# Patient Record
Sex: MALE | Race: WHITE | NOT HISPANIC OR LATINO | Employment: OTHER | URBAN - METROPOLITAN AREA
[De-identification: names, ages, dates, MRNs, and addresses within clinical notes are randomized per-mention and may not be internally consistent; named-entity substitution may affect disease eponyms.]

---

## 2017-04-28 DIAGNOSIS — R73.9 BLOOD GLUCOSE ELEVATED: Primary | ICD-10-CM

## 2017-04-28 DIAGNOSIS — E03.9 ACQUIRED HYPOTHYROIDISM: ICD-10-CM

## 2017-04-28 DIAGNOSIS — E78.5 HYPERLIPIDEMIA, UNSPECIFIED HYPERLIPIDEMIA TYPE: ICD-10-CM

## 2017-05-07 LAB
ALBUMIN SERPL-MCNC: 3.9 G/DL (ref 3.5–5.2)
ALBUMIN/GLOB SERPL: 1.1 G/DL
ALP SERPL-CCNC: 79 U/L (ref 39–117)
ALT SERPL-CCNC: 13 U/L (ref 1–41)
AST SERPL-CCNC: 15 U/L (ref 1–40)
BASOPHILS # BLD AUTO: 0.08 10*3/MM3 (ref 0–0.2)
BASOPHILS NFR BLD AUTO: 0.8 % (ref 0–1.5)
BILIRUB SERPL-MCNC: 0.3 MG/DL (ref 0.1–1.2)
BUN SERPL-MCNC: 25 MG/DL (ref 8–23)
BUN/CREAT SERPL: 16.4 (ref 7–25)
CALCIUM SERPL-MCNC: 9.3 MG/DL (ref 8.2–9.6)
CHLORIDE SERPL-SCNC: 102 MMOL/L (ref 98–107)
CHOLEST SERPL-MCNC: 133 MG/DL (ref 100–199)
CO2 SERPL-SCNC: 28.8 MMOL/L (ref 22–29)
CREAT SERPL-MCNC: 1.52 MG/DL (ref 0.76–1.27)
EOSINOPHIL # BLD AUTO: 0.18 10*3/MM3 (ref 0–0.7)
EOSINOPHIL NFR BLD AUTO: 1.8 % (ref 0.3–6.2)
ERYTHROCYTE [DISTWIDTH] IN BLOOD BY AUTOMATED COUNT: 13.5 % (ref 11.5–14.5)
GLOBULIN SER CALC-MCNC: 3.7 GM/DL
GLUCOSE SERPL-MCNC: 105 MG/DL (ref 65–99)
HBA1C MFR BLD: 5.6 % (ref 4.8–5.6)
HCT VFR BLD AUTO: 41.3 % (ref 40.4–52.2)
HDL SERPL-SCNC: 33.7 UMOL/L
HDLC SERPL-MCNC: 52 MG/DL
HGB BLD-MCNC: 13.5 G/DL (ref 13.7–17.6)
IMM GRANULOCYTES # BLD: 0.02 10*3/MM3 (ref 0–0.03)
IMM GRANULOCYTES NFR BLD: 0.2 % (ref 0–0.5)
LDL SERPL QN: 21.5 NM
LDL SERPL-SCNC: 634 NMOL/L
LDL SMALL SERPL-SCNC: 296 NMOL/L
LDLC SERPL CALC-MCNC: 63 MG/DL (ref 0–99)
LYMPHOCYTES # BLD AUTO: 2.79 10*3/MM3 (ref 0.9–4.8)
LYMPHOCYTES NFR BLD AUTO: 28.6 % (ref 19.6–45.3)
MCH RBC QN AUTO: 31.7 PG (ref 27–32.7)
MCHC RBC AUTO-ENTMCNC: 32.7 G/DL (ref 32.6–36.4)
MCV RBC AUTO: 96.9 FL (ref 79.8–96.2)
MONOCYTES # BLD AUTO: 1.09 10*3/MM3 (ref 0.2–1.2)
MONOCYTES NFR BLD AUTO: 11.2 % (ref 5–12)
NEUTROPHILS # BLD AUTO: 5.58 10*3/MM3 (ref 1.9–8.1)
NEUTROPHILS NFR BLD AUTO: 57.4 % (ref 42.7–76)
PLATELET # BLD AUTO: 183 10*3/MM3 (ref 140–500)
POTASSIUM SERPL-SCNC: 4.5 MMOL/L (ref 3.5–5.2)
PROT SERPL-MCNC: 7.6 G/DL (ref 6–8.5)
RBC # BLD AUTO: 4.26 10*6/MM3 (ref 4.6–6)
SODIUM SERPL-SCNC: 143 MMOL/L (ref 136–145)
TRIGL SERPL-MCNC: 88 MG/DL (ref 0–149)
TSH SERPL DL<=0.005 MIU/L-ACNC: 1.55 MIU/ML (ref 0.27–4.2)
WBC # BLD AUTO: 9.74 10*3/MM3 (ref 4.5–10.7)

## 2017-05-12 ENCOUNTER — OFFICE VISIT (OUTPATIENT)
Dept: FAMILY MEDICINE CLINIC | Facility: CLINIC | Age: 82
End: 2017-05-12

## 2017-05-12 VITALS
HEIGHT: 68 IN | WEIGHT: 152.8 LBS | TEMPERATURE: 97.8 F | DIASTOLIC BLOOD PRESSURE: 64 MMHG | OXYGEN SATURATION: 94 % | HEART RATE: 79 BPM | SYSTOLIC BLOOD PRESSURE: 120 MMHG | BODY MASS INDEX: 23.16 KG/M2

## 2017-05-12 DIAGNOSIS — I10 BENIGN ESSENTIAL HYPERTENSION: Primary | ICD-10-CM

## 2017-05-12 DIAGNOSIS — E78.5 HYPERLIPIDEMIA, UNSPECIFIED HYPERLIPIDEMIA TYPE: ICD-10-CM

## 2017-05-12 DIAGNOSIS — E03.9 ACQUIRED HYPOTHYROIDISM: ICD-10-CM

## 2017-05-12 DIAGNOSIS — N18.30 CHRONIC KIDNEY DISEASE, STAGE III (MODERATE) (HCC): ICD-10-CM

## 2017-05-12 PROCEDURE — 99214 OFFICE O/P EST MOD 30 MIN: CPT | Performed by: INTERNAL MEDICINE

## 2017-05-12 RX ORDER — SIMVASTATIN 5 MG
5 TABLET ORAL NIGHTLY
Qty: 90 TABLET | Refills: 3 | Status: SHIPPED | OUTPATIENT
Start: 2017-05-12 | End: 2018-12-13

## 2017-10-05 DIAGNOSIS — E03.9 ACQUIRED HYPOTHYROIDISM: ICD-10-CM

## 2017-10-05 DIAGNOSIS — E78.5 HYPERLIPIDEMIA, UNSPECIFIED HYPERLIPIDEMIA TYPE: ICD-10-CM

## 2017-10-11 LAB
ALBUMIN SERPL-MCNC: 4 G/DL (ref 3.5–5.2)
ALBUMIN/GLOB SERPL: 1.2 G/DL
ALP SERPL-CCNC: 72 U/L (ref 39–117)
ALT SERPL-CCNC: 13 U/L (ref 1–41)
AST SERPL-CCNC: 16 U/L (ref 1–40)
BASOPHILS # BLD AUTO: 0.08 10*3/MM3 (ref 0–0.2)
BASOPHILS NFR BLD AUTO: 1 % (ref 0–1.5)
BILIRUB SERPL-MCNC: 0.4 MG/DL (ref 0.1–1.2)
BUN SERPL-MCNC: 22 MG/DL (ref 8–23)
BUN/CREAT SERPL: 14.9 (ref 7–25)
CALCIUM SERPL-MCNC: 9 MG/DL (ref 8.2–9.6)
CHLORIDE SERPL-SCNC: 103 MMOL/L (ref 98–107)
CHOLEST SERPL-MCNC: 199 MG/DL (ref 100–199)
CO2 SERPL-SCNC: 27.8 MMOL/L (ref 22–29)
CREAT SERPL-MCNC: 1.48 MG/DL (ref 0.76–1.27)
EOSINOPHIL # BLD AUTO: 0.2 10*3/MM3 (ref 0–0.7)
EOSINOPHIL NFR BLD AUTO: 2.5 % (ref 0.3–6.2)
ERYTHROCYTE [DISTWIDTH] IN BLOOD BY AUTOMATED COUNT: 13.1 % (ref 11.5–14.5)
GLOBULIN SER CALC-MCNC: 3.3 GM/DL
GLUCOSE SERPL-MCNC: 90 MG/DL (ref 65–99)
HCT VFR BLD AUTO: 40 % (ref 40.4–52.2)
HDL SERPL-SCNC: 22.2 UMOL/L
HDLC SERPL-MCNC: 44 MG/DL
HGB BLD-MCNC: 13 G/DL (ref 13.7–17.6)
IMM GRANULOCYTES # BLD: 0 10*3/MM3 (ref 0–0.03)
IMM GRANULOCYTES NFR BLD: 0 % (ref 0–0.5)
LDL SERPL QN: 20.7 NM
LDL SERPL-SCNC: 1140 NMOL/L
LDL SMALL SERPL-SCNC: 637 NMOL/L
LDLC SERPL CALC-MCNC: 128 MG/DL (ref 0–99)
LYMPHOCYTES # BLD AUTO: 2.9 10*3/MM3 (ref 0.9–4.8)
LYMPHOCYTES NFR BLD AUTO: 35.8 % (ref 19.6–45.3)
MCH RBC QN AUTO: 31.9 PG (ref 27–32.7)
MCHC RBC AUTO-ENTMCNC: 32.5 G/DL (ref 32.6–36.4)
MCV RBC AUTO: 98.3 FL (ref 79.8–96.2)
MONOCYTES # BLD AUTO: 0.83 10*3/MM3 (ref 0.2–1.2)
MONOCYTES NFR BLD AUTO: 10.3 % (ref 5–12)
NEUTROPHILS # BLD AUTO: 4.08 10*3/MM3 (ref 1.9–8.1)
NEUTROPHILS NFR BLD AUTO: 50.4 % (ref 42.7–76)
PLATELET # BLD AUTO: 190 10*3/MM3 (ref 140–500)
POTASSIUM SERPL-SCNC: 3.5 MMOL/L (ref 3.5–5.2)
PROT SERPL-MCNC: 7.3 G/DL (ref 6–8.5)
RBC # BLD AUTO: 4.07 10*6/MM3 (ref 4.6–6)
SODIUM SERPL-SCNC: 143 MMOL/L (ref 136–145)
TRIGL SERPL-MCNC: 137 MG/DL (ref 0–149)
TSH SERPL DL<=0.005 MIU/L-ACNC: 2.49 MIU/ML (ref 0.27–4.2)
WBC # BLD AUTO: 8.09 10*3/MM3 (ref 4.5–10.7)

## 2017-10-17 ENCOUNTER — OFFICE VISIT (OUTPATIENT)
Dept: FAMILY MEDICINE CLINIC | Facility: CLINIC | Age: 82
End: 2017-10-17

## 2017-10-17 VITALS
WEIGHT: 154.2 LBS | HEART RATE: 79 BPM | DIASTOLIC BLOOD PRESSURE: 70 MMHG | BODY MASS INDEX: 23.37 KG/M2 | HEIGHT: 68 IN | OXYGEN SATURATION: 94 % | SYSTOLIC BLOOD PRESSURE: 124 MMHG | TEMPERATURE: 98 F

## 2017-10-17 DIAGNOSIS — N18.30 ANEMIA IN STAGE 3 CHRONIC KIDNEY DISEASE (HCC): ICD-10-CM

## 2017-10-17 DIAGNOSIS — Z23 NEED FOR IMMUNIZATION AGAINST INFLUENZA: Primary | ICD-10-CM

## 2017-10-17 DIAGNOSIS — N18.30 CHRONIC KIDNEY DISEASE, STAGE III (MODERATE) (HCC): ICD-10-CM

## 2017-10-17 DIAGNOSIS — I10 BENIGN ESSENTIAL HYPERTENSION: ICD-10-CM

## 2017-10-17 DIAGNOSIS — D63.1 ANEMIA IN STAGE 3 CHRONIC KIDNEY DISEASE (HCC): ICD-10-CM

## 2017-10-17 DIAGNOSIS — E78.5 HYPERLIPIDEMIA, UNSPECIFIED HYPERLIPIDEMIA TYPE: ICD-10-CM

## 2017-10-17 DIAGNOSIS — E03.9 ACQUIRED HYPOTHYROIDISM: ICD-10-CM

## 2017-10-17 PROBLEM — D64.9 ANEMIA: Status: ACTIVE | Noted: 2017-10-17

## 2017-10-17 PROCEDURE — 90662 IIV NO PRSV INCREASED AG IM: CPT | Performed by: INTERNAL MEDICINE

## 2017-10-17 PROCEDURE — 99214 OFFICE O/P EST MOD 30 MIN: CPT | Performed by: INTERNAL MEDICINE

## 2017-10-17 PROCEDURE — G0008 ADMIN INFLUENZA VIRUS VAC: HCPCS | Performed by: INTERNAL MEDICINE

## 2017-10-17 NOTE — PROGRESS NOTES
Subjective   Lavelle Soliman is a 89 y.o. male. Patient is here today for follow-up on his hyperlipidemia, hypertension, hypothyroidism and chronic kidney disease stage III.  Patient generally is feeling okay and is had no chest pain, shortness of breath, edema or myalgias.  He does complain of some intermittent symptoms of tinnitus and vertigo that seem to go together.  They're not terribly severe at this point needed is not taking needs to see any specialist or have any specific treatment.  He does continue to be followed [facility]    Chief Complaint   Patient presents with   • Hypothyroidism     HLD, HTN, BPH, CKD- FOLLOW UP LABS          Vitals:    10/17/17 0931   BP: 124/70   Pulse: 79   Temp: 98 °F (36.7 °C)   SpO2: 94%     The following portions of the patient's history were reviewed and updated as appropriate: allergies, current medications, past family history, past medical history, past social history, past surgical history and problem list.    History reviewed. No pertinent past medical history.   Allergies   Allergen Reactions   • Latex       Social History     Social History   • Marital status:      Spouse name: N/A   • Number of children: N/A   • Years of education: N/A     Occupational History   • Not on file.     Social History Main Topics   • Smoking status: Former Smoker   • Smokeless tobacco: Never Used   • Alcohol use Yes   • Drug use: Defer   • Sexual activity: Defer     Other Topics Concern   • Not on file     Social History Narrative        Current Outpatient Prescriptions:   •  hydrochlorothiazide (HYDRODIURIL) 12.5 MG tablet, Take by mouth., Disp: , Rfl:   •  levothyroxine (SYNTHROID, LEVOTHROID) 88 MCG tablet, Take 1 tablet by mouth daily., Disp: , Rfl:   •  simvastatin (ZOCOR) 5 MG tablet, Take 1 tablet by mouth Every Night., Disp: 90 tablet, Rfl: 3  •  terazosin (HYTRIN) 5 MG capsule, Take by mouth., Disp: , Rfl:      Objective     History of Present  Illness     Review of Systems   Constitutional: Negative.    HENT: Positive for tinnitus.    Eyes: Negative.    Respiratory: Negative.    Cardiovascular: Negative.    Gastrointestinal: Negative.    Endocrine: Negative.    Genitourinary: Negative.    Musculoskeletal: Negative.    Skin: Negative.    Neurological: Positive for dizziness.   Psychiatric/Behavioral: Negative.        Physical Exam   Constitutional: He is oriented to person, place, and time. He appears well-developed and well-nourished.   Pleasant, cooperative and in no distress with a blood pressure 130/80   HENT:   Head: Normocephalic and atraumatic.   Hearing aids   Eyes: Conjunctivae are normal. Pupils are equal, round, and reactive to light. No scleral icterus.   Neck: Normal range of motion. Neck supple.   Cardiovascular: Normal rate, regular rhythm and normal heart sounds.    Pulmonary/Chest: Effort normal and breath sounds normal. No respiratory distress. He has no wheezes. He has no rales.   Musculoskeletal: Normal range of motion. He exhibits no edema.   Neurological: He is alert and oriented to person, place, and time.   Skin: Skin is warm and dry.   Psychiatric: He has a normal mood and affect. His behavior is normal.   Nursing note and vitals reviewed.      ASSESSMENT  CBC is stable with just a minimally low RBC count hemoglobin and hematocrit.  CMP has an elevated creatinine that stable at 1.48 and otherwise is normal.  TSH was quite normal.  Her lipid panel is a bit higher than usual but probably okay with a total cholesterol of 199, HDL 44, LDL a bit higher at 128 with the particle number well in the moderate range at 1140.  #1-hypertension, excellent control  #2-hyperlipidemia, reasonable control on medicine  #3-chronic kidney disease stage III, stable  #4-hypothyroidism, stable  #5-mild anemia, related to his kidney disease and stable     Problem List Items Addressed This Visit        Cardiovascular and Mediastinum    Benign essential  hypertension    Hyperlipidemia       Endocrine    Hypothyroidism       Genitourinary    Chronic kidney disease, stage III (moderate)      Other Visit Diagnoses     Need for immunization against influenza    -  Primary    Relevant Orders    Flu Vaccine High Dose PF 65YR+          PLAN  The patient received a flu shot today.  He will continue current medicines and a plan on rechecking him in 6 months with a CBC, CMP, NMR lipid panel, TSH and free T4 and hemoglobin A1c    There are no Patient Instructions on file for this visit.  Return in about 6 months (around 4/17/2018) for with labs.

## 2018-04-18 DIAGNOSIS — E03.9 ACQUIRED HYPOTHYROIDISM: ICD-10-CM

## 2018-04-18 DIAGNOSIS — E78.5 HYPERLIPIDEMIA, UNSPECIFIED HYPERLIPIDEMIA TYPE: ICD-10-CM

## 2018-04-18 DIAGNOSIS — R73.9 BLOOD GLUCOSE ELEVATED: ICD-10-CM

## 2018-04-26 LAB
ALBUMIN SERPL-MCNC: 3.7 G/DL (ref 3.5–5.2)
ALBUMIN/GLOB SERPL: 1 G/DL
ALP SERPL-CCNC: 64 U/L (ref 39–117)
ALT SERPL-CCNC: 14 U/L (ref 1–41)
AST SERPL-CCNC: 15 U/L (ref 1–40)
BASOPHILS # BLD AUTO: 0.08 10*3/MM3 (ref 0–0.2)
BASOPHILS NFR BLD AUTO: 1 % (ref 0–1.5)
BILIRUB SERPL-MCNC: 0.4 MG/DL (ref 0.1–1.2)
BUN SERPL-MCNC: 20 MG/DL (ref 8–23)
BUN/CREAT SERPL: 12.8 (ref 7–25)
CALCIUM SERPL-MCNC: 9.2 MG/DL (ref 8.2–9.6)
CHLORIDE SERPL-SCNC: 101 MMOL/L (ref 98–107)
CHOLEST SERPL-MCNC: 212 MG/DL (ref 100–199)
CO2 SERPL-SCNC: 30.5 MMOL/L (ref 22–29)
CREAT SERPL-MCNC: 1.56 MG/DL (ref 0.76–1.27)
EOSINOPHIL # BLD AUTO: 0.14 10*3/MM3 (ref 0–0.7)
EOSINOPHIL NFR BLD AUTO: 1.7 % (ref 0.3–6.2)
ERYTHROCYTE [DISTWIDTH] IN BLOOD BY AUTOMATED COUNT: 13.2 % (ref 11.5–14.5)
GFR SERPLBLD CREATININE-BSD FMLA CKD-EPI: 42 ML/MIN/1.73
GFR SERPLBLD CREATININE-BSD FMLA CKD-EPI: 51 ML/MIN/1.73
GLOBULIN SER CALC-MCNC: 3.7 GM/DL
GLUCOSE SERPL-MCNC: 104 MG/DL (ref 65–99)
HBA1C MFR BLD: 5.9 % (ref 4.8–5.6)
HCT VFR BLD AUTO: 41.6 % (ref 40.4–52.2)
HDL SERPL-SCNC: 23.4 UMOL/L
HDLC SERPL-MCNC: 45 MG/DL
HGB BLD-MCNC: 13.8 G/DL (ref 13.7–17.6)
IMM GRANULOCYTES # BLD: 0.02 10*3/MM3 (ref 0–0.03)
IMM GRANULOCYTES NFR BLD: 0.2 % (ref 0–0.5)
LDL SERPL QN: 21.4 NM
LDL SERPL-SCNC: 1178 NMOL/L
LDL SMALL SERPL-SCNC: 407 NMOL/L
LDLC SERPL CALC-MCNC: 136 MG/DL (ref 0–99)
LYMPHOCYTES # BLD AUTO: 2.47 10*3/MM3 (ref 0.9–4.8)
LYMPHOCYTES NFR BLD AUTO: 29.8 % (ref 19.6–45.3)
MCH RBC QN AUTO: 32.2 PG (ref 27–32.7)
MCHC RBC AUTO-ENTMCNC: 33.2 G/DL (ref 32.6–36.4)
MCV RBC AUTO: 97 FL (ref 79.8–96.2)
MONOCYTES # BLD AUTO: 1 10*3/MM3 (ref 0.2–1.2)
MONOCYTES NFR BLD AUTO: 12.1 % (ref 5–12)
NEUTROPHILS # BLD AUTO: 4.59 10*3/MM3 (ref 1.9–8.1)
NEUTROPHILS NFR BLD AUTO: 55.4 % (ref 42.7–76)
PLATELET # BLD AUTO: 182 10*3/MM3 (ref 140–500)
POTASSIUM SERPL-SCNC: 3.7 MMOL/L (ref 3.5–5.2)
PROT SERPL-MCNC: 7.4 G/DL (ref 6–8.5)
RBC # BLD AUTO: 4.29 10*6/MM3 (ref 4.6–6)
SODIUM SERPL-SCNC: 142 MMOL/L (ref 136–145)
T4 FREE SERPL-MCNC: 1.67 NG/DL (ref 0.93–1.7)
TRIGL SERPL-MCNC: 155 MG/DL (ref 0–149)
TSH SERPL DL<=0.005 MIU/L-ACNC: 0.58 MIU/ML (ref 0.27–4.2)
WBC # BLD AUTO: 8.28 10*3/MM3 (ref 4.5–10.7)

## 2018-05-01 ENCOUNTER — OFFICE VISIT (OUTPATIENT)
Dept: FAMILY MEDICINE CLINIC | Facility: CLINIC | Age: 83
End: 2018-05-01

## 2018-05-01 VITALS
WEIGHT: 159.4 LBS | TEMPERATURE: 97.6 F | BODY MASS INDEX: 24.16 KG/M2 | HEIGHT: 68 IN | DIASTOLIC BLOOD PRESSURE: 74 MMHG | SYSTOLIC BLOOD PRESSURE: 122 MMHG | HEART RATE: 76 BPM | OXYGEN SATURATION: 95 %

## 2018-05-01 DIAGNOSIS — I10 BENIGN ESSENTIAL HYPERTENSION: ICD-10-CM

## 2018-05-01 DIAGNOSIS — E03.9 ACQUIRED HYPOTHYROIDISM: ICD-10-CM

## 2018-05-01 DIAGNOSIS — E78.5 HYPERLIPIDEMIA, UNSPECIFIED HYPERLIPIDEMIA TYPE: ICD-10-CM

## 2018-05-01 DIAGNOSIS — N18.30 ANEMIA IN STAGE 3 CHRONIC KIDNEY DISEASE (HCC): ICD-10-CM

## 2018-05-01 DIAGNOSIS — N18.30 CHRONIC KIDNEY DISEASE, STAGE III (MODERATE) (HCC): Primary | ICD-10-CM

## 2018-05-01 DIAGNOSIS — D63.1 ANEMIA IN STAGE 3 CHRONIC KIDNEY DISEASE (HCC): ICD-10-CM

## 2018-05-01 PROCEDURE — 99214 OFFICE O/P EST MOD 30 MIN: CPT | Performed by: INTERNAL MEDICINE

## 2018-05-01 NOTE — PROGRESS NOTES
Kristian Soliman is a 89 y.o. male. Patient is here today for follow-up on his hypertension, hyperlipidemia, hypothyroidism, chronic kidney stage III and mild anemia related to his kidney disease.  He's been feeling fine and is had no chest pain, shortness of breath, edema or myalgias.  He's had no medicine side effects.  Chief Complaint   Patient presents with   • Hypothyroidism     HLD, HTN- FOLLOW UP LABS          Vitals:    05/01/18 0927   BP: 122/74   Pulse: 76   Temp: 97.6 °F (36.4 °C)   SpO2: 95%     The following portions of the patient's history were reviewed and updated as appropriate: allergies, current medications, past family history, past medical history, past social history, past surgical history and problem list.    History reviewed. No pertinent past medical history.   Allergies   Allergen Reactions   • Latex       Social History     Social History   • Marital status:      Spouse name: N/A   • Number of children: N/A   • Years of education: N/A     Occupational History   • Not on file.     Social History Main Topics   • Smoking status: Former Smoker   • Smokeless tobacco: Never Used   • Alcohol use Yes   • Drug use: Unknown   • Sexual activity: Defer     Other Topics Concern   • Not on file     Social History Narrative   • No narrative on file        Current Outpatient Prescriptions:   •  hydrochlorothiazide (HYDRODIURIL) 12.5 MG tablet, Take by mouth., Disp: , Rfl:   •  levothyroxine (SYNTHROID, LEVOTHROID) 88 MCG tablet, Take 1 tablet by mouth daily., Disp: , Rfl:   •  simvastatin (ZOCOR) 5 MG tablet, Take 1 tablet by mouth Every Night., Disp: 90 tablet, Rfl: 3  •  terazosin (HYTRIN) 5 MG capsule, Take by mouth., Disp: , Rfl:      Objective     History of Present Illness     Review of Systems   Constitutional: Negative.    HENT: Negative.    Eyes: Negative.    Respiratory: Negative.    Cardiovascular: Negative.    Gastrointestinal: Negative.    Endocrine: Negative.     Genitourinary: Negative.    Musculoskeletal: Negative.    Skin: Negative.    Neurological: Negative.    Psychiatric/Behavioral: Negative.        Physical Exam   Constitutional: He is oriented to person, place, and time. He appears well-developed and well-nourished.   Pleasant, cooperative no distress   HENT:   Head: Normocephalic and atraumatic.   Eyes: Conjunctivae are normal. Pupils are equal, round, and reactive to light. No scleral icterus.   Neck: Normal range of motion. Neck supple. No thyromegaly present.   Cardiovascular: Normal rate, regular rhythm and normal heart sounds.    Pulmonary/Chest: Effort normal and breath sounds normal. No respiratory distress. He has no wheezes. He has no rales.   Musculoskeletal: Normal range of motion. He exhibits no edema.   Neurological: He is alert and oriented to person, place, and time.   Skin: Skin is warm and dry.   Psychiatric: He has a normal mood and affect. His behavior is normal.   Nursing note and vitals reviewed.      ASSESSMENT  CBC had a minimally low RBC count but otherwise was normal.  CMP had a sugar of 104 and a creatinine 1.56, both stable and otherwise was normal.  Hemoglobin A1c is a bit high but acceptable at 5.9.  TSH and free T4 were both normal.  His NMR lipid panels acceptable with a total cholesterol 212, HDL of 45,  but the particle number stable in the moderate range.  #1-hypertension, adequately controlled on medication  #2-hyperlipidemia, adequately controlled for age on medication  #3-hypothyroidism, euthyroid on replacement  #4-chronic kidney disease stage III, stable and asymptomatic  #5-borderline anemia, related to kidney disease and stable     Problem List Items Addressed This Visit        Cardiovascular and Mediastinum    Benign essential hypertension    Hyperlipidemia       Endocrine    Hypothyroidism       Genitourinary    Chronic kidney disease, stage III (moderate) - Primary       Hematopoietic and Hemostatic    Anemia       Other Visit Diagnoses    None.         PLAN  The patient will continue current medicines as now and a plan on rechecking him in 4 months with a CBC, CMP, NMR lipid panel and hemoglobin A1c and TSH    There are no Patient Instructions on file for this visit.  Return in about 4 months (around 9/1/2018) for with labs.

## 2018-11-01 DIAGNOSIS — R73.9 HYPERGLYCEMIA: ICD-10-CM

## 2018-11-01 DIAGNOSIS — Z79.899 LONG TERM USE OF DRUG: Primary | ICD-10-CM

## 2018-11-01 DIAGNOSIS — E03.9 HYPOTHYROIDISM, UNSPECIFIED TYPE: ICD-10-CM

## 2018-11-01 DIAGNOSIS — Z13.1 SCREENING FOR DIABETES MELLITUS: ICD-10-CM

## 2018-11-01 DIAGNOSIS — E78.5 HYPERLIPIDEMIA, UNSPECIFIED HYPERLIPIDEMIA TYPE: ICD-10-CM

## 2018-11-08 LAB
ALBUMIN SERPL-MCNC: 3.6 G/DL (ref 3.5–5.2)
ALBUMIN/GLOB SERPL: 0.9 G/DL
ALP SERPL-CCNC: 83 U/L (ref 39–117)
ALT SERPL-CCNC: 14 U/L (ref 1–41)
AST SERPL-CCNC: 12 U/L (ref 1–40)
BASOPHILS # BLD AUTO: 0.08 10*3/MM3 (ref 0–0.2)
BASOPHILS NFR BLD AUTO: 0.8 % (ref 0–1.5)
BILIRUB SERPL-MCNC: 0.3 MG/DL (ref 0.1–1.2)
BUN SERPL-MCNC: 27 MG/DL (ref 8–23)
BUN/CREAT SERPL: 17.8 (ref 7–25)
CALCIUM SERPL-MCNC: 8.9 MG/DL (ref 8.2–9.6)
CHLORIDE SERPL-SCNC: 103 MMOL/L (ref 98–107)
CHOLEST SERPL-MCNC: 191 MG/DL (ref 100–199)
CO2 SERPL-SCNC: 28.5 MMOL/L (ref 22–29)
CREAT SERPL-MCNC: 1.52 MG/DL (ref 0.76–1.27)
EOSINOPHIL # BLD AUTO: 0.2 10*3/MM3 (ref 0–0.7)
EOSINOPHIL NFR BLD AUTO: 2.1 % (ref 0.3–6.2)
ERYTHROCYTE [DISTWIDTH] IN BLOOD BY AUTOMATED COUNT: 13.1 % (ref 11.5–14.5)
GLOBULIN SER CALC-MCNC: 4 GM/DL
GLUCOSE SERPL-MCNC: 104 MG/DL (ref 65–99)
HBA1C MFR BLD: 5.4 % (ref 4.8–5.6)
HCT VFR BLD AUTO: 40.8 % (ref 40.4–52.2)
HDL SERPL-SCNC: 25.1 UMOL/L
HDLC SERPL-MCNC: 44 MG/DL
HGB BLD-MCNC: 12.9 G/DL (ref 13.7–17.6)
IMM GRANULOCYTES # BLD: 0.02 10*3/MM3 (ref 0–0.03)
IMM GRANULOCYTES NFR BLD: 0.2 % (ref 0–0.5)
LDL SERPL QN: 21.3 NM
LDL SERPL-SCNC: 1122 NMOL/L
LDL SMALL SERPL-SCNC: 410 NMOL/L
LDLC SERPL CALC-MCNC: 123 MG/DL (ref 0–99)
LYMPHOCYTES # BLD AUTO: 2.86 10*3/MM3 (ref 0.9–4.8)
LYMPHOCYTES NFR BLD AUTO: 29.3 % (ref 19.6–45.3)
MCH RBC QN AUTO: 31.1 PG (ref 27–32.7)
MCHC RBC AUTO-ENTMCNC: 31.6 G/DL (ref 32.6–36.4)
MCV RBC AUTO: 98.3 FL (ref 79.8–96.2)
MONOCYTES # BLD AUTO: 1.01 10*3/MM3 (ref 0.2–1.2)
MONOCYTES NFR BLD AUTO: 10.4 % (ref 5–12)
NEUTROPHILS # BLD AUTO: 5.58 10*3/MM3 (ref 1.9–8.1)
NEUTROPHILS NFR BLD AUTO: 57.2 % (ref 42.7–76)
PLATELET # BLD AUTO: 229 10*3/MM3 (ref 140–500)
POTASSIUM SERPL-SCNC: 3.3 MMOL/L (ref 3.5–5.2)
PROT SERPL-MCNC: 7.6 G/DL (ref 6–8.5)
RBC # BLD AUTO: 4.15 10*6/MM3 (ref 4.6–6)
SODIUM SERPL-SCNC: 141 MMOL/L (ref 136–145)
TRIGL SERPL-MCNC: 118 MG/DL (ref 0–149)
TSH SERPL DL<=0.005 MIU/L-ACNC: 1.16 MIU/ML (ref 0.27–4.2)
WBC # BLD AUTO: 9.75 10*3/MM3 (ref 4.5–10.7)

## 2018-11-13 ENCOUNTER — OFFICE VISIT (OUTPATIENT)
Dept: FAMILY MEDICINE CLINIC | Facility: CLINIC | Age: 83
End: 2018-11-13

## 2018-11-13 VITALS
BODY MASS INDEX: 23.95 KG/M2 | HEIGHT: 68 IN | WEIGHT: 158 LBS | OXYGEN SATURATION: 96 % | RESPIRATION RATE: 16 BRPM | SYSTOLIC BLOOD PRESSURE: 128 MMHG | DIASTOLIC BLOOD PRESSURE: 74 MMHG | HEART RATE: 78 BPM

## 2018-11-13 DIAGNOSIS — R73.9 HYPERGLYCEMIA: ICD-10-CM

## 2018-11-13 DIAGNOSIS — I10 BENIGN ESSENTIAL HYPERTENSION: ICD-10-CM

## 2018-11-13 DIAGNOSIS — N18.30 ANEMIA DUE TO STAGE 3 CHRONIC KIDNEY DISEASE (HCC): ICD-10-CM

## 2018-11-13 DIAGNOSIS — N18.30 CHRONIC KIDNEY DISEASE, STAGE III (MODERATE) (HCC): ICD-10-CM

## 2018-11-13 DIAGNOSIS — E03.9 ACQUIRED HYPOTHYROIDISM: ICD-10-CM

## 2018-11-13 DIAGNOSIS — D63.1 ANEMIA DUE TO STAGE 3 CHRONIC KIDNEY DISEASE (HCC): ICD-10-CM

## 2018-11-13 DIAGNOSIS — E78.5 HYPERLIPIDEMIA, UNSPECIFIED HYPERLIPIDEMIA TYPE: Primary | ICD-10-CM

## 2018-11-13 PROCEDURE — 99214 OFFICE O/P EST MOD 30 MIN: CPT | Performed by: INTERNAL MEDICINE

## 2018-11-13 NOTE — PROGRESS NOTES
Kristian Soliman is a 89 y.o. male. Patient is here today for follow-up on his hypertension, hyperlipidemia, hypothyroidism, chronic kidney disease stage III and mild anemia.  He is generally stable and has no new acute complaints other than some mild constipation.  He's had no chest pain, shortness of breath, edema or myalgias  Chief Complaint   Patient presents with   • Hyperglycemia   • Hyperlipidemia   • Hypothyroidism          Vitals:    11/13/18 0941   BP: 128/74   Pulse: 78   Resp: 16   SpO2: 96%     The following portions of the patient's history were reviewed and updated as appropriate: allergies, current medications, past family history, past medical history, past social history, past surgical history and problem list.    History reviewed. No pertinent past medical history.   Allergies   Allergen Reactions   • Latex       Social History     Socioeconomic History   • Marital status:      Spouse name: Not on file   • Number of children: Not on file   • Years of education: Not on file   • Highest education level: Not on file   Social Needs   • Financial resource strain: Not on file   • Food insecurity - worry: Not on file   • Food insecurity - inability: Not on file   • Transportation needs - medical: Not on file   • Transportation needs - non-medical: Not on file   Occupational History   • Not on file   Tobacco Use   • Smoking status: Former Smoker   • Smokeless tobacco: Never Used   Substance and Sexual Activity   • Alcohol use: Yes   • Drug use: Defer   • Sexual activity: Defer   Other Topics Concern   • Not on file   Social History Narrative   • Not on file        Current Outpatient Medications:   •  hydrochlorothiazide (HYDRODIURIL) 12.5 MG tablet, Take by mouth., Disp: , Rfl:   •  levothyroxine (SYNTHROID, LEVOTHROID) 88 MCG tablet, Take 1 tablet by mouth daily., Disp: , Rfl:   •  simvastatin (ZOCOR) 5 MG tablet, Take 1 tablet by mouth Every Night., Disp: 90 tablet, Rfl: 3  •   terazosin (HYTRIN) 5 MG capsule, Take by mouth., Disp: , Rfl:      Objective     History of Present Illness     Review of Systems   Constitutional: Negative.    HENT: Negative.    Eyes: Negative.    Respiratory: Negative.    Cardiovascular: Negative.    Gastrointestinal: Negative.    Genitourinary: Negative.    Musculoskeletal: Negative.    Skin: Negative.    Neurological: Negative.    Psychiatric/Behavioral: Negative.        Physical Exam   Constitutional: He is oriented to person, place, and time. He appears well-developed and well-nourished.   Pleasant, cooperative no distress blood pressure 120/80   HENT:   Head: Normocephalic and atraumatic.   Eyes: Conjunctivae are normal. Pupils are equal, round, and reactive to light. No scleral icterus.   Neck: Normal range of motion. Neck supple.   Cardiovascular: Normal rate, regular rhythm and normal heart sounds.   Pulmonary/Chest: Effort normal and breath sounds normal. No stridor. No respiratory distress. He has no wheezes.   Musculoskeletal: Normal range of motion. He exhibits no edema.   Neurological: He is alert and oriented to person, place, and time.   Skin: Skin is warm and dry.   Psychiatric: He has a normal mood and affect. His behavior is normal.   Nursing note and vitals reviewed.      ASSESSMENT  CBC is stable with a minimally low RBC count and hemoglobin but normal hematocrit.  CMP had an elevated sugar of 104 and a creatinine of 1.5 to that are stable and was otherwise normal.  Hemoglobin A1c was normal at 5.4.  TSH is quite normal.  NMR lipid panel is stable with total cholesterol 191, HDL 44,  with the particle number in the moderate range.  #1-hypertension, controlled on medication  #2-hyperlipidemia, controlled on medication  #3-hypothyroidism, euthyroid on replacement  #4-hyperglycemia, mild and stable with normal hemoglobin A1c  #5-chronic kidney disease stage III, stable     Problem List Items Addressed This Visit        Cardiovascular and  Mediastinum    Benign essential hypertension    Hyperlipidemia - Primary       Endocrine    Hypothyroidism       Genitourinary    Chronic kidney disease, stage III (moderate) (CMS/HCC)       Hematopoietic and Hemostatic    Anemia          PLAN  I recommended the shingles vaccinations to the patient.  He will continue current medicines as now and can try some MiraLAX for the constipation.  I plan on rechecking him in 6 months with a CBC, CMP, lipid panel, hemoglobin A1c, TSH and free T4    There are no Patient Instructions on file for this visit.  Return in about 6 months (around 5/13/2019) for with labs.

## 2018-12-03 ENCOUNTER — APPOINTMENT (OUTPATIENT)
Dept: GENERAL RADIOLOGY | Facility: HOSPITAL | Age: 83
End: 2018-12-03

## 2018-12-03 ENCOUNTER — HOSPITAL ENCOUNTER (INPATIENT)
Facility: HOSPITAL | Age: 83
LOS: 5 days | Discharge: HOME HEALTH | End: 2018-12-08
Attending: EMERGENCY MEDICINE | Admitting: HOSPITALIST

## 2018-12-03 ENCOUNTER — APPOINTMENT (OUTPATIENT)
Dept: CT IMAGING | Facility: HOSPITAL | Age: 83
End: 2018-12-03

## 2018-12-03 ENCOUNTER — LAB (OUTPATIENT)
Dept: LAB | Facility: HOSPITAL | Age: 83
End: 2018-12-03

## 2018-12-03 ENCOUNTER — TRANSCRIBE ORDERS (OUTPATIENT)
Dept: ADMINISTRATIVE | Facility: HOSPITAL | Age: 83
End: 2018-12-03

## 2018-12-03 DIAGNOSIS — N13.8 BENIGN PROSTATIC HYPERPLASIA WITH URINARY OBSTRUCTION: ICD-10-CM

## 2018-12-03 DIAGNOSIS — N39.0 URINARY TRACT INFECTION WITHOUT HEMATURIA, SITE UNSPECIFIED: Primary | ICD-10-CM

## 2018-12-03 DIAGNOSIS — A41.9 SEPSIS, DUE TO UNSPECIFIED ORGANISM: ICD-10-CM

## 2018-12-03 DIAGNOSIS — N30.00 ACUTE CYSTITIS WITHOUT HEMATURIA: Primary | ICD-10-CM

## 2018-12-03 DIAGNOSIS — N39.0 URINARY TRACT INFECTION WITHOUT HEMATURIA, SITE UNSPECIFIED: ICD-10-CM

## 2018-12-03 DIAGNOSIS — R33.9 URINARY RETENTION: ICD-10-CM

## 2018-12-03 DIAGNOSIS — N40.1 BENIGN PROSTATIC HYPERPLASIA WITH URINARY OBSTRUCTION: ICD-10-CM

## 2018-12-03 DIAGNOSIS — D72.828 OTHER ELEVATED WHITE BLOOD CELL (WBC) COUNT: ICD-10-CM

## 2018-12-03 DIAGNOSIS — I48.91 NEW ONSET A-FIB (HCC): ICD-10-CM

## 2018-12-03 PROBLEM — N18.30 CKD (CHRONIC KIDNEY DISEASE), STAGE III: Status: ACTIVE | Noted: 2018-12-03

## 2018-12-03 PROBLEM — A49.9 UTI (URINARY TRACT INFECTION), BACTERIAL: Status: ACTIVE | Noted: 2018-12-03

## 2018-12-03 PROBLEM — E87.6 HYPOKALEMIA: Status: ACTIVE | Noted: 2018-12-03

## 2018-12-03 PROBLEM — I10 HTN (HYPERTENSION): Status: ACTIVE | Noted: 2018-12-03

## 2018-12-03 PROBLEM — N17.9 AKI (ACUTE KIDNEY INJURY) (HCC): Status: ACTIVE | Noted: 2018-12-03

## 2018-12-03 LAB
ALBUMIN SERPL-MCNC: 3.4 G/DL (ref 3.5–5.2)
ALBUMIN SERPL-MCNC: 3.4 G/DL (ref 3.5–5.2)
ALBUMIN/GLOB SERPL: 0.8 G/DL
ALBUMIN/GLOB SERPL: 0.8 G/DL
ALP SERPL-CCNC: 85 U/L (ref 39–117)
ALP SERPL-CCNC: 89 U/L (ref 39–117)
ALT SERPL W P-5'-P-CCNC: 13 U/L (ref 1–41)
ALT SERPL W P-5'-P-CCNC: 14 U/L (ref 1–41)
ANION GAP SERPL CALCULATED.3IONS-SCNC: 11.5 MMOL/L
ANION GAP SERPL CALCULATED.3IONS-SCNC: 14.6 MMOL/L
AST SERPL-CCNC: 13 U/L (ref 1–40)
AST SERPL-CCNC: 14 U/L (ref 1–40)
BACTERIA UR QL AUTO: ABNORMAL /HPF
BASOPHILS # BLD AUTO: 0.03 10*3/MM3 (ref 0–0.2)
BASOPHILS NFR BLD AUTO: 0.1 % (ref 0–1.5)
BILIRUB SERPL-MCNC: 0.6 MG/DL (ref 0.1–1.2)
BILIRUB SERPL-MCNC: 0.8 MG/DL (ref 0.1–1.2)
BILIRUB UR QL STRIP: NEGATIVE
BUN BLD-MCNC: 33 MG/DL (ref 8–23)
BUN BLD-MCNC: 35 MG/DL (ref 8–23)
BUN/CREAT SERPL: 15.3 (ref 7–25)
BUN/CREAT SERPL: 17 (ref 7–25)
CALCIUM SPEC-SCNC: 8.7 MG/DL (ref 8.2–9.6)
CALCIUM SPEC-SCNC: 8.7 MG/DL (ref 8.2–9.6)
CHLORIDE SERPL-SCNC: 100 MMOL/L (ref 98–107)
CHLORIDE SERPL-SCNC: 97 MMOL/L (ref 98–107)
CLARITY UR: ABNORMAL
CO2 SERPL-SCNC: 24.4 MMOL/L (ref 22–29)
CO2 SERPL-SCNC: 24.5 MMOL/L (ref 22–29)
COLOR UR: YELLOW
CREAT BLD-MCNC: 2.06 MG/DL (ref 0.76–1.27)
CREAT BLD-MCNC: 2.16 MG/DL (ref 0.76–1.27)
D-LACTATE SERPL-SCNC: 1.8 MMOL/L (ref 0.5–2)
DEPRECATED RDW RBC AUTO: 44.3 FL (ref 37–54)
DEPRECATED RDW RBC AUTO: 45.9 FL (ref 37–54)
EOSINOPHIL # BLD AUTO: 0 10*3/MM3 (ref 0–0.7)
EOSINOPHIL NFR BLD AUTO: 0 % (ref 0.3–6.2)
ERYTHROCYTE [DISTWIDTH] IN BLOOD BY AUTOMATED COUNT: 12.9 % (ref 11.5–14.5)
ERYTHROCYTE [DISTWIDTH] IN BLOOD BY AUTOMATED COUNT: 13.1 % (ref 11.5–14.5)
GFR SERPL CREATININE-BSD FRML MDRD: 29 ML/MIN/1.73
GFR SERPL CREATININE-BSD FRML MDRD: 30 ML/MIN/1.73
GLOBULIN UR ELPH-MCNC: 4.2 GM/DL
GLOBULIN UR ELPH-MCNC: 4.4 GM/DL
GLUCOSE BLD-MCNC: 119 MG/DL (ref 65–99)
GLUCOSE BLD-MCNC: 122 MG/DL (ref 65–99)
GLUCOSE UR STRIP-MCNC: NEGATIVE MG/DL
HCT VFR BLD AUTO: 36.5 % (ref 40.4–52.2)
HCT VFR BLD AUTO: 38.3 % (ref 40.4–52.2)
HGB BLD-MCNC: 12.3 G/DL (ref 13.7–17.6)
HGB BLD-MCNC: 12.4 G/DL (ref 13.7–17.6)
HGB UR QL STRIP.AUTO: ABNORMAL
HYALINE CASTS UR QL AUTO: ABNORMAL /LPF
IMM GRANULOCYTES # BLD: 0.12 10*3/MM3 (ref 0–0.03)
IMM GRANULOCYTES NFR BLD: 0.5 % (ref 0–0.5)
KETONES UR QL STRIP: ABNORMAL
LEUKOCYTE ESTERASE UR QL STRIP.AUTO: ABNORMAL
LYMPHOCYTES # BLD AUTO: 1.18 10*3/MM3 (ref 0.9–4.8)
LYMPHOCYTES NFR BLD AUTO: 5 % (ref 19.6–45.3)
MCH RBC QN AUTO: 31.3 PG (ref 27–32.7)
MCH RBC QN AUTO: 31.6 PG (ref 27–32.7)
MCHC RBC AUTO-ENTMCNC: 32.4 G/DL (ref 32.6–36.4)
MCHC RBC AUTO-ENTMCNC: 33.7 G/DL (ref 32.6–36.4)
MCV RBC AUTO: 93.8 FL (ref 79.8–96.2)
MCV RBC AUTO: 96.7 FL (ref 79.8–96.2)
MONOCYTES # BLD AUTO: 2.52 10*3/MM3 (ref 0.2–1.2)
MONOCYTES NFR BLD AUTO: 10.7 % (ref 5–12)
NEUTROPHILS # BLD AUTO: 19.7 10*3/MM3 (ref 1.9–8.1)
NEUTROPHILS NFR BLD AUTO: 83.7 % (ref 42.7–76)
NITRITE UR QL STRIP: NEGATIVE
PH UR STRIP.AUTO: 5.5 [PH] (ref 5–8)
PLATELET # BLD AUTO: 158 10*3/MM3 (ref 140–500)
PLATELET # BLD AUTO: 166 10*3/MM3 (ref 140–500)
PMV BLD AUTO: 10.7 FL (ref 6–12)
PMV BLD AUTO: 11 FL (ref 6–12)
POTASSIUM BLD-SCNC: 3.2 MMOL/L (ref 3.5–5.2)
POTASSIUM BLD-SCNC: 3.7 MMOL/L (ref 3.5–5.2)
PROCALCITONIN SERPL-MCNC: 2.28 NG/ML (ref 0.1–0.25)
PROT SERPL-MCNC: 7.6 G/DL (ref 6–8.5)
PROT SERPL-MCNC: 7.8 G/DL (ref 6–8.5)
PROT UR QL STRIP: ABNORMAL
RBC # BLD AUTO: 3.89 10*6/MM3 (ref 4.6–6)
RBC # BLD AUTO: 3.96 10*6/MM3 (ref 4.6–6)
RBC # UR: ABNORMAL /HPF
REF LAB TEST METHOD: ABNORMAL
SODIUM BLD-SCNC: 136 MMOL/L (ref 136–145)
SODIUM BLD-SCNC: 136 MMOL/L (ref 136–145)
SP GR UR STRIP: 1.01 (ref 1–1.03)
SQUAMOUS #/AREA URNS HPF: ABNORMAL /HPF
TROPONIN T SERPL-MCNC: <0.01 NG/ML (ref 0–0.03)
UROBILINOGEN UR QL STRIP: ABNORMAL
WBC NRBC COR # BLD: 23.55 10*3/MM3 (ref 4.5–10.7)
WBC NRBC COR # BLD: 25.93 10*3/MM3 (ref 4.5–10.7)
WBC UR QL AUTO: ABNORMAL /HPF

## 2018-12-03 PROCEDURE — 72110 X-RAY EXAM L-2 SPINE 4/>VWS: CPT | Performed by: GENERAL PRACTICE

## 2018-12-03 PROCEDURE — 74176 CT ABD & PELVIS W/O CONTRAST: CPT

## 2018-12-03 PROCEDURE — 85025 COMPLETE CBC W/AUTO DIFF WBC: CPT | Performed by: EMERGENCY MEDICINE

## 2018-12-03 PROCEDURE — 25010000002 HEPARIN (PORCINE) PER 1000 UNITS: Performed by: HOSPITALIST

## 2018-12-03 PROCEDURE — 93005 ELECTROCARDIOGRAM TRACING: CPT | Performed by: HOSPITALIST

## 2018-12-03 PROCEDURE — 25010000003 CEFTRIAXONE PER 250 MG: Performed by: EMERGENCY MEDICINE

## 2018-12-03 PROCEDURE — 83605 ASSAY OF LACTIC ACID: CPT | Performed by: EMERGENCY MEDICINE

## 2018-12-03 PROCEDURE — 84145 PROCALCITONIN (PCT): CPT | Performed by: EMERGENCY MEDICINE

## 2018-12-03 PROCEDURE — 87040 BLOOD CULTURE FOR BACTERIA: CPT | Performed by: EMERGENCY MEDICINE

## 2018-12-03 PROCEDURE — 36415 COLL VENOUS BLD VENIPUNCTURE: CPT

## 2018-12-03 PROCEDURE — 99284 EMERGENCY DEPT VISIT MOD MDM: CPT

## 2018-12-03 PROCEDURE — 80053 COMPREHEN METABOLIC PANEL: CPT | Performed by: EMERGENCY MEDICINE

## 2018-12-03 PROCEDURE — 81001 URINALYSIS AUTO W/SCOPE: CPT | Performed by: EMERGENCY MEDICINE

## 2018-12-03 PROCEDURE — 80053 COMPREHEN METABOLIC PANEL: CPT

## 2018-12-03 PROCEDURE — 93010 ELECTROCARDIOGRAM REPORT: CPT | Performed by: INTERNAL MEDICINE

## 2018-12-03 PROCEDURE — 25010000002 CEFTRIAXONE PER 250 MG: Performed by: HOSPITALIST

## 2018-12-03 PROCEDURE — 84484 ASSAY OF TROPONIN QUANT: CPT | Performed by: HOSPITALIST

## 2018-12-03 PROCEDURE — 85027 COMPLETE CBC AUTOMATED: CPT

## 2018-12-03 RX ORDER — ONDANSETRON 4 MG/1
4 TABLET, FILM COATED ORAL EVERY 6 HOURS PRN
Status: DISCONTINUED | OUTPATIENT
Start: 2018-12-03 | End: 2018-12-08 | Stop reason: HOSPADM

## 2018-12-03 RX ORDER — SODIUM CHLORIDE 0.9 % (FLUSH) 0.9 %
3-10 SYRINGE (ML) INJECTION AS NEEDED
Status: DISCONTINUED | OUTPATIENT
Start: 2018-12-03 | End: 2018-12-08 | Stop reason: HOSPADM

## 2018-12-03 RX ORDER — TERAZOSIN 5 MG/1
5 CAPSULE ORAL NIGHTLY
Status: DISCONTINUED | OUTPATIENT
Start: 2018-12-03 | End: 2018-12-08 | Stop reason: HOSPADM

## 2018-12-03 RX ORDER — ONDANSETRON 4 MG/1
4 TABLET, ORALLY DISINTEGRATING ORAL EVERY 6 HOURS PRN
Status: DISCONTINUED | OUTPATIENT
Start: 2018-12-03 | End: 2018-12-08 | Stop reason: HOSPADM

## 2018-12-03 RX ORDER — POTASSIUM CHLORIDE 1.5 G/1.77G
40 POWDER, FOR SOLUTION ORAL ONCE
Status: COMPLETED | OUTPATIENT
Start: 2018-12-03 | End: 2018-12-03

## 2018-12-03 RX ORDER — ONDANSETRON 2 MG/ML
4 INJECTION INTRAMUSCULAR; INTRAVENOUS EVERY 6 HOURS PRN
Status: DISCONTINUED | OUTPATIENT
Start: 2018-12-03 | End: 2018-12-08 | Stop reason: HOSPADM

## 2018-12-03 RX ORDER — NITROGLYCERIN 0.4 MG/1
0.4 TABLET SUBLINGUAL
Status: DISCONTINUED | OUTPATIENT
Start: 2018-12-03 | End: 2018-12-08 | Stop reason: HOSPADM

## 2018-12-03 RX ORDER — HEPARIN SODIUM 5000 [USP'U]/ML
5000 INJECTION, SOLUTION INTRAVENOUS; SUBCUTANEOUS EVERY 12 HOURS SCHEDULED
Status: DISCONTINUED | OUTPATIENT
Start: 2018-12-03 | End: 2018-12-08 | Stop reason: HOSPADM

## 2018-12-03 RX ORDER — DILTIAZEM HYDROCHLORIDE 5 MG/ML
0.25 INJECTION INTRAVENOUS ONCE
Status: DISCONTINUED | OUTPATIENT
Start: 2018-12-03 | End: 2018-12-04

## 2018-12-03 RX ORDER — SODIUM CHLORIDE 9 MG/ML
125 INJECTION, SOLUTION INTRAVENOUS CONTINUOUS
Status: DISCONTINUED | OUTPATIENT
Start: 2018-12-03 | End: 2018-12-05

## 2018-12-03 RX ORDER — LEVOTHYROXINE SODIUM 88 UG/1
88 TABLET ORAL DAILY
Status: DISCONTINUED | OUTPATIENT
Start: 2018-12-03 | End: 2018-12-08 | Stop reason: HOSPADM

## 2018-12-03 RX ORDER — ACETAMINOPHEN 325 MG/1
650 TABLET ORAL EVERY 4 HOURS PRN
Status: DISCONTINUED | OUTPATIENT
Start: 2018-12-03 | End: 2018-12-08 | Stop reason: HOSPADM

## 2018-12-03 RX ORDER — SODIUM CHLORIDE 0.9 % (FLUSH) 0.9 %
3 SYRINGE (ML) INJECTION EVERY 12 HOURS SCHEDULED
Status: DISCONTINUED | OUTPATIENT
Start: 2018-12-03 | End: 2018-12-08 | Stop reason: HOSPADM

## 2018-12-03 RX ORDER — CEFTRIAXONE SODIUM 2 G/50ML
2 INJECTION, SOLUTION INTRAVENOUS ONCE
Status: COMPLETED | OUTPATIENT
Start: 2018-12-03 | End: 2018-12-03

## 2018-12-03 RX ORDER — CEFTRIAXONE SODIUM 1 G/50ML
1 INJECTION, SOLUTION INTRAVENOUS EVERY 24 HOURS
Status: DISCONTINUED | OUTPATIENT
Start: 2018-12-03 | End: 2018-12-07

## 2018-12-03 RX ORDER — SODIUM CHLORIDE 0.9 % (FLUSH) 0.9 %
10 SYRINGE (ML) INJECTION AS NEEDED
Status: DISCONTINUED | OUTPATIENT
Start: 2018-12-03 | End: 2018-12-08 | Stop reason: HOSPADM

## 2018-12-03 RX ORDER — ATORVASTATIN CALCIUM 10 MG/1
10 TABLET, FILM COATED ORAL DAILY
Status: DISCONTINUED | OUTPATIENT
Start: 2018-12-03 | End: 2018-12-08 | Stop reason: HOSPADM

## 2018-12-03 RX ADMIN — LEVOTHYROXINE SODIUM 88 MCG: 88 TABLET ORAL at 20:08

## 2018-12-03 RX ADMIN — SODIUM CHLORIDE 125 ML/HR: 9 INJECTION, SOLUTION INTRAVENOUS at 12:41

## 2018-12-03 RX ADMIN — SODIUM CHLORIDE 1000 ML: 9 INJECTION, SOLUTION INTRAVENOUS at 11:26

## 2018-12-03 RX ADMIN — CEFTRIAXONE SODIUM 2 G: 2 INJECTION, SOLUTION INTRAVENOUS at 12:41

## 2018-12-03 RX ADMIN — METOPROLOL TARTRATE 25 MG: 25 TABLET ORAL at 17:34

## 2018-12-03 RX ADMIN — TERAZOSIN HYDROCHLORIDE 5 MG: 5 CAPSULE ORAL at 20:08

## 2018-12-03 RX ADMIN — SODIUM CHLORIDE 125 ML/HR: 9 INJECTION, SOLUTION INTRAVENOUS at 18:33

## 2018-12-03 RX ADMIN — SODIUM CHLORIDE, PRESERVATIVE FREE 3 ML: 5 INJECTION INTRAVENOUS at 22:05

## 2018-12-03 RX ADMIN — ATORVASTATIN CALCIUM 10 MG: 10 TABLET, FILM COATED ORAL at 20:08

## 2018-12-03 RX ADMIN — HEPARIN SODIUM 5000 UNITS: 5000 INJECTION INTRAVENOUS; SUBCUTANEOUS at 20:08

## 2018-12-03 RX ADMIN — CEFTRIAXONE SODIUM 1 G: 1 INJECTION, SOLUTION INTRAVENOUS at 22:06

## 2018-12-03 RX ADMIN — POTASSIUM CHLORIDE 40 MEQ: 1.5 POWDER, FOR SOLUTION ORAL at 22:05

## 2018-12-03 NOTE — ED PROVIDER NOTES
" EMERGENCY DEPARTMENT ENCOUNTER    Room Number:  16/16  PCP: Rafita Colunga MD  Historian: Patient, Family      HPI  Chief Complaint: Flank Pain  Context: Lavelle Soliman is a 89 y.o. male who presents to the ED c/o intermittent episodes of left flank pain radiating to the lower back onset about 5-6 days ago. No known direct trauma sustained to the back recently. Pt reports that his flank pain has progressively worsened since initial onset. Pt reports that he has also had dysuria, difficulty urinating, and intermittent confusion. Pt denies documented fever, headache, neck pain/stiffness, hematuria, abdominal pain, N/V/D, chest pain, dyspnea, new cough, sore throat, bowel/bladder incontinence, motor/sensory deficits, and saddle anesthesia. Pt reports that he was seen for this at the [facility] earlier today and had a UA ordered, which suggested that pt had a UTI; consequently, pt was referred to the ER for further evaluation. There are no other complaints at this time.      Pain Location: Left flank  Radiation: Lower back  Character: \"aching\"  Duration: Onset about 5-6 days ago  Severity: Moderate  Progression: Worsening  Aggravating Factors: Nothing  Alleviating Factors: Nothing          MEDICAL RECORD REVIEW    Pt had an L-Spine X-Ray performed at the [facility] earlier today that showed: Old L2 volume loss similar to 2009. There is intervertebral  disc space narrowing at L1-2 and a 3-4 and L5-S1. No acute compression  deformity. Lower lumbar facet hypertrophy. No spondylolysis nor  spondylolisthesis.     There is multilevel disc degeneration lower thoracic spine with minimal  wedging of T12 and volume loss T11, age indeterminate.    Pt's UA dipstick results from [facility] earlier today (urine cultures were ordered):  Ref Range & Units 08:57    Color Yellow, Straw, Dark Yellow, Yuliana Dark Yellow    Clarity, UA Clear Cloudy Abnormal     Glucose, UA Negative, 1000 mg/dL (3+) mg/dL " Negative    Bilirubin Negative Negative    Ketones, UA Negative Trace Abnormal     Specific Gravity  1.005 - 1.030 1.025    Blood, UA Negative 2+ Abnormal     pH, Urine 5.0 - 8.0 5.5    Protein, POC Negative mg/dL 1+ Abnormal     Urobilinogen, UA Normal Normal    Nitrite, UA Negative Negative    Leukocytes Negative Small (1+) Abnormal                     PAST MEDICAL HISTORY  Active Ambulatory Problems     Diagnosis Date Noted   • Benign essential hypertension 02/11/2016   • Benign prostatic hyperplasia with urinary obstruction 02/11/2016   • Chronic kidney disease, stage III (moderate) (CMS/[facility]) 02/11/2016   • Hyperlipidemia 02/11/2016   • Hypothyroidism 02/11/2016   • Muscle cramps 06/30/2016   • Anemia 10/17/2017   • Hyperglycemia 11/13/2018     Resolved Ambulatory Problems     Diagnosis Date Noted   • No Resolved Ambulatory Problems     Past Medical History:   Diagnosis Date   • Disease of thyroid gland          PAST SURGICAL HISTORY  Past Surgical History:   Procedure Laterality Date   • HERNIA REPAIR           FAMILY HISTORY  Family History   Problem Relation Age of Onset   • No Known Problems Mother    • No Known Problems Father          SOCIAL HISTORY  Social History     Socioeconomic History   • Marital status:      Spouse name: Not on file   • Number of children: Not on file   • Years of education: Not on file   • Highest education level: Not on file   Social Needs   • Financial resource strain: Not on file   • Food insecurity - worry: Not on file   • Food insecurity - inability: Not on file   • Transportation needs - medical: Not on file   • Transportation needs - non-medical: Not on file   Occupational History   • Not on file   Tobacco Use   • Smoking status: Former Smoker   • Smokeless tobacco: Never Used   Substance and Sexual Activity   • Alcohol use: Yes     Comment: occ   • Drug use: Defer   • Sexual activity: Defer   Other Topics Concern   • Not on file   Social History Narrative   • Not on  file         ALLERGIES  Latex        REVIEW OF SYSTEMS  Review of Systems   Constitutional: Negative for chills and fever.   HENT: Negative for congestion, rhinorrhea and sore throat.    Eyes: Negative for pain.   Respiratory: Negative for cough and shortness of breath.    Cardiovascular: Negative for chest pain, palpitations and leg swelling.   Gastrointestinal: Negative for abdominal pain, diarrhea, nausea and vomiting.   Genitourinary: Positive for difficulty urinating, dysuria and flank pain (left-sided). Negative for frequency and hematuria.   Musculoskeletal: Positive for back pain. Negative for myalgias, neck pain and neck stiffness.   Skin: Negative for rash.   Neurological: Negative for dizziness, speech difficulty, weakness, light-headedness, numbness and headaches.   Psychiatric/Behavioral: Positive for confusion.   All other systems reviewed and are negative.           PHYSICAL EXAM  ED Triage Vitals   Temp Heart Rate Resp BP SpO2   12/03/18 1047 12/03/18 1047 12/03/18 1047 12/03/18 1102 12/03/18 1047   99.9 °F (37.7 °C) 97 16 121/63 94 %      Temp src Heart Rate Source Patient Position BP Location FiO2 (%)   12/03/18 1047 -- 12/03/18 1102 -- --   Tympanic  Sitting           Physical Exam   Constitutional: He is oriented to person, place, and time. No distress.   HENT:   Head: Normocephalic.   Mouth/Throat: Mucous membranes are normal.   Eyes: EOM are normal. Pupils are equal, round, and reactive to light.   Neck: Normal range of motion. Neck supple.   Cardiovascular: Normal rate, regular rhythm and normal heart sounds.   Pulmonary/Chest: Effort normal and breath sounds normal. No respiratory distress. He has no decreased breath sounds. He has no wheezes. He has no rhonchi. He has no rales.   Abdominal: Soft. There is no tenderness. There is CVA tenderness (left-sided). There is no rebound and no guarding.   Musculoskeletal: Normal range of motion. He exhibits tenderness (midline lumbar tenderness;  tenderness of the left lower back).   Neurological: He is alert and oriented to person, place, and time. He has normal sensation.   Skin: Skin is warm and dry.   Psychiatric: Mood and affect normal.   Nursing note and vitals reviewed.          LAB RESULTS  Recent Results (from the past 24 hour(s))   POC Urinalysis Dipstick, Multipro (Automated dipstick)    Collection Time: 12/03/18  8:57 AM   Result Value Ref Range    Color Dark Yellow Yellow, Straw, Dark Yellow, Yuliana    Clarity, UA Cloudy (A) Clear    Glucose, UA Negative Negative, 1000 mg/dL (3+) mg/dL    Bilirubin Negative Negative    Ketones, UA Trace (A) Negative    Specific Gravity  1.025 1.005 - 1.030    Blood, UA 2+ (A) Negative    pH, Urine 5.5 5.0 - 8.0    Protein, POC 1+ (A) Negative mg/dL    Urobilinogen, UA Normal Normal    Nitrite, UA Negative Negative    Leukocytes Small (1+) (A) Negative   CBC (No Diff)    Collection Time: 12/03/18  9:31 AM   Result Value Ref Range    WBC 25.93 (H) 4.50 - 10.70 10*3/mm3    RBC 3.89 (L) 4.60 - 6.00 10*6/mm3    Hemoglobin 12.3 (L) 13.7 - 17.6 g/dL    Hematocrit 36.5 (L) 40.4 - 52.2 %    MCV 93.8 79.8 - 96.2 fL    MCH 31.6 27.0 - 32.7 pg    MCHC 33.7 32.6 - 36.4 g/dL    RDW 12.9 11.5 - 14.5 %    RDW-SD 44.3 37.0 - 54.0 fl    MPV 10.7 6.0 - 12.0 fL    Platelets 158 140 - 500 10*3/mm3   Comprehensive Metabolic Panel    Collection Time: 12/03/18  9:31 AM   Result Value Ref Range    Glucose 122 (H) 65 - 99 mg/dL    BUN 33 (H) 8 - 23 mg/dL    Creatinine 2.16 (H) 0.76 - 1.27 mg/dL    Sodium 136 136 - 145 mmol/L    Potassium 3.7 3.5 - 5.2 mmol/L    Chloride 100 98 - 107 mmol/L    CO2 24.5 22.0 - 29.0 mmol/L    Calcium 8.7 8.2 - 9.6 mg/dL    Total Protein 7.8 6.0 - 8.5 g/dL    Albumin 3.40 (L) 3.50 - 5.20 g/dL    ALT (SGPT) 13 1 - 41 U/L    AST (SGOT) 13 1 - 40 U/L    Alkaline Phosphatase 85 39 - 117 U/L    Total Bilirubin 0.8 0.1 - 1.2 mg/dL    eGFR Non African Amer 29 (L) >60 mL/min/1.73    Globulin 4.4 gm/dL    A/G Ratio  0.8 g/dL    BUN/Creatinine Ratio 15.3 7.0 - 25.0    Anion Gap 11.5 mmol/L   Comprehensive Metabolic Panel    Collection Time: 12/03/18 11:24 AM   Result Value Ref Range    Glucose 119 (H) 65 - 99 mg/dL    BUN 35 (H) 8 - 23 mg/dL    Creatinine 2.06 (H) 0.76 - 1.27 mg/dL    Sodium 136 136 - 145 mmol/L    Potassium 3.2 (L) 3.5 - 5.2 mmol/L    Chloride 97 (L) 98 - 107 mmol/L    CO2 24.4 22.0 - 29.0 mmol/L    Calcium 8.7 8.2 - 9.6 mg/dL    Total Protein 7.6 6.0 - 8.5 g/dL    Albumin 3.40 (L) 3.50 - 5.20 g/dL    ALT (SGPT) 14 1 - 41 U/L    AST (SGOT) 14 1 - 40 U/L    Alkaline Phosphatase 89 39 - 117 U/L    Total Bilirubin 0.6 0.1 - 1.2 mg/dL    eGFR Non African Amer 30 (L) >60 mL/min/1.73    Globulin 4.2 gm/dL    A/G Ratio 0.8 g/dL    BUN/Creatinine Ratio 17.0 7.0 - 25.0    Anion Gap 14.6 mmol/L   Lactic Acid, Plasma    Collection Time: 12/03/18 11:24 AM   Result Value Ref Range    Lactate 1.8 0.5 - 2.0 mmol/L   Procalcitonin    Collection Time: 12/03/18 11:24 AM   Result Value Ref Range    Procalcitonin 2.28 (C) 0.10 - 0.25 ng/mL   CBC Auto Differential    Collection Time: 12/03/18 11:24 AM   Result Value Ref Range    WBC 23.55 (H) 4.50 - 10.70 10*3/mm3    RBC 3.96 (L) 4.60 - 6.00 10*6/mm3    Hemoglobin 12.4 (L) 13.7 - 17.6 g/dL    Hematocrit 38.3 (L) 40.4 - 52.2 %    MCV 96.7 (H) 79.8 - 96.2 fL    MCH 31.3 27.0 - 32.7 pg    MCHC 32.4 (L) 32.6 - 36.4 g/dL    RDW 13.1 11.5 - 14.5 %    RDW-SD 45.9 37.0 - 54.0 fl    MPV 11.0 6.0 - 12.0 fL    Platelets 166 140 - 500 10*3/mm3    Neutrophil % 83.7 (H) 42.7 - 76.0 %    Lymphocyte % 5.0 (L) 19.6 - 45.3 %    Monocyte % 10.7 5.0 - 12.0 %    Eosinophil % 0.0 (L) 0.3 - 6.2 %    Basophil % 0.1 0.0 - 1.5 %    Immature Grans % 0.5 0.0 - 0.5 %    Neutrophils, Absolute 19.70 (H) 1.90 - 8.10 10*3/mm3    Lymphocytes, Absolute 1.18 0.90 - 4.80 10*3/mm3    Monocytes, Absolute 2.52 (H) 0.20 - 1.20 10*3/mm3    Eosinophils, Absolute 0.00 0.00 - 0.70 10*3/mm3    Basophils, Absolute 0.03 0.00 -  0.20 10*3/mm3    Immature Grans, Absolute 0.12 (H) 0.00 - 0.03 10*3/mm3   Urinalysis With Microscopic If Indicated (No Culture) - Urine, Clean Catch    Collection Time: 12/03/18 12:27 PM   Result Value Ref Range    Color, UA Yellow Yellow, Straw    Appearance, UA Turbid (A) Clear    pH, UA 5.5 5.0 - 8.0    Specific Gravity, UA 1.014 1.005 - 1.030    Glucose, UA Negative Negative    Ketones, UA Trace (A) Negative    Bilirubin, UA Negative Negative    Blood, UA Moderate (2+) (A) Negative    Protein, UA 30 mg/dL (1+) (A) Negative    Leuk Esterase, UA Large (3+) (A) Negative    Nitrite, UA Negative Negative    Urobilinogen, UA 0.2 E.U./dL 0.2 - 1.0 E.U./dL   Urinalysis, Microscopic Only - Urine, Clean Catch    Collection Time: 12/03/18 12:27 PM   Result Value Ref Range    RBC, UA 3-5 (A) None Seen, 0-2 /HPF    WBC, UA Too Numerous to Count (A) None Seen, 0-2 /HPF    Bacteria, UA 4+ (A) None Seen /HPF    Squamous Epithelial Cells, UA 0-2 None Seen, 0-2 /HPF    Hyaline Casts, UA 3-6 None Seen /LPF    Methodology Automated Microscopy        Ordered the above labs and reviewed the results.        RADIOLOGY  CT Abdomen Pelvis Without Contrast   Preliminary Result   1. Enlarged prostate gland that indents the bladder base. The urinary   bladder demonstrates perivesicular fat stranding most concerning for   cystitis. There is no hydronephrosis.   2. Colonic diverticulosis.   3. Infrarenal abdominal aortic aneurysm.   4. Additional findings as above.    FINDINGS: Bilateral adrenal glands are normal. Both kidneys are normal  in size and attenuation. No renal calculi or hydronephrosis is imaged.  There is a large predominantly exophytic cyst arising from the mid to  lower pole of the left kidney measuring 5.3 x 5.2 cm. There is also a  partly exophytic cortical cyst in the midpole of the right kidney  measuring 2 x 1.6 cm. The urinary bladder is partially distended. The  prostate gland is enlarged and indents the bladder base.  Mild  perivesicular fat stranding is present and is suggestive of cystitis.     The liver demonstrates normal attenuation. Multiple low-density lesions  are seen within the liver that are favored to represent simple cysts.  Cholelithiasis is present. The spleen is normal. The pancreas is normal  without ductal dilatation. The small and large bowel loops demonstrate  normal caliber. Colonic diverticulosis is present. The appendix is  normal. No pathologic retroperitoneal lymphadenopathy. There is a distal  abdominal aortic aneurysm measuring up to 4 cm. Bilateral lung bases  demonstrate underaeration of the lower lobes with patchy increased  ground glass density favored to represent atelectasis. Multiple healed  pelvic fractures are identified.       These findings were discussed with Dr. Gamino by telephone.       Radiation dose reduction techniques were utilized, including automated   exposure control and exposure modulation based on body size.                         Ordered the above noted radiological studies. Reviewed by me in PACS.  Spoke with Dr. Bueno (radiologist) regarding CT Abd scan results.          PROCEDURES  Procedures        MEDICATIONS GIVEN IN ER  Medications   sodium chloride 0.9 % flush 10 mL (not administered)   sodium chloride 0.9 % infusion (125 mL/hr Intravenous New Bag 12/3/18 1241)   sodium chloride 0.9 % bolus 1,000 mL (0 mL Intravenous Stopped 12/3/18 1451)   cefTRIAXone (ROCEPHIN) IVPB 2 g (0 g Intravenous Stopped 12/3/18 1452)             PROGRESS AND CONSULTS       11:06 AM:  Procalcitonin, lactic acid, blood cultures, UA, blood work, and CT Abd ordered for further evaluation. IV fluids ordered to hydrate pt.     12:24 PM:  Pt's UA suggests UTI - Rocephin ordered.     12:30 PM:  Rechecked pt. Informed pt and family that pt's UA is (+) for acute UTI and pt's WBC is 23.55. CT Abd results are c/w cystitis. Need for pt's admission to the hospital for further evaluation and  management/treatment. Pt and family agree with plan. Decision time to admit: Now.     12:33 PM:  Placed call to A for admission.     12:44 PM:  Discussed case with Dr. Wilkinson, hospitalist. He will admit pt to a med/surg bed (Dr. Wilkinson states that pt is stable for a med/surg bed) for further evaluation and management/treatment.           MEDICAL DECISION MAKING      MDM  Number of Diagnoses or Management Options  Acute cystitis without hematuria:   Other elevated white blood cell (WBC) count:      Amount and/or Complexity of Data Reviewed  Clinical lab tests: ordered and reviewed (UA results reviewed.)  Tests in the radiology section of CPT®: ordered and reviewed (CT Abd:  1. Enlarged prostate gland that indents the bladder base. The urinary  bladder demonstrates perivesicular fat stranding most concerning for  cystitis. There is no hydronephrosis.  2. Colonic diverticulosis.  3. Infrarenal abdominal aortic aneurysm.  4. Additional findings as above.)  Decide to obtain previous medical records or to obtain history from someone other than the patient: yes  Discuss the patient with other providers: yes (Discussed the case with Dr. Wilkinson, hospitalist.  )    Patient Progress  Patient progress: stable             DIAGNOSIS  Final diagnoses:   Acute cystitis without hematuria   Other elevated white blood cell (WBC) count             DISPOSITION  Pt admitted to med/surg.    ADMISSION    Discussed treatment plan and reason for admission with pt/family and admitting physician.  Pt/family voiced understanding of the plan for admission for further testing/treatment as needed.         Latest Documented Vital Signs:  As of 2:56 PM  BP- 110/61 HR- 92 Temp- 97.8 °F (36.6 °C) (Oral) O2 sat- 95%        --  Documentation assistance provided by aide Amaya for Dr. Stevenson MD.  Information recorded by the aide was done at my direction and has been verified and validated by me.         Vandana Amaya  12/03/18  1456       Tony Gamino MD  12/03/18 3862

## 2018-12-03 NOTE — H&P
HISTORY AND PHYSICAL   [facility]        Patient Identification:  Name: Lavelle Soliman  Age: 89 y.o.  Sex: male  :  1927  MRN: [id number]                     Primary Care Physician: Rafita Colunga MD    Chief Complaint:  Pain left lower back.    History of Present Illness:   Pleasant 89-year-old gentleman who presented initially to an urgent care center with left low back pain.  He was noted have a significant urinary tract infection during the workup there as well as significant leukocytosis and was referred to the emergency room for further evaluation and treatment.  He notes the back pain is been there for 5 or 6 days.  Waxes and wanes.  He is not aware of any positional component to it.  There is not aware of any fever sweats or chills.  On questioning he has noted some dysuria the last week or so.  He also has noted urinary incontinence and dribbling.  He states he does feel that his bladder is not emptying fully and indeed had a high postvoid residual here on the floor.  Workup in the emergency room confirmed a significant urinary tract infection with a significant leukocytosis with left shift.  CT scan did not note any obstructive changes or pyelonephritis.  Although the patient is reportedly afebrile vital signs stable at the urgent care center and stable vital signs are noted in the emergency room, on presentation to the floor he was tachycardic with an irregular rhythm.  EKG has already been performed confirming atrial fibrillation with rapid ventricular response.  Patient is asymptomatic.  He has no knowledge of previous cardiac dysrhythmias or any cardiac history.  He denies any palpitations, chest pain, shortness of air, lightheaded spells.  He has been treated for hypothyroidism and a TSH done just 1 month ago was within normal limits.      Past Medical History:  Past Medical History:   Diagnosis Date   • Disease of thyroid gland      Past Surgical History:  Past Surgical  History:   Procedure Laterality Date   • HERNIA REPAIR        Home Meds:  Medications Prior to Admission   Medication Sig Dispense Refill Last Dose   • hydrochlorothiazide (HYDRODIURIL) 12.5 MG tablet Take by mouth.   Taking   • levothyroxine (SYNTHROID, LEVOTHROID) 88 MCG tablet Take 1 tablet by mouth daily.   Taking   • simvastatin (ZOCOR) 5 MG tablet Take 1 tablet by mouth Every Night. 90 tablet 3 Taking   • terazosin (HYTRIN) 5 MG capsule Take by mouth.   Taking       Allergies:  Allergies   Allergen Reactions   • Latex      Immunizations:  Immunization History   Administered Date(s) Administered   • Flu Mist 10/15/2015   • Flu Vaccine High Dose PF 65YR+ 10/15/2015, 10/20/2016, 10/17/2017, 10/16/2018   • Hepatitis A 2018, 2018   • Pneumococcal Conjugate (PCV7) 2014   • Pneumococcal Conjugate 13-Valent (PCV13) 2013   • Tdap 2013   • Zostavax 2014     Social History:   Social History     Social History Narrative   • Not on file     Social History     Tobacco Use   • Smoking status: Former Smoker   • Smokeless tobacco: Never Used   Substance Use Topics   • Alcohol use: Yes     Comment: occ     Family History:  Family History   Problem Relation Age of Onset   • No Known Problems Mother    • No Known Problems Father         Review of Systems  Review of Systems   Constitutional: Negative.    HENT: Negative.    Eyes: Negative.    Respiratory: Negative.    Cardiovascular: Negative.    Gastrointestinal: Negative.    Endocrine: Negative.    Genitourinary:        As per history of present illness.  Otherwise negative.   Musculoskeletal:        As per history of present illness.  Otherwise negative.   Skin: Negative.    Allergic/Immunologic: Negative.    Neurological: Negative.    Hematological: Negative.    Psychiatric/Behavioral: Negative.        Objective:  tMax 24 hrs: Temp (24hrs), Av.8 °F (37.1 °C), Min:97.5 °F (36.4 °C), Max:99.9 °F (37.7 °C)    Vitals Ranges:   Temp:  [97.5 °F  (36.4 °C)-99.9 °F (37.7 °C)] 99.9 °F (37.7 °C)  Heart Rate:  [] 114  Resp:  [16-22] 22  BP: (109-121)/(61-76) 120/76      Exam:  Physical Exam   Constitutional: He is oriented to person, place, and time. He appears well-developed. No distress.   Mildly thin.   HENT:   Head: Normocephalic and atraumatic.   Right Ear: External ear normal.   Left Ear: External ear normal.   Nose: Nose normal.   Mouth/Throat: Oropharynx is clear and moist.   Eyes: Conjunctivae and EOM are normal. Right eye exhibits no discharge. Left eye exhibits no discharge. No scleral icterus.   Neck: Neck supple. No tracheal deviation present. No thyromegaly present.   Cardiovascular: Exam reveals no gallop and no friction rub.   Murmur (/6 systolic precordial) heard.  Rapid and irregular.   Pulmonary/Chest: Effort normal and breath sounds normal. No respiratory distress. He exhibits no tenderness.   Abdominal: Soft. Bowel sounds are normal. He exhibits no distension and no mass. There is no tenderness. There is no rebound and no guarding.   Musculoskeletal: He exhibits edema (Trace pretibial bilaterally).   Neurological: He is alert and oriented to person, place, and time.   Skin: Skin is warm and dry. He is not diaphoretic.   Psychiatric: He has a normal mood and affect. His behavior is normal. Judgment and thought content normal.       Data Review:  All labs and radiology reviewed.    Assessment:    Sepsis (CMS/[facility])    Atrial fibrillation (CMS/[facility])    MARTA (acute kidney injury) (CMS/[facility])    Urinary retention    UTI (urinary tract infection), bacterial    CKD (chronic kidney disease), stage III (CMS/[facility])    Hypokalemia    HTN (hypertension)      Plan:  Patient was [facility] to [facility] unit.  We will be transferring him to a telemetry unit and go ahead and get a Cardizem drip started on him and get cardiology involved.  He has been cultured and started on Rocephin emergency room and we will continue to this pending culture results.  A Foreign  catheter is been inserted noting his urinary retention in association with acute kidney injury.  We'll continue IV hydration also.  For full details please see orders.    Rock Wilkinson MD  12/3/2018  4:26 PM    Addendum:  NSR on arrival to [facility].  Will start PO beta blockers.     EMR Dragon/Transcription disclaimer:   Much of this encounter note is an electronic transcription/translation of spoken language to printed text. The electronic translation of spoken language may permit erroneous, or at times, nonsensical words or phrases to be inadvertently transcribed; Although I have reviewed the note for such errors, some may still exist.

## 2018-12-03 NOTE — PLAN OF CARE
Problem: Patient Care Overview  Goal: Plan of Care Review  Outcome: Ongoing (interventions implemented as appropriate)   12/03/18 3212   Coping/Psychosocial   Plan of Care Reviewed With patient;family   Plan of Care Review   Progress no change   OTHER   Outcome Summary admit to unit for further evaluation and treatment     Goal: Individualization and Mutuality  Outcome: Ongoing (interventions implemented as appropriate)    Goal: Discharge Needs Assessment  Outcome: Ongoing (interventions implemented as appropriate)    Goal: Interprofessional Rounds/Family Conf  Outcome: Ongoing (interventions implemented as appropriate)

## 2018-12-03 NOTE — NURSING NOTE
Received pt to floor from ER, HR rapid & irregular. MD notified stat EKG obtained. Transferring to 4S room 417 report called to Yris STALLINGS

## 2018-12-03 NOTE — ED NOTES
2ND SET OF BLOOD CULTURE DRAWN FROM RIGHT AC, SITE PREPPED WITH CHLORHEXIDINE 21g BUTTERFLY     Vonnie Heredia  12/03/18 5907

## 2018-12-03 NOTE — ED NOTES
"Nursing report ED to floor  Lavelle Soliman  89 y.o.  male    HPI (triage note):   Chief Complaint   Patient presents with   • Back Pain     left sided back pain. Seen [place] [facility] [place]. Dx with UTI and told to go to ER.        Admitting doctor:   Rock Wilkinson MD    Admitting diagnosis:   The primary encounter diagnosis was Acute cystitis without hematuria. A diagnosis of Other elevated white blood cell (WBC) count was also pertinent to this visit.    Code status:   Current Code Status     This patient does not have a recorded code status. Please follow your organizational policy for patients in this situation.          Allergies:   Latex    Weight:       12/03/18  1107   Weight: 72.6 kg (160 lb)       Most recent vitals:   Vitals:    12/03/18 1102 12/03/18 1107 12/03/18 1228 12/03/18 1455   BP: 121/63  121/63 110/61   BP Location:    Right arm   Patient Position: Sitting   Sitting   Pulse: 84   92   Resp: 20   16   Temp:    97.8 °F (36.6 °C)   TempSrc:    Oral   SpO2: 94%   95%   Weight:  72.6 kg (160 lb)     Height:  170.2 cm (67\")         Active LDAs/IV Access:   Lines, Drains & Airways    Active LDAs     Name:   Placement date:   Placement time:   Site:   Days:    Peripheral IV 12/03/18 1125 Left Antecubital   12/03/18    1125    Antecubital   less than 1                Labs (abnormal labs have a star):   Labs Reviewed   COMPREHENSIVE METABOLIC PANEL - Abnormal; Notable for the following components:       Result Value    Glucose 119 (*)     BUN 35 (*)     Creatinine 2.06 (*)     Potassium 3.2 (*)     Chloride 97 (*)     Albumin 3.40 (*)     eGFR Non  Amer 30 (*)     All other components within normal limits    Narrative:     The MDRD GFR formula is only valid for adults with stable renal function between ages 18 and 70.   URINALYSIS W/ MICROSCOPIC IF INDICATED (NO CULTURE) - Abnormal; Notable for the following components:    Appearance, UA Turbid (*)     Ketones, UA Trace (*)     Blood, UA " "Moderate (2+) (*)     Protein, UA 30 mg/dL (1+) (*)     Leuk Esterase, UA Large (3+) (*)     All other components within normal limits   PROCALCITONIN - Abnormal; Notable for the following components:    Procalcitonin 2.28 (*)     All other components within normal limits    Narrative:     As a Marker for Sepsis (Non-Neonates):   1. <0.5 ng/mL represents a low risk of severe sepsis and/or septic shock.  1. >2 ng/mL represents a high risk of severe sepsis and/or septic shock.    As a Marker for Lower Respiratory Tract Infections that require antibiotic therapy:  PCT on Admission     Antibiotic Therapy             6-12 Hrs later  > 0.5                Strongly Recommended            >0.25 - <0.5         Recommended  0.1 - 0.25           Discouraged                   Remeasure/reassess PCT  <0.1                 Strongly Discouraged          Remeasure/reassess PCT      As 28 day mortality risk marker: \"Change in Procalcitonin Result\" (> 80 % or <=80 %) if Day 0 (or Day 1) and Day 4 values are available. Refer to http://www.[employer]-pct-calculator.com/   Change in PCT <=80 %   A decrease of PCT levels below or equal to 80 % defines a positive change in PCT test result representing a higher risk for 28-day all-cause mortality of patients diagnosed with severe sepsis or septic shock.  Change in PCT > 80 %   A decrease of PCT levels of more than 80 % defines a negative change in PCT result representing a lower risk for 28-day all-cause mortality of patients diagnosed with severe sepsis or septic shock.               CBC WITH AUTO DIFFERENTIAL - Abnormal; Notable for the following components:    WBC 23.55 (*)     RBC 3.96 (*)     Hemoglobin 12.4 (*)     Hematocrit 38.3 (*)     MCV 96.7 (*)     MCHC 32.4 (*)     Neutrophil % 83.7 (*)     Lymphocyte % 5.0 (*)     Eosinophil % 0.0 (*)     Neutrophils, Absolute 19.70 (*)     Monocytes, Absolute 2.52 (*)     Immature Grans, Absolute 0.12 (*)     All other components within normal " limits   URINALYSIS, MICROSCOPIC ONLY - Abnormal; Notable for the following components:    RBC, UA 3-5 (*)     WBC, UA Too Numerous to Count (*)     Bacteria, UA 4+ (*)     All other components within normal limits   LACTIC ACID, PLASMA - Normal   BLOOD CULTURE   BLOOD CULTURE   CBC AND DIFFERENTIAL    Narrative:     The following orders were created for panel order CBC & Differential.  Procedure                               Abnormality         Status                     ---------                               -----------         ------                     Scan Slide[[id number]]                                                                  CBC Auto Differential[[id number]]        Abnormal            Final result                 Please view results for these tests on the individual orders.       EKG:   No orders to display       Meds given in ED:   Medications   sodium chloride 0.9 % flush 10 mL (not administered)   sodium chloride 0.9 % infusion (125 mL/hr Intravenous New Bag 12/3/18 1241)   sodium chloride 0.9 % bolus 1,000 mL (0 mL Intravenous Stopped 12/3/18 1451)   cefTRIAXone (ROCEPHIN) IVPB 2 g (0 g Intravenous Stopped 12/3/18 1452)       Imaging results:  Ct Abdomen Pelvis Without Contrast    Result Date: 12/3/2018  1. Enlarged prostate gland that indents the bladder base. The urinary bladder demonstrates perivesicular fat stranding most concerning for cystitis. There is no hydronephrosis. 2. Colonic diverticulosis. 3. Infrarenal abdominal aortic aneurysm. 4. Additional findings as above.  These findings were discussed with Dr. Gamino by telephone.  Radiation dose reduction techniques were utilized, including automated exposure control and exposure modulation based on body size.          Ambulatory status:   - up with assist    Social issues:   Social History     Socioeconomic History   • Marital status:      Spouse name: Not on file   • Number of children: Not on file   • Years of education: Not  on file   • Highest education level: Not on file   Social Needs   • Financial resource strain: Not on file   • Food insecurity - worry: Not on file   • Food insecurity - inability: Not on file   • Transportation needs - medical: Not on file   • Transportation needs - non-medical: Not on file   Occupational History   • Not on file   Tobacco Use   • Smoking status: Former Smoker   • Smokeless tobacco: Never Used   Substance and Sexual Activity   • Alcohol use: Yes     Comment: occ   • Drug use: Defer   • Sexual activity: Defer   Other Topics Concern   • Not on file   Social History Narrative   • Not on file          More Boyer, RN  12/03/18 8356

## 2018-12-04 ENCOUNTER — APPOINTMENT (OUTPATIENT)
Dept: CARDIOLOGY | Facility: HOSPITAL | Age: 83
End: 2018-12-04
Attending: HOSPITALIST

## 2018-12-04 PROBLEM — N40.1 BPH WITH OBSTRUCTION/LOWER URINARY TRACT SYMPTOMS: Chronic | Status: ACTIVE | Noted: 2018-12-04

## 2018-12-04 PROBLEM — N13.8 BPH WITH OBSTRUCTION/LOWER URINARY TRACT SYMPTOMS: Chronic | Status: ACTIVE | Noted: 2018-12-04

## 2018-12-04 PROBLEM — I48.91 NEW ONSET A-FIB: Status: ACTIVE | Noted: 2018-12-04

## 2018-12-04 LAB
ANION GAP SERPL CALCULATED.3IONS-SCNC: 11 MMOL/L
ASCENDING AORTA: 3.1 CM
BASOPHILS # BLD AUTO: 0.02 10*3/MM3 (ref 0–0.2)
BASOPHILS NFR BLD AUTO: 0.1 % (ref 0–1.5)
BH CV ECHO MEAS - ACS: 2.2 CM
BH CV ECHO MEAS - AO MEAN PG (FULL): 2 MMHG
BH CV ECHO MEAS - AO MEAN PG: 4 MMHG
BH CV ECHO MEAS - AO ROOT AREA (BSA CORRECTED): 1.6
BH CV ECHO MEAS - AO ROOT AREA: 7.1 CM^2
BH CV ECHO MEAS - AO ROOT DIAM: 3 CM
BH CV ECHO MEAS - AO V2 MAX: 132 CM/SEC
BH CV ECHO MEAS - AO V2 MEAN: 84.3 CM/SEC
BH CV ECHO MEAS - AO V2 VTI: 27.2 CM
BH CV ECHO MEAS - AVA(I,A): 2.1 CM^2
BH CV ECHO MEAS - AVA(I,D): 2.1 CM^2
BH CV ECHO MEAS - BSA(HAYCOCK): 1.8 M^2
BH CV ECHO MEAS - BSA: 1.8 M^2
BH CV ECHO MEAS - BZI_BMI: 24.4 KILOGRAMS/M^2
BH CV ECHO MEAS - BZI_METRIC_HEIGHT: 170.2 CM
BH CV ECHO MEAS - BZI_METRIC_WEIGHT: 70.8 KG
BH CV ECHO MEAS - EDV(MOD-SP2): 69 ML
BH CV ECHO MEAS - EDV(MOD-SP4): 60 ML
BH CV ECHO MEAS - EDV(TEICH): 173.2 ML
BH CV ECHO MEAS - EF(CUBED): 61.3 %
BH CV ECHO MEAS - EF(MOD-BP): 53 %
BH CV ECHO MEAS - EF(MOD-SP2): 50.7 %
BH CV ECHO MEAS - EF(MOD-SP4): 65 %
BH CV ECHO MEAS - EF(TEICH): 52 %
BH CV ECHO MEAS - ESV(MOD-SP2): 34 ML
BH CV ECHO MEAS - ESV(MOD-SP4): 21 ML
BH CV ECHO MEAS - ESV(TEICH): 83.1 ML
BH CV ECHO MEAS - FS: 27.1 %
BH CV ECHO MEAS - IVS/LVPW: 1
BH CV ECHO MEAS - IVSD: 0.7 CM
BH CV ECHO MEAS - LAT PEAK E' VEL: 10 CM/SEC
BH CV ECHO MEAS - LV DIASTOLIC VOL/BSA (35-75): 33 ML/M^2
BH CV ECHO MEAS - LV MASS(C)D: 153.4 GRAMS
BH CV ECHO MEAS - LV MASS(C)DI: 84.3 GRAMS/M^2
BH CV ECHO MEAS - LV MEAN PG: 2 MMHG
BH CV ECHO MEAS - LV SYSTOLIC VOL/BSA (12-30): 11.5 ML/M^2
BH CV ECHO MEAS - LV V1 MAX: 87 CM/SEC
BH CV ECHO MEAS - LV V1 MEAN: 64.6 CM/SEC
BH CV ECHO MEAS - LV V1 VTI: 18.1 CM
BH CV ECHO MEAS - LVIDD: 5.9 CM
BH CV ECHO MEAS - LVIDS: 4.3 CM
BH CV ECHO MEAS - LVLD AP2: 6.5 CM
BH CV ECHO MEAS - LVLD AP4: 5.7 CM
BH CV ECHO MEAS - LVLS AP2: 5.8 CM
BH CV ECHO MEAS - LVLS AP4: 5.1 CM
BH CV ECHO MEAS - LVOT AREA (M): 3.1 CM^2
BH CV ECHO MEAS - LVOT AREA: 3.1 CM^2
BH CV ECHO MEAS - LVOT DIAM: 2 CM
BH CV ECHO MEAS - LVPWD: 0.7 CM
BH CV ECHO MEAS - MED PEAK E' VEL: 14 CM/SEC
BH CV ECHO MEAS - MR MAX PG: 9.1 MMHG
BH CV ECHO MEAS - MR MAX VEL: 151 CM/SEC
BH CV ECHO MEAS - MV A DUR: 0.14 SEC
BH CV ECHO MEAS - MV A MAX VEL: 63.2 CM/SEC
BH CV ECHO MEAS - MV DEC SLOPE: 495 CM/SEC^2
BH CV ECHO MEAS - MV DEC TIME: 0.16 SEC
BH CV ECHO MEAS - MV E MAX VEL: 85.4 CM/SEC
BH CV ECHO MEAS - MV E/A: 1.4
BH CV ECHO MEAS - MV MEAN PG: 2 MMHG
BH CV ECHO MEAS - MV P1/2T MAX VEL: 84.9 CM/SEC
BH CV ECHO MEAS - MV P1/2T: 50.2 MSEC
BH CV ECHO MEAS - MV V2 MEAN: 60.9 CM/SEC
BH CV ECHO MEAS - MV V2 VTI: 21.8 CM
BH CV ECHO MEAS - MVA P1/2T LCG: 2.6 CM^2
BH CV ECHO MEAS - MVA(P1/2T): 4.4 CM^2
BH CV ECHO MEAS - MVA(VTI): 2.6 CM^2
BH CV ECHO MEAS - PA MAX PG (FULL): 1.8 MMHG
BH CV ECHO MEAS - PA MAX PG: 3.2 MMHG
BH CV ECHO MEAS - PA V2 MAX: 89.6 CM/SEC
BH CV ECHO MEAS - PULM A REVS DUR: 0.1 SEC
BH CV ECHO MEAS - PULM A REVS VEL: 29 CM/SEC
BH CV ECHO MEAS - PULM DIAS VEL: 26 CM/SEC
BH CV ECHO MEAS - PULM S/D: 1
BH CV ECHO MEAS - PULM SYS VEL: 26.4 CM/SEC
BH CV ECHO MEAS - PVA(V,A): 2.8 CM^2
BH CV ECHO MEAS - PVA(V,D): 2.8 CM^2
BH CV ECHO MEAS - QP/QS: 0.92
BH CV ECHO MEAS - RAP SYSTOLE: 8 MMHG
BH CV ECHO MEAS - RV MAX PG: 1.4 MMHG
BH CV ECHO MEAS - RV MEAN PG: 1 MMHG
BH CV ECHO MEAS - RV V1 MAX: 60.2 CM/SEC
BH CV ECHO MEAS - RV V1 MEAN: 39.2 CM/SEC
BH CV ECHO MEAS - RV V1 VTI: 12.6 CM
BH CV ECHO MEAS - RVOT AREA: 4.2 CM^2
BH CV ECHO MEAS - RVOT DIAM: 2.3 CM
BH CV ECHO MEAS - RVSP: 33 MMHG
BH CV ECHO MEAS - SI(AO): 105.7 ML/M^2
BH CV ECHO MEAS - SI(CUBED): 69.2 ML/M^2
BH CV ECHO MEAS - SI(LVOT): 31.3 ML/M^2
BH CV ECHO MEAS - SI(MOD-SP2): 19.2 ML/M^2
BH CV ECHO MEAS - SI(MOD-SP4): 21.4 ML/M^2
BH CV ECHO MEAS - SI(TEICH): 49.5 ML/M^2
BH CV ECHO MEAS - SUP REN AO DIAM: 1.9 CM
BH CV ECHO MEAS - SV(AO): 192.3 ML
BH CV ECHO MEAS - SV(CUBED): 125.9 ML
BH CV ECHO MEAS - SV(LVOT): 56.9 ML
BH CV ECHO MEAS - SV(MOD-SP2): 35 ML
BH CV ECHO MEAS - SV(MOD-SP4): 39 ML
BH CV ECHO MEAS - SV(RVOT): 52.3 ML
BH CV ECHO MEAS - SV(TEICH): 90.1 ML
BH CV ECHO MEAS - TAPSE (>1.6): 2.2 CM2
BH CV ECHO MEAS - TR MAX VEL: 253 CM/SEC
BH CV ECHO MEASUREMENTS AVERAGE E/E' RATIO: 7.12
BH CV XLRA - RV BASE: 3.1 CM
BH CV XLRA - TDI S': 13 CM/SEC
BUN BLD-MCNC: 30 MG/DL (ref 8–23)
BUN/CREAT SERPL: 18.9 (ref 7–25)
CALCIUM SPEC-SCNC: 7.8 MG/DL (ref 8.2–9.6)
CHLORIDE SERPL-SCNC: 104 MMOL/L (ref 98–107)
CO2 SERPL-SCNC: 22 MMOL/L (ref 22–29)
CREAT BLD-MCNC: 1.59 MG/DL (ref 0.76–1.27)
DEPRECATED RDW RBC AUTO: 47.8 FL (ref 37–54)
EOSINOPHIL # BLD AUTO: 0 10*3/MM3 (ref 0–0.7)
EOSINOPHIL NFR BLD AUTO: 0 % (ref 0.3–6.2)
ERYTHROCYTE [DISTWIDTH] IN BLOOD BY AUTOMATED COUNT: 13.3 % (ref 11.5–14.5)
GFR SERPL CREATININE-BSD FRML MDRD: 41 ML/MIN/1.73
GLUCOSE BLD-MCNC: 122 MG/DL (ref 65–99)
HCT VFR BLD AUTO: 33.2 % (ref 40.4–52.2)
HGB BLD-MCNC: 10.5 G/DL (ref 13.7–17.6)
IMM GRANULOCYTES # BLD: 0.06 10*3/MM3 (ref 0–0.03)
IMM GRANULOCYTES NFR BLD: 0.3 % (ref 0–0.5)
LEFT ATRIUM VOLUME INDEX: 30 ML/M2
LV EF 2D ECHO EST: 53 %
LYMPHOCYTES # BLD AUTO: 1.43 10*3/MM3 (ref 0.9–4.8)
LYMPHOCYTES NFR BLD AUTO: 7.9 % (ref 19.6–45.3)
MAGNESIUM SERPL-MCNC: 2 MG/DL (ref 1.7–2.3)
MAXIMAL PREDICTED HEART RATE: 129 BPM
MCH RBC QN AUTO: 30.9 PG (ref 27–32.7)
MCHC RBC AUTO-ENTMCNC: 31.6 G/DL (ref 32.6–36.4)
MCV RBC AUTO: 97.6 FL (ref 79.8–96.2)
MONOCYTES # BLD AUTO: 1.6 10*3/MM3 (ref 0.2–1.2)
MONOCYTES NFR BLD AUTO: 8.8 % (ref 5–12)
NEUTROPHILS # BLD AUTO: 15.09 10*3/MM3 (ref 1.9–8.1)
NEUTROPHILS NFR BLD AUTO: 82.9 % (ref 42.7–76)
PLATELET # BLD AUTO: 146 10*3/MM3 (ref 140–500)
PMV BLD AUTO: 11.5 FL (ref 6–12)
POTASSIUM BLD-SCNC: 2.9 MMOL/L (ref 3.5–5.2)
POTASSIUM BLD-SCNC: 4 MMOL/L (ref 3.5–5.2)
PROCALCITONIN SERPL-MCNC: 1.72 NG/ML (ref 0.1–0.25)
RBC # BLD AUTO: 3.4 10*6/MM3 (ref 4.6–6)
SINUS: 3.1 CM
SODIUM BLD-SCNC: 137 MMOL/L (ref 136–145)
STJ: 3.1 CM
STRESS TARGET HR: 110 BPM
TROPONIN T SERPL-MCNC: <0.01 NG/ML (ref 0–0.03)
TSH SERPL DL<=0.05 MIU/L-ACNC: 0.32 MIU/ML (ref 0.27–4.2)
WBC NRBC COR # BLD: 18.2 10*3/MM3 (ref 4.5–10.7)

## 2018-12-04 PROCEDURE — 99221 1ST HOSP IP/OBS SF/LOW 40: CPT | Performed by: INTERNAL MEDICINE

## 2018-12-04 PROCEDURE — 93306 TTE W/DOPPLER COMPLETE: CPT

## 2018-12-04 PROCEDURE — 85025 COMPLETE CBC W/AUTO DIFF WBC: CPT | Performed by: HOSPITALIST

## 2018-12-04 PROCEDURE — 93005 ELECTROCARDIOGRAM TRACING: CPT | Performed by: INTERNAL MEDICINE

## 2018-12-04 PROCEDURE — 84484 ASSAY OF TROPONIN QUANT: CPT | Performed by: HOSPITALIST

## 2018-12-04 PROCEDURE — 84145 PROCALCITONIN (PCT): CPT | Performed by: HOSPITALIST

## 2018-12-04 PROCEDURE — 83735 ASSAY OF MAGNESIUM: CPT | Performed by: HOSPITALIST

## 2018-12-04 PROCEDURE — 84443 ASSAY THYROID STIM HORMONE: CPT | Performed by: HOSPITALIST

## 2018-12-04 PROCEDURE — 25010000002 HEPARIN (PORCINE) PER 1000 UNITS: Performed by: HOSPITALIST

## 2018-12-04 PROCEDURE — 84132 ASSAY OF SERUM POTASSIUM: CPT | Performed by: HOSPITALIST

## 2018-12-04 PROCEDURE — 80048 BASIC METABOLIC PNL TOTAL CA: CPT | Performed by: HOSPITALIST

## 2018-12-04 PROCEDURE — 25010000002 DIGOXIN PER 500 MCG: Performed by: INTERNAL MEDICINE

## 2018-12-04 PROCEDURE — 93306 TTE W/DOPPLER COMPLETE: CPT | Performed by: INTERNAL MEDICINE

## 2018-12-04 PROCEDURE — 93010 ELECTROCARDIOGRAM REPORT: CPT | Performed by: INTERNAL MEDICINE

## 2018-12-04 PROCEDURE — 25010000002 CEFTRIAXONE PER 250 MG: Performed by: HOSPITALIST

## 2018-12-04 RX ORDER — DIGOXIN 0.25 MG/ML
250 INJECTION INTRAMUSCULAR; INTRAVENOUS ONCE
Status: COMPLETED | OUTPATIENT
Start: 2018-12-04 | End: 2018-12-04

## 2018-12-04 RX ORDER — FINASTERIDE 5 MG/1
5 TABLET, FILM COATED ORAL DAILY
Status: DISCONTINUED | OUTPATIENT
Start: 2018-12-04 | End: 2018-12-08 | Stop reason: HOSPADM

## 2018-12-04 RX ORDER — POTASSIUM CHLORIDE 750 MG/1
40 CAPSULE, EXTENDED RELEASE ORAL AS NEEDED
Status: DISCONTINUED | OUTPATIENT
Start: 2018-12-04 | End: 2018-12-08 | Stop reason: HOSPADM

## 2018-12-04 RX ORDER — TAMSULOSIN HYDROCHLORIDE 0.4 MG/1
0.4 CAPSULE ORAL DAILY
Status: DISCONTINUED | OUTPATIENT
Start: 2018-12-04 | End: 2018-12-08 | Stop reason: HOSPADM

## 2018-12-04 RX ORDER — CALCIUM CARBONATE 200(500)MG
2 TABLET,CHEWABLE ORAL ONCE
Status: COMPLETED | OUTPATIENT
Start: 2018-12-05 | End: 2018-12-05

## 2018-12-04 RX ADMIN — TERAZOSIN HYDROCHLORIDE 5 MG: 5 CAPSULE ORAL at 21:09

## 2018-12-04 RX ADMIN — POTASSIUM CHLORIDE 40 MEQ: 750 CAPSULE, EXTENDED RELEASE ORAL at 10:51

## 2018-12-04 RX ADMIN — POTASSIUM CHLORIDE 40 MEQ: 750 CAPSULE, EXTENDED RELEASE ORAL at 19:31

## 2018-12-04 RX ADMIN — ATORVASTATIN CALCIUM 10 MG: 10 TABLET, FILM COATED ORAL at 08:12

## 2018-12-04 RX ADMIN — SODIUM CHLORIDE 125 ML/HR: 9 INJECTION, SOLUTION INTRAVENOUS at 22:04

## 2018-12-04 RX ADMIN — METOPROLOL TARTRATE 12.5 MG: 25 TABLET ORAL at 08:12

## 2018-12-04 RX ADMIN — CEFTRIAXONE SODIUM 1 G: 1 INJECTION, SOLUTION INTRAVENOUS at 16:57

## 2018-12-04 RX ADMIN — HEPARIN SODIUM 5000 UNITS: 5000 INJECTION INTRAVENOUS; SUBCUTANEOUS at 08:12

## 2018-12-04 RX ADMIN — TAMSULOSIN HYDROCHLORIDE 0.4 MG: 0.4 CAPSULE ORAL at 08:12

## 2018-12-04 RX ADMIN — LEVOTHYROXINE SODIUM 88 MCG: 88 TABLET ORAL at 08:12

## 2018-12-04 RX ADMIN — POTASSIUM CHLORIDE 40 MEQ: 750 CAPSULE, EXTENDED RELEASE ORAL at 12:31

## 2018-12-04 RX ADMIN — HEPARIN SODIUM 5000 UNITS: 5000 INJECTION INTRAVENOUS; SUBCUTANEOUS at 21:09

## 2018-12-04 RX ADMIN — FINASTERIDE 5 MG: 5 TABLET, FILM COATED ORAL at 14:56

## 2018-12-04 RX ADMIN — DIGOXIN 250 MCG: 0.25 INJECTION INTRAMUSCULAR; INTRAVENOUS at 14:56

## 2018-12-04 RX ADMIN — SODIUM CHLORIDE, PRESERVATIVE FREE 3 ML: 5 INJECTION INTRAVENOUS at 21:59

## 2018-12-04 NOTE — NURSING NOTE
Patient converted to atrial fibrillation around 1425.  He is un-symptomatic anywhere from 110-130.  Dr. Enrrique dalton nurse notified.

## 2018-12-04 NOTE — CONSULTS
FIRST UROLOGY CONSULT      Patient Identification:  NAME:  Lavelle Soliman  Age:  89 y.o.   Sex:  male   :  1927   MRN:  [id number]       Chief complaint: Left sided pain    History of present illness:      89 yo male not seen in our office since .  One week h/o left flank pain, radiates to the lower back, mod severity.  Progressively worsening, then assoc with dysuria, MS changes, weak stream.  No GH, no fever, no vomiting.    In ER:  -UA turbid, large navarro ase, TNTC WBCs, 3-5 RBCs  -Creat 2.2 - down to 1.6  -WBC 26 K - down to 18 K  -Blood and urine cultures negative to dtae    -CT - marked BPH, no hydro, no masses, sergio-vesical stranding and inflammation, AAA    Carrasquillo placed for poor voiding, PVR around 400 cc by report  On Rocephin and IV fluids  On hytrin 5 mg daily    Reports fairly minimal BPH symptoms at baseline  Feels much better with Carrasquillo in however, indicating he did have pain from AUR and UTI perhaps    Asked to see    Past medical history:  Past Medical History:   Diagnosis Date   • Disease of thyroid gland        Past surgical history:  Past Surgical History:   Procedure Laterality Date   • HERNIA REPAIR         Allergies:  Latex    Home medications:  Medications Prior to Admission   Medication Sig Dispense Refill Last Dose   • hydrochlorothiazide (HYDRODIURIL) 12.5 MG tablet Take by mouth.   Taking   • levothyroxine (SYNTHROID, LEVOTHROID) 88 MCG tablet Take 1 tablet by mouth daily.   Taking   • simvastatin (ZOCOR) 5 MG tablet Take 1 tablet by mouth Every Night. 90 tablet 3 Taking   • terazosin (HYTRIN) 5 MG capsule Take by mouth.   Taking        Hospital medications:    atorvastatin 10 mg Oral Daily   ceftriaxone 1 g Intravenous Q24H   diltiaZEM 0.25 mg/kg Intravenous Once   heparin (porcine) 5,000 Units Subcutaneous Q12H   levothyroxine 88 mcg Oral Daily   metoprolol tartrate 12.5 mg Oral Q12H   sodium chloride 3 mL Intravenous Q12H   terazosin 5 mg Oral Nightly       diltiaZEM  5-15 mg/hr    sodium chloride 125 mL/hr Last Rate: 125 mL/hr (18 1833)     •  acetaminophen  •  diltiaZEM **FOLLOWED BY** diltiaZEM **FOLLOWED BY** diltiazem  •  nitroglycerin  •  ondansetron **OR** ondansetron ODT **OR** ondansetron  •  [COMPLETED] Insert peripheral IV **AND** sodium chloride  •  sodium chloride    Family history:  Family History   Problem Relation Age of Onset   • No Known Problems Mother    • No Known Problems Father        Social history:  Social History     Tobacco Use   • Smoking status: Former Smoker   • Smokeless tobacco: Never Used   Substance Use Topics   • Alcohol use: Yes     Comment: occ   • Drug use: Defer       Review of systems:    Negative 12-system ROS except for the following:  No chest pain, cough, fever, SOB, o/w neg 10 point ROS      Objective:  TMax 24 hours:   Temp (24hrs), Av.6 °F (37 °C), Min:97.5 °F (36.4 °C), Max:99.9 °F (37.7 °C)      Vitals Ranges:   Temp:  [97.5 °F (36.4 °C)-99.9 °F (37.7 °C)] 98.4 °F (36.9 °C)  Heart Rate:  [] 83  Resp:  [16-22] 16  BP: ()/(50-88) 102/59    Intake/Output Last 3 shifts:  I/O last 3 completed shifts:  In: 1050 [IV Piggyback:1050]  Out: 400 [Urine:400]     Physical Exam:       General Appearance:    Alert, cooperative, NAD   Ears:    [place]   Throat:   No oral lesions, oral mucosa moist   Neck:   Supple, no LAD, trachea midline   Back:     Mild left CVA tenderness   Lungs:     Respirations unlabored, no audible wheezing    Heart:    Strong peripheral pulses, no tachycardia   Abdomen:     Soft, NDNT, no masses, bladder non distended   :    Carrasquillo in place, clear yellow urine   Extremities:   No edema, no deformity   Neuro/Psych:   Orientation intact, mood/affect pleasant, no focal findings       Results review:   I reviewed the patient's new clinical results.    Data review:  Lab Results (last 24 hours)     Procedure Component Value Units Date/Time    Procalcitonin [[id number]]  (Abnormal) Collected:  18 0402     "Specimen:  Blood Updated:  12/04/18 0527     Procalcitonin 1.72 ng/mL     Narrative:       As a Marker for Sepsis (Non-Neonates):   1. <0.5 ng/mL represents a low risk of severe sepsis and/or septic shock.  1. >2 ng/mL represents a high risk of severe sepsis and/or septic shock.    As a Marker for Lower Respiratory Tract Infections that require antibiotic therapy:  PCT on Admission     Antibiotic Therapy             6-12 Hrs later  > 0.5                Strongly Recommended            >0.25 - <0.5         Recommended  0.1 - 0.25           Discouraged                   Remeasure/reassess PCT  <0.1                 Strongly Discouraged          Remeasure/reassess PCT      As 28 day mortality risk marker: \"Change in Procalcitonin Result\" (> 80 % or <=80 %) if Day 0 (or Day 1) and Day 4 values are available. Refer to http://www.[employer]ang[employer]ipct-calculator.[employer]/   Change in PCT <=80 %   A decrease of PCT levels below or equal to 80 % defines a positive change in PCT test result representing a higher risk for 28-day all-cause mortality of patients diagnosed with severe sepsis or septic shock.  Change in PCT > 80 %   A decrease of PCT levels of more than 80 % defines a negative change in PCT result representing a lower risk for 28-day all-cause mortality of patients diagnosed with severe sepsis or septic shock.                Basic Metabolic Panel [[id number]]  (Abnormal) Collected:  12/04/18 0402    Specimen:  Blood Updated:  12/04/18 0516     Glucose 122 mg/dL      BUN 30 mg/dL      Creatinine 1.59 mg/dL      Sodium 137 mmol/L      Potassium 2.9 mmol/L      Chloride 104 mmol/L      CO2 22.0 mmol/L      Calcium 7.8 mg/dL      eGFR Non African Amer 41 mL/min/1.73      BUN/Creatinine Ratio 18.9     Anion Gap 11.0 mmol/L     Narrative:       The MDRD GFR formula is only valid for adults with stable renal function between ages 18 and 70.    Troponin [[id number]]  (Normal) Collected:  12/04/18 0402    Specimen:  Blood Updated:  12/04/18 " 0514     Troponin T <0.010 ng/mL     Narrative:       Troponin T Reference Ranges:  Less than 0.03 ng/mL:    Negative for AMI  0.03 to 0.09 ng/mL:      Indeterminant for AMI  Greater than 0.09 ng/mL: Positive for AMI    TSH [[id number]]  (Normal) Collected:  12/04/18 0402    Specimen:  Blood Updated:  12/04/18 0514     TSH 0.324 mIU/mL     CBC & Differential [[id number]] Collected:  12/04/18 0402    Specimen:  Blood Updated:  12/04/18 0438    Narrative:       The following orders were created for panel order CBC & Differential.  Procedure                               Abnormality         Status                     ---------                               -----------         ------                     CBC Auto Differential[[id number]]        Abnormal            Final result                 Please view results for these tests on the individual orders.    CBC Auto Differential [[id number]]  (Abnormal) Collected:  12/04/18 0402    Specimen:  Blood Updated:  12/04/18 0438     WBC 18.20 10*3/mm3      RBC 3.40 10*6/mm3      Hemoglobin 10.5 g/dL      Hematocrit 33.2 %      MCV 97.6 fL      MCH 30.9 pg      MCHC 31.6 g/dL      RDW 13.3 %      RDW-SD 47.8 fl      MPV 11.5 fL      Platelets 146 10*3/mm3      Neutrophil % 82.9 %      Lymphocyte % 7.9 %      Monocyte % 8.8 %      Eosinophil % 0.0 %      Basophil % 0.1 %      Immature Grans % 0.3 %      Neutrophils, Absolute 15.09 10*3/mm3      Lymphocytes, Absolute 1.43 10*3/mm3      Monocytes, Absolute 1.60 10*3/mm3      Eosinophils, Absolute 0.00 10*3/mm3      Basophils, Absolute 0.02 10*3/mm3      Immature Grans, Absolute 0.06 10*3/mm3     Blood Culture - Blood, Arm, Left [[id number]] Collected:  12/03/18 1124    Specimen:  Blood from Arm, Left Updated:  12/03/18 2345     Blood Culture No growth at less than 24 hours    Blood Culture - Blood, Arm, Left [[id number]] Collected:  12/03/18 1124    Specimen:  Blood from Arm, Left Updated:  12/03/18 2345     Blood Culture No growth at  less than 24 hours    Troponin [[id number]]  (Normal) Collected:  12/03/18 1124    Specimen:  Blood Updated:  12/03/18 1712     Troponin T <0.010 ng/mL     Narrative:       Troponin T Reference Ranges:  Less than 0.03 ng/mL:    Negative for AMI  0.03 to 0.09 ng/mL:      Indeterminant for AMI  Greater than 0.09 ng/mL: Positive for AMI    Urinalysis With Microscopic If Indicated (No Culture) - Urine, Clean Catch [[id number]]  (Abnormal) Collected:  12/03/18 1227    Specimen:  Urine, Clean Catch Updated:  12/03/18 1241     Color, UA Yellow     Appearance, UA Turbid     pH, UA 5.5     Specific Gravity, UA 1.014     Glucose, UA Negative     Ketones, UA Trace     Bilirubin, UA Negative     Blood, UA Moderate (2+)     Protein, UA 30 mg/dL (1+)     Leuk Esterase, UA Large (3+)     Nitrite, UA Negative     Urobilinogen, UA 0.2 E.U./dL    Urinalysis, Microscopic Only - Urine, Clean Catch [[id number]]  (Abnormal) Collected:  12/03/18 1227    Specimen:  Urine, Clean Catch Updated:  12/03/18 1241     RBC, UA 3-5 /HPF      WBC, UA Too Numerous to Count /HPF      Bacteria, UA 4+ /HPF      Squamous Epithelial Cells, UA 0-2 /HPF      Hyaline Casts, UA 3-6 /LPF      Methodology Automated Microscopy    Procalcitonin [[id number]]  (Abnormal) Collected:  12/03/18 1124    Specimen:  Blood Updated:  12/03/18 1212     Procalcitonin 2.28 ng/mL     Narrative:       As a Marker for Sepsis (Non-Neonates):   1. <0.5 ng/mL represents a low risk of severe sepsis and/or septic shock.  1. >2 ng/mL represents a high risk of severe sepsis and/or septic shock.    As a Marker for Lower Respiratory Tract Infections that require antibiotic therapy:  PCT on Admission     Antibiotic Therapy             6-12 Hrs later  > 0.5                Strongly Recommended            >0.25 - <0.5         Recommended  0.1 - 0.25           Discouraged                   Remeasure/reassess PCT  <0.1                 Strongly Discouraged          Remeasure/reassess PCT      As 28  "day mortality risk marker: \"Change in Procalcitonin Result\" (> 80 % or <=80 %) if Day 0 (or Day 1) and Day 4 values are available. Refer to http://www.[facility] – [facility] City[facility]irqpct-calculator.com/   Change in PCT <=80 %   A decrease of PCT levels below or equal to 80 % defines a positive change in PCT test result representing a higher risk for 28-day all-cause mortality of patients diagnosed with severe sepsis or septic shock.  Change in PCT > 80 %   A decrease of PCT levels of more than 80 % defines a negative change in PCT result representing a lower risk for 28-day all-cause mortality of patients diagnosed with severe sepsis or septic shock.                Comprehensive Metabolic Panel [[id number]]  (Abnormal) Collected:  12/03/18 1124    Specimen:  Blood Updated:  12/03/18 1200     Glucose 119 mg/dL      BUN 35 mg/dL      Creatinine 2.06 mg/dL      Sodium 136 mmol/L      Potassium 3.2 mmol/L      Chloride 97 mmol/L      CO2 24.4 mmol/L      Calcium 8.7 mg/dL      Total Protein 7.6 g/dL      Albumin 3.40 g/dL      ALT (SGPT) 14 U/L      AST (SGOT) 14 U/L      Alkaline Phosphatase 89 U/L      Total Bilirubin 0.6 mg/dL      eGFR Non African Amer 30 mL/min/1.73      Globulin 4.2 gm/dL      A/G Ratio 0.8 g/dL      BUN/Creatinine Ratio 17.0     Anion Gap 14.6 mmol/L     Narrative:       The MDRD GFR formula is only valid for adults with stable renal function between ages 18 and 70.    Lactic Acid, Plasma [[id number]]  (Normal) Collected:  12/03/18 1124    Specimen:  Blood Updated:  12/03/18 1154     Lactate 1.8 mmol/L     CBC & Differential [[id number]] Collected:  12/03/18 1124    Specimen:  Blood Updated:  12/03/18 1145    Narrative:       The following orders were created for panel order CBC & Differential.  Procedure                               Abnormality         Status                     ---------                               -----------         ------                     Scan Slide[[id number]]                                      "                             CBC Auto Differential[[id number]]        Abnormal            Final result                 Please view results for these tests on the individual orders.    CBC Auto Differential [[id number]]  (Abnormal) Collected:  12/03/18 1124    Specimen:  Blood from Arm, Left Updated:  12/03/18 1145     WBC 23.55 10*3/mm3      RBC 3.96 10*6/mm3      Hemoglobin 12.4 g/dL      Hematocrit 38.3 %      MCV 96.7 fL      MCH 31.3 pg      MCHC 32.4 g/dL      RDW 13.1 %      RDW-SD 45.9 fl      MPV 11.0 fL      Platelets 166 10*3/mm3      Neutrophil % 83.7 %      Lymphocyte % 5.0 %      Monocyte % 10.7 %      Eosinophil % 0.0 %      Basophil % 0.1 %      Immature Grans % 0.5 %      Neutrophils, Absolute 19.70 10*3/mm3      Lymphocytes, Absolute 1.18 10*3/mm3      Monocytes, Absolute 2.52 10*3/mm3      Eosinophils, Absolute 0.00 10*3/mm3      Basophils, Absolute 0.03 10*3/mm3      Immature Grans, Absolute 0.12 10*3/mm3            Imaging:  Imaging Results (last 24 hours)     Procedure Component Value Units Date/Time    CT Abdomen Pelvis Without Contrast [[id number]] Collected:  12/03/18 1206     Updated:  12/03/18 1206    Narrative:       CT ABDOMEN AND PELVIS WITHOUT CONTRAST     HISTORY: Left flank pain and urinary tract infection.     TECHNIQUE: Axial CT images of the abdomen and pelvis were obtained  without administration of intravenous contrast. The patient was not  given oral contrast. Coronal and sagittal reformats were obtained.     COMPARISON: None.     FINDINGS: Bilateral adrenal glands are normal. Both kidneys are normal  in size and attenuation. No renal calculi or hydronephrosis is imaged.  There is a large predominantly exophytic cyst arising from the mid to  lower pole of the left kidney measuring 5.3 x 5.2 cm. There is also a  partly exophytic cortical cyst in the midpole of the right kidney  measuring 2 x 1.6 cm. The urinary bladder is partially distended. The  prostate gland is enlarged and  indents the bladder base. Mild  perivesicular fat stranding is present and is suggestive of cystitis.     The liver demonstrates normal attenuation. Multiple low-density lesions  are seen within the liver that are favored to represent simple cysts.  Cholelithiasis is present. The spleen is normal. The pancreas is normal  without ductal dilatation. The small and large bowel loops demonstrate  normal caliber. Colonic diverticulosis is present. The appendix is  normal. No pathologic retroperitoneal lymphadenopathy. There is a distal  abdominal aortic aneurysm measuring up to 4 cm. Bilateral lung bases  demonstrate underaeration of the lower lobes with patchy increased  ground glass density favored to represent atelectasis. Multiple healed  pelvic fractures are identified.       Impression:       1. Enlarged prostate gland that indents the bladder base. The urinary  bladder demonstrates perivesicular fat stranding most concerning for  cystitis. There is no hydronephrosis.  2. Colonic diverticulosis.  3. Infrarenal abdominal aortic aneurysm.  4. Additional findings as above.     These findings were discussed with Dr. Gamino by telephone.     Radiation dose reduction techniques were utilized, including automated  exposure control and exposure modulation based on body size.                      Assessment:       Sepsis (CMS/HCC)    MARTA (acute kidney injury) (CMS/HCC)    Urinary retention    UTI (urinary tract infection), bacterial    CKD (chronic kidney disease), stage III (CMS/HCC)    Hypokalemia    HTN (hypertension)    Atrial fibrillation (CMS/HCC)    Acute cystitis vs prostatitis  BPH with AUR    Plan:     Await cultures  Cont Rocephin  Keep Carrasquillo - will need formal eval with cysto, poss UDS  Start flomax and finasteride today  Will follow    Johnathon Martin MD  12/04/18  5:29 AM

## 2018-12-04 NOTE — PROGRESS NOTES
"   LOS: 1 day   Patient Care Team:  Rafita Colunga MD as PCP - General  Rafita Colunga MD as PCP - Claims Attributed    Chief Complaint: tired and weak    Subjective     Feeling better today. Still quite tired/weak. Tolerating PO. Feels better now that cristobal in place--no abd pain.        Subjective:  Symptoms:  Improved.  He reports malaise and weakness.  No shortness of breath, cough, chest pain, headache, chest pressure, anorexia, diarrhea or anxiety.    Diet:  Adequate intake.  No nausea or vomiting.    Activity level: Impaired due to weakness.    Pain:  He reports no pain.        History taken from: patient chart RN    Objective     Vital Signs  Temp:  [97.8 °F (36.6 °C)-99.9 °F (37.7 °C)] 98.3 °F (36.8 °C)  Heart Rate:  [] 81  Resp:  [16-22] 18  BP: ()/(50-88) 99/51    Objective:  General Appearance:  Comfortable and in no acute distress.    Vital signs: (most recent): Blood pressure 99/51, pulse 81, temperature 98.3 °F (36.8 °C), temperature source Oral, resp. rate 18, height 170.2 cm (67\"), weight 71.1 kg (156 lb 12.8 oz), SpO2 95 %.  Vital signs are normal.  No fever.  (BPs a little low).    Output: Producing urine (clear urine in cristobal bag) and producing stool.    Lungs:  Normal effort and normal respiratory rate.  Breath sounds clear to auscultation.    Heart: Normal rate.  Regular rhythm.    Abdomen: Abdomen is soft.  Bowel sounds are normal.   There is no abdominal tenderness.     Extremities: There is no dependent edema.    Pulses: Distal pulses are intact.    Neurological: Patient is alert and oriented to person, place and time.    Pupils:  Pupils are equal, round, and reactive to light.    Skin:  Warm and dry.  (Skin turgor poor  BCR ~3 sec)            Results Review:     I reviewed the patient's new clinical results.  I reviewed the patient's new imaging results and agree with the interpretation.  I reviewed the patient's other test results and agree with the interpretation  I " personally viewed and interpreted the patient's EKG/Telemetry data  Discussed with patient and RN    Medication Review: reviewed    Assessment/Plan       Sepsis (CMS/[facility])    Hypothyroidism    MARTA (acute kidney injury) (CMS/[facility])    Urinary retention    UTI (urinary tract infection), bacterial    CKD (chronic kidney disease), stage III (CMS/[facility])    Hypokalemia    HTN (hypertension)    BPH with obstruction/lower urinary tract symptoms    New onset a-fib (CMS/[facility])          Plan:   (Continue IV Rocephin, blood and urine cultures pending, PCT down  TFTs okay  Cr falling with IVFs, no hydro, CT imaging suggests severe UTI only  [place] in place, may need cysto, Flomax and Proscar started  Appreciate  attention to pt  In NSR since arrival on floor  PAF due to sepsis  Echo ordered  No plans for rate control therapy or AC unless afib returns  Appreciate Card attention to pt  K+ protocol ordered  ).       Mitesh Mooney MD  12/04/18  1:39 PM    Time: 20min

## 2018-12-04 NOTE — PLAN OF CARE
Problem: Patient Care Overview  Goal: Plan of Care Review  Outcome: Ongoing (interventions implemented as appropriate)   12/03/18 1759 12/04/18 0000 12/04/18 0518   Coping/Psychosocial   Plan of Care Reviewed With --  patient --    Plan of Care Review   Progress no change --  --    OTHER   Outcome Summary --  --  VSS, patient remains in NSR, cristobal catheter care done per protocol, pt is resting well with no complaints, continue IVF and IV abx. Will continue to closely monitor.      Goal: Individualization and Mutuality  Outcome: Ongoing (interventions implemented as appropriate)    Goal: Discharge Needs Assessment  Outcome: Ongoing (interventions implemented as appropriate)    Goal: Interprofessional Rounds/Family Conf  Outcome: Ongoing (interventions implemented as appropriate)      Problem: Skin Injury Risk (Adult)  Goal: Identify Related Risk Factors and Signs and Symptoms  Outcome: Ongoing (interventions implemented as appropriate)    Goal: Skin Health and Integrity  Outcome: Ongoing (interventions implemented as appropriate)      Problem: Cardiac Output Decreased (Adult)  Goal: Identify Related Risk Factors and Signs and Symptoms  Outcome: Ongoing (interventions implemented as appropriate)    Goal: Effective Tissue Perfusion  Outcome: Ongoing (interventions implemented as appropriate)

## 2018-12-04 NOTE — CONSULTS
Cardiology Hospital Consult    Patient Name: Lavelle Soliman  Age/Sex: 89 y.o. male  : 1927  MRN: [id number]    Date of Admission: 12/3/2018  Date of Encounter Visit: 18  Encounter Provider: Licha Osuna MD  Referring Provider: Rock Wilkinson MD  Place of Service: [facility] CARD[facility]  Patient Care Team:  Rafita Colunga MD as PCP - General  Rafita Colunga MD as PCP - Claims Attributed    Subjective:     Consulted for: Atrial fibrillation      History of Present Illness: 89-year-old male patient with a history of hypertension, BPH, chronic kidney disease stage III, hyperlipidemia, and hypothyroidism who is admitted with urinary tract infection with sepsis and atrial fibrillation.      The patient presented to the emergency room on 12/3/2018 with complaints of intermittent left flank pain radiating down to his lower back that started about 5-6 days prior.  He also reported generalized weakness and chills.  He initially went to urgent care and a urinalysis was performed showing evidence of a UTI.  He was transferred to the ER for further evaluation.  He also reports worsening difficulty urinating and intermittent confusion.  His work up revealed evidence of urinary incontinence, significant leukocytosis with a left shift and elevated procalcitonin level.  On arrival to the floor he was noted to be in atrial fibrillation with rapid ventricular rate.  He then converted back to sinus rhythm shortly after.     He denies any chest pain, dyspnea, dizziness or lightheadedness or palpitations with atrial fibrillation.  He denies any prior cardiac history.  He is feeling better this morning.       Past Medical History:  Past Medical History:   Diagnosis Date   • Disease of thyroid gland        Past Surgical History:   Procedure Laterality Date   • HERNIA REPAIR         Home Medications:   Medications Prior to Admission   Medication Sig Dispense Refill Last Dose    • hydrochlorothiazide (HYDRODIURIL) 12.5 MG tablet Take by mouth.   Taking   • levothyroxine (SYNTHROID, LEVOTHROID) 88 MCG tablet Take 1 tablet by mouth daily.   Taking   • simvastatin (ZOCOR) 5 MG tablet Take 1 tablet by mouth Every Night. 90 tablet 3 Taking   • terazosin (HYTRIN) 5 MG capsule Take by mouth.   Taking       Allergies:  Allergies   Allergen Reactions   • Latex        Past Social History:  Social History     Socioeconomic History   • Marital status:      Spouse name: Not on file   • Number of children: Not on file   • Years of education: Not on file   • Highest education level: Not on file   Tobacco Use   • Smoking status: Former Smoker   • Smokeless tobacco: Never Used   Substance and Sexual Activity   • Alcohol use: Yes     Comment: occ   • Drug use: Defer   • Sexual activity: Defer       Past Family History:  Family History   Problem Relation Age of Onset   • No Known Problems Mother    • No Known Problems Father        Review of Systems:   All systems reviewed. Pertinent positives identified in HPI. All other systems are negative.    Objective:   Temp:  [97.5 °F (36.4 °C)-99.9 °F (37.7 °C)] 99.3 °F (37.4 °C)  Heart Rate:  [] 81  Resp:  [16-22] 18  BP: ()/(50-88) 99/53     Intake/Output Summary (Last 24 hours) at 12/4/2018 0814  Last data filed at 12/4/2018 0657  Gross per 24 hour   Intake 1050 ml   Output 1200 ml   Net -150 ml     Body mass index is 24.56 kg/m².      12/03/18  1107 12/03/18  1529   Weight: 72.6 kg (160 lb) 71.1 kg (156 lb 12.8 oz)     Weight change:     Physical Exam:   General Appearance:    Alert, cooperative, in no acute distress   Head:    Normocephalic, without obvious abnormality, atraumatic   Eyes:            Lids and lashes normal, conjunctivae and sclerae normal, no   icterus, no pallor, corneas clear, PERRLA   Ears:    Ears appear intact with no abnormalities noted   Neck:   No adenopathy, supple, trachea midline, no thyromegaly, no   carotid bruit,  no JVD   Lungs:     Clear to auscultation,respirations regular, even and unlabored    Heart:    Regular rhythm and normal rate, normal S1 and S2, no murmur, no gallop, no rub, no click   Chest Wall:    No abnormalities observed   Abdomen:     Normal bowel sounds, no masses, no organomegaly, soft        non-tender, non-distended, no guarding, no rebound  tenderness   Extremities:   Moves all extremities well, no edema, no cyanosis, no redness   Pulses:   Pulses palpable and equal bilaterally. Normal radial, carotid, femoral, dorsalis pedis and posterior tibial pulses bilaterally. Normal abdominal aorta   Skin:  Psychiatric:   No bleeding, bruising or rash    Alert and oriented x 3, normal mood and affect         Lab Review:   Results from last 7 days   Lab Units  12/04/18   0402  12/03/18   1124  12/03/18   0931   SODIUM mmol/L  137  136  136   POTASSIUM mmol/L  2.9*  3.2*  3.7   CHLORIDE mmol/L  104  97*  100   CO2 mmol/L  22.0  24.4  24.5   BUN mg/dL  30*  35*  33*   CREATININE mg/dL  1.59*  2.06*  2.16*   GLUCOSE mg/dL  122*  119*  122*   CALCIUM mg/dL  7.8*  8.7  8.7   AST (SGOT) U/L   --   14  13   ALT (SGPT) U/L   --   14  13     Results from last 7 days   Lab Units  12/04/18   0402  12/03/18   1124   TROPONIN T ng/mL  <0.010  <0.010     Results from last 7 days   Lab Units  12/04/18   0402  12/03/18   1124  12/03/18   0931   WBC 10*3/mm3  18.20*  23.55*  25.93*   HEMOGLOBIN g/dL  10.5*  12.4*  12.3*   HEMATOCRIT %  33.2*  38.3*  36.5*   PLATELETS 10*3/mm3  146  166  158                   Invalid input(s): LDLCALC      Results from last 7 days   Lab Units  12/04/18   0402   TSH mIU/mL  0.324       Echo EF Estimated  No results found for: ECHOEFEST    EKG:     Imaging:  Imaging Results (most recent)     Procedure Component Value Units Date/Time    CT Abdomen Pelvis Without Contrast [[id number]] Collected:  12/03/18 1206     Updated:  12/03/18 9825    Narrative:       CT ABDOMEN AND PELVIS WITHOUT CONTRAST      HISTORY: Left flank pain and urinary tract infection.     TECHNIQUE: Axial CT images of the abdomen and pelvis were obtained  without administration of intravenous contrast. The patient was not  given oral contrast. Coronal and sagittal reformats were obtained.     COMPARISON: None.     FINDINGS: Bilateral adrenal glands are normal. Both kidneys are normal  in size and attenuation. No renal calculi or hydronephrosis is imaged.  There is a large predominantly exophytic cyst arising from the mid to  lower pole of the left kidney measuring 5.3 x 5.2 cm. There is also a  partly exophytic cortical cyst in the midpole of the right kidney  measuring 2 x 1.6 cm. The urinary bladder is partially distended. The  prostate gland is enlarged and indents the bladder base. Mild  perivesicular fat stranding is present and is suggestive of cystitis.     The liver demonstrates normal attenuation. Multiple low-density lesions  are seen within the liver that are favored to represent simple cysts.  Cholelithiasis is present. The spleen is normal. The pancreas is normal  without ductal dilatation. The small and large bowel loops demonstrate  normal caliber. Colonic diverticulosis is present. The appendix is  normal. No pathologic retroperitoneal lymphadenopathy. There is a distal  abdominal aortic aneurysm measuring up to 4 cm. Bilateral lung bases  demonstrate underaeration of the lower lobes with patchy increased  ground glass density favored to represent atelectasis. Multiple healed  pelvic fractures are identified.       Impression:       1. Enlarged prostate gland that indents the bladder base. The urinary  bladder demonstrates perivesicular fat stranding most concerning for  cystitis. There is no hydronephrosis.  2. Colonic diverticulosis.  3. Infrarenal abdominal aortic aneurysm.  4. Additional findings as above.     These findings were discussed with Dr. Gamino by telephone.     Radiation dose reduction techniques were  utilized, including automated  exposure control and exposure modulation based on body size.                       12/3/2018              I personally viewed and interpreted the patient's EKG    Assessment/Plan:     1. Paroxsymal atrial fibrillation.  Likely related to sepsis.  Currently in sinus rhythm.   2. Urinary tract infection  3. Sepsis. Related to #2.  Elevated procalcitonin.  On IVF's in addition to antibiotics.   4. MARAT/CKD  5. History of hypertension.  Currently hypotensive.   6. BPH  7. Urinary retention    -  Will check echocardiogram  -  Monitor for atrial fibrillation recurrence.  No plans for beta blockers unless afib recurs and if BP will tolerate.  No plans for anticoagulation unless afib recurs    Thank you for allowing me to participate in the care of Lavelle Soliman. Feel free to contact me directly with any further questions or concerns.    Licha Osuna MD  [facility]  12/04/18  8:14 AM    EMR Dragon/Transcription disclaimer:   Much of this encounter note is an electronic transcription/translation of spoken language to printed text. The electronic translation of spoken language may permit erroneous, or at times, nonsensical words or phrases to be inadvertently transcribed; Although I have reviewed the note for such errors, some may still exist.

## 2018-12-05 LAB
ANION GAP SERPL CALCULATED.3IONS-SCNC: 9.2 MMOL/L
BASOPHILS # BLD AUTO: 0.03 10*3/MM3 (ref 0–0.2)
BASOPHILS NFR BLD AUTO: 0.2 % (ref 0–1.5)
BUN BLD-MCNC: 20 MG/DL (ref 8–23)
BUN/CREAT SERPL: 16.4 (ref 7–25)
CALCIUM SPEC-SCNC: 7.5 MG/DL (ref 8.2–9.6)
CHLORIDE SERPL-SCNC: 111 MMOL/L (ref 98–107)
CO2 SERPL-SCNC: 18.8 MMOL/L (ref 22–29)
CREAT BLD-MCNC: 1.22 MG/DL (ref 0.76–1.27)
DEPRECATED RDW RBC AUTO: 47.8 FL (ref 37–54)
EOSINOPHIL # BLD AUTO: 0.17 10*3/MM3 (ref 0–0.7)
EOSINOPHIL NFR BLD AUTO: 1.2 % (ref 0.3–6.2)
ERYTHROCYTE [DISTWIDTH] IN BLOOD BY AUTOMATED COUNT: 13.3 % (ref 11.5–14.5)
GFR SERPL CREATININE-BSD FRML MDRD: 56 ML/MIN/1.73
GLUCOSE BLD-MCNC: 103 MG/DL (ref 65–99)
HCT VFR BLD AUTO: 31.9 % (ref 40.4–52.2)
HGB BLD-MCNC: 10 G/DL (ref 13.7–17.6)
IMM GRANULOCYTES # BLD: 0.07 10*3/MM3 (ref 0–0.03)
IMM GRANULOCYTES NFR BLD: 0.5 % (ref 0–0.5)
LYMPHOCYTES # BLD AUTO: 1.29 10*3/MM3 (ref 0.9–4.8)
LYMPHOCYTES NFR BLD AUTO: 9 % (ref 19.6–45.3)
MCH RBC QN AUTO: 30.8 PG (ref 27–32.7)
MCHC RBC AUTO-ENTMCNC: 31.3 G/DL (ref 32.6–36.4)
MCV RBC AUTO: 98.2 FL (ref 79.8–96.2)
MONOCYTES # BLD AUTO: 1.96 10*3/MM3 (ref 0.2–1.2)
MONOCYTES NFR BLD AUTO: 13.7 % (ref 5–12)
NEUTROPHILS # BLD AUTO: 10.78 10*3/MM3 (ref 1.9–8.1)
NEUTROPHILS NFR BLD AUTO: 75.4 % (ref 42.7–76)
PLATELET # BLD AUTO: 172 10*3/MM3 (ref 140–500)
PMV BLD AUTO: 11.5 FL (ref 6–12)
POTASSIUM BLD-SCNC: 3.7 MMOL/L (ref 3.5–5.2)
RBC # BLD AUTO: 3.25 10*6/MM3 (ref 4.6–6)
SODIUM BLD-SCNC: 139 MMOL/L (ref 136–145)
WBC NRBC COR # BLD: 14.3 10*3/MM3 (ref 4.5–10.7)

## 2018-12-05 PROCEDURE — 85025 COMPLETE CBC W/AUTO DIFF WBC: CPT | Performed by: HOSPITALIST

## 2018-12-05 PROCEDURE — 97110 THERAPEUTIC EXERCISES: CPT

## 2018-12-05 PROCEDURE — 25010000002 HEPARIN (PORCINE) PER 1000 UNITS: Performed by: HOSPITALIST

## 2018-12-05 PROCEDURE — 99232 SBSQ HOSP IP/OBS MODERATE 35: CPT | Performed by: INTERNAL MEDICINE

## 2018-12-05 PROCEDURE — 25010000002 DIGOXIN PER 500 MCG: Performed by: NURSE PRACTITIONER

## 2018-12-05 PROCEDURE — 25010000002 CEFTRIAXONE PER 250 MG: Performed by: HOSPITALIST

## 2018-12-05 PROCEDURE — 97162 PT EVAL MOD COMPLEX 30 MIN: CPT

## 2018-12-05 PROCEDURE — 80048 BASIC METABOLIC PNL TOTAL CA: CPT | Performed by: HOSPITALIST

## 2018-12-05 RX ORDER — FAMOTIDINE 20 MG/1
20 TABLET, FILM COATED ORAL DAILY
Status: DISCONTINUED | OUTPATIENT
Start: 2018-12-05 | End: 2018-12-08 | Stop reason: HOSPADM

## 2018-12-05 RX ORDER — CALCIUM CARBONATE 200(500)MG
2 TABLET,CHEWABLE ORAL 3 TIMES DAILY PRN
Status: DISCONTINUED | OUTPATIENT
Start: 2018-12-05 | End: 2018-12-08 | Stop reason: HOSPADM

## 2018-12-05 RX ORDER — DIGOXIN 0.25 MG/ML
250 INJECTION INTRAMUSCULAR; INTRAVENOUS ONCE
Status: COMPLETED | OUTPATIENT
Start: 2018-12-05 | End: 2018-12-05

## 2018-12-05 RX ADMIN — FINASTERIDE 5 MG: 5 TABLET, FILM COATED ORAL at 08:21

## 2018-12-05 RX ADMIN — CEFTRIAXONE SODIUM 1 G: 1 INJECTION, SOLUTION INTRAVENOUS at 16:51

## 2018-12-05 RX ADMIN — FAMOTIDINE 20 MG: 20 TABLET, FILM COATED ORAL at 16:51

## 2018-12-05 RX ADMIN — HEPARIN SODIUM 5000 UNITS: 5000 INJECTION INTRAVENOUS; SUBCUTANEOUS at 08:21

## 2018-12-05 RX ADMIN — DIGOXIN 250 MCG: 0.25 INJECTION INTRAMUSCULAR; INTRAVENOUS at 17:09

## 2018-12-05 RX ADMIN — SODIUM CHLORIDE, PRESERVATIVE FREE 3 ML: 5 INJECTION INTRAVENOUS at 08:21

## 2018-12-05 RX ADMIN — Medication 2 TABLET: at 01:09

## 2018-12-05 RX ADMIN — LEVOTHYROXINE SODIUM 88 MCG: 88 TABLET ORAL at 08:21

## 2018-12-05 RX ADMIN — TERAZOSIN HYDROCHLORIDE 5 MG: 5 CAPSULE ORAL at 19:57

## 2018-12-05 RX ADMIN — TAMSULOSIN HYDROCHLORIDE 0.4 MG: 0.4 CAPSULE ORAL at 08:21

## 2018-12-05 RX ADMIN — ATORVASTATIN CALCIUM 10 MG: 10 TABLET, FILM COATED ORAL at 08:21

## 2018-12-05 RX ADMIN — HEPARIN SODIUM 5000 UNITS: 5000 INJECTION INTRAVENOUS; SUBCUTANEOUS at 19:57

## 2018-12-05 NOTE — PLAN OF CARE
Problem: Patient Care Overview  Goal: Plan of Care Review  Outcome: Ongoing (interventions implemented as appropriate)   12/05/18 1549   Coping/Psychosocial   Plan of Care Reviewed With patient   Plan of Care Review   Progress improving   OTHER   Outcome Summary urinary catheter still in place, pt alert and oriented, tums/ pepcid added for acid reflux, no c/o at this time, will continue to monitor closely      Goal: Individualization and Mutuality  Outcome: Ongoing (interventions implemented as appropriate)    Goal: Discharge Needs Assessment  Outcome: Ongoing (interventions implemented as appropriate)    Goal: Interprofessional Rounds/Family Conf  Outcome: Ongoing (interventions implemented as appropriate)      Problem: Skin Injury Risk (Adult)  Goal: Identify Related Risk Factors and Signs and Symptoms  Outcome: Outcome(s) achieved Date Met: 12/05/18    Goal: Skin Health and Integrity  Outcome: Ongoing (interventions implemented as appropriate)      Problem: Cardiac Output Decreased (Adult)  Goal: Effective Tissue Perfusion  Outcome: Ongoing (interventions implemented as appropriate)      Problem: Fall Risk (Adult)  Goal: Absence of Fall  Outcome: Ongoing (interventions implemented as appropriate)

## 2018-12-05 NOTE — THERAPY DISCHARGE NOTE
Acute Care - Physical Therapy Initial Eval/Discharge  [facility]     Patient Name: Lavelle Soliman  : 1927  MRN: [id number]  Today's Date: 2018   Onset of Illness/Injury or Date of Surgery: 18            Admit Date: 12/3/2018    Visit Dx:    ICD-10-CM ICD-9-CM   1. Acute cystitis without hematuria N30.00 595.0   2. Other elevated white blood cell (WBC) count D72.828 288.69     Patient Active Problem List   Diagnosis   • Benign essential hypertension   • Benign prostatic hyperplasia with urinary obstruction   • Chronic kidney disease, stage III (moderate) (CMS/HCC)   • Hyperlipidemia   • Hypothyroidism   • Muscle cramps   • Anemia   • Hyperglycemia   • Sepsis (CMS/HCC)   • MARTA (acute kidney injury) (CMS/HCC)   • Urinary retention   • UTI (urinary tract infection), bacterial   • CKD (chronic kidney disease), stage III (CMS/HCC)   • Hypokalemia   • HTN (hypertension)   • Atrial fibrillation (CMS/[facility])   • BPH with obstruction/lower urinary tract symptoms   • New onset a-fib (CMS/HCC)     Past Medical History:   Diagnosis Date   • Disease of thyroid gland      Past Surgical History:   Procedure Laterality Date   • HERNIA REPAIR            PT ASSESSMENT (last 12 hours)      Physical Therapy Evaluation     Row Name 1844          PT Evaluation Time/Intention    Subjective Information  no complaints  -AR     Document Type  evaluation;discharge treatment  -AR     Mode of Treatment  physical therapy  -AR     Patient Effort  good  -AR     Symptoms Noted During/After Treatment  none  -AR     Row Name 1844          General Information    Patient Profile Reviewed?  yes  -AR     Onset of Illness/Injury or Date of Surgery  18  -AR     Prior Level of Function  independent: no falls  -AR     Equipment Currently Used at Home  cane, straight occasional use of cane  -AR     Pertinent History of Current Functional Problem  sepsis, UTI  -AR     Row Name 1829           Relationship/Environment    Lives With  child(angela), adult  -AR     Row Name 12/05/18 0944          Resource/Environmental Concerns    Current Living Arrangements  home/apartment/condo  -AR     Row Name 12/05/18 0944          Cognitive Assessment/Intervention- PT/OT    Orientation Status (Cognition)  oriented x 4  -AR     Follows Commands (Cognition)  WNL  -AR     Row Name 12/05/18 0944          Bed Mobility Assessment/Treatment    Bed Mobility Assessment/Treatment  supine-sit;sit-supine  -AR     Supine-Sit [place] (Bed Mobility)  conditional independence  -AR     Sit-Supine [place] (Bed Mobility)  independent  -AR     Assistive Device (Bed Mobility)  head of bed elevated  -AR     Row Name 12/05/18 0944          Transfer Assessment/Treatment    Transfer Assessment/Treatment  sit-stand transfer;stand-sit transfer  -AR     Sit-Stand [place] (Transfers)  stand by assist  -AR     Stand-Sit [place] (Transfers)  stand by assist  -AR     Row Name 12/05/18 0944          Sit-Stand Transfer    Assistive Device (Sit-Stand Transfers)  walker, front-wheeled  -AR     Row Name 12/05/18 0944          Stand-Sit Transfer    Assistive Device (Stand-Sit Transfers)  walker, front-wheeled  -AR     Row Name 12/05/18 0944          Gait/Stairs Assessment/Training    Gait/Stairs Assessment/Training  gait/ambulation independence  -AR     [place] Level (Gait)  supervision  -AR     Assistive Device (Gait)  walker, front-wheeled  -AR     Distance in Feet (Gait)  175  -AR     Pattern (Gait)  swing-through  -AR     Deviations/Abnormal Patterns (Gait)  festinating/shuffling;gait speed decreased  -AR     Bilateral Gait Deviations  forward flexed posture  -AR     Comment (Gait/Stairs)  had just walked w/ dtr, no AD, wanting to trial RW  -AR     Row Name 12/05/18 0944          General ROM    GENERAL ROM COMMENTS  B LE WFL  -AR     Row Name 12/05/18 0944          MMT (Manual Muscle Testing)    General MMT Comments  B LE +4/5   -AR     Row Name 12/05/18 0944          Pain Assessment    Additional Documentation  Pain Scale: Numbers Pre/Post-Treatment (Group)  -AR     Row Name 12/05/18 0944          Pain Scale: Numbers Pre/Post-Treatment    Pain Scale: Numbers, Pretreatment  0/10 - no pain  -AR     Pain Scale: Numbers, Post-Treatment  0/10 - no pain  -AR     Pain Intervention(s)  Repositioned  -AR     Row Name 12/05/18 0944          Physical Therapy Clinical Impression    Patient/Family Goals Statement (PT Clinical Impression)  DC home  -AR     Criteria for Skilled Interventions Met (PT Clinical Impression)  no  -AR     Row Name 12/05/18 0944          Vital Signs    O2 Delivery Pre Treatment  room air  -AR     Row Name 12/05/18 0944          Positioning and Restraints    Pre-Treatment Position  in bed  -AR     Post Treatment Position  bed  -AR     In Bed  supine;call light within reach;encouraged to call for assist;with family/caregiver  -AR     Row Name 12/05/18 0944          Living Environment    Home Accessibility  -- 1 small step to enter  -AR       User Key  (r) = Recorded By, (t) = Taken By, (c) = Cosigned By    Initials Name Provider Type    Holly Vizcaino, PT Physical Therapist              PT Recommendation and Plan  Anticipated Discharge Disposition (PT): home with assist(planning to use RW, pt owns)  Therapy Frequency (PT Clinical Impression): evaluation only  Outcome Summary/Treatment Plan (PT)  Anticipated Discharge Disposition (PT): home with assist(planning to use RW, pt owns)  Plan of Care Reviewed With: patient  Outcome Summary: Pt admitted 12/3 w/ MARTA on CKD, sepsis, acute cystits vs prostatitis.  Pt has been ambulating in [place] w/ dtr, no AD; requesting to trial RW.  Able to ambulate 175' w/ supervision using RW, no LOB or safety concerns.  DIscussed plan to use RW once home - pt owns. Educated on activity/walking recommendations and PT POC to DC PT - pt near baseline and up w/ family.  Pt and family agreeable.           Time Calculation:   PT Charges     Row Name 12/05/18 0950             Time Calculation    Start Time  0902  -AR      Stop Time  0921  -AR      Time Calculation (min)  19 min  -AR      PT Received On  12/05/18  -AR        User Key  (r) = Recorded By, (t) = Taken By, (c) = Cosigned By    Initials Name Provider Type    AR Holly Mccollum, PT Physical Therapist        Therapy Suggested Charges     Code   Minutes Charges    None           Therapy Charges for Today     Code Description Service Date Service Provider Modifiers Qty    20612950060 HC PT EVAL MOD COMPLEXITY 2 12/5/2018 Holly Mccollum, PT GP 1    25220401431 HC PT THER PROC EA 15 MIN 12/5/2018 Holly Mccollum, PT GP 1               PT Discharge Summary  Anticipated Discharge Disposition (PT): home with assist(planning to use RW, pt owns)    Holly Mccollum PT  12/5/2018

## 2018-12-05 NOTE — PROGRESS NOTES
"   LOS: 2 days   Patient Care Team:  Rafita Colunga MD as PCP - General  Rafita Colunga MD as PCP - Claims Attributed    Chief Complaint: heartburn    Subjective     Feeling okay. Would like something for \"indigestion\". Worse at night. Last night had episode of Afib with RVR (141) treated with IV Digoxin x 1. No symptoms with this. Has some dry cough today        Subjective:  Symptoms:  Improved.  He reports malaise, cough and weakness.  No shortness of breath, chest pain, headache, chest pressure, anorexia, diarrhea or anxiety.    Diet:  Adequate intake.  No nausea or vomiting.    Activity level: Impaired due to weakness.    Pain:  He reports no pain.        History taken from: patient chart family RN    Objective     Vital Signs  Temp:  [97.6 °F (36.4 °C)-98.9 °F (37.2 °C)] 97.6 °F (36.4 °C)  Heart Rate:  [] 84  Resp:  [18-20] 18  BP: ()/(50-87) 115/87    Objective:  General Appearance:  Comfortable and in no acute distress.    Vital signs: (most recent): Blood pressure 115/87, pulse 84, temperature 97.6 °F (36.4 °C), temperature source Oral, resp. rate 18, height 170.2 cm (67\"), weight 70.8 kg (156 lb), SpO2 94 %.  Vital signs are normal.  No fever.  (BPs a little low).    Output: Producing urine (clear urine in cristobal bag) and producing stool.    Lungs:  Normal effort and normal respiratory rate.  Breath sounds clear to auscultation.    Heart: Normal rate.  Regular rhythm.    Abdomen: Abdomen is soft.  Bowel sounds are normal.   There is no abdominal tenderness.     Extremities: There is no dependent edema.    Pulses: Distal pulses are intact.    Neurological: Patient is alert and oriented to person, place and time.    Pupils:  Pupils are equal, round, and reactive to light.    Skin:  Warm and dry.  (Skin turgor poor  BCR ~3 sec)            Results Review:     I reviewed the patient's new clinical results.  I reviewed the patient's new imaging results and agree with the interpretation.  I " reviewed the patient's other test results and agree with the interpretation  I personally viewed and interpreted the patient's EKG/Telemetry data  Discussed with patient, daughter, and RN    Medication Review: reviewed and adjusted    Assessment/Plan       Sepsis (CMS/[facility])    Hypothyroidism    MARTA (acute kidney injury) (CMS/[facility])    Urinary retention    UTI (urinary tract infection), bacterial    CKD (chronic kidney disease), stage III (CMS/[facility])    Hypokalemia    HTN (hypertension)    BPH with obstruction/lower urinary tract symptoms    New onset a-fib (CMS/[facility])          Plan:   (Continue IV Rocephin, blood and urine cultures pending, WBC falling  TFTs okay  Cr normalized with IVFs, no hydro, CT imaging suggests severe UTI only  [place] in place, may need cysto and UDS as outpt, Flomax and Proscar started  Appreciate  attention to pt  Episode of PAF with RVR last night, required IV Dig x 1  PAF due to sepsis  Echo reassuring  No plans for rate control therapy or AC at present per Card  Appreciate Card attention to pt  K+ protocol   Home with [facility] eventually (1-2 days)  ).       Mitesh Mooney MD  12/05/18  2:54 PM    Time: 25min

## 2018-12-05 NOTE — PROGRESS NOTES
Hospital Follow Up    Chief Complaint: Follow up paroxsymal atrial fibrillation    Interval History:  Episode of atrial fibrillation yesterday with rates in 100s-130s.  Back in sinus rhythm now. Feeling better today.     Objective:     Objective:  Temp:  [98 °F (36.7 °C)-98.9 °F (37.2 °C)] 98.1 °F (36.7 °C)  Heart Rate:  [] 83  Resp:  [18-20] 18  BP: ()/(50-71) 117/56     Intake/Output Summary (Last 24 hours) at 12/5/2018 0820  Last data filed at 12/5/2018 0719  Gross per 24 hour   Intake 3860.42 ml   Output 2200 ml   Net 1660.42 ml     Body mass index is 24.43 kg/m².      12/03/18  1107 12/03/18  1529 12/04/18  1639   Weight: 72.6 kg (160 lb) 71.1 kg (156 lb 12.8 oz) 70.8 kg (156 lb)     Weight change: -1.814 kg ()      Physical Exam:   General : Alert, cooperative, in no acute distress.  Neuro: alert,cooperative and oriented  Lungs: CTAB. Normal respiratory effort and rate.  CV:: Regular rate and rhythm, normal S1 and S2, no murmurs, gallops or rubs.  ABD: Soft, nontender, non-distended. positive bowel sounds  Extr: No edema or cyanosis, moves all extremities    Lab Review:   Results from last 7 days   Lab Units  12/05/18   0449  12/04/18   2254  12/04/18   0402  12/03/18   1124  12/03/18   0931   SODIUM mmol/L  139   --   137  136  136   POTASSIUM mmol/L  3.7  4.0  2.9*  3.2*  3.7   CHLORIDE mmol/L  111*   --   104  97*  100   CO2 mmol/L  18.8*   --   22.0  24.4  24.5   BUN mg/dL  20   --   30*  35*  33*   CREATININE mg/dL  1.22   --   1.59*  2.06*  2.16*   GLUCOSE mg/dL  103*   --   122*  119*  122*   CALCIUM mg/dL  7.5*   --   7.8*  8.7  8.7   AST (SGOT) U/L   --    --    --   14  13   ALT (SGPT) U/L   --    --    --   14  13     Results from last 7 days   Lab Units  12/04/18   0402  12/03/18   1124   TROPONIN T ng/mL  <0.010  <0.010     Results from last 7 days   Lab Units  12/05/18   0449  12/04/18   0402   WBC 10*3/mm3  14.30*  18.20*   HEMOGLOBIN g/dL  10.0*  10.5*   HEMATOCRIT %  31.9*   33.2*   PLATELETS 10*3/mm3  172  146         Results from last 7 days   Lab Units  12/04/18   0402   MAGNESIUM mg/dL  2.0           Invalid input(s): LDLCALC      Results from last 7 days   Lab Units  12/04/18   0402   TSH mIU/mL  0.324     I reviewed the patient's new clinical results.  I personally viewed and interpreted the patient's EKG  Current Medications:   Scheduled Meds:  atorvastatin 10 mg Oral Daily   ceftriaxone 1 g Intravenous Q24H   finasteride 5 mg Oral Daily   heparin (porcine) 5,000 Units Subcutaneous Q12H   levothyroxine 88 mcg Oral Daily   sodium chloride 3 mL Intravenous Q12H   tamsulosin 0.4 mg Oral Daily   terazosin 5 mg Oral Nightly     Continuous Infusions:  sodium chloride 125 mL/hr Last Rate: 125 mL/hr (12/05/18 0719)       Allergies:  Allergies   Allergen Reactions   • Latex        Assessment/Plan:     1. Paroxsymal atrial fibrillation.  Recurrent episode yesterday.  Treated with a dose of IV digoxin.  Likely related to sepsis.  Currently back in sinus rhythm.  Echo with normal LV function and no significant valvular disease.  2. Urinary tract infection  3. Sepsis. Related to #2.  Elevated procalcitonin.  On IVF's in addition to antibiotics.   4. MARTA/CKD  5. History of hypertension.  Currently hypotensive.   6. BPH  7. Urinary retention    -  Continue to monitor for further atrial fibrillation    Licha Osuna MD  12/05/18  8:20 AM

## 2018-12-05 NOTE — PROGRESS NOTES
Continued Stay Note  [facility]     Patient Name: Lavelle Soliman  MRN: [id number]  Today's Date: 12/5/2018    Admit Date: 12/3/2018    Discharge Plan     Row Name 12/05/18 1407       Plan    Plan  Plans are home with [facility] and family.  Pt states he plans on using walker at home.     Patient/Family in Agreement with Plan  yes    Plan Comments  CCP spoke with pt and dtr, Little @ bedside.  Confirmed face sheet and pharmacy info with pt.  Pt states he lives with dtr, and had been using a cane for assistance.  Pt says he will use a walker  ( which he has at home) when he's dc'd.   Pt states plans are home. CCP discussed having HH follow up on dc.  They agree they would like  HH to follow. CCP will cont to follow and offer assist as needed. Adriana Hernandez RN        Discharge Codes    No documentation.             Adriana Hernandez RN

## 2018-12-05 NOTE — PLAN OF CARE
Problem: Patient Care Overview  Goal: Plan of Care Review   12/05/18 0947   Coping/Psychosocial   Plan of Care Reviewed With patient   OTHER   Outcome Summary Pt admitted 12/3 w/ MARTA on CKD, sepsis, acute cystits vs prostatitis. Pt has been ambulating in garsia w/ dtr, no AD; requesting to trial RW. Able to ambulate 175' w/ supervision using RW, no LOB or safety concerns. DIscussed plan to use RW once home - pt owns. Educated on activity/walking recommendations and PT POC to DC PT - pt near baseline and up w/ family. Pt and family agreeable.

## 2018-12-05 NOTE — PLAN OF CARE
Problem: Patient Care Overview  Goal: Plan of Care Review  Outcome: Ongoing (interventions implemented as appropriate)   12/05/18 0454   Coping/Psychosocial   Plan of Care Reviewed With patient   Plan of Care Review   Progress improving   OTHER   Outcome Summary vss. patient remains in NSR overnight. K+ replaced per protocol-recheck at 2300=4.0. one time order of tums ordered for c/o heartburn with relief. f/c care provided. continue IVF and abx as ordered. No falls. ctm closely      Goal: Individualization and Mutuality  Outcome: Ongoing (interventions implemented as appropriate)    Goal: Discharge Needs Assessment  Outcome: Ongoing (interventions implemented as appropriate)      Problem: Skin Injury Risk (Adult)  Goal: Skin Health and Integrity  Outcome: Ongoing (interventions implemented as appropriate)      Problem: Cardiac Output Decreased (Adult)  Goal: Identify Related Risk Factors and Signs and Symptoms  Outcome: Outcome(s) achieved Date Met: 12/05/18    Goal: Effective Tissue Perfusion  Outcome: Ongoing (interventions implemented as appropriate)      Problem: Fall Risk (Adult)  Goal: Identify Related Risk Factors and Signs and Symptoms  Outcome: Outcome(s) achieved Date Met: 12/05/18    Goal: Absence of Fall  Outcome: Ongoing (interventions implemented as appropriate)

## 2018-12-06 ENCOUNTER — APPOINTMENT (OUTPATIENT)
Dept: GENERAL RADIOLOGY | Facility: HOSPITAL | Age: 83
End: 2018-12-06

## 2018-12-06 LAB
ANION GAP SERPL CALCULATED.3IONS-SCNC: 9.1 MMOL/L
BASOPHILS # BLD AUTO: 0.05 10*3/MM3 (ref 0–0.2)
BASOPHILS NFR BLD AUTO: 0.4 % (ref 0–1.5)
BUN BLD-MCNC: 16 MG/DL (ref 8–23)
BUN/CREAT SERPL: 13.9 (ref 7–25)
CALCIUM SPEC-SCNC: 7.7 MG/DL (ref 8.2–9.6)
CHLORIDE SERPL-SCNC: 110 MMOL/L (ref 98–107)
CO2 SERPL-SCNC: 20.9 MMOL/L (ref 22–29)
CREAT BLD-MCNC: 1.15 MG/DL (ref 0.76–1.27)
DEPRECATED RDW RBC AUTO: 47.9 FL (ref 37–54)
EOSINOPHIL # BLD AUTO: 0.28 10*3/MM3 (ref 0–0.7)
EOSINOPHIL NFR BLD AUTO: 2.3 % (ref 0.3–6.2)
ERYTHROCYTE [DISTWIDTH] IN BLOOD BY AUTOMATED COUNT: 13.4 % (ref 11.5–14.5)
GFR SERPL CREATININE-BSD FRML MDRD: 60 ML/MIN/1.73
GLUCOSE BLD-MCNC: 109 MG/DL (ref 65–99)
HCT VFR BLD AUTO: 32.3 % (ref 40.4–52.2)
HGB BLD-MCNC: 10.1 G/DL (ref 13.7–17.6)
IMM GRANULOCYTES # BLD: 0.07 10*3/MM3 (ref 0–0.03)
IMM GRANULOCYTES NFR BLD: 0.6 % (ref 0–0.5)
LYMPHOCYTES # BLD AUTO: 1.6 10*3/MM3 (ref 0.9–4.8)
LYMPHOCYTES NFR BLD AUTO: 13.1 % (ref 19.6–45.3)
MCH RBC QN AUTO: 30.7 PG (ref 27–32.7)
MCHC RBC AUTO-ENTMCNC: 31.3 G/DL (ref 32.6–36.4)
MCV RBC AUTO: 98.2 FL (ref 79.8–96.2)
MONOCYTES # BLD AUTO: 2.03 10*3/MM3 (ref 0.2–1.2)
MONOCYTES NFR BLD AUTO: 16.6 % (ref 5–12)
NEUTROPHILS # BLD AUTO: 8.22 10*3/MM3 (ref 1.9–8.1)
NEUTROPHILS NFR BLD AUTO: 67 % (ref 42.7–76)
PLATELET # BLD AUTO: 194 10*3/MM3 (ref 140–500)
PMV BLD AUTO: 11 FL (ref 6–12)
POTASSIUM BLD-SCNC: 3.6 MMOL/L (ref 3.5–5.2)
POTASSIUM BLD-SCNC: 4.2 MMOL/L (ref 3.5–5.2)
RBC # BLD AUTO: 3.29 10*6/MM3 (ref 4.6–6)
SODIUM BLD-SCNC: 140 MMOL/L (ref 136–145)
WBC NRBC COR # BLD: 12.25 10*3/MM3 (ref 4.5–10.7)

## 2018-12-06 PROCEDURE — 25010000002 HEPARIN (PORCINE) PER 1000 UNITS: Performed by: HOSPITALIST

## 2018-12-06 PROCEDURE — 84132 ASSAY OF SERUM POTASSIUM: CPT | Performed by: HOSPITALIST

## 2018-12-06 PROCEDURE — 25010000002 CEFTRIAXONE PER 250 MG: Performed by: HOSPITALIST

## 2018-12-06 PROCEDURE — 85025 COMPLETE CBC W/AUTO DIFF WBC: CPT | Performed by: HOSPITALIST

## 2018-12-06 PROCEDURE — 99232 SBSQ HOSP IP/OBS MODERATE 35: CPT | Performed by: INTERNAL MEDICINE

## 2018-12-06 PROCEDURE — 71046 X-RAY EXAM CHEST 2 VIEWS: CPT

## 2018-12-06 PROCEDURE — 80048 BASIC METABOLIC PNL TOTAL CA: CPT | Performed by: HOSPITALIST

## 2018-12-06 RX ADMIN — METOPROLOL TARTRATE 12.5 MG: 25 TABLET ORAL at 13:56

## 2018-12-06 RX ADMIN — FINASTERIDE 5 MG: 5 TABLET, FILM COATED ORAL at 08:59

## 2018-12-06 RX ADMIN — HEPARIN SODIUM 5000 UNITS: 5000 INJECTION INTRAVENOUS; SUBCUTANEOUS at 20:26

## 2018-12-06 RX ADMIN — TERAZOSIN HYDROCHLORIDE 5 MG: 5 CAPSULE ORAL at 20:26

## 2018-12-06 RX ADMIN — CEFTRIAXONE SODIUM 1 G: 1 INJECTION, SOLUTION INTRAVENOUS at 18:00

## 2018-12-06 RX ADMIN — TAMSULOSIN HYDROCHLORIDE 0.4 MG: 0.4 CAPSULE ORAL at 09:00

## 2018-12-06 RX ADMIN — POTASSIUM CHLORIDE 40 MEQ: 750 CAPSULE, EXTENDED RELEASE ORAL at 14:27

## 2018-12-06 RX ADMIN — SODIUM CHLORIDE, PRESERVATIVE FREE 3 ML: 5 INJECTION INTRAVENOUS at 20:26

## 2018-12-06 RX ADMIN — FAMOTIDINE 20 MG: 20 TABLET, FILM COATED ORAL at 09:00

## 2018-12-06 RX ADMIN — POTASSIUM CHLORIDE 40 MEQ: 750 CAPSULE, EXTENDED RELEASE ORAL at 18:00

## 2018-12-06 RX ADMIN — ATORVASTATIN CALCIUM 10 MG: 10 TABLET, FILM COATED ORAL at 09:00

## 2018-12-06 RX ADMIN — METOPROLOL TARTRATE 12.5 MG: 25 TABLET ORAL at 20:26

## 2018-12-06 RX ADMIN — LEVOTHYROXINE SODIUM 88 MCG: 88 TABLET ORAL at 09:00

## 2018-12-06 RX ADMIN — SODIUM CHLORIDE, PRESERVATIVE FREE 3 ML: 5 INJECTION INTRAVENOUS at 09:00

## 2018-12-06 RX ADMIN — HEPARIN SODIUM 5000 UNITS: 5000 INJECTION INTRAVENOUS; SUBCUTANEOUS at 08:59

## 2018-12-06 NOTE — PLAN OF CARE
Problem: Patient Care Overview  Goal: Plan of Care Review  Outcome: Ongoing (interventions implemented as appropriate)   12/06/18 0550   Coping/Psychosocial   Plan of Care Reviewed With patient   Plan of Care Review   Progress improving   OTHER   Outcome Summary no complaints, room air, tolerating diet, pleasant and well rested tonight, HR WNL and converted to SR this AM      Goal: Individualization and Mutuality  Outcome: Ongoing (interventions implemented as appropriate)    Goal: Discharge Needs Assessment  Outcome: Ongoing (interventions implemented as appropriate)      Problem: Skin Injury Risk (Adult)  Goal: Skin Health and Integrity  Outcome: Ongoing (interventions implemented as appropriate)      Problem: Cardiac Output Decreased (Adult)  Goal: Effective Tissue Perfusion  Outcome: Ongoing (interventions implemented as appropriate)      Problem: Fall Risk (Adult)  Goal: Absence of Fall  Outcome: Ongoing (interventions implemented as appropriate)

## 2018-12-06 NOTE — PLAN OF CARE
Problem: Patient Care Overview  Goal: Plan of Care Review  Outcome: Ongoing (interventions implemented as appropriate)   12/06/18 7135   Coping/Psychosocial   Plan of Care Reviewed With patient   OTHER   Outcome Summary chest xray today showed small infiltrates and pleural effusions, pt still in afib but rate is much more controlled, metoprolol added, VSS, voiding trial on day of d/c, will continue to monitor closely      Goal: Individualization and Mutuality  Outcome: Ongoing (interventions implemented as appropriate)    Goal: Discharge Needs Assessment  Outcome: Ongoing (interventions implemented as appropriate)    Goal: Interprofessional Rounds/Family Conf  Outcome: Ongoing (interventions implemented as appropriate)      Problem: Skin Injury Risk (Adult)  Goal: Skin Health and Integrity  Outcome: Ongoing (interventions implemented as appropriate)      Problem: Fall Risk (Adult)  Goal: Absence of Fall  Outcome: Ongoing (interventions implemented as appropriate)

## 2018-12-06 NOTE — PROGRESS NOTES
"   LOS: 3 days   Patient Care Team:  Rafita Colunga MD as PCP - General  Rafita Colunga MD as PCP - Claims Attributed      Subjective   Interval History: Comfortable. Reports ambulating with walker yesterday. Would like voiding trial prior to discharge.    Objective     ROS   12 POINT NEG ROS PERTINENT IN HPI      Vital Signs  Temp:  [97.6 °F (36.4 °C)-98.4 °F (36.9 °C)] 97.8 °F (36.6 °C)  Heart Rate:  [] 88  Resp:  [16-18] 16  BP: (111-131)/(48-87) 131/68      Intake/Output Summary (Last 24 hours) at 12/6/2018 0903  Last data filed at 12/6/2018 0500  Gross per 24 hour   Intake --   Output 1650 ml   Net -1650 ml       Flowsheet Rows      First Filed Value   Admission Height  170.2 cm (67\") Documented at 12/03/2018 1107   Admission Weight  72.6 kg (160 lb) Documented at 12/03/2018 1107          Physical Exam:     General saqib: alert, cooperative, oriented    Genitalia:  [place] intact, urine yellow and clear.  Results Review:     I reviewed the patient's new clinical results.    Lab Results (last 72 hours)     Procedure Component Value Units Date/Time    Basic Metabolic Panel [[id number]]  (Abnormal) Collected:  12/06/18 0353    Specimen:  Blood Updated:  12/06/18 0438     Glucose 109 mg/dL      BUN 16 mg/dL      Creatinine 1.15 mg/dL      Sodium 140 mmol/L      Potassium 3.6 mmol/L      Chloride 110 mmol/L      CO2 20.9 mmol/L      Calcium 7.7 mg/dL      eGFR Non African Amer 60 mL/min/1.73      BUN/Creatinine Ratio 13.9     Anion Gap 9.1 mmol/L     Narrative:       The MDRD GFR formula is only valid for adults with stable renal function between ages 18 and 70.    CBC & Differential [[id number]] Collected:  12/06/18 0353    Specimen:  Blood Updated:  12/06/18 0422    Narrative:       The following orders were created for panel order CBC & Differential.  Procedure                               Abnormality         Status                     ---------                               -----------         ------  "                    CBC Auto Differential[[id number]]        Abnormal            Final result                 Please view results for these tests on the individual orders.    CBC Auto Differential [[id number]]  (Abnormal) Collected:  12/06/18 0353    Specimen:  Blood Updated:  12/06/18 0422     WBC 12.25 10*3/mm3      RBC 3.29 10*6/mm3      Hemoglobin 10.1 g/dL      Hematocrit 32.3 %      MCV 98.2 fL      MCH 30.7 pg      MCHC 31.3 g/dL      RDW 13.4 %      RDW-SD 47.9 fl      MPV 11.0 fL      Platelets 194 10*3/mm3      Neutrophil % 67.0 %      Lymphocyte % 13.1 %      Monocyte % 16.6 %      Eosinophil % 2.3 %      Basophil % 0.4 %      Immature Grans % 0.6 %      Neutrophils, Absolute 8.22 10*3/mm3      Lymphocytes, Absolute 1.60 10*3/mm3      Monocytes, Absolute 2.03 10*3/mm3      Eosinophils, Absolute 0.28 10*3/mm3      Basophils, Absolute 0.05 10*3/mm3      Immature Grans, Absolute 0.07 10*3/mm3     Blood Culture - Blood, Arm, Left [[id number]] Collected:  12/03/18 1124    Specimen:  Blood from Arm, Left Updated:  12/05/18 1145     Blood Culture No growth at 2 days    Blood Culture - Blood, Arm, Left [[id number]] Collected:  12/03/18 1124    Specimen:  Blood from Arm, Left Updated:  12/05/18 1130     Blood Culture No growth at 2 days    Basic Metabolic Panel [[id number]]  (Abnormal) Collected:  12/05/18 0449    Specimen:  Blood Updated:  12/05/18 0600     Glucose 103 mg/dL      BUN 20 mg/dL      Creatinine 1.22 mg/dL      Sodium 139 mmol/L      Potassium 3.7 mmol/L      Chloride 111 mmol/L      CO2 18.8 mmol/L      Calcium 7.5 mg/dL      eGFR Non African Amer 56 mL/min/1.73      BUN/Creatinine Ratio 16.4     Anion Gap 9.2 mmol/L     Narrative:       The MDRD GFR formula is only valid for adults with stable renal function between ages 18 and 70.    CBC & Differential [[id number]] Collected:  12/05/18 0449    Specimen:  Blood Updated:  12/05/18 0531    Narrative:       The following orders were created for panel order  CBC & Differential.  Procedure                               Abnormality         Status                     ---------                               -----------         ------                     CBC Auto Differential[[id number]]        Abnormal            Final result                 Please view results for these tests on the individual orders.    CBC Auto Differential [[id number]]  (Abnormal) Collected:  12/05/18 0449    Specimen:  Blood Updated:  12/05/18 0531     WBC 14.30 10*3/mm3      RBC 3.25 10*6/mm3      Hemoglobin 10.0 g/dL      Hematocrit 31.9 %      MCV 98.2 fL      MCH 30.8 pg      MCHC 31.3 g/dL      RDW 13.3 %      RDW-SD 47.8 fl      MPV 11.5 fL      Platelets 172 10*3/mm3      Neutrophil % 75.4 %      Lymphocyte % 9.0 %      Monocyte % 13.7 %      Eosinophil % 1.2 %      Basophil % 0.2 %      Immature Grans % 0.5 %      Neutrophils, Absolute 10.78 10*3/mm3      Lymphocytes, Absolute 1.29 10*3/mm3      Monocytes, Absolute 1.96 10*3/mm3      Eosinophils, Absolute 0.17 10*3/mm3      Basophils, Absolute 0.03 10*3/mm3      Immature Grans, Absolute 0.07 10*3/mm3     Potassium [[id number]]  (Normal) Collected:  12/04/18 2254    Specimen:  Blood from Arm, Left Updated:  12/04/18 2322     Potassium 4.0 mmol/L     Magnesium [[id number]]  (Normal) Collected:  12/04/18 0402    Specimen:  Blood Updated:  12/04/18 0934     Magnesium 2.0 mg/dL     Procalcitonin [[id number]]  (Abnormal) Collected:  12/04/18 0402    Specimen:  Blood Updated:  12/04/18 0527     Procalcitonin 1.72 ng/mL     Narrative:       As a Marker for Sepsis (Non-Neonates):   1. <0.5 ng/mL represents a low risk of severe sepsis and/or septic shock.  1. >2 ng/mL represents a high risk of severe sepsis and/or septic shock.    As a Marker for Lower Respiratory Tract Infections that require antibiotic therapy:  PCT on Admission     Antibiotic Therapy             6-12 Hrs later  > 0.5                Strongly Recommended            >0.25 - <0.5          "Recommended  0.1 - 0.25           Discouraged                   Remeasure/reassess PCT  <0.1                 Strongly Discouraged          Remeasure/reassess PCT      As 28 day mortality risk marker: \"Change in Procalcitonin Result\" (> 80 % or <=80 %) if Day 0 (or Day 1) and Day 4 values are available. Refer to http://www.[employer]-pct-calculator.com/   Change in PCT <=80 %   A decrease of PCT levels below or equal to 80 % defines a positive change in PCT test result representing a higher risk for 28-day all-cause mortality of patients diagnosed with severe sepsis or septic shock.  Change in PCT > 80 %   A decrease of PCT levels of more than 80 % defines a negative change in PCT result representing a lower risk for 28-day all-cause mortality of patients diagnosed with severe sepsis or septic shock.                Basic Metabolic Panel [[id number]]  (Abnormal) Collected:  12/04/18 0402    Specimen:  Blood Updated:  12/04/18 0516     Glucose 122 mg/dL      BUN 30 mg/dL      Creatinine 1.59 mg/dL      Sodium 137 mmol/L      Potassium 2.9 mmol/L      Chloride 104 mmol/L      CO2 22.0 mmol/L      Calcium 7.8 mg/dL      eGFR Non African Amer 41 mL/min/1.73      BUN/Creatinine Ratio 18.9     Anion Gap 11.0 mmol/L     Narrative:       The MDRD GFR formula is only valid for adults with stable renal function between ages 18 and 70.    Troponin [[id number]]  (Normal) Collected:  12/04/18 0402    Specimen:  Blood Updated:  12/04/18 0514     Troponin T <0.010 ng/mL     Narrative:       Troponin T Reference Ranges:  Less than 0.03 ng/mL:    Negative for AMI  0.03 to 0.09 ng/mL:      Indeterminant for AMI  Greater than 0.09 ng/mL: Positive for AMI    TSH [[id number]]  (Normal) Collected:  12/04/18 0402    Specimen:  Blood Updated:  12/04/18 0514     TSH 0.324 mIU/mL     CBC & Differential [[id number]] Collected:  12/04/18 0402    Specimen:  Blood Updated:  12/04/18 0438    Narrative:       The following orders were created for panel " order CBC & Differential.  Procedure                               Abnormality         Status                     ---------                               -----------         ------                     CBC Auto Differential[[id number]]        Abnormal            Final result                 Please view results for these tests on the individual orders.    CBC Auto Differential [[id number]]  (Abnormal) Collected:  12/04/18 0402    Specimen:  Blood Updated:  12/04/18 0438     WBC 18.20 10*3/mm3      RBC 3.40 10*6/mm3      Hemoglobin 10.5 g/dL      Hematocrit 33.2 %      MCV 97.6 fL      MCH 30.9 pg      MCHC 31.6 g/dL      RDW 13.3 %      RDW-SD 47.8 fl      MPV 11.5 fL      Platelets 146 10*3/mm3      Neutrophil % 82.9 %      Lymphocyte % 7.9 %      Monocyte % 8.8 %      Eosinophil % 0.0 %      Basophil % 0.1 %      Immature Grans % 0.3 %      Neutrophils, Absolute 15.09 10*3/mm3      Lymphocytes, Absolute 1.43 10*3/mm3      Monocytes, Absolute 1.60 10*3/mm3      Eosinophils, Absolute 0.00 10*3/mm3      Basophils, Absolute 0.02 10*3/mm3      Immature Grans, Absolute 0.06 10*3/mm3     Troponin [[id number]]  (Normal) Collected:  12/03/18 1124    Specimen:  Blood Updated:  12/03/18 1712     Troponin T <0.010 ng/mL     Narrative:       Troponin T Reference Ranges:  Less than 0.03 ng/mL:    Negative for AMI  0.03 to 0.09 ng/mL:      Indeterminant for AMI  Greater than 0.09 ng/mL: Positive for AMI    Urinalysis With Microscopic If Indicated (No Culture) - Urine, Clean Catch [[id number]]  (Abnormal) Collected:  12/03/18 1227    Specimen:  Urine, Clean Catch Updated:  12/03/18 1241     Color, UA Yellow     Appearance, UA Turbid     pH, UA 5.5     Specific Gravity, UA 1.014     Glucose, UA Negative     Ketones, UA Trace     Bilirubin, UA Negative     Blood, UA Moderate (2+)     Protein, UA 30 mg/dL (1+)     Leuk Esterase, UA Large (3+)     Nitrite, UA Negative     Urobilinogen, UA 0.2 E.U./dL    Urinalysis, Microscopic Only -  "Urine, Clean Catch [[id number]]  (Abnormal) Collected:  12/03/18 1227    Specimen:  Urine, Clean Catch Updated:  12/03/18 1241     RBC, UA 3-5 /HPF      WBC, UA Too Numerous to Count /HPF      Bacteria, UA 4+ /HPF      Squamous Epithelial Cells, UA 0-2 /HPF      Hyaline Casts, UA 3-6 /LPF      Methodology Automated Microscopy    Procalcitonin [[id number]]  (Abnormal) Collected:  12/03/18 1124    Specimen:  Blood Updated:  12/03/18 1212     Procalcitonin 2.28 ng/mL     Narrative:       As a Marker for Sepsis (Non-Neonates):   1. <0.5 ng/mL represents a low risk of severe sepsis and/or septic shock.  1. >2 ng/mL represents a high risk of severe sepsis and/or septic shock.    As a Marker for Lower Respiratory Tract Infections that require antibiotic therapy:  PCT on Admission     Antibiotic Therapy             6-12 Hrs later  > 0.5                Strongly Recommended            >0.25 - <0.5         Recommended  0.1 - 0.25           Discouraged                   Remeasure/reassess PCT  <0.1                 Strongly Discouraged          Remeasure/reassess PCT      As 28 day mortality risk marker: \"Change in Procalcitonin Result\" (> 80 % or <=80 %) if Day 0 (or Day 1) and Day 4 values are available. Refer to http://www.[employer]-pct-calculator.com/   Change in PCT <=80 %   A decrease of PCT levels below or equal to 80 % defines a positive change in PCT test result representing a higher risk for 28-day all-cause mortality of patients diagnosed with severe sepsis or septic shock.  Change in PCT > 80 %   A decrease of PCT levels of more than 80 % defines a negative change in PCT result representing a lower risk for 28-day all-cause mortality of patients diagnosed with severe sepsis or septic shock.                Comprehensive Metabolic Panel [[id number]]  (Abnormal) Collected:  12/03/18 1124    Specimen:  Blood Updated:  12/03/18 1200     Glucose 119 mg/dL      BUN 35 mg/dL      Creatinine 2.06 mg/dL      Sodium 136 mmol/L      " Potassium 3.2 mmol/L      Chloride 97 mmol/L      CO2 24.4 mmol/L      Calcium 8.7 mg/dL      Total Protein 7.6 g/dL      Albumin 3.40 g/dL      ALT (SGPT) 14 U/L      AST (SGOT) 14 U/L      Alkaline Phosphatase 89 U/L      Total Bilirubin 0.6 mg/dL      eGFR Non African Amer 30 mL/min/1.73      Globulin 4.2 gm/dL      A/G Ratio 0.8 g/dL      BUN/Creatinine Ratio 17.0     Anion Gap 14.6 mmol/L     Narrative:       The MDRD GFR formula is only valid for adults with stable renal function between ages 18 and 70.    Lactic Acid, Plasma [[id number]]  (Normal) Collected:  12/03/18 1124    Specimen:  Blood Updated:  12/03/18 1154     Lactate 1.8 mmol/L     CBC & Differential [[id number]] Collected:  12/03/18 1124    Specimen:  Blood Updated:  12/03/18 1145    Narrative:       The following orders were created for panel order CBC & Differential.  Procedure                               Abnormality         Status                     ---------                               -----------         ------                     Scan Slide[[id number]]                                                                  CBC Auto Differential[[id number]]        Abnormal            Final result                 Please view results for these tests on the individual orders.    CBC Auto Differential [[id number]]  (Abnormal) Collected:  12/03/18 1124    Specimen:  Blood from Arm, Left Updated:  12/03/18 1145     WBC 23.55 10*3/mm3      RBC 3.96 10*6/mm3      Hemoglobin 12.4 g/dL      Hematocrit 38.3 %      MCV 96.7 fL      MCH 31.3 pg      MCHC 32.4 g/dL      RDW 13.1 %      RDW-SD 45.9 fl      MPV 11.0 fL      Platelets 166 10*3/mm3      Neutrophil % 83.7 %      Lymphocyte % 5.0 %      Monocyte % 10.7 %      Eosinophil % 0.0 %      Basophil % 0.1 %      Immature Grans % 0.5 %      Neutrophils, Absolute 19.70 10*3/mm3      Lymphocytes, Absolute 1.18 10*3/mm3      Monocytes, Absolute 2.52 10*3/mm3      Eosinophils, Absolute 0.00 10*3/mm3      Basophils,  Absolute 0.03 10*3/mm3      Immature Grans, Absolute 0.12 10*3/mm3           Imaging Results (last 72 hours)     Procedure Component Value Units Date/Time    CT Abdomen Pelvis Without Contrast [[id number]] Collected:  12/03/18 1206     Updated:  12/05/18 1026    Narrative:       CT ABDOMEN AND PELVIS WITHOUT CONTRAST     HISTORY: Left flank pain and urinary tract infection.     TECHNIQUE: Axial CT images of the abdomen and pelvis were obtained  without administration of intravenous contrast. The patient was not  given oral contrast. Coronal and sagittal reformats were obtained.     COMPARISON: None.     FINDINGS: Bilateral adrenal glands are normal. Both kidneys are normal  in size and attenuation. No renal calculi or hydronephrosis is imaged.  There is a large predominantly exophytic cyst arising from the mid to  lower pole of the left kidney measuring 5.3 x 5.2 cm. There is also a  partly exophytic cortical cyst in the midpole of the right kidney  measuring 2 x 1.6 cm. The urinary bladder is partially distended. The  prostate gland is enlarged and indents the bladder base. Mild  perivesicular fat stranding is present and is suggestive of cystitis.     The liver demonstrates normal attenuation. Multiple low-density lesions  are seen within the liver that are favored to represent simple cysts.  Cholelithiasis is present. The spleen is normal. The pancreas is normal  without ductal dilatation. The small and large bowel loops demonstrate  normal caliber. Colonic diverticulosis is present. The appendix is  normal. No pathologic retroperitoneal lymphadenopathy. There is a distal  abdominal aortic aneurysm measuring up to 4 cm. Bilateral lung bases  demonstrate underaeration of the lower lobes with patchy increased  ground glass density favored to represent atelectasis. Multiple healed  pelvic fractures are identified.       Impression:       1. Enlarged prostate gland that indents the bladder base. The urinary  bladder  demonstrates perivesicular fat stranding most concerning for  cystitis. There is no hydronephrosis.  2. Colonic diverticulosis.  3. Infrarenal abdominal aortic aneurysm.  4. Additional findings as above.     These findings were discussed with Dr. Gamino by telephone.     Radiation dose reduction techniques were utilized, including automated  exposure control and exposure modulation based on body size.     This report was finalized on 12/5/2018 10:23 AM by Dr. Maria T Bueno M.D.             Medication Review:   Current Facility-Administered Medications   Medication Dose Route Frequency Provider Last Rate Last Dose   • acetaminophen (TYLENOL) tablet 650 mg  650 mg Oral Q4H PRN Rock Wilkinson MD       • atorvastatin (LIPITOR) tablet 10 mg  10 mg Oral Daily Rock Wilkinson MD   10 mg at 12/06/18 0900   • calcium carbonate (TUMS) chewable tablet 500 mg (200 mg elemental)  2 tablet Oral TID PRN Mitesh Mooney MD       • cefTRIAXone (ROCEPHIN) IVPB 1 g  1 g Intravenous Q24H Rock Wilkinson  mL/hr at 12/05/18 1651 1 g at 12/05/18 1651   • famotidine (PEPCID) tablet 20 mg  20 mg Oral Daily Mitesh Mooney MD   20 mg at 12/06/18 0900   • finasteride (PROSCAR) tablet 5 mg  5 mg Oral Daily Johnathon Martin MD   5 mg at 12/06/18 0859   • heparin (porcine) 5000 UNIT/ML injection 5,000 Units  5,000 Units Subcutaneous Q12H Rock Wilkinson MD   5,000 Units at 12/06/18 0859   • levothyroxine (SYNTHROID, LEVOTHROID) tablet 88 mcg  88 mcg Oral Daily Rock Wilkinson MD   88 mcg at 12/06/18 0900   • nitroglycerin (NITROSTAT) SL tablet 0.4 mg  0.4 mg Sublingual Q5 Min PRN Rock Wilkinson MD       • ondansetron (ZOFRAN) tablet 4 mg  4 mg Oral Q6H PRN Rock Wilkinson MD        Or   • ondansetron ODT (ZOFRAN-ODT) disintegrating tablet 4 mg  4 mg Oral Q6H PRN Rock Wilkinson MD        Or   • ondansetron (ZOFRAN) injection 4 mg  4 mg Intravenous Q6H PRN Rock Wilkinson MD        • potassium chloride (MICRO-K) CR capsule 40 mEq  40 mEq Oral PRN Mitesh Mooney MD   40 mEq at 12/04/18 1931   • sodium chloride 0.9 % flush 10 mL  10 mL Intravenous PRN Tony Gamino MD       • sodium chloride 0.9 % flush 3 mL  3 mL Intravenous Q12H Rock Wilkinson MD   3 mL at 12/06/18 0900   • sodium chloride 0.9 % flush 3-10 mL  3-10 mL Intravenous PRN Rock Wilknison MD       • tamsulosin (FLOMAX) 24 hr capsule 0.4 mg  0.4 mg Oral Daily Johnathon Martin MD   0.4 mg at 12/06/18 0900   • terazosin (HYTRIN) capsule 5 mg  5 mg Oral Nightly Rock Wilkinson MD   5 mg at 12/05/18 1957       Current Facility-Administered Medications:   •  acetaminophen (TYLENOL) tablet 650 mg, 650 mg, Oral, Q4H PRN, Rock Wilkinson MD  •  atorvastatin (LIPITOR) tablet 10 mg, 10 mg, Oral, Daily, Rock Wilkinson MD, 10 mg at 12/06/18 0900  •  calcium carbonate (TUMS) chewable tablet 500 mg (200 mg elemental), 2 tablet, Oral, TID PRN, Mitesh Mooney MD  •  cefTRIAXone (ROCEPHIN) IVPB 1 g, 1 g, Intravenous, Q24H, Rock Wilkinson MD, Last Rate: 100 mL/hr at 12/05/18 1651, 1 g at 12/05/18 1651  •  famotidine (PEPCID) tablet 20 mg, 20 mg, Oral, Daily, Mitesh Mooney MD, 20 mg at 12/06/18 0900  •  finasteride (PROSCAR) tablet 5 mg, 5 mg, Oral, Daily, Johnathon Martin MD, 5 mg at 12/06/18 0859  •  heparin (porcine) 5000 UNIT/ML injection 5,000 Units, 5,000 Units, Subcutaneous, Q12H, Rock Wilkinson MD, 5,000 Units at 12/06/18 0859  •  levothyroxine (SYNTHROID, LEVOTHROID) tablet 88 mcg, 88 mcg, Oral, Daily, Rock Wilkinson MD, 88 mcg at 12/06/18 0900  •  nitroglycerin (NITROSTAT) SL tablet 0.4 mg, 0.4 mg, Sublingual, Q5 Min PRN, Rock Wilkinson MD  •  ondansetron (ZOFRAN) tablet 4 mg, 4 mg, Oral, Q6H PRN **OR** ondansetron ODT (ZOFRAN-ODT) disintegrating tablet 4 mg, 4 mg, Oral, Q6H PRN **OR** ondansetron (ZOFRAN) injection 4 mg, 4 mg, Intravenous, Q6H PRN, Jaja,  Rock GALDAMEZ MD  •  potassium chloride (MICRO-K) CR capsule 40 mEq, 40 mEq, Oral, PRN, Mitesh Mooney MD, 40 mEq at 12/04/18 1931  •  [COMPLETED] Insert peripheral IV, , , Once **AND** sodium chloride 0.9 % flush 10 mL, 10 mL, Intravenous, PRN, Tony Gamino MD  •  sodium chloride 0.9 % flush 3 mL, 3 mL, Intravenous, Q12H, Rock Wilkinson MD, 3 mL at 12/06/18 0900  •  sodium chloride 0.9 % flush 3-10 mL, 3-10 mL, Intravenous, PRN, Rock Wilkinson MD  •  tamsulosin (FLOMAX) 24 hr capsule 0.4 mg, 0.4 mg, Oral, Daily, Johnathon Martin MD, 0.4 mg at 12/06/18 0900  •  terazosin (HYTRIN) capsule 5 mg, 5 mg, Oral, Nightly, Rock Wilkinson MD, 5 mg at 12/05/18 1957  Medications Discontinued During This Encounter   Medication Reason   • diltiaZEM (CARDIZEM) injection 17.8 mg    • diltiaZEM (CARDIZEM) IVPB 100 mg/100 mL (1 mg/mL) NS (add-vantage)    • diltiazem (CARDIZEM) bolus from bag 1 mg/mL 5 mg    • metoprolol tartrate (LOPRESSOR) tablet 12.5 mg    • sodium chloride 0.9 % infusion        Assessment/Plan         Sepsis (CMS/[facility])    Hypothyroidism    MARTA (acute kidney injury) (CMS/[facility])    Urinary retention    UTI (urinary tract infection), bacterial    CKD (chronic kidney disease), stage III (CMS/[facility])    Hypokalemia    HTN (hypertension)    BPH with obstruction/lower urinary tract symptoms    New onset a-fib (CMS/[facility])        Plan   - await final culture results  - continue Rocephin, flomax, and finasteride  - plan voiding trial on the day of discharge.     Mitesh Srivastava Jr., MD  12/06/18  9:03 AM

## 2018-12-06 NOTE — PROGRESS NOTES
"   LOS: 3 days   Patient Care Team:  Rafita Colunga MD as PCP - General  Rafita Colunga MD as PCP - Claims Attributed    Chief Complaint: cough    Subjective     Feeling okay today. Dtr says he was having a little trouble breathing when she arrived this AM, and that his cough is more productive now. Pt c/o cough. Otherwise feeling okay. Had yet another episode of Afib with RVR last night.        Subjective:  Symptoms:  Stable.  He reports shortness of breath, cough and weakness.  No malaise, chest pain, headache, chest pressure, anorexia, diarrhea or anxiety.    Diet:  Adequate intake.  No nausea or vomiting.    Activity level: Impaired due to weakness.    Pain:  He reports no pain.        History taken from: patient chart family RN    Objective     Vital Signs  Temp:  [97.6 °F (36.4 °C)-98.4 °F (36.9 °C)] 97.7 °F (36.5 °C)  Heart Rate:  [] 85  Resp:  [16-18] 16  BP: (111-132)/(48-87) 132/69    Objective:  General Appearance:  Comfortable and in no acute distress.    Vital signs: (most recent): Blood pressure 132/69, pulse 85, temperature 97.7 °F (36.5 °C), temperature source Oral, resp. rate 16, height 170.2 cm (67\"), weight 70.8 kg (156 lb), SpO2 93 %.  Vital signs are normal.  No fever.  (BPs a little low).    Output: Producing urine (clear urine in cristobal bag) and producing stool.    Lungs:  Normal effort and normal respiratory rate.  Breath sounds clear to auscultation.    Heart: Normal rate.  Regular rhythm.    Abdomen: Abdomen is soft.  Bowel sounds are normal.   There is no abdominal tenderness.     Extremities: There is no dependent edema.    Pulses: Distal pulses are intact.    Neurological: Patient is alert and oriented to person, place and time.    Pupils:  Pupils are equal, round, and reactive to light.    Skin:  Warm and dry.              Results Review:     I reviewed the patient's new clinical results.  I reviewed the patient's other test results and agree with the interpretation  I " personally viewed and interpreted the patient's EKG/Telemetry data  Discussed with patient, dtr, granddtr, and RN    Medication Review: reviewed and adjusted    Assessment/Plan       Sepsis (CMS/[facility])    Hypothyroidism    MARTA (acute kidney injury) (CMS/[facility])    Urinary retention    UTI (urinary tract infection), bacterial    CKD (chronic kidney disease), stage III (CMS/[facility])    Hypokalemia    HTN (hypertension)    BPH with obstruction/lower urinary tract symptoms    New onset a-fib (CMS/[facility])          Plan:   (Continue IV Rocephin, blood cultures negative at 3d, urine culture growing E.coli, WBC still falling  TFTs okay  Cr normalized with IVFs, no hydro, CT imaging suggests severe UTI only  [place] in place, may need cysto and UDS as outpt, Flomax and Proscar started  Appreciate  attention to pt, they recommend voiding trial day of discharge  Episode of PAF with RVR again last night, required IV Dig x 1 again  Metoprolol started, continue to hold off on AC for now (per Card)  PAF due to sepsis  Echo reassuring  Appreciate Card attention to pt  K+ protocol   Will start IS for cough, no wheezing and no h/o lung disease so will not start bronchodilators (especially in setting of afib with RVR), will check CXR  Home with [facility] eventually (1-2 days)  ).       Mitesh Mooney MD  12/06/18  2:02 PM    Time: 30min

## 2018-12-06 NOTE — PROGRESS NOTES
Hospital Follow Up    Chief Complaint: paroxsymal atrial fibrillation    Interval History:  Recurrent atrial fibrillation yesterday afternoon that was treated with IV digoxin.  Now in sinus rhythm.      Objective:     Objective:  Temp:  [97.6 °F (36.4 °C)-98.4 °F (36.9 °C)] 97.8 °F (36.6 °C)  Heart Rate:  [] 88  Resp:  [16-18] 16  BP: (111-131)/(48-87) 131/68     Intake/Output Summary (Last 24 hours) at 12/6/2018 1043  Last data filed at 12/6/2018 0727  Gross per 24 hour   Intake 240 ml   Output 1650 ml   Net -1410 ml     Body mass index is 24.43 kg/m².      12/03/18  1107 12/03/18  1529 12/04/18  1639   Weight: 72.6 kg (160 lb) 71.1 kg (156 lb 12.8 oz) 70.8 kg (156 lb)     Weight change:       Physical Exam:   General : Alert, cooperative, in no acute distress.  Neuro: alert,cooperative and oriented  Lungs: CTAB. Normal respiratory effort and rate.  CV:: Regular rate and rhythm, normal S1 and S2, no murmurs, gallops or rubs.  ABD: Soft, nontender, non-distended. positive bowel sounds  Extr: No edema or cyanosis, moves all extremities    Lab Review:   Results from last 7 days   Lab Units  12/06/18   0353  12/05/18   0449   12/03/18   1124  12/03/18   0931   SODIUM mmol/L  140  139   < >  136  136   POTASSIUM mmol/L  3.6  3.7   < >  3.2*  3.7   CHLORIDE mmol/L  110*  111*   < >  97*  100   CO2 mmol/L  20.9*  18.8*   < >  24.4  24.5   BUN mg/dL  16  20   < >  35*  33*   CREATININE mg/dL  1.15  1.22   < >  2.06*  2.16*   GLUCOSE mg/dL  109*  103*   < >  119*  122*   CALCIUM mg/dL  7.7*  7.5*   < >  8.7  8.7   AST (SGOT) U/L   --    --    --   14  13   ALT (SGPT) U/L   --    --    --   14  13    < > = values in this interval not displayed.     Results from last 7 days   Lab Units  12/04/18   0402  12/03/18   1124   TROPONIN T ng/mL  <0.010  <0.010     Results from last 7 days   Lab Units  12/06/18   0353  12/05/18   0449   WBC 10*3/mm3  12.25*  14.30*   HEMOGLOBIN g/dL  10.1*  10.0*   HEMATOCRIT %  32.3*   31.9*   PLATELETS 10*3/mm3  194  172         Results from last 7 days   Lab Units  12/04/18   0402   MAGNESIUM mg/dL  2.0           Invalid input(s): LDLCALC      Results from last 7 days   Lab Units  12/04/18   0402   TSH mIU/mL  0.324     I reviewed the patient's new clinical results.  I personally viewed and interpreted the patient's EKG  Current Medications:   Scheduled Meds:  atorvastatin 10 mg Oral Daily   ceftriaxone 1 g Intravenous Q24H   famotidine 20 mg Oral Daily   finasteride 5 mg Oral Daily   heparin (porcine) 5,000 Units Subcutaneous Q12H   levothyroxine 88 mcg Oral Daily   sodium chloride 3 mL Intravenous Q12H   tamsulosin 0.4 mg Oral Daily   terazosin 5 mg Oral Nightly     Continuous Infusions:     Allergies:  Allergies   Allergen Reactions   • Latex        Assessment/Plan:     1. Paroxsymal atrial fibrillation.  Recurrent episode yesterday.  Treated with a dose of IV digoxin again.  Still suspect this is related to sepsis.  Currently back in sinus rhythm.  Echo with normal LV function and no significant valvular disease.  2. Urinary tract infection  3. Sepsis. Related to #2.  Elevated procalcitonin.  On IVF's in addition to antibiotics.   4. MARTA/CKD  5. History of hypertension.  Currently hypotensive.   6. BPH  7. Urinary retention    -  With recurrent episodes and improvement in BP will start beta blockers  -  May need to consider anticoagulation if he continues to have episodes despite clinical improvement from sepsis    Licha Osuna MD  12/06/18  10:43 AM

## 2018-12-07 ENCOUNTER — TELEPHONE (OUTPATIENT)
Dept: FAMILY MEDICINE CLINIC | Facility: CLINIC | Age: 83
End: 2018-12-07

## 2018-12-07 LAB
ANION GAP SERPL CALCULATED.3IONS-SCNC: 8.6 MMOL/L
BASOPHILS # BLD AUTO: 0.07 10*3/MM3 (ref 0–0.2)
BASOPHILS NFR BLD AUTO: 0.5 % (ref 0–1.5)
BUN BLD-MCNC: 14 MG/DL (ref 8–23)
BUN/CREAT SERPL: 11.3 (ref 7–25)
CALCIUM SPEC-SCNC: 8 MG/DL (ref 8.2–9.6)
CHLORIDE SERPL-SCNC: 109 MMOL/L (ref 98–107)
CO2 SERPL-SCNC: 22.4 MMOL/L (ref 22–29)
CREAT BLD-MCNC: 1.24 MG/DL (ref 0.76–1.27)
DEPRECATED RDW RBC AUTO: 49.1 FL (ref 37–54)
EOSINOPHIL # BLD AUTO: 0.25 10*3/MM3 (ref 0–0.7)
EOSINOPHIL NFR BLD AUTO: 1.8 % (ref 0.3–6.2)
ERYTHROCYTE [DISTWIDTH] IN BLOOD BY AUTOMATED COUNT: 13.6 % (ref 11.5–14.5)
GFR SERPL CREATININE-BSD FRML MDRD: 55 ML/MIN/1.73
GLUCOSE BLD-MCNC: 100 MG/DL (ref 65–99)
HCT VFR BLD AUTO: 33.8 % (ref 40.4–52.2)
HGB BLD-MCNC: 10.5 G/DL (ref 13.7–17.6)
IMM GRANULOCYTES # BLD: 0.13 10*3/MM3 (ref 0–0.03)
IMM GRANULOCYTES NFR BLD: 0.9 % (ref 0–0.5)
LYMPHOCYTES # BLD AUTO: 1.65 10*3/MM3 (ref 0.9–4.8)
LYMPHOCYTES NFR BLD AUTO: 11.9 % (ref 19.6–45.3)
MCH RBC QN AUTO: 30.7 PG (ref 27–32.7)
MCHC RBC AUTO-ENTMCNC: 31.1 G/DL (ref 32.6–36.4)
MCV RBC AUTO: 98.8 FL (ref 79.8–96.2)
MONOCYTES # BLD AUTO: 2.68 10*3/MM3 (ref 0.2–1.2)
MONOCYTES NFR BLD AUTO: 19.3 % (ref 5–12)
NEUTROPHILS # BLD AUTO: 9.11 10*3/MM3 (ref 1.9–8.1)
NEUTROPHILS NFR BLD AUTO: 65.6 % (ref 42.7–76)
PLATELET # BLD AUTO: 234 10*3/MM3 (ref 140–500)
PMV BLD AUTO: 10.9 FL (ref 6–12)
POTASSIUM BLD-SCNC: 4.1 MMOL/L (ref 3.5–5.2)
RBC # BLD AUTO: 3.42 10*6/MM3 (ref 4.6–6)
SODIUM BLD-SCNC: 140 MMOL/L (ref 136–145)
WBC NRBC COR # BLD: 13.89 10*3/MM3 (ref 4.5–10.7)

## 2018-12-07 PROCEDURE — 85025 COMPLETE CBC W/AUTO DIFF WBC: CPT | Performed by: HOSPITALIST

## 2018-12-07 PROCEDURE — 25010000002 HEPARIN (PORCINE) PER 1000 UNITS: Performed by: HOSPITALIST

## 2018-12-07 PROCEDURE — 80048 BASIC METABOLIC PNL TOTAL CA: CPT | Performed by: HOSPITALIST

## 2018-12-07 PROCEDURE — 99232 SBSQ HOSP IP/OBS MODERATE 35: CPT | Performed by: NURSE PRACTITIONER

## 2018-12-07 RX ORDER — CEPHALEXIN 250 MG/1
250 CAPSULE ORAL EVERY 12 HOURS SCHEDULED
Status: DISCONTINUED | OUTPATIENT
Start: 2018-12-07 | End: 2018-12-08 | Stop reason: HOSPADM

## 2018-12-07 RX ADMIN — SODIUM CHLORIDE, PRESERVATIVE FREE 3 ML: 5 INJECTION INTRAVENOUS at 10:26

## 2018-12-07 RX ADMIN — CEPHALEXIN 250 MG: 250 CAPSULE ORAL at 15:00

## 2018-12-07 RX ADMIN — SODIUM CHLORIDE, PRESERVATIVE FREE 3 ML: 5 INJECTION INTRAVENOUS at 22:33

## 2018-12-07 RX ADMIN — LEVOTHYROXINE SODIUM 88 MCG: 88 TABLET ORAL at 09:01

## 2018-12-07 RX ADMIN — CEPHALEXIN 250 MG: 250 CAPSULE ORAL at 20:59

## 2018-12-07 RX ADMIN — HEPARIN SODIUM 5000 UNITS: 5000 INJECTION INTRAVENOUS; SUBCUTANEOUS at 20:59

## 2018-12-07 RX ADMIN — HEPARIN SODIUM 5000 UNITS: 5000 INJECTION INTRAVENOUS; SUBCUTANEOUS at 09:00

## 2018-12-07 RX ADMIN — ATORVASTATIN CALCIUM 10 MG: 10 TABLET, FILM COATED ORAL at 09:01

## 2018-12-07 RX ADMIN — METOPROLOL TARTRATE 12.5 MG: 25 TABLET ORAL at 20:59

## 2018-12-07 RX ADMIN — FINASTERIDE 5 MG: 5 TABLET, FILM COATED ORAL at 09:00

## 2018-12-07 RX ADMIN — FAMOTIDINE 20 MG: 20 TABLET, FILM COATED ORAL at 09:01

## 2018-12-07 RX ADMIN — TERAZOSIN HYDROCHLORIDE 5 MG: 5 CAPSULE ORAL at 20:59

## 2018-12-07 RX ADMIN — METOPROLOL TARTRATE 12.5 MG: 25 TABLET ORAL at 09:00

## 2018-12-07 RX ADMIN — TAMSULOSIN HYDROCHLORIDE 0.4 MG: 0.4 CAPSULE ORAL at 09:01

## 2018-12-07 NOTE — PROGRESS NOTES
"   LOS: 4 days   Patient Care Team:  Rafita Colunga MD as PCP - General  Rafita Colunga MD as PCP - Claims Attributed      Subjective   Interval History: Comfortable, no issues overnight.     Objective     ROS   12 POINT NEG ROS PERTINENT IN HPI      Vital Signs  Temp:  [97.4 °F (36.3 °C)-98.4 °F (36.9 °C)] 98.4 °F (36.9 °C)  Heart Rate:  [80-88] 81  Resp:  [16-18] 18  BP: (128-142)/(56-71) 129/65      Intake/Output Summary (Last 24 hours) at 12/7/2018 0638  Last data filed at 12/7/2018 0340  Gross per 24 hour   Intake 960 ml   Output 2600 ml   Net -1640 ml       Flowsheet Rows      First Filed Value   Admission Height  170.2 cm (67\") Documented at 12/03/2018 1107   Admission Weight  72.6 kg (160 lb) Documented at 12/03/2018 1107          Physical Exam:     General saqib: alert, cooperative, oriented    Skin:  Skin color, texture, turgor, normal no rashes        Results Review:     I reviewed the patient's new clinical results.    Lab Results (last 72 hours)     Procedure Component Value Units Date/Time    CBC & Differential [[id number]] Collected:  12/07/18 0527    Specimen:  Blood Updated:  12/07/18 0618    Narrative:       The following orders were created for panel order CBC & Differential.  Procedure                               Abnormality         Status                     ---------                               -----------         ------                     CBC Auto Differential[[id number]]        Abnormal            Final result                 Please view results for these tests on the individual orders.    CBC Auto Differential [[id number]]  (Abnormal) Collected:  12/07/18 0527    Specimen:  Blood Updated:  12/07/18 0618     WBC 13.89 10*3/mm3      RBC 3.42 10*6/mm3      Hemoglobin 10.5 g/dL      Hematocrit 33.8 %      MCV 98.8 fL      MCH 30.7 pg      MCHC 31.1 g/dL      RDW 13.6 %      RDW-SD 49.1 fl      MPV 10.9 fL      Platelets 234 10*3/mm3      Neutrophil % 65.6 %      Lymphocyte % 11.9 %     "  Monocyte % 19.3 %      Eosinophil % 1.8 %      Basophil % 0.5 %      Immature Grans % 0.9 %      Neutrophils, Absolute 9.11 10*3/mm3      Lymphocytes, Absolute 1.65 10*3/mm3      Monocytes, Absolute 2.68 10*3/mm3      Eosinophils, Absolute 0.25 10*3/mm3      Basophils, Absolute 0.07 10*3/mm3      Immature Grans, Absolute 0.13 10*3/mm3     Basic Metabolic Panel [[id number]] Collected:  12/07/18 0527    Specimen:  Blood Updated:  12/07/18 0607    Potassium [[id number]]  (Normal) Collected:  12/06/18 2210    Specimen:  Blood Updated:  12/06/18 2247     Potassium 4.2 mmol/L     Blood Culture - Blood, Arm, Left [[id number]] Collected:  12/03/18 1124    Specimen:  Blood from Arm, Left Updated:  12/06/18 1145     Blood Culture No growth at 3 days    Blood Culture - Blood, Arm, Left [[id number]] Collected:  12/03/18 1124    Specimen:  Blood from Arm, Left Updated:  12/06/18 1131     Blood Culture No growth at 3 days    Basic Metabolic Panel [[id number]]  (Abnormal) Collected:  12/06/18 0353    Specimen:  Blood Updated:  12/06/18 0438     Glucose 109 mg/dL      BUN 16 mg/dL      Creatinine 1.15 mg/dL      Sodium 140 mmol/L      Potassium 3.6 mmol/L      Chloride 110 mmol/L      CO2 20.9 mmol/L      Calcium 7.7 mg/dL      eGFR Non African Amer 60 mL/min/1.73      BUN/Creatinine Ratio 13.9     Anion Gap 9.1 mmol/L     Narrative:       The MDRD GFR formula is only valid for adults with stable renal function between ages 18 and 70.    CBC & Differential [[id number]] Collected:  12/06/18 0353    Specimen:  Blood Updated:  12/06/18 0422    Narrative:       The following orders were created for panel order CBC & Differential.  Procedure                               Abnormality         Status                     ---------                               -----------         ------                     CBC Auto Differential[[id number]]        Abnormal            Final result                 Please view results for these tests on the  individual orders.    CBC Auto Differential [[id number]]  (Abnormal) Collected:  12/06/18 0353    Specimen:  Blood Updated:  12/06/18 0422     WBC 12.25 10*3/mm3      RBC 3.29 10*6/mm3      Hemoglobin 10.1 g/dL      Hematocrit 32.3 %      MCV 98.2 fL      MCH 30.7 pg      MCHC 31.3 g/dL      RDW 13.4 %      RDW-SD 47.9 fl      MPV 11.0 fL      Platelets 194 10*3/mm3      Neutrophil % 67.0 %      Lymphocyte % 13.1 %      Monocyte % 16.6 %      Eosinophil % 2.3 %      Basophil % 0.4 %      Immature Grans % 0.6 %      Neutrophils, Absolute 8.22 10*3/mm3      Lymphocytes, Absolute 1.60 10*3/mm3      Monocytes, Absolute 2.03 10*3/mm3      Eosinophils, Absolute 0.28 10*3/mm3      Basophils, Absolute 0.05 10*3/mm3      Immature Grans, Absolute 0.07 10*3/mm3     Basic Metabolic Panel [[id number]]  (Abnormal) Collected:  12/05/18 0449    Specimen:  Blood Updated:  12/05/18 0600     Glucose 103 mg/dL      BUN 20 mg/dL      Creatinine 1.22 mg/dL      Sodium 139 mmol/L      Potassium 3.7 mmol/L      Chloride 111 mmol/L      CO2 18.8 mmol/L      Calcium 7.5 mg/dL      eGFR Non African Amer 56 mL/min/1.73      BUN/Creatinine Ratio 16.4     Anion Gap 9.2 mmol/L     Narrative:       The MDRD GFR formula is only valid for adults with stable renal function between ages 18 and 70.    CBC & Differential [[id number]] Collected:  12/05/18 0449    Specimen:  Blood Updated:  12/05/18 0531    Narrative:       The following orders were created for panel order CBC & Differential.  Procedure                               Abnormality         Status                     ---------                               -----------         ------                     CBC Auto Differential[[id number]]        Abnormal            Final result                 Please view results for these tests on the individual orders.    CBC Auto Differential [[id number]]  (Abnormal) Collected:  12/05/18 0449    Specimen:  Blood Updated:  12/05/18 0531     WBC 14.30 10*3/mm3       RBC 3.25 10*6/mm3      Hemoglobin 10.0 g/dL      Hematocrit 31.9 %      MCV 98.2 fL      MCH 30.8 pg      MCHC 31.3 g/dL      RDW 13.3 %      RDW-SD 47.8 fl      MPV 11.5 fL      Platelets 172 10*3/mm3      Neutrophil % 75.4 %      Lymphocyte % 9.0 %      Monocyte % 13.7 %      Eosinophil % 1.2 %      Basophil % 0.2 %      Immature Grans % 0.5 %      Neutrophils, Absolute 10.78 10*3/mm3      Lymphocytes, Absolute 1.29 10*3/mm3      Monocytes, Absolute 1.96 10*3/mm3      Eosinophils, Absolute 0.17 10*3/mm3      Basophils, Absolute 0.03 10*3/mm3      Immature Grans, Absolute 0.07 10*3/mm3     Potassium [[id number]]  (Normal) Collected:  12/04/18 2254    Specimen:  Blood from Arm, Left Updated:  12/04/18 2322     Potassium 4.0 mmol/L     Magnesium [[id number]]  (Normal) Collected:  12/04/18 0402    Specimen:  Blood Updated:  12/04/18 0934     Magnesium 2.0 mg/dL           Imaging Results (last 72 hours)     Procedure Component Value Units Date/Time    XR Chest PA & Lateral [[id number]] Collected:  12/06/18 1518     Updated:  12/06/18 1524    Narrative:       XR CHEST PA AND LATERAL-     HISTORY: Male who is 89 years-old,  cough     TECHNIQUE: Frontal and lateral views of the chest     COMPARISON: 4/28/2012     FINDINGS: Heart size is normal. Aorta is tortuous. Pulmonary vasculature  is unremarkable. Small atelectasis or infiltrate at the bases with  minimal pleural effusions, follow-up suggested. No pneumothorax. Mild  elevation/eventration of the right hemidiaphragm. Chronic changes of the  spine.       Impression:       Small atelectasis or infiltrate at the bases with minimal  pleural effusions, follow-up suggested. Tortuous aorta.     This report was finalized on 12/6/2018 3:20 PM by Dr. Rajiv Grider M.D.       CT Abdomen Pelvis Without Contrast [[id number]] Collected:  12/03/18 1206     Updated:  12/05/18 1026    Narrative:       CT ABDOMEN AND PELVIS WITHOUT CONTRAST     HISTORY: Left flank pain and urinary  tract infection.     TECHNIQUE: Axial CT images of the abdomen and pelvis were obtained  without administration of intravenous contrast. The patient was not  given oral contrast. Coronal and sagittal reformats were obtained.     COMPARISON: None.     FINDINGS: Bilateral adrenal glands are normal. Both kidneys are normal  in size and attenuation. No renal calculi or hydronephrosis is imaged.  There is a large predominantly exophytic cyst arising from the mid to  lower pole of the left kidney measuring 5.3 x 5.2 cm. There is also a  partly exophytic cortical cyst in the midpole of the right kidney  measuring 2 x 1.6 cm. The urinary bladder is partially distended. The  prostate gland is enlarged and indents the bladder base. Mild  perivesicular fat stranding is present and is suggestive of cystitis.     The liver demonstrates normal attenuation. Multiple low-density lesions  are seen within the liver that are favored to represent simple cysts.  Cholelithiasis is present. The spleen is normal. The pancreas is normal  without ductal dilatation. The small and large bowel loops demonstrate  normal caliber. Colonic diverticulosis is present. The appendix is  normal. No pathologic retroperitoneal lymphadenopathy. There is a distal  abdominal aortic aneurysm measuring up to 4 cm. Bilateral lung bases  demonstrate underaeration of the lower lobes with patchy increased  ground glass density favored to represent atelectasis. Multiple healed  pelvic fractures are identified.       Impression:       1. Enlarged prostate gland that indents the bladder base. The urinary  bladder demonstrates perivesicular fat stranding most concerning for  cystitis. There is no hydronephrosis.  2. Colonic diverticulosis.  3. Infrarenal abdominal aortic aneurysm.  4. Additional findings as above.     These findings were discussed with Dr. Gamino by telephone.     Radiation dose reduction techniques were utilized, including automated  exposure  control and exposure modulation based on body size.     This report was finalized on 12/5/2018 10:23 AM by Dr. Maria T Bueno M.D.             Medication Review:   Current Facility-Administered Medications   Medication Dose Route Frequency Provider Last Rate Last Dose   • acetaminophen (TYLENOL) tablet 650 mg  650 mg Oral Q4H PRN Rock Wilkinson MD       • atorvastatin (LIPITOR) tablet 10 mg  10 mg Oral Daily Rock Wilkinson MD   10 mg at 12/06/18 0900   • calcium carbonate (TUMS) chewable tablet 500 mg (200 mg elemental)  2 tablet Oral TID PRN Mitesh Mooney MD       • cefTRIAXone (ROCEPHIN) IVPB 1 g  1 g Intravenous Q24H Rock Wilkinson  mL/hr at 12/06/18 1800 1 g at 12/06/18 1800   • famotidine (PEPCID) tablet 20 mg  20 mg Oral Daily Mitesh Mooney MD   20 mg at 12/06/18 0900   • finasteride (PROSCAR) tablet 5 mg  5 mg Oral Daily Johnathon Martin MD   5 mg at 12/06/18 0859   • heparin (porcine) 5000 UNIT/ML injection 5,000 Units  5,000 Units Subcutaneous Q12H Rock Wilkinson MD   5,000 Units at 12/06/18 2026   • levothyroxine (SYNTHROID, LEVOTHROID) tablet 88 mcg  88 mcg Oral Daily Rock Wilkinson MD   88 mcg at 12/06/18 0900   • metoprolol tartrate (LOPRESSOR) tablet 12.5 mg  12.5 mg Oral Q12H Licha Osuna MD   12.5 mg at 12/06/18 2026   • nitroglycerin (NITROSTAT) SL tablet 0.4 mg  0.4 mg Sublingual Q5 Min PRN Rock Wilkinson MD       • ondansetron (ZOFRAN) tablet 4 mg  4 mg Oral Q6H PRN Rock Wilkinson MD        Or   • ondansetron ODT (ZOFRAN-ODT) disintegrating tablet 4 mg  4 mg Oral Q6H PRN Rock Wilkinson MD        Or   • ondansetron (ZOFRAN) injection 4 mg  4 mg Intravenous Q6H PRN Rock Wilkinson MD       • potassium chloride (MICRO-K) CR capsule 40 mEq  40 mEq Oral PRN Mitesh Mooney MD   40 mEq at 12/06/18 1800   • sodium chloride 0.9 % flush 10 mL  10 mL Intravenous PRN Tony Gamino MD       • sodium chloride 0.9 % flush 3 mL  3  mL Intravenous Q12H Rock Wilkinson MD   3 mL at 12/06/18 2026   • sodium chloride 0.9 % flush 3-10 mL  3-10 mL Intravenous PRN Rock Wilkinson MD       • tamsulosin (FLOMAX) 24 hr capsule 0.4 mg  0.4 mg Oral Daily Johnathon Martin MD   0.4 mg at 12/06/18 0900   • terazosin (HYTRIN) capsule 5 mg  5 mg Oral Nightly Rock Wilkinson MD   5 mg at 12/06/18 2026       Current Facility-Administered Medications:   •  acetaminophen (TYLENOL) tablet 650 mg, 650 mg, Oral, Q4H PRN, Rock Wilkinson MD  •  atorvastatin (LIPITOR) tablet 10 mg, 10 mg, Oral, Daily, Rock Wilkinson MD, 10 mg at 12/06/18 0900  •  calcium carbonate (TUMS) chewable tablet 500 mg (200 mg elemental), 2 tablet, Oral, TID PRN, Mitesh Mooney MD  •  cefTRIAXone (ROCEPHIN) IVPB 1 g, 1 g, Intravenous, Q24H, Rock Wilkinson MD, Last Rate: 100 mL/hr at 12/06/18 1800, 1 g at 12/06/18 1800  •  famotidine (PEPCID) tablet 20 mg, 20 mg, Oral, Daily, Mitesh Mooney MD, 20 mg at 12/06/18 0900  •  finasteride (PROSCAR) tablet 5 mg, 5 mg, Oral, Daily, Johnathon Martin MD, 5 mg at 12/06/18 0859  •  heparin (porcine) 5000 UNIT/ML injection 5,000 Units, 5,000 Units, Subcutaneous, Q12H, Rock Wilkinson MD, 5,000 Units at 12/06/18 2026  •  levothyroxine (SYNTHROID, LEVOTHROID) tablet 88 mcg, 88 mcg, Oral, Daily, Rock Wilkinson MD, 88 mcg at 12/06/18 0900  •  metoprolol tartrate (LOPRESSOR) tablet 12.5 mg, 12.5 mg, Oral, Q12H, Licha Osuna MD, 12.5 mg at 12/06/18 2026  •  nitroglycerin (NITROSTAT) SL tablet 0.4 mg, 0.4 mg, Sublingual, Q5 Min PRN, Rock Wilkinson MD  •  ondansetron (ZOFRAN) tablet 4 mg, 4 mg, Oral, Q6H PRN **OR** ondansetron ODT (ZOFRAN-ODT) disintegrating tablet 4 mg, 4 mg, Oral, Q6H PRN **OR** ondansetron (ZOFRAN) injection 4 mg, 4 mg, Intravenous, Q6H PRN, Rock Wilkinson MD  •  potassium chloride (MICRO-K) CR capsule 40 mEq, 40 mEq, Oral, PRN, Mitesh Mooney MD, 40 mEq at  12/06/18 1800  •  [COMPLETED] Insert peripheral IV, , , Once **AND** sodium chloride 0.9 % flush 10 mL, 10 mL, Intravenous, PRN, Tony Gamino MD  •  sodium chloride 0.9 % flush 3 mL, 3 mL, Intravenous, Q12H, Rock Wilkinson MD, 3 mL at 12/06/18 2026  •  sodium chloride 0.9 % flush 3-10 mL, 3-10 mL, Intravenous, PRN, Rock Wilkinson MD  •  tamsulosin (FLOMAX) 24 hr capsule 0.4 mg, 0.4 mg, Oral, Daily, Johnathon Martin MD, 0.4 mg at 12/06/18 0900  •  terazosin (HYTRIN) capsule 5 mg, 5 mg, Oral, Nightly, Rock Wilkinson MD, 5 mg at 12/06/18 2026  Medications Discontinued During This Encounter   Medication Reason   • diltiaZEM (CARDIZEM) injection 17.8 mg    • diltiaZEM (CARDIZEM) IVPB 100 mg/100 mL (1 mg/mL) NS (add-vantage)    • diltiazem (CARDIZEM) bolus from bag 1 mg/mL 5 mg    • metoprolol tartrate (LOPRESSOR) tablet 12.5 mg    • sodium chloride 0.9 % infusion        Assessment/Plan         Sepsis (CMS/[facility])    Hypothyroidism    MARTA (acute kidney injury) (CMS/[facility])    Urinary retention    UTI (urinary tract infection), bacterial    CKD (chronic kidney disease), stage III (CMS/[facility])    Hypokalemia    HTN (hypertension)    BPH with obstruction/lower urinary tract symptoms    New onset a-fib (CMS/[facility])        Plan   - Voiding trial  - Recommend Keflex 250 mg po bid x 7 days    Mitesh Srivastava Jr., MD  12/07/18  6:38 AM

## 2018-12-07 NOTE — PROGRESS NOTES
"    Patient Name: Lavelle Soliman  :1927  89 y.o.      Patient Care Team:  Rafita Colunga MD as PCP - General  Rafita Colunga MD as PCP - Claims Attributed    Chief Complaint: follow up paroxysmal atrial fibrillatoin    Interval History: According to monitor tech, patient has had intermittent AF this morning. I reviewed tele and mostly see only SR. Patient feels well. He hopes to get cristobal out and discharge home soon. No palpitations, shortness of breath, chest pressure.        Objective   Vital Signs  Temp:  [97.4 °F (36.3 °C)-98.4 °F (36.9 °C)] 98.3 °F (36.8 °C)  Heart Rate:  [80-86] 81  Resp:  [18] 18  BP: (128-142)/(56-71) 132/63    Intake/Output Summary (Last 24 hours) at 2018 1315  Last data filed at 2018 0759  Gross per 24 hour   Intake 360 ml   Output 2500 ml   Net -2140 ml     Flowsheet Rows      First Filed Value   Admission Height  170.2 cm (67\") Documented at 2018 1107   Admission Weight  72.6 kg (160 lb) Documented at 2018 1107          Physical Exam:   General Appearance:    Alert, cooperative, in no acute distress   Lungs:     Clear to auscultation.  Normal respiratory effort and rate.      Heart:    Regular rhythm and normal rate, normal S1 and S2, no murmurs, gallops or rubs.     Chest Wall:    No abnormalities observed   Abdomen:     Soft, nontender, positive bowel sounds.     Extremities:   no cyanosis, clubbing or edema.  No marked joint deformities.  Adequate musculoskeletal strength.       Results Review:    Results from last 7 days   Lab Units  18   0527   SODIUM mmol/L  140   POTASSIUM mmol/L  4.1   CHLORIDE mmol/L  109*   CO2 mmol/L  22.4   BUN mg/dL  14   CREATININE mg/dL  1.24   GLUCOSE mg/dL  100*   CALCIUM mg/dL  8.0*     Results from last 7 days   Lab Units  18   0402  18   1124   TROPONIN T ng/mL  <0.010  <0.010     Results from last 7 days   Lab Units  18   0527   WBC 10*3/mm3  13.89*   HEMOGLOBIN g/dL  10.5*   HEMATOCRIT " %  33.8*   PLATELETS 10*3/mm3  234         Results from last 7 days   Lab Units  12/04/18   0402   MAGNESIUM mg/dL  2.0                   Medication Review:     atorvastatin 10 mg Oral Daily   ceftriaxone 1 g Intravenous Q24H   famotidine 20 mg Oral Daily   finasteride 5 mg Oral Daily   heparin (porcine) 5,000 Units Subcutaneous Q12H   levothyroxine 88 mcg Oral Daily   metoprolol tartrate 12.5 mg Oral Q12H   sodium chloride 3 mL Intravenous Q12H   tamsulosin 0.4 mg Oral Daily   terazosin 5 mg Oral Nightly             Assessment/Plan   1. Paroxsymal atrial fibrillation - likely driven by sepsis. 2 definite episodes (last on 12/5), both treated with IV digoxin. Reported episodes of AF this morning. My personal review of tele shows predominately normal sinus. I did discuss anticoagulation with Mr. Soliman. He has a CHADs 2 Vasc score of 2 (2.2% annual risk). He denies any falls or prior bleeding. He reports at his age, he is willing to except the risk of stroke and would rather not be placed on anticoagulation. I think this is completely reasonable given the acute circumstances and his age. His blood pressure is much better , will increase metoprolol to 25 mg BID.     2. Urinary tract infection  3. Sepsis. Related to #2.  Elevated procalcitonin.  On IVF's in addition to antibiotics.   4. MARTA/CKD  5. History of hypertension.  Currently hypotensive.   6. BPH  7. Urinary retention    Discussed with Dr. Osuna.     MARLENE Post  [facility]  12/07/18  1:15 PM

## 2018-12-07 NOTE — TELEPHONE ENCOUNTER
Pt was admitted to [facility] on 12/3/18 for UTI. Pt is being discharged and they would live a verbal for home health and physical therapy. Please call with a verbal.

## 2018-12-07 NOTE — PLAN OF CARE
Problem: Patient Care Overview  Goal: Plan of Care Review  Outcome: Ongoing (interventions implemented as appropriate)   12/07/18 0419   Coping/Psychosocial   Plan of Care Reviewed With patient   Plan of Care Review   Progress improving   OTHER   Outcome Summary patient doing better on iv abx; off ivf; bb for paf and no afib during shift; f/c still in place and will need voiding trial today if poss dc; will monitor     Goal: Individualization and Mutuality  Outcome: Ongoing (interventions implemented as appropriate)    Goal: Discharge Needs Assessment  Outcome: Ongoing (interventions implemented as appropriate)      Problem: Skin Injury Risk (Adult)  Goal: Skin Health and Integrity  Outcome: Ongoing (interventions implemented as appropriate)      Problem: Cardiac Output Decreased (Adult)  Goal: Effective Tissue Perfusion  Outcome: Outcome(s) achieved Date Met: 12/07/18      Problem: Fall Risk (Adult)  Goal: Absence of Fall  Outcome: Ongoing (interventions implemented as appropriate)

## 2018-12-07 NOTE — PLAN OF CARE
Problem: Patient Care Overview  Goal: Plan of Care Review  Outcome: Ongoing (interventions implemented as appropriate)   12/07/18 1645   Coping/Psychosocial   Plan of Care Reviewed With patient   Plan of Care Review   Progress improving   OTHER   Outcome Summary Patient ambulated around nurse station w/walker w/no complaints; cristobal removed this afternoon at 1614 for void trial. IV Antibiotic switched to PO. No complaints of pain today. Vitals stable. Will continue to monitor.

## 2018-12-07 NOTE — PROGRESS NOTES
"   LOS: 4 days   Patient Care Team:  Rafita Colunga MD as PCP - General  Rafita Colunga MD as PCP - Claims Attributed    Chief Complaint: none today    Subjective     Feeling pretty good today. Would like to ambulate more, but cristobal makes that difficult. No cardiac symptoms. Cough is the same today. Using IS.        Subjective:  Symptoms:  Stable.  He reports cough.  No shortness of breath, malaise, chest pain, weakness, headache, chest pressure, anorexia, diarrhea or anxiety.    Diet:  Adequate intake.  No nausea or vomiting.    Activity level: Impaired due to weakness.    Pain:  He reports no pain.        History taken from: patient chart RN    Objective     Vital Signs  Temp:  [97.4 °F (36.3 °C)-98.4 °F (36.9 °C)] 98.3 °F (36.8 °C)  Heart Rate:  [80-86] 81  Resp:  [18] 18  BP: (128-142)/(56-71) 132/63    Objective:  General Appearance:  Comfortable and in no acute distress.    Vital signs: (most recent): Blood pressure 132/63, pulse 81, temperature 98.3 °F (36.8 °C), temperature source Oral, resp. rate 18, height 170.2 cm (67\"), weight 70.8 kg (156 lb), SpO2 90 %.  Vital signs are normal.  No fever.    Output: Producing urine (clear urine in cristobal bag) and producing stool.    Lungs:  Normal effort and normal respiratory rate.  Breath sounds clear to auscultation.  He is not in respiratory distress.  No rales, wheezes or rhonchi.    Heart: Normal rate.  Regular rhythm.    Abdomen: Abdomen is soft.  Bowel sounds are normal.   There is no abdominal tenderness.     Extremities: There is no dependent edema.    Pulses: Distal pulses are intact.    Neurological: Patient is alert and oriented to person, place and time.    Pupils:  Pupils are equal, round, and reactive to light.    Skin:  Warm and dry.              Results Review:     I reviewed the patient's new clinical results.  I reviewed the patient's new imaging results and agree with the interpretation.  I reviewed the patient's other test results and " agree with the interpretation  I personally viewed and interpreted the patient's EKG/Telemetry data  Discussed with patient, RN, and Card NP    Medication Review: reviewed and adjusted    Assessment/Plan       Sepsis (CMS/[facility])    Hypothyroidism    MARTA (acute kidney injury) (CMS/[facility])    Urinary retention    UTI (urinary tract infection), bacterial    CKD (chronic kidney disease), stage III (CMS/[facility])    Hypokalemia    HTN (hypertension)    BPH with obstruction/lower urinary tract symptoms    New onset a-fib (CMS/[facility])          Plan:   (Change to oral Keflex per   Cr normalized with IVFs, no hydro, CT imaging suggests severe UTI only  Carrasquillo in place, may need cysto and UDS as outpt, Flomax and Proscar started  Appreciate  attention to pt, voiding trial today  Metoprolol increased, pt refuses AC (and I think that's reasonable)  PAF due to sepsis  Echo reassuring  Appreciate Card attention to pt  K+ protocol   Continue IS for cough, no wheezing/fever/hypoxia and no h/o lung disease so will not start bronchodilators or new abx (especially in setting of afib with RVR), checked CXR and it showed only atelectasis  Home with [facility] tomorrow  ).       Mitesh Mooney MD  12/07/18  1:40 PM    Time: 25min

## 2018-12-08 VITALS
HEIGHT: 67 IN | HEART RATE: 80 BPM | OXYGEN SATURATION: 93 % | SYSTOLIC BLOOD PRESSURE: 118 MMHG | BODY MASS INDEX: 24.48 KG/M2 | TEMPERATURE: 97.7 F | DIASTOLIC BLOOD PRESSURE: 63 MMHG | WEIGHT: 156 LBS | RESPIRATION RATE: 18 BRPM

## 2018-12-08 LAB
ANION GAP SERPL CALCULATED.3IONS-SCNC: 11.2 MMOL/L
BACTERIA SPEC AEROBE CULT: NORMAL
BACTERIA SPEC AEROBE CULT: NORMAL
BASOPHILS # BLD AUTO: 0.09 10*3/MM3 (ref 0–0.2)
BASOPHILS NFR BLD AUTO: 0.8 % (ref 0–1.5)
BUN BLD-MCNC: 15 MG/DL (ref 8–23)
BUN/CREAT SERPL: 13.2 (ref 7–25)
CALCIUM SPEC-SCNC: 8.1 MG/DL (ref 8.2–9.6)
CHLORIDE SERPL-SCNC: 107 MMOL/L (ref 98–107)
CO2 SERPL-SCNC: 21.8 MMOL/L (ref 22–29)
CREAT BLD-MCNC: 1.14 MG/DL (ref 0.76–1.27)
DEPRECATED RDW RBC AUTO: 49.1 FL (ref 37–54)
EOSINOPHIL # BLD AUTO: 0.37 10*3/MM3 (ref 0–0.7)
EOSINOPHIL NFR BLD AUTO: 3.5 % (ref 0.3–6.2)
ERYTHROCYTE [DISTWIDTH] IN BLOOD BY AUTOMATED COUNT: 13.7 % (ref 11.5–14.5)
GFR SERPL CREATININE-BSD FRML MDRD: 60 ML/MIN/1.73
GLUCOSE BLD-MCNC: 108 MG/DL (ref 65–99)
HCT VFR BLD AUTO: 31.9 % (ref 40.4–52.2)
HGB BLD-MCNC: 10.1 G/DL (ref 13.7–17.6)
IMM GRANULOCYTES # BLD: 0.21 10*3/MM3 (ref 0–0.03)
IMM GRANULOCYTES NFR BLD: 2 % (ref 0–0.5)
LYMPHOCYTES # BLD AUTO: 1.52 10*3/MM3 (ref 0.9–4.8)
LYMPHOCYTES NFR BLD AUTO: 14.3 % (ref 19.6–45.3)
MCH RBC QN AUTO: 30.9 PG (ref 27–32.7)
MCHC RBC AUTO-ENTMCNC: 31.7 G/DL (ref 32.6–36.4)
MCV RBC AUTO: 97.6 FL (ref 79.8–96.2)
MONOCYTES # BLD AUTO: 1.54 10*3/MM3 (ref 0.2–1.2)
MONOCYTES NFR BLD AUTO: 14.4 % (ref 5–12)
NEUTROPHILS # BLD AUTO: 6.93 10*3/MM3 (ref 1.9–8.1)
NEUTROPHILS NFR BLD AUTO: 65 % (ref 42.7–76)
PLATELET # BLD AUTO: 259 10*3/MM3 (ref 140–500)
PMV BLD AUTO: 10.5 FL (ref 6–12)
POTASSIUM BLD-SCNC: 3.9 MMOL/L (ref 3.5–5.2)
RBC # BLD AUTO: 3.27 10*6/MM3 (ref 4.6–6)
SODIUM BLD-SCNC: 140 MMOL/L (ref 136–145)
WBC NRBC COR # BLD: 10.66 10*3/MM3 (ref 4.5–10.7)

## 2018-12-08 PROCEDURE — 99232 SBSQ HOSP IP/OBS MODERATE 35: CPT | Performed by: INTERNAL MEDICINE

## 2018-12-08 PROCEDURE — 25010000002 HEPARIN (PORCINE) PER 1000 UNITS: Performed by: HOSPITALIST

## 2018-12-08 PROCEDURE — 85025 COMPLETE CBC W/AUTO DIFF WBC: CPT | Performed by: HOSPITALIST

## 2018-12-08 PROCEDURE — 80048 BASIC METABOLIC PNL TOTAL CA: CPT | Performed by: HOSPITALIST

## 2018-12-08 RX ORDER — CEPHALEXIN 250 MG/1
250 CAPSULE ORAL EVERY 12 HOURS SCHEDULED
Qty: 11 CAPSULE | Refills: 0 | Status: SHIPPED | OUTPATIENT
Start: 2018-12-08 | End: 2018-12-13

## 2018-12-08 RX ORDER — FAMOTIDINE 20 MG/1
20 TABLET, FILM COATED ORAL DAILY
Qty: 30 TABLET | Refills: 0 | Status: SHIPPED | OUTPATIENT
Start: 2018-12-09 | End: 2018-12-13 | Stop reason: SDUPTHER

## 2018-12-08 RX ORDER — FINASTERIDE 5 MG/1
5 TABLET, FILM COATED ORAL DAILY
Qty: 30 TABLET | Refills: 0 | Status: SHIPPED | OUTPATIENT
Start: 2018-12-09 | End: 2018-12-13 | Stop reason: SDUPTHER

## 2018-12-08 RX ADMIN — ATORVASTATIN CALCIUM 10 MG: 10 TABLET, FILM COATED ORAL at 09:04

## 2018-12-08 RX ADMIN — CEPHALEXIN 250 MG: 250 CAPSULE ORAL at 09:03

## 2018-12-08 RX ADMIN — METOPROLOL TARTRATE 12.5 MG: 25 TABLET ORAL at 09:04

## 2018-12-08 RX ADMIN — HEPARIN SODIUM 5000 UNITS: 5000 INJECTION INTRAVENOUS; SUBCUTANEOUS at 09:03

## 2018-12-08 RX ADMIN — TAMSULOSIN HYDROCHLORIDE 0.4 MG: 0.4 CAPSULE ORAL at 09:04

## 2018-12-08 RX ADMIN — LEVOTHYROXINE SODIUM 88 MCG: 88 TABLET ORAL at 06:58

## 2018-12-08 RX ADMIN — FINASTERIDE 5 MG: 5 TABLET, FILM COATED ORAL at 09:03

## 2018-12-08 RX ADMIN — SODIUM CHLORIDE, PRESERVATIVE FREE 10 ML: 5 INJECTION INTRAVENOUS at 09:08

## 2018-12-08 RX ADMIN — FAMOTIDINE 20 MG: 20 TABLET, FILM COATED ORAL at 09:04

## 2018-12-08 NOTE — DISCHARGE SUMMARY
Name: Lavelle Soliman  Age: 89 y.o.  Sex: male  :  1927  MRN: [id number]         Primary Care Physician: Rafita Colunga MD      Date of Admission:  12/3/2018  Date of Discharge:  2018      Chief Complaint:  Back Pain (left sided back pain. Seen at [facility] this morning. Dx with UTI and told to go to ER. )      Presenting Problem/History of Present Illness:  Acute cystitis without hematuria [N30.00]  Other elevated white blood cell (WBC) count [D72.828]  Atrial fibrillation (CMS/[facility]) [I48.91]     Discharge Diagnosis:  Active Hospital Problems    Diagnosis Date Noted   • **Sepsis (CMS/[facility]) [A41.9] 2018   • BPH with obstruction/lower urinary tract symptoms [N40.1, N13.8] 2018   • New onset a-fib (CMS/[facility]) [I48.91] 2018   • MARTA (acute kidney injury) (CMS/[facility]) [N17.9] 2018   • Urinary retention [R33.9] 2018   • UTI (urinary tract infection), bacterial [N39.0, A49.9] 2018   • CKD (chronic kidney disease), stage III (CMS/[facility]) [N18.3] 2018   • Hypokalemia [E87.6] 2018   • HTN (hypertension) [I10] 2018   • Hypothyroidism [E03.9] 2016      Resolved Hospital Problems   No resolved problems to display.       Secondary Diagnoses:  Past Medical History:   Diagnosis Date   • Disease of thyroid gland          Consults:  Consult Orders (all) (From admission, onward)    Start     Ordered    18 1611  Inpatient Urology Consult  Once     Specialty:  Urology  Provider:  Mitesh Manley Jr., MD    18 1611    18 1608  Inpatient Cardiology Consult  Once     Specialty:  Cardiology  Provider:  Shaun Juarez MD    18 1607    18 1234  [facility] (on-call MD unless specified)  Once     Specialty:  Internal Medicine  Provider:  Rock Wilkinson MD    18 1233        Procedures Performed:  None        Hospital Course:  Very pleasant 89yo gentleman admitted with urosepsis (pan-sensitive E.coli on urine culture, blood cultures negative) and  new onset Afib with RVR due to sepsis. He has BPH with LUTS/obstruction that contributed to his UTI. Treated with cristobal and Rocephin initially. Proscar was added to his Hytrin. He was eventually changed to oral Keflex. Jovanna was reluctant to start rate-control meds however he kept having episodes of significant tachycardia at night. Low dose metoprolol was started and heart rates have been much better since. Card recommended AC, but pt and family refused. Card feels this is reasonable given pt's advanced age. Cristobal was removed yesterday and pt has been voiding well. Arrangements have been made for [facility] to follow him after dc.         Physical Exam:  Temp:  [97.7 °F (36.5 °C)-97.9 °F (36.6 °C)] 97.9 °F (36.6 °C)  Heart Rate:  [76-84] 76  Resp:  [18] 18  BP: (118-137)/(60-72) 118/63  Body mass index is 24.43 kg/m².  Physical Exam  General Appearance:  Comfortable and in no acute distress.    Output: Producing urine and producing stool.    Lungs:  Normal effort and normal respiratory rate.  Breath sounds clear to auscultation.  He is not in respiratory distress.  No rales, wheezes or rhonchi.    Heart: Normal rate.  Regular rhythm.    Abdomen: Abdomen is soft.  Bowel sounds are normal.   There is no abdominal tenderness.     Extremities: There is no dependent edema.    Pulses: Distal pulses are intact.    Neurological: Patient is alert and oriented to person, place and time.    Pupils:  Pupils are equal, round, and reactive to light.    Skin:  Warm and dry        Condition on Discharge:  Stable.    Discharge Disposition:   Home with Home Health    Allergies:   Allergies   Allergen Reactions   • Latex        Discharge Medications:      Discharge Medications      New Medications      Instructions Start Date   cephalexin 250 MG capsule  Commonly known as:  KEFLEX   250 mg, Oral, Every 12 Hours Scheduled      famotidine 20 MG tablet  Commonly known as:  PEPCID   20 mg, Oral, Daily      finasteride 5 MG  tablet  Commonly known as:  PROSCAR   5 mg, Oral, Daily      metoprolol tartrate 25 MG tablet  Commonly known as:  LOPRESSOR   12.5 mg, Oral, Every 12 Hours Scheduled         Continue These Medications      Instructions Start Date   levothyroxine 88 MCG tablet  Commonly known as:  SYNTHROID, LEVOTHROID   1 tablet, Oral, Daily      simvastatin 5 MG tablet  Commonly known as:  ZOCOR   5 mg, Oral, Nightly      terazosin 5 MG capsule  Commonly known as:  HYTRIN   Oral         Stop These Medications    hydrochlorothiazide 12.5 MG tablet  Commonly known as:  HYDRODIURIL            Discharge Diet:     Activity at Discharge:     Follow-up Appointments:  Future Appointments   Date Time Provider Department Center   5/14/2019  9:00 AM [facility] None   5/21/2019 10:00 AM Rafita Desai MD [facility] None     Additional Instructions for the Follow-ups that You Need to Schedule     Ambulatory Referral to Home Health   As directed      Face to Face Visit Date:  12/8/2018    Follow-up Provider for Plan of Care?:  I treated the patient in an acute care facility and will not continue treatment after discharge.    Follow-up Provider:  RAFITA DESAI [[id number]]    Reason/Clinical Findings:  UTI, sepsis, new Afib, BPH with LUTS and urinary retention    Describe mobility limitations that make leaving home difficult:  Requires the assistance of another to leave the home    Nursing/Therapeutic Services Requested:  Skilled Nursing    Skilled nursing orders:  Medication education    Frequency:  1 Week 1         Discharge Follow-up with PCP   As directed       Currently Documented PCP:    Rafita Desai MD    PCP Phone Number:    [phone]     Follow Up Details:  Dr. Desai (PCP) in 1 week         Discharge Follow-up with Specified Provider: Dr. Osuna (Jovanna); 2 Weeks   As directed      To:  Dr. Osuna (Card)    Follow Up:  2 Weeks         Discharge Follow-up with Specified Provider: Dr. Manley (); 2  Weeks   As directed      To:  Dr. Manley ()    Follow Up:  2 Weeks            Contact information for follow-up providers     Rafita Desai MD .    Specialty:  Internal Medicine  Why:  Dr. Desai (PCP) in 1 week  Contact information:  [address] [facility] [address]  [phone]             [facility] .    Specialty:  Home Health Services  Contact information:  [address] [facility] [address]  [place] [facility] [address]                 Contact information for after-discharge care     Home Medical Care     [facility] .    Service:  Home Health Services  Contact information:  [address]  [facility] [id number] [phone]                           Additional Instructions for the Follow-ups that You Need to Schedule     Ambulatory Referral to Home Health   As directed      Face to Face Visit Date:  12/8/2018    Follow-up Provider for Plan of Care?:  I treated the patient in an acute care facility and will not continue treatment after discharge.    Follow-up Provider:  RAFITA DESAI [[id number]]    Reason/Clinical Findings:  UTI, sepsis, new Afib, BPH with LUTS and urinary retention    Describe mobility limitations that make leaving home difficult:  Requires the assistance of another to leave the home    Nursing/Therapeutic Services Requested:  Skilled Nursing    Skilled nursing orders:  Medication education    Frequency:  1 Week 1         Discharge Follow-up with PCP   As directed       Currently Documented PCP:    Rafita Desai MD    PCP Phone Number:    [phone]     Follow Up Details:  Dr. Desai (PCP) in 1 week         Discharge Follow-up with Specified Provider: Dr. Osuna (Jovanna); 2 Weeks   As directed      To:  Dr. Osuna (Jovanna)    Follow Up:  2 Weeks         Discharge Follow-up with Specified Provider: Dr. Manley (ASTON); 2 Weeks   As directed      To:  Dr. Manley ()    Follow Up:  2  Weeks               Test Results Pending at Discharge:  None     Code status:   Code Status and Medical Interventions:   Ordered at: 12/06/18 1452     Level Of Support Discussed With:    Patient     Code Status:    No CPR     Medical Interventions (Level of Support Prior to Arrest):    Full         Mitesh Mooney MD  [facility]  12/08/18  1:42 PM      Time: greater than 30 minutes.

## 2018-12-08 NOTE — PROGRESS NOTES
First Urology Progress Note  UR and UTI    Feeling better, resting    AVSS  Abd NT, ND  VT yesterday, PVR 250cc    PO abx  Will sign off, call if needed

## 2018-12-08 NOTE — PROGRESS NOTES
"    Patient Name: Lavelle Soliman  :1927  89 y.o.      Patient Care Team:  Rafita Colunga MD as PCP - General  Rafita Colunga MD as PCP - Claims Attributed    Chief Complaint: follow up paroxysmal atrial fibrillatoin    Interval History: According to monitor tech, patient has had intermittent AF this morning. I reviewed tele and mostly see only SR. Patient feels well. He hopes to get cristobal out and discharge home soon. No palpitations, shortness of breath, chest pressure.        Objective   Vital Signs  Temp:  [97.7 °F (36.5 °C)-97.9 °F (36.6 °C)] 97.9 °F (36.6 °C)  Heart Rate:  [76-84] 76  Resp:  [18] 18  BP: (118-137)/(60-72) 118/63    Intake/Output Summary (Last 24 hours) at 2018 0955  Last data filed at 2018 0753  Gross per 24 hour   Intake --   Output 3400 ml   Net -3400 ml     Flowsheet Rows      First Filed Value   Admission Height  170.2 cm (67\") Documented at 2018 1107   Admission Weight  72.6 kg (160 lb) Documented at 2018 1107          Physical Exam:   General Appearance:    Alert, cooperative, in no acute distress   Lungs:     Clear to auscultation.  Normal respiratory effort and rate.      Heart:    Regular rhythm and normal rate, normal S1 and S2, no murmurs, gallops or rubs.     Chest Wall:    No abnormalities observed   Abdomen:     Soft, nontender, positive bowel sounds.     Extremities:   no cyanosis, clubbing or edema.  No marked joint deformities.  Adequate musculoskeletal strength.       Results Review:    Results from last 7 days   Lab Units  18   0513   SODIUM mmol/L  140   POTASSIUM mmol/L  3.9   CHLORIDE mmol/L  107   CO2 mmol/L  21.8*   BUN mg/dL  15   CREATININE mg/dL  1.14   GLUCOSE mg/dL  108*   CALCIUM mg/dL  8.1*     Results from last 7 days   Lab Units  18   0402  18   1124   TROPONIN T ng/mL  <0.010  <0.010     Results from last 7 days   Lab Units  18   0513   WBC 10*3/mm3  10.66   HEMOGLOBIN g/dL  10.1*   HEMATOCRIT %  " 31.9*   PLATELETS 10*3/mm3  259         Results from last 7 days   Lab Units  12/04/18   0402   MAGNESIUM mg/dL  2.0                   Medication Review:     atorvastatin 10 mg Oral Daily   cephalexin 250 mg Oral Q12H   famotidine 20 mg Oral Daily   finasteride 5 mg Oral Daily   heparin (porcine) 5,000 Units Subcutaneous Q12H   levothyroxine 88 mcg Oral Daily   metoprolol tartrate 12.5 mg Oral Q12H   sodium chloride 3 mL Intravenous Q12H   tamsulosin 0.4 mg Oral Daily   terazosin 5 mg Oral Nightly             Assessment/Plan   1. Paroxsymal atrial fibrillation - likely driven by sepsis.  No normal sinus rhythm, continue beta blockers, not a great candidate for long-term anticoagulation  2. Urinary tract infection  3. Sepsis. Related to #2.  Elevated procalcitonin.  On IVF's in addition to antibiotics.   4. MARTA/CKD  5. History of hypertension.  Currently hypotensive.   6. BPH  7. Urinary retention        Sherry Clifton MD    12/08/18  9:55 AM

## 2018-12-08 NOTE — PLAN OF CARE
Problem: Patient Care Overview  Goal: Plan of Care Review  Outcome: Ongoing (interventions implemented as appropriate)  Pt tolerated diet well no complaints, denies discomfort with bladder, uses urinal, daughter at bedside throughout shift. Will continue to monitor, plan to discharge  Goal: Individualization and Mutuality  Outcome: Ongoing (interventions implemented as appropriate)    Goal: Discharge Needs Assessment  Outcome: Ongoing (interventions implemented as appropriate)    Goal: Interprofessional Rounds/Family Conf  Outcome: Ongoing (interventions implemented as appropriate)      Problem: Skin Injury Risk (Adult)  Goal: Skin Health and Integrity  Outcome: Ongoing (interventions implemented as appropriate)      Problem: Fall Risk (Adult)  Goal: Absence of Fall  Outcome: Ongoing (interventions implemented as appropriate)

## 2018-12-08 NOTE — PLAN OF CARE
Problem: Patient Care Overview  Goal: Plan of Care Review   12/08/18 0623   Coping/Psychosocial   Plan of Care Reviewed With patient   Plan of Care Review   Progress improving   OTHER   Outcome Summary Pt appeared to sleep well through the night. Pt had urinary frequency and was bladder scanned post void. Will continue to monitor.

## 2018-12-09 ENCOUNTER — READMISSION MANAGEMENT (OUTPATIENT)
Dept: CALL CENTER | Facility: HOSPITAL | Age: 83
End: 2018-12-09

## 2018-12-09 NOTE — OUTREACH NOTE
Prep Survey      Responses   Facility patient discharged from?  [facility]   Is patient eligible?  Yes   Discharge diagnosis  Acute cystitis, sepsis   Does the patient have one of the following disease processes/diagnoses(primary or secondary)?  Sepsis   Does the patient have Home health ordered?  Yes   What is the Home health agency?   [facility]   Is there a DME ordered?  No   Prep survey completed?  Yes          Flora Hamlin RN

## 2018-12-10 ENCOUNTER — HOSPITAL ENCOUNTER (EMERGENCY)
Facility: HOSPITAL | Age: 83
Discharge: HOME | End: 2018-12-10
Attending: EMERGENCY MEDICINE | Admitting: EMERGENCY MEDICINE

## 2018-12-10 VITALS
HEIGHT: 67 IN | BODY MASS INDEX: 25.1 KG/M2 | TEMPERATURE: 97.5 F | RESPIRATION RATE: 18 BRPM | SYSTOLIC BLOOD PRESSURE: 115 MMHG | OXYGEN SATURATION: 94 % | WEIGHT: 159.9 LBS | DIASTOLIC BLOOD PRESSURE: 68 MMHG | HEART RATE: 61 BPM

## 2018-12-10 DIAGNOSIS — R33.9 URINARY RETENTION: Primary | ICD-10-CM

## 2018-12-10 PROCEDURE — 51702 INSERT TEMP BLADDER CATH: CPT

## 2018-12-10 PROCEDURE — 99283 EMERGENCY DEPT VISIT LOW MDM: CPT

## 2018-12-10 PROCEDURE — 51798 US URINE CAPACITY MEASURE: CPT

## 2018-12-10 NOTE — ED PROVIDER NOTES
Pt is a 89 y.o. male who presents to the ED complaining of difficulty w/ urination. He has 3 days remaining of Keflex.     On exam,  Constitutional: no distress  : Carrasquillo draining clear, yellow urine. Suprapubic tenderness, no rebound or guarding.    Labs and imaging reviewed.     Plan: Dischage      MD ATTESTATION NOTE    The JOVANI and I have discussed this patient's history, physical exam, and treatment plan.  I have reviewed the documentation and personally had a face to face interaction with the patient. I affirm the documentation and agree with the treatment and plan.  The attached note describes my personal findings.      Documentation assistance provided by aide Cooney  for Dr. Pettit. Information recorded by the scribe was done at my direction and has been verified and validated by me.             Julián Cooney  12/10/18 5265       Kehinde Pettit II, MD  12/11/18 2796

## 2018-12-10 NOTE — ED NOTES
"Pt to ED with c/o unable to urinate since 0400. Pt reports last \"good urination was at 2am\" pt denies clots or bleeding. Pt had FC in place until Saturday. Pt does not have urologist, bladder is distended, slightly tender to palp. 678ml per bladder scan prior to FC insertion.      Maryanne Kirk RN  12/10/18 9641    "

## 2018-12-10 NOTE — PROGRESS NOTES
Case Management Discharge Note    Final Note: Home with [facility]    Destination      No service has been selected for the patient.      Durable Medical Equipment      No service has been selected for the patient.      Dialysis/Infusion      No service has been selected for the patient.      Home Medical Care - Selection Complete      Service Provider Request Status Selected Services Address Phone Number Fax Number    [facility] Selected Home Health Services [address] [address] [phone]      Community Resources      No service has been selected for the patient.        Other: Other(private)    Final Discharge Disposition Code: 06 - home with home health care

## 2018-12-10 NOTE — ED PROVIDER NOTES
EMERGENCY DEPARTMENT ENCOUNTER    Room Number:  32/32  Time seen: 8:59 AM  PCP: Rafita Colunga MD  Historian: patient, family  History Limited By: N/A      HPI:  Chief Complaint: trouble urinating  Context: Per family, Lavelle Soliman is a 89 y.o. male who has hx of BPH and is on proscar and hytrin. According to family, pt was admitted at [facility] from 12/3/18 to 12/8/18 for urinary retention, UTI, and sepsis; at the time, he was treated with cristobal catheter insertion and rocephin and then was switched to PO keflex. Upon discharge on 12/8/18, the cristobal catheter was removed as pt was able to void urine well.      Pt presents to the ED c/o intermittent trouble urinating since 0400 this morning. He states that he has been dribbling small amounts of urine. He also reports having urinary urgency, urinary frequency, decreased urine output, and lower abd pain. Pt denies N/V/D, hematuria, dysuria, fever, chills, chest pain, and dyspnea. Pt has no other complaints at this time.     Location: male   Radiation: none  Quality: dribbling small amounts of urine  Intensity/Severity: moderate  Duration: started this morning at about 0400  Onset quality: gradual  Timing: intermittent  Progression: unchanged  Aggravating Factors: urination  Alleviating Factors: none  Previous Episodes: Per family, pt was admitted at [facility] from 12/3/18 to 12/5/18 for urinary retention, UTI, and sepsis.   Treatment before arrival: none mentioned  Associated Symptoms: urinary urgency, urinary frequency, decreased urine output, lower abd pain        MEDICAL RECORD REVIEW    Pt was admitted at [facility] from 12/3/18 to 12/8/18 for UTI, sepsis, urinary retention, MARTA on CKD, and new onset atrial fibrillation.     Hospital course note from 12/3/18 to 12/8/18 admission-   Very pleasant 89yo gentleman admitted with urosepsis (pan-sensitive E.coli on urine culture, blood cultures negative) and new onset Afib with RVR due to sepsis. He has BPH with LUTS/obstruction  that contributed to his UTI. Treated with cristobal and Rocephin initially. Proscar was added to his Hytrin. He was eventually changed to oral Keflex. Jovanna was reluctant to start rate-control meds however he kept having episodes of significant tachycardia at night. Low dose metoprolol was started and heart rates have been much better since. Card recommended AC, but pt and family refused. Card feels this is reasonable given pt's advanced age. Cristobal was removed yesterday and pt has been voiding well. Arrangements have been made for [facility] to follow him after dc.           PAST MEDICAL HISTORY  Active Ambulatory Problems     Diagnosis Date Noted   • Benign essential hypertension 02/11/2016   • Benign prostatic hyperplasia with urinary obstruction 02/11/2016   • Chronic kidney disease, stage III (moderate) (CMS/[facility]) 02/11/2016   • Hyperlipidemia 02/11/2016   • Hypothyroidism 02/11/2016   • Muscle cramps 06/30/2016   • Anemia 10/17/2017   • Hyperglycemia 11/13/2018   • Sepsis (CMS/[facility]) 12/03/2018   • MARTA (acute kidney injury) (CMS/[facility]) 12/03/2018   • Urinary retention 12/03/2018   • UTI (urinary tract infection), bacterial 12/03/2018   • CKD (chronic kidney disease), stage III (CMS/[facility]) 12/03/2018   • Hypokalemia 12/03/2018   • HTN (hypertension) 12/03/2018   • Atrial fibrillation (CMS/[facility]) 12/03/2018   • BPH with obstruction/lower urinary tract symptoms 12/04/2018   • New onset a-fib (CMS/[facility]) 12/04/2018     Resolved Ambulatory Problems     Diagnosis Date Noted   • No Resolved Ambulatory Problems     Past Medical History:   Diagnosis Date   • Disease of thyroid gland          PAST SURGICAL HISTORY  Past Surgical History:   Procedure Laterality Date   • HERNIA REPAIR           FAMILY HISTORY  Family History   Problem Relation Age of Onset   • No Known Problems Mother    • No Known Problems Father          SOCIAL HISTORY  Social History     Socioeconomic History   • Marital status:      Spouse name: Not on  file   • Number of children: Not on file   • Years of education: Not on file   • Highest education level: Not on file   Social Needs   • Financial resource strain: Not on file   • Food insecurity - worry: Not on file   • Food insecurity - inability: Not on file   • Transportation needs - medical: Not on file   • Transportation needs - non-medical: Not on file   Occupational History   • Not on file   Tobacco Use   • Smoking status: Former Smoker   • Smokeless tobacco: Never Used   Substance and Sexual Activity   • Alcohol use: Yes     Comment: occ   • Drug use: Defer   • Sexual activity: Defer   Other Topics Concern   • Not on file   Social History Narrative   • Not on file         ALLERGIES  Latex      REVIEW OF SYSTEMS  Review of Systems   Constitutional: Negative for chills and fever.   HENT: Negative for congestion, rhinorrhea and sore throat.    Eyes: Negative for pain.   Respiratory: Negative for cough and shortness of breath.    Cardiovascular: Negative for chest pain and palpitations.   Gastrointestinal: Positive for abdominal pain (lower abd pain). Negative for diarrhea, nausea and vomiting.   Endocrine: Negative.    Genitourinary: Positive for decreased urine volume, difficulty urinating, frequency and urgency. Negative for dysuria and hematuria.   Musculoskeletal: Negative for myalgias.   Skin: Negative.    Neurological: Negative for speech difficulty, weakness, numbness and headaches.   Psychiatric/Behavioral: Negative.    All other systems reviewed and are negative.           PHYSICAL EXAM  ED Triage Vitals   Temp Heart Rate Resp BP SpO2   12/10/18 0845 12/10/18 0845 12/10/18 0845 12/10/18 0917 12/10/18 0845   97.5 °F (36.4 °C) 96 20 137/98 92 %      Temp src Heart Rate Source Patient Position BP Location FiO2 (%)   -- -- -- -- --            Physical Exam   Constitutional: He is oriented to person, place, and time. No distress.   HENT:   Head: Normocephalic.   Mouth/Throat: Mucous membranes are normal.    Eyes: EOM are normal. Pupils are equal, round, and reactive to light.   Neck: Normal range of motion. Neck supple.   Cardiovascular: Normal rate, regular rhythm and normal heart sounds.   Pulmonary/Chest: Effort normal and breath sounds normal. No respiratory distress. He has no decreased breath sounds. He has no wheezes. He has no rhonchi. He has no rales.   Abdominal: Soft. He exhibits distension (suprapubic abd distension). There is tenderness in the suprapubic area. There is no rebound and no guarding.   Suprapubic abd fullness   Musculoskeletal: Normal range of motion.   Neurological: He is alert and oriented to person, place, and time. He has normal motor skills and normal sensation.   Skin: Skin is warm and dry.   Psychiatric: Mood and affect normal.   Nursing note and vitals reviewed.          PROCEDURES  Procedures            PROGRESS AND CONSULTS     0938- Bladder scan shows 678ml urine in bladder. Nursing staff will insert cristobal catheter for urinary retention.     0940- Discussed case with Dr Pettit who agrees with the plan of care.     1000- Rechecked pt. He is resting comfortably and is in no acute distress. Pt states that he feels better after cristobal catheter insertion. Discussed with pt and family about dx of urinary retention and plan for discharge. Provided discharge instructions for acute urinary retention (male). Instructed pt and family to have pt f/u closely with referred urologist for recheck and for further management. RTER warnings given. Pt and family verbalize understanding and agreement with plan. Addressed all questions.        MEDICAL DECISION MAKING      MDM  Number of Diagnoses or Management Options     Amount and/or Complexity of Data Reviewed  Decide to obtain previous medical records or to obtain history from someone other than the patient: yes  Review and summarize past medical records: yes (See medical record review section for details)    Patient Progress  Patient progress:  stable             DIAGNOSIS  Final diagnoses:   Urinary retention         DISPOSITION  DISCHARGE    Patient discharged in stable condition.    Reviewed implications of diagnosis, meds, responsibility to follow up, warning signs and symptoms of possible worsening, potential complications and reasons to return to ER.    Patient/Family voiced understanding of above instructions.    Discussed plan for discharge, as there is no emergent indication for admission.  Pt/family is agreeable and understands need for follow up and repeat testing.  Pt/family is aware that discharge does not mean that nothing is wrong but it indicates no emergency is present and that pt must continue care with follow-up as given below or physician of their choice.     FOLLOW-UP  Arnaud Bergman MD  [address] [place], [place] [id number]  [phone]    Schedule an appointment as soon as possible for a visit         Latest Documented Vital Signs:  As of 10:08 AM  BP- 137/98 HR- 71 Temp- 97.5 °F (36.4 °C) O2 sat- 94%        --  Documentation assistance provided by aide Amaya for LINDSAY Arana.  Information recorded by the scribe was done at my direction and has been verified and validated by me.       Alana Amaya  12/10/18 1012       Dilip Arana PA  12/10/18 4532

## 2018-12-12 ENCOUNTER — READMISSION MANAGEMENT (OUTPATIENT)
Dept: CALL CENTER | Facility: HOSPITAL | Age: 83
End: 2018-12-12

## 2018-12-12 NOTE — OUTREACH NOTE
Sepsis Week 1 Survey      Responses   Facility patient discharged from?  [facility]   Does the patient have one of the following disease processes/diagnoses(primary or secondary)?  Other   Is there a successful TCM telephone encounter documented?  No   Week 1 attempt successful?  Yes   Call start time  1423   Call end time  1434   Discharge diagnosis  Acute cystitis, sepsis   Is patient permission given to speak with other caregiver?  Yes   List who call center can speak with  Little, daughter   Person spoke with today (if not patient) and relationship  Little, daughter   Meds reviewed with patient/caregiver?  Yes   Is the patient having any side effects they believe may be caused by any medication additions or changes?  No   Does the patient have all medications related to this admission filled (includes all antibiotics, inhalers, nebulizers,steroids,etc.)  Yes   Is the patient taking all medications as directed (includes completed medication regime)?  Yes   Does the patient have a primary care provider?   Yes   Comments regarding PCP  Rafita Colunga MD PCP Thursday Dec 13, 2018 3:45 PM   Does the patient have an appointment with their PCP within 7 days of discharge?  Yes   Has the patient kept scheduled appointments due by today?  Yes   Comments  Appt with Soniya MONTOYA cardiology 12/19/18. Patient states also returning to see urologist this week. States daughter has that appt information.    What is the Home health agency?   [facility]   Has home health visited the patient within 72 hours of discharge?  Yes   Home health comments  Patient states that he has  nurse, speech therapy and will be having PT, but has not seen yet.    Psychosocial issues?  No   Did the patient receive a copy of their discharge instructions?  Yes   Nursing interventions  Reviewed instructions with patient   What is the patient's perception of their health status since discharge?  Improving [Patient with ER visit 12/10 with  return of urinary retention symptoms. Patient has cristobal cath reinserted. Patient states once again improving and will follow up with urologist. ]   Nursing interventions  Nurse provided patient education   Is the patient/caregiver able to teach back Sepsis?  S - Shivering,fever or very cold, E - Extreme pain or generalized discomfort (worst ever,especially abdomen), P - Pale or discolored skin, S - Sleepy, difficult to arouse,confused, I -   I feel like I might die-a feeling of hopelessness, S - Short of breath   Nursing interventions  Nurse provided patient education   Is patient/caregiver able to teach back steps to recovery at home?  Set small, achievable goals for return to baseline health, Rest and regain strength   Is the patient/caregiver able to teach back signs and symptoms of worsening condition:  Fever, Altered mental status(confusion/coma)   Is the patient/caregiver able to teach back the hierarchy of who to call/visit for symptoms/problems? PCP, Specialist, Home health nurse, Urgent Care, ED, 911  Yes   Week 1 call completed?  Yes          May Estrada RN

## 2018-12-13 ENCOUNTER — OFFICE VISIT (OUTPATIENT)
Dept: FAMILY MEDICINE CLINIC | Facility: CLINIC | Age: 83
End: 2018-12-13

## 2018-12-13 VITALS
BODY MASS INDEX: 24.96 KG/M2 | WEIGHT: 159 LBS | HEART RATE: 77 BPM | OXYGEN SATURATION: 98 % | DIASTOLIC BLOOD PRESSURE: 60 MMHG | SYSTOLIC BLOOD PRESSURE: 100 MMHG | RESPIRATION RATE: 16 BRPM | HEIGHT: 67 IN

## 2018-12-13 DIAGNOSIS — I48.91 NEW ONSET A-FIB (HCC): ICD-10-CM

## 2018-12-13 DIAGNOSIS — I10 BENIGN ESSENTIAL HYPERTENSION: Primary | ICD-10-CM

## 2018-12-13 DIAGNOSIS — N40.1 BENIGN PROSTATIC HYPERPLASIA WITH URINARY OBSTRUCTION: ICD-10-CM

## 2018-12-13 DIAGNOSIS — N13.8 BENIGN PROSTATIC HYPERPLASIA WITH URINARY OBSTRUCTION: ICD-10-CM

## 2018-12-13 DIAGNOSIS — A41.9 SEPSIS, DUE TO UNSPECIFIED ORGANISM: ICD-10-CM

## 2018-12-13 DIAGNOSIS — R33.9 URINARY RETENTION: ICD-10-CM

## 2018-12-13 PROCEDURE — 99214 OFFICE O/P EST MOD 30 MIN: CPT | Performed by: INTERNAL MEDICINE

## 2018-12-13 RX ORDER — FINASTERIDE 5 MG/1
5 TABLET, FILM COATED ORAL DAILY
Qty: 30 TABLET | Refills: 0 | Status: SHIPPED | OUTPATIENT
Start: 2018-12-13

## 2018-12-13 RX ORDER — FAMOTIDINE 20 MG/1
20 TABLET, FILM COATED ORAL DAILY
Qty: 30 TABLET | Refills: 0 | Status: SHIPPED | OUTPATIENT
Start: 2018-12-13 | End: 2019-02-01 | Stop reason: SDUPTHER

## 2018-12-13 NOTE — PROGRESS NOTES
Subjective   Lavelle Soliman is a 89 y.o. male. Patient is here today for hospital follow-up at [facility] for a urinary tract infection with sepsis and then urinary retention.  Patient was discharged home but developed retention and now has a Carrasquillo catheter in place.  He still is on Keflex but will finish that in about 3 days.  He was also found to have some atrial fibrillation in the hospital and was started on metoprolol.  He has upcoming urology and cardiology appointments but is feeling much better.  He denies any fevers or chills and is alert and seems at his baseline.  He is having no urinary symptoms.  Chief Complaint   Patient presents with   • Urinary Tract Infection     hospital follow up       (Not on file)-  Risk for Readmission (LACE) Score: 9 (12/8/2018  6:00 AM)           Vitals:    12/13/18 1524   BP: 100/60   Pulse: 77   Resp: 16   SpO2: 98%     The following portions of the patient's history were reviewed and updated as appropriate: allergies, current medications, past family history, past medical history, past social history, past surgical history and problem list.    Past Medical History:   Diagnosis Date   • Disease of thyroid gland       Allergies   Allergen Reactions   • Latex       Social History     Socioeconomic History   • Marital status:      Spouse name: Not on file   • Number of children: Not on file   • Years of education: Not on file   • Highest education level: Not on file   Social Needs   • Financial resource strain: Not on file   • Food insecurity - worry: Not on file   • Food insecurity - inability: Not on file   • Transportation needs - medical: Not on file   • Transportation needs - non-medical: Not on file   Occupational History   • Not on file   Tobacco Use   • Smoking status: Former Smoker   • Smokeless tobacco: Never Used   Substance and Sexual Activity   • Alcohol use: Yes     Comment: occ   • Drug use: Defer   • Sexual activity: Defer   Other Topics Concern   • Not on  file   Social History Narrative   • Not on file        Current Outpatient Medications:   •  famotidine (PEPCID) 20 MG tablet, Take 1 tablet by mouth Daily., Disp: 30 tablet, Rfl: 0  •  finasteride (PROSCAR) 5 MG tablet, Take 1 tablet by mouth Daily., Disp: 30 tablet, Rfl: 0  •  levothyroxine (SYNTHROID, LEVOTHROID) 88 MCG tablet, Take 1 tablet by mouth daily., Disp: , Rfl:   •  metoprolol tartrate (LOPRESSOR) 25 MG tablet, Take 0.5 tablets by mouth Every 12 (Twelve) Hours., Disp: 30 tablet, Rfl: 0  •  terazosin (HYTRIN) 5 MG capsule, Take by mouth., Disp: , Rfl:      Objective     History of Present Illness     Review of Systems   Constitutional: Negative.  Negative for chills and fever.   HENT: Negative.    Eyes: Negative.    Respiratory: Negative.    Cardiovascular: Negative.    Gastrointestinal: Negative.    Endocrine: Negative.    Genitourinary:        Carrasquillo catheter in place   Musculoskeletal: Negative.    Skin: Negative.    Neurological: Negative.    Psychiatric/Behavioral: Negative.        Physical Exam   Constitutional: He is oriented to person, place, and time. He appears well-developed and well-nourished.   Pleasant, cooperative no distress blood pressure 110/70 and a regular cardiac rhythm   HENT:   Head: Normocephalic and atraumatic.   Eyes: Conjunctivae are normal. Pupils are equal, round, and reactive to light. No scleral icterus.   Neck: Normal range of motion. Neck supple.   Cardiovascular: Normal rate, regular rhythm and normal heart sounds.   The patient's heart rhythm seems completely regular and I believe he is in normal sinus rhythm.  I did not do an EKG but he is extremely regular currently   Pulmonary/Chest: Effort normal and breath sounds normal. No respiratory distress. He has no wheezes. He has no rales.   Neurological: He is alert and oriented to person, place, and time.   Skin: Skin is warm and dry.   Psychiatric: He has a normal mood and affect. His behavior is normal.   Nursing note and  vitals reviewed.      ASSESSMENT  patient was recently discharged for a urinary tract infection with sepsis and subsequently urinary obstruction due to BPH with a Carrasquillo catheter placement.  He now is feeling well and has no further symptoms of infection.  He still is on Keflex as an anabiotic.  He had some atrial fibrillation while hospitalized but his cardiac rhythm seems completely regular today and I believe he is in normal sinus rhythm.  He is tolerating his medications and has upcoming cardiology and urology appointments already scheduled.    I reviewed and reconciled his discharge medications from his recent hospitalization.     Problem List Items Addressed This Visit        Cardiovascular and Mediastinum    Benign essential hypertension - Primary    RESOLVED: New onset a-fib (CMS/HCC)       Genitourinary    Benign prostatic hyperplasia with urinary obstruction    Relevant Medications    finasteride (PROSCAR) 5 MG tablet    Urinary retention    Relevant Medications    finasteride (PROSCAR) 5 MG tablet       Other    RESOLVED: Sepsis (CMS/HCC)          Current outpatient and discharge medications have been reconciled for the patient.  Reviewed by: Rafita Colunga MD      PLAN  the patient will continue on current medications as now.  He has upcoming urology and cardiology appointments and will keep them.  He's already scheduled to see me in May and will keep that appointment.    There are no Patient Instructions on file for this visit.  No Follow-up on file.

## 2018-12-19 ENCOUNTER — OFFICE VISIT (OUTPATIENT)
Dept: CARDIOLOGY | Facility: CLINIC | Age: 83
End: 2018-12-19

## 2018-12-19 ENCOUNTER — READMISSION MANAGEMENT (OUTPATIENT)
Dept: CALL CENTER | Facility: HOSPITAL | Age: 83
End: 2018-12-19

## 2018-12-19 VITALS
OXYGEN SATURATION: 95 % | DIASTOLIC BLOOD PRESSURE: 60 MMHG | HEART RATE: 70 BPM | HEIGHT: 67 IN | BODY MASS INDEX: 24.01 KG/M2 | SYSTOLIC BLOOD PRESSURE: 108 MMHG | WEIGHT: 153 LBS

## 2018-12-19 DIAGNOSIS — I48.0 PAROXYSMAL ATRIAL FIBRILLATION (HCC): Primary | ICD-10-CM

## 2018-12-19 PROCEDURE — 99213 OFFICE O/P EST LOW 20 MIN: CPT | Performed by: PHYSICIAN ASSISTANT

## 2018-12-19 PROCEDURE — 93000 ELECTROCARDIOGRAM COMPLETE: CPT | Performed by: PHYSICIAN ASSISTANT

## 2018-12-19 NOTE — PROGRESS NOTES
Date of Office Visit: 2018  Encounter Provider: LINDSAY Beal  Place of Service: [facility]  Patient Name: Lavelle Soliman  :1927    Chief Complaint   Patient presents with   • Atrial Fibrillation     1 week hospital follow up   :     HPI: Lavelle Soliman is a 89 y.o. male, new to me, who presents today for follow-up.  Old records have been obtained and reviewed by me.  He is a patient who presented to the emergency room with urosepsis and a new onset atrial fibrillation with RVR secondary to her sepsis.  He was started on low-dose metoprolol for rate control.  Anticoagulation was recommended, however the patient and the family refused he was discharged home in stable condition and is here today for follow-up.   Since he left the hospital he's been doing well.  He had to go back and have a Carrasquillo catheter replaced, he is going to the urologist today to see if he can get it removed.  He denies any chest pain, palpitations, edema, dizziness, or syncope.  He does not think that he has had any irregular rhythms or elevated heart rates.      Past Medical History:   Diagnosis Date   • Disease of thyroid gland        Past Surgical History:   Procedure Laterality Date   • HERNIA REPAIR         Social History     Socioeconomic History   • Marital status:      Spouse name: Not on file   • Number of children: Not on file   • Years of education: Not on file   • Highest education level: Not on file   Social Needs   • Financial resource strain: Not on file   • Food insecurity - worry: Not on file   • Food insecurity - inability: Not on file   • Transportation needs - medical: Not on file   • Transportation needs - non-medical: Not on file   Occupational History   • Not on file   Tobacco Use   • Smoking status: Former Smoker   • Smokeless tobacco: Never Used   Substance and Sexual Activity   • Alcohol use: Yes     Alcohol/week: 0.6 oz     Types: 1 Shots of liquor  "per week     Comment: occ   • Drug use: Defer   • Sexual activity: Defer   Other Topics Concern   • Not on file   Social History Narrative   • Not on file       Family History   Problem Relation Age of Onset   • No Known Problems Mother    • No Known Problems Father    • No Known Problems Maternal Grandmother    • No Known Problems Maternal Grandfather    • No Known Problems Paternal Grandmother    • No Known Problems Paternal Grandfather        Review of Systems   Constitution: Negative for chills, fever and malaise/fatigue.   Cardiovascular: Negative for chest pain, dyspnea on exertion, leg swelling, near-syncope, orthopnea, palpitations, paroxysmal nocturnal dyspnea and syncope.   Respiratory: Negative for cough and shortness of breath.    Musculoskeletal: Negative for joint pain, joint swelling and myalgias.   Gastrointestinal: Negative for abdominal pain, diarrhea, melena, nausea and vomiting.   Genitourinary: Negative for frequency and hematuria.   Neurological: Negative for light-headedness, numbness, paresthesias and seizures.   Allergic/Immunologic: Negative.    All other systems reviewed and are negative.      Allergies   Allergen Reactions   • Latex          Current Outpatient Medications:   •  famotidine (PEPCID) 20 MG tablet, Take 1 tablet by mouth Daily., Disp: 30 tablet, Rfl: 0  •  finasteride (PROSCAR) 5 MG tablet, Take 1 tablet by mouth Daily., Disp: 30 tablet, Rfl: 0  •  levothyroxine (SYNTHROID, LEVOTHROID) 88 MCG tablet, Take 1 tablet by mouth daily., Disp: , Rfl:   •  metoprolol tartrate (LOPRESSOR) 25 MG tablet, Take 0.5 tablets by mouth Every 12 (Twelve) Hours., Disp: 30 tablet, Rfl: 0  •  terazosin (HYTRIN) 5 MG capsule, Take 5 mg by mouth Every Night., Disp: , Rfl:       Objective:     Vitals:    12/19/18 1021 12/19/18 1030   BP: 102/58 108/60   BP Location: Right arm Left arm   Pulse: 70    SpO2: 95%    Weight: 69.4 kg (153 lb)    Height: 170.2 cm (67\")      Body mass index is 23.96 " kg/m².    PHYSICAL EXAM:    Physical Exam   Constitutional: He is oriented to person, place, and time. He appears well-developed and well-nourished. No distress.   HENT:   Head: Normocephalic and atraumatic.   Eyes: Pupils are equal, round, and reactive to light.   Neck: No JVD present. No thyromegaly present.   Cardiovascular: Normal rate, regular rhythm, normal heart sounds and intact distal pulses.   No murmur heard.  Pulmonary/Chest: Effort normal and breath sounds normal. No respiratory distress.   Abdominal: Soft. Bowel sounds are normal. He exhibits no distension. There is no splenomegaly or hepatomegaly. There is no tenderness.   Musculoskeletal: Normal range of motion. He exhibits no edema.   Neurological: He is alert and oriented to person, place, and time.   Skin: Skin is warm and dry. He is not diaphoretic. No erythema.   Psychiatric: He has a normal mood and affect. His behavior is normal. Judgment normal.         ECG 12 Lead  Date/Time: 12/19/2018 10:46 AM  Performed by: Soniya Nava PA  Authorized by: Soniya Nava PA   Comparison: compared with previous ECG from 12/4/2018  Rhythm: sinus rhythm  BPM: 65  Conduction: 1st degree  Clinical impression: abnormal ECG  Comments: Indication: Atrial fibrillation.              Assessment:       Diagnosis Plan   1. Paroxysmal atrial fibrillation (CMS/HCC)  ECG 12 Lead     Orders Placed This Encounter   Procedures   • ECG 12 Lead     This order was created via procedure documentation          Plan:       Atrial Fibrillation and Atrial Flutter  Assessment  • The patient has paroxysmal atrial fibrillation  • This is non-valvular in etiology  • The patient's CHADS2-VASc score is 2  • A [id number] score of 2 or more is considered a high risk for a thromboembolic event    Plan  • Attempt to maintain sinus rhythm  • Continue beta blocker for rhythm control  • Continue beta blocker for rate control    Subjective - Objective  • He is in sinus rhythm today.  He  is on low-dose Lopressor and tolerating this well.  His blood pressure is a little on the low side, however he is asymptomatic.  I'm not going to make any changes to his medical regimen, and he will follow-up with Dr. Osuna on 1/16/2018 or sooner if needed.  He is not anticoagulated secondary to age and frailty.        As always, it has been a pleasure to participate in your patient's care.      Sincerely,         Soniya Nava PA-C

## 2018-12-19 NOTE — OUTREACH NOTE
Sepsis Week 2 Survey      Responses   Facility patient discharged from?  [place]   Does the patient have one of the following disease processes/diagnoses(primary or secondary)?  Other   Week 2 attempt successful?  Yes   Call start time  1609   Call end time  1614   Discharge diagnosis  Acute cystitis, sepsis   Is patient permission given to speak with other caregiver?  Yes   Person spoke with today (if not patient) and relationship  Little, og   Meds reviewed with patient/caregiver?  Yes   Is the patient having any side effects they believe may be caused by any medication additions or changes?  No   Does the patient have all medications related to this admission filled (includes all antibiotics, inhalers, nebulizers,steroids,etc.)  Yes   Is the patient taking all medications as directed (includes completed medication regime)?  Yes   Does the patient have a primary care provider?   Yes   Does the patient have an appointment with their PCP within 7 days of discharge?  Yes   Has the patient kept scheduled appointments due by today?  Yes   What is the Home health agency?   [facility]   Has home health visited the patient within 72 hours of discharge?  Yes   Home health comments  Patient states that he has HH nurse, speech therapy and will be having PT, but has not seen yet.    Psychosocial issues?  No   Did the patient receive a copy of their discharge instructions?  Yes   Nursing interventions  Reviewed instructions with patient   What is the patient's perception of their health status since discharge?  Improving   Nursing interventions  Nurse provided patient education   Is the patient/caregiver able to teach back Sepsis?  S - Shivering,fever or very cold, E - Extreme pain or generalized discomfort (worst ever,especially abdomen), P - Pale or discolored skin, S - Sleepy, difficult to arouse,confused, I -   I feel like I might die-a feeling of hopelessness, S - Short of breath   Nursing interventions  Nurse  provided reassurance to patient   Is patient/caregiver able to teach back steps to recovery at home?  Set small, achievable goals for return to baseline health, Rest and regain strength, Eat a balanced diet, Exercise as tolerated, Make a list of questions for PCP appoinment   Is the patient/caregiver able to teach back signs and symptoms of worsening condition:  Fever, Edema   Is the patient/caregiver able to teach back the hierarchy of who to call/visit for symptoms/problems? PCP, Specialist, Home health nurse, Urgent Care, ED, 911  Yes   Additional teach back comments  Encouraged f/u appts w/ urology re: cristobal and possible surgery...TERP or other.  US next Friday.   Week 2 call completed?  Yes          Wanda Ruiz RN

## 2018-12-27 ENCOUNTER — READMISSION MANAGEMENT (OUTPATIENT)
Dept: CALL CENTER | Facility: HOSPITAL | Age: 83
End: 2018-12-27

## 2018-12-27 NOTE — OUTREACH NOTE
Sepsis Week 3 Survey      Responses   Facility patient discharged from?  [place]   Does the patient have one of the following disease processes/diagnoses(primary or secondary)?  Other   Week 3 attempt successful?  No   Unsuccessful attempts  Attempt 1          Shelly Washington RN

## 2019-01-06 ENCOUNTER — APPOINTMENT (OUTPATIENT)
Dept: GENERAL RADIOLOGY | Facility: HOSPITAL | Age: 84
End: 2019-01-06

## 2019-01-06 PROCEDURE — 71046 X-RAY EXAM CHEST 2 VIEWS: CPT | Performed by: GENERAL PRACTICE

## 2019-01-16 ENCOUNTER — OFFICE VISIT (OUTPATIENT)
Dept: CARDIOLOGY | Facility: CLINIC | Age: 84
End: 2019-01-16

## 2019-01-16 VITALS
SYSTOLIC BLOOD PRESSURE: 138 MMHG | WEIGHT: 154 LBS | HEART RATE: 65 BPM | DIASTOLIC BLOOD PRESSURE: 74 MMHG | HEIGHT: 67 IN | BODY MASS INDEX: 24.17 KG/M2

## 2019-01-16 DIAGNOSIS — E78.5 HYPERLIPIDEMIA, UNSPECIFIED HYPERLIPIDEMIA TYPE: ICD-10-CM

## 2019-01-16 DIAGNOSIS — I48.0 PAROXYSMAL ATRIAL FIBRILLATION (HCC): Primary | ICD-10-CM

## 2019-01-16 DIAGNOSIS — I10 ESSENTIAL HYPERTENSION: ICD-10-CM

## 2019-01-16 PROCEDURE — 99213 OFFICE O/P EST LOW 20 MIN: CPT | Performed by: INTERNAL MEDICINE

## 2019-01-16 PROCEDURE — 93000 ELECTROCARDIOGRAM COMPLETE: CPT | Performed by: INTERNAL MEDICINE

## 2019-01-16 NOTE — PROGRESS NOTES
Subjective:     Encounter Date:01/16/2019      Patient ID: Lavelle Soliman is a 89 y.o. male.    Chief Complaint:  History of Present Illness    This is a 89-year-old with a history of hypertension, BPH, chronic kidney disease stage III, hyperlipidemia, hypothyroidism, paroxysmal atrial fibrillation who presents for hospital follow-up.      I saw the patient initially was admitted to the hospital in 12/2018 with a urinary tract infection with sepsis.  During his hospitalization he developed episodes of atrial fibrillation with rapid ventricular rate.  I felt that his episodes were due to his underlying sepsis.  Due to recurrent episodes he was started on metoprolol tartrate with good control of his heart rates.  Anticoagulation was discussed with the patient and the family and it was decided it is best to keep him off of it regulation due to his advanced age and overall frailty.  He did undergo an echocardiogram during that admission that showed normal left ventricle systolic function wall motion with an EF of 53%, a normal left atrial size, and no significant valvular disease.    Since his discharge he has been doing well.  Denies any chest pain, palpitations, shortness of breath, PND or orthopnea, near syncope or syncope, or lower or many edema.  He's been tolerating the low dose of metoprolol tartrate.    Review of Systems   Constitution: Negative for weakness and malaise/fatigue.   HENT: Negative for hearing loss, hoarse voice, nosebleeds and sore throat.    Eyes: Negative for pain.   Cardiovascular: Negative for chest pain, claudication, cyanosis, dyspnea on exertion, irregular heartbeat, leg swelling, near-syncope, orthopnea, palpitations, paroxysmal nocturnal dyspnea and syncope.   Respiratory: Negative for shortness of breath and snoring.    Endocrine: Negative for cold intolerance, heat intolerance, polydipsia, polyphagia and polyuria.   Skin: Negative for itching and rash.   Musculoskeletal: Negative  for arthritis, falls, joint pain, joint swelling, muscle cramps, muscle weakness and myalgias.   Gastrointestinal: Negative for constipation, diarrhea, dysphagia, heartburn, hematemesis, hematochezia, melena, nausea and vomiting.   Genitourinary: Negative for frequency, hematuria and hesitancy.   Neurological: Negative for excessive daytime sleepiness, dizziness, headaches, light-headedness and numbness.   Psychiatric/Behavioral: Negative for depression. The patient is not nervous/anxious.           Current Outpatient Medications:   •  famotidine (PEPCID) 20 MG tablet, Take 1 tablet by mouth Daily., Disp: 30 tablet, Rfl: 0  •  finasteride (PROSCAR) 5 MG tablet, Take 1 tablet by mouth Daily., Disp: 30 tablet, Rfl: 0  •  levothyroxine (SYNTHROID, LEVOTHROID) 88 MCG tablet, Take 1 tablet by mouth daily., Disp: , Rfl:   •  metoprolol tartrate (LOPRESSOR) 25 MG tablet, Take 0.5 tablets by mouth Every 12 (Twelve) Hours., Disp: 30 tablet, Rfl: 0  •  terazosin (HYTRIN) 5 MG capsule, Take 5 mg by mouth Every Night., Disp: , Rfl:     Past Medical History:   Diagnosis Date   • Benign essential hypertension    • Disease of thyroid gland    • Paroxysmal atrial fibrillation (CMS/HCC)      Past Surgical History:   Procedure Laterality Date   • HERNIA REPAIR       Family History   Problem Relation Age of Onset   • No Known Problems Mother    • No Known Problems Father    • No Known Problems Maternal Grandmother    • No Known Problems Maternal Grandfather    • No Known Problems Paternal Grandmother    • No Known Problems Paternal Grandfather      Social History     Tobacco Use   • Smoking status: Former Smoker   • Smokeless tobacco: Never Used   Substance Use Topics   • Alcohol use: Yes     Alcohol/week: 0.6 oz     Types: 1 Shots of liquor per week     Comment: occ   • Drug use: Defer           ECG 12 Lead  Date/Time: 1/16/2019 5:01 PM  Performed by: Licha Osuna MD  Authorized by: Licha Osuna MD   Comparison: compared with  "previous ECG   Similar to previous ECG  Rhythm: sinus rhythm               Objective:         Visit Vitals  /74   Pulse 65   Ht 170.2 cm (67\")   Wt 69.9 kg (154 lb)   BMI 24.12 kg/m²          Physical Exam   Constitutional: He is oriented to person, place, and time. He appears well-developed and well-nourished.   HENT:   Head: Normocephalic and atraumatic.   Neck: No JVD present. Carotid bruit is not present.   Cardiovascular: Normal rate, regular rhythm, S1 normal and S2 normal. Exam reveals no gallop.   No murmur heard.  Pulses:       Radial pulses are 2+ on the right side, and 2+ on the left side.   No bilateral lower extremity edema   Pulmonary/Chest: Effort normal and breath sounds normal.   Abdominal: Soft. Normal appearance.   Neurological: He is alert and oriented to person, place, and time.   Skin: Skin is warm, dry and intact.   Psychiatric: He has a normal mood and affect.       Lab Review:       Assessment:          Diagnosis Plan   1. Paroxysmal atrial fibrillation (CMS/HCC)     2. Essential hypertension     3. Hyperlipidemia, unspecified hyperlipidemia type            Plan:       1.  Paroxysmal atrial fibrillation.  Remains in sinus rhythm.  Suspect this is related to his sepsis during his last hospitalization.  He has no structural heart disease indicates that he is high risk for recurrent atrial fibrillation.  For the time being I would recommend continuing with low-dose metoprolol tartrate.  Would not recommend anticoagulation at this point due to his advanced age and overall frailty.  2.  Hypertension.  Well controlled on his current medications.  3.  Hyperlipidemia.  4.  Chronic kidney disease    Will plan on seeing the patient back again in 6 months.    Atrial Fibrillation and Atrial Flutter  Assessment  • The patient has paroxysmal atrial fibrillation  • This is non-valvular in etiology  • The patient's CHADS2-VASc score is 2  • A [id number] score of 2 or more is considered a high risk " for a thromboembolic event    Plan  • Attempt to maintain sinus rhythm  • Continue beta blocker for rhythm control  • Continue beta blocker for rate control

## 2019-02-04 RX ORDER — FAMOTIDINE 20 MG/1
TABLET, FILM COATED ORAL
Qty: 30 TABLET | Refills: 0 | Status: SHIPPED | OUTPATIENT
Start: 2019-02-04 | End: 2019-03-01 | Stop reason: SDUPTHER

## 2019-03-04 RX ORDER — FAMOTIDINE 20 MG/1
TABLET, FILM COATED ORAL
Qty: 30 TABLET | Refills: 2 | Status: SHIPPED | OUTPATIENT
Start: 2019-03-04 | End: 2019-05-21 | Stop reason: SDUPTHER

## 2019-05-13 DIAGNOSIS — R73.9 HYPERGLYCEMIA: ICD-10-CM

## 2019-05-13 DIAGNOSIS — E78.2 MIXED HYPERLIPIDEMIA: ICD-10-CM

## 2019-05-13 DIAGNOSIS — I10 BENIGN ESSENTIAL HYPERTENSION: Primary | ICD-10-CM

## 2019-05-15 LAB
ALBUMIN SERPL-MCNC: 3.7 G/DL (ref 3.5–5.2)
ALBUMIN/GLOB SERPL: 1 G/DL
ALP SERPL-CCNC: 72 U/L (ref 39–117)
ALT SERPL-CCNC: 13 U/L (ref 1–41)
AST SERPL-CCNC: 12 U/L (ref 1–40)
BASOPHILS # BLD AUTO: 0.07 10*3/MM3 (ref 0–0.2)
BASOPHILS NFR BLD AUTO: 0.9 % (ref 0–1.5)
BILIRUB SERPL-MCNC: 0.4 MG/DL (ref 0.2–1.2)
BUN SERPL-MCNC: 23 MG/DL (ref 8–23)
BUN/CREAT SERPL: 15.1 (ref 7–25)
CALCIUM SERPL-MCNC: 9.5 MG/DL (ref 8.2–9.6)
CHLORIDE SERPL-SCNC: 104 MMOL/L (ref 98–107)
CHOLEST SERPL-MCNC: 210 MG/DL (ref 0–200)
CO2 SERPL-SCNC: 27.2 MMOL/L (ref 22–29)
CREAT SERPL-MCNC: 1.52 MG/DL (ref 0.76–1.27)
EOSINOPHIL # BLD AUTO: 0.18 10*3/MM3 (ref 0–0.4)
EOSINOPHIL NFR BLD AUTO: 2.2 % (ref 0.3–6.2)
ERYTHROCYTE [DISTWIDTH] IN BLOOD BY AUTOMATED COUNT: 13.1 % (ref 12.3–15.4)
GLOBULIN SER CALC-MCNC: 3.6 GM/DL
GLUCOSE SERPL-MCNC: 100 MG/DL (ref 65–99)
HBA1C MFR BLD: 5.6 % (ref 4.8–5.6)
HCT VFR BLD AUTO: 41 % (ref 37.5–51)
HDLC SERPL-MCNC: 46 MG/DL (ref 40–60)
HGB BLD-MCNC: 13 G/DL (ref 13–17.7)
IMM GRANULOCYTES # BLD AUTO: 0.02 10*3/MM3 (ref 0–0.05)
IMM GRANULOCYTES NFR BLD AUTO: 0.2 % (ref 0–0.5)
LDLC SERPL CALC-MCNC: 136 MG/DL (ref 0–100)
LDLC/HDLC SERPL: 2.97 {RATIO}
LYMPHOCYTES # BLD AUTO: 2.8 10*3/MM3 (ref 0.7–3.1)
LYMPHOCYTES NFR BLD AUTO: 34.4 % (ref 19.6–45.3)
MCH RBC QN AUTO: 31.4 PG (ref 26.6–33)
MCHC RBC AUTO-ENTMCNC: 31.7 G/DL (ref 31.5–35.7)
MCV RBC AUTO: 99 FL (ref 79–97)
MONOCYTES # BLD AUTO: 0.86 10*3/MM3 (ref 0.1–0.9)
MONOCYTES NFR BLD AUTO: 10.6 % (ref 5–12)
NEUTROPHILS # BLD AUTO: 4.22 10*3/MM3 (ref 1.7–7)
NEUTROPHILS NFR BLD AUTO: 51.7 % (ref 42.7–76)
NRBC BLD AUTO-RTO: 0.1 /100 WBC (ref 0–0.2)
PLATELET # BLD AUTO: 180 10*3/MM3 (ref 140–450)
POTASSIUM SERPL-SCNC: 3.9 MMOL/L (ref 3.5–5.2)
PROT SERPL-MCNC: 7.3 G/DL (ref 6–8.5)
RBC # BLD AUTO: 4.14 10*6/MM3 (ref 4.14–5.8)
SODIUM SERPL-SCNC: 142 MMOL/L (ref 136–145)
T4 FREE SERPL-MCNC: 1.36 NG/DL (ref 0.93–1.7)
TRIGL SERPL-MCNC: 138 MG/DL (ref 0–150)
TSH SERPL DL<=0.005 MIU/L-ACNC: 3.44 MIU/ML (ref 0.27–4.2)
VLDLC SERPL CALC-MCNC: 27.6 MG/DL
WBC # BLD AUTO: 8.15 10*3/MM3 (ref 3.4–10.8)

## 2019-05-21 ENCOUNTER — OFFICE VISIT (OUTPATIENT)
Dept: FAMILY MEDICINE CLINIC | Facility: CLINIC | Age: 84
End: 2019-05-21

## 2019-05-21 VITALS
TEMPERATURE: 97 F | HEART RATE: 68 BPM | OXYGEN SATURATION: 98 % | WEIGHT: 155 LBS | RESPIRATION RATE: 16 BRPM | BODY MASS INDEX: 24.33 KG/M2 | DIASTOLIC BLOOD PRESSURE: 60 MMHG | HEIGHT: 67 IN | SYSTOLIC BLOOD PRESSURE: 100 MMHG

## 2019-05-21 DIAGNOSIS — E03.9 ACQUIRED HYPOTHYROIDISM: ICD-10-CM

## 2019-05-21 DIAGNOSIS — N18.30 CHRONIC KIDNEY DISEASE, STAGE III (MODERATE) (HCC): ICD-10-CM

## 2019-05-21 DIAGNOSIS — E78.5 HYPERLIPIDEMIA, UNSPECIFIED HYPERLIPIDEMIA TYPE: ICD-10-CM

## 2019-05-21 DIAGNOSIS — I10 BENIGN ESSENTIAL HYPERTENSION: Primary | ICD-10-CM

## 2019-05-21 DIAGNOSIS — R73.9 HYPERGLYCEMIA: ICD-10-CM

## 2019-05-21 PROCEDURE — 99214 OFFICE O/P EST MOD 30 MIN: CPT | Performed by: INTERNAL MEDICINE

## 2019-05-21 RX ORDER — FAMOTIDINE 20 MG/1
20 TABLET, FILM COATED ORAL DAILY
Qty: 90 TABLET | Refills: 3 | Status: SHIPPED | OUTPATIENT
Start: 2019-05-21 | End: 2020-06-05

## 2019-05-21 NOTE — PROGRESS NOTES
Kristian Soliman is a 89 y.o. male. Patient is here today for follow-up on his hypertension, hyperlipidemia, hypothyroidism, chronic kidney disease stage III and hyperglycemia.  He is feeling fine and has had no chest pain, shortness of breath, edema or myalgias.  Chief Complaint   Patient presents with   • Hypertension   • Hyperlipidemia   • Hypothyroidism          Vitals:    05/21/19 1001   BP: 100/60   Pulse: 68   Resp: 16   Temp: 97 °F (36.1 °C)   SpO2: 98%     The following portions of the patient's history were reviewed and updated as appropriate: allergies, current medications, past family history, past medical history, past social history, past surgical history and problem list.    Past Medical History:   Diagnosis Date   • Benign essential hypertension    • Disease of thyroid gland    • Paroxysmal atrial fibrillation (CMS/HCC)       Allergies   Allergen Reactions   • Latex       Social History     Socioeconomic History   • Marital status:      Spouse name: Not on file   • Number of children: Not on file   • Years of education: Not on file   • Highest education level: Not on file   Tobacco Use   • Smoking status: Former Smoker   • Smokeless tobacco: Never Used   Substance and Sexual Activity   • Alcohol use: Yes     Alcohol/week: 0.6 oz     Types: 1 Shots of liquor per week     Comment: occ   • Drug use: Defer   • Sexual activity: Defer        Current Outpatient Medications:   •  famotidine (PEPCID) 20 MG tablet, Take 1 tablet by mouth Daily., Disp: 90 tablet, Rfl: 3  •  finasteride (PROSCAR) 5 MG tablet, Take 1 tablet by mouth Daily., Disp: 30 tablet, Rfl: 0  •  levothyroxine (SYNTHROID, LEVOTHROID) 88 MCG tablet, Take 1 tablet by mouth daily., Disp: , Rfl:   •  metoprolol tartrate (LOPRESSOR) 25 MG tablet, TAKE ONE-HALF TABLET BY MOUTH EVERY 12 HOURS, Disp: 90 tablet, Rfl: 0  •  terazosin (HYTRIN) 5 MG capsule, Take 5 mg by mouth Every Night., Disp: , Rfl:      Objective     History of  Present Illness     Review of Systems   Constitutional: Negative.    HENT: Negative.    Eyes: Negative.    Respiratory: Negative.    Cardiovascular: Negative.    Gastrointestinal: Negative.    Genitourinary: Negative.    Musculoskeletal: Negative.    Skin: Negative.    Neurological: Negative.    Psychiatric/Behavioral: Negative.        Physical Exam   Constitutional: He is oriented to person, place, and time. He appears well-developed and well-nourished.   Pleasant, cooperative no distress, blood pressure 110/60   HENT:   Head: Normocephalic and atraumatic.   Eyes: Conjunctivae are normal. Pupils are equal, round, and reactive to light. No scleral icterus.   Neck: Normal range of motion. Neck supple. No thyromegaly present.   Cardiovascular: Normal rate, regular rhythm and normal heart sounds.   Pulmonary/Chest: Effort normal and breath sounds normal. No respiratory distress. He has no wheezes. He has no rales.   Musculoskeletal: Normal range of motion. He exhibits no edema.   Neurological: He is alert and oriented to person, place, and time.   Skin: Skin is warm and dry.   Psychiatric: He has a normal mood and affect. His behavior is normal.   Nursing note and vitals reviewed.      ASSESSMENT CBC is normal.  CMP had a sugar of 180 elevated but stable creatinine of 1.52 and is otherwise normal.  Lipid panel is reasonable with total cholesterol 210, HDL 46 and .  Hemoglobin A1c is normal at 5.6.  TSH and free T4 were both normal.  #1-hypertension, controlled on medication  #2-hyperlipidemia, reasonable on diet control  #3-hypothyroidism, euthyroid on replacement  #4-chronic kidney disease stage III, asymptomatic and stable  #5-hyperglycemia, mild and asymptomatic with normal hemoglobin A1c     Problem List Items Addressed This Visit        Cardiovascular and Mediastinum    Benign essential hypertension - Primary    Hyperlipidemia       Endocrine    Hypothyroidism       Genitourinary    Chronic kidney disease,  stage III (moderate) (CMS/HCC)       Other    Hyperglycemia          PLAN the patient will continue current medicines as now on I will recheck him in 6 months with a CBC, CMP, lipid panel, hemoglobin A1c and TSH    There are no Patient Instructions on file for this visit.  Return in about 6 months (around 11/21/2019) for with labs.

## 2019-07-18 ENCOUNTER — OFFICE VISIT (OUTPATIENT)
Dept: CARDIOLOGY | Facility: CLINIC | Age: 84
End: 2019-07-18

## 2019-07-18 VITALS
DIASTOLIC BLOOD PRESSURE: 68 MMHG | HEART RATE: 70 BPM | HEIGHT: 67 IN | BODY MASS INDEX: 24.64 KG/M2 | WEIGHT: 157 LBS | SYSTOLIC BLOOD PRESSURE: 122 MMHG

## 2019-07-18 DIAGNOSIS — E78.5 HYPERLIPIDEMIA, UNSPECIFIED HYPERLIPIDEMIA TYPE: ICD-10-CM

## 2019-07-18 DIAGNOSIS — E03.9 ACQUIRED HYPOTHYROIDISM: ICD-10-CM

## 2019-07-18 DIAGNOSIS — I10 ESSENTIAL HYPERTENSION: ICD-10-CM

## 2019-07-18 DIAGNOSIS — I48.0 PAROXYSMAL ATRIAL FIBRILLATION (HCC): Primary | ICD-10-CM

## 2019-07-18 PROCEDURE — 99214 OFFICE O/P EST MOD 30 MIN: CPT | Performed by: INTERNAL MEDICINE

## 2019-07-18 PROCEDURE — 93000 ELECTROCARDIOGRAM COMPLETE: CPT | Performed by: INTERNAL MEDICINE

## 2019-07-18 NOTE — PROGRESS NOTES
"    Subjective:     Encounter Date:07/18/2019      Patient ID: Lavelle Soliman is a 89 y.o. male.    Chief Complaint:  History of Present Illness    This is a 89-year-old with a history of hypertension, BPH, chronic kidney disease stage III, hyperlipidemia, hypothyroidism, paroxysmal atrial fibrillation who presents for hospital follow-up.       I saw the patient initially was admitted to the hospital in 12/2018 with a urinary tract infection with sepsis.  During his hospitalization he developed episodes of atrial fibrillation with rapid ventricular rate.  I felt that his episodes were due to his underlying sepsis.  Due to recurrent episodes he was started on metoprolol tartrate with good control of his heart rates.  Anticoagulation was discussed with the patient and the family and it was decided it is best to keep him off of it regulation due to his advanced age and overall frailty.  He did undergo an echocardiogram during that admission that showed normal left ventricle systolic function wall motion with an EF of 53%, a normal left atrial size, and no significant valvular disease.  In follow-up he is done well without any recurrent atrial fibrillation and has been tolerating low dose of metoprolol tartrate.    Today he presents for routine follow-up.  Continues to feel well.  Denies any significant palpitations.  He does report some occasional skipped beats but this only last a few seconds.  He denies any chest pain, shortness of breath, PND or orthopnea, near-syncope or syncope, or lower extremity edema.  He does report that he tires easily and often takes \"power naps\".       Review of Systems   Constitution: Positive for malaise/fatigue. Negative for weakness.   HENT: Negative for hearing loss, hoarse voice, nosebleeds and sore throat.    Eyes: Negative for pain.   Cardiovascular: Positive for palpitations. Negative for chest pain, claudication, cyanosis, dyspnea on exertion, irregular heartbeat, leg swelling, " near-syncope, orthopnea, paroxysmal nocturnal dyspnea and syncope.   Respiratory: Negative for shortness of breath and snoring.    Endocrine: Negative for cold intolerance, heat intolerance, polydipsia, polyphagia and polyuria.   Skin: Negative for itching and rash.   Musculoskeletal: Negative for arthritis, falls, joint pain, joint swelling, muscle cramps, muscle weakness and myalgias.   Gastrointestinal: Negative for constipation, diarrhea, dysphagia, heartburn, hematemesis, hematochezia, melena, nausea and vomiting.   Genitourinary: Negative for frequency, hematuria and hesitancy.   Neurological: Positive for excessive daytime sleepiness. Negative for dizziness, headaches, light-headedness and numbness.   Psychiatric/Behavioral: Negative for depression. The patient is not nervous/anxious.           Current Outpatient Medications:   •  famotidine (PEPCID) 20 MG tablet, Take 1 tablet by mouth Daily., Disp: 90 tablet, Rfl: 3  •  finasteride (PROSCAR) 5 MG tablet, Take 1 tablet by mouth Daily., Disp: 30 tablet, Rfl: 0  •  levothyroxine (SYNTHROID, LEVOTHROID) 88 MCG tablet, Take 1 tablet by mouth daily., Disp: , Rfl:   •  metoprolol tartrate (LOPRESSOR) 25 MG tablet, TAKE ONE-HALF TABLET BY MOUTH EVERY 12 HOURS, Disp: 90 tablet, Rfl: 1  •  terazosin (HYTRIN) 5 MG capsule, Take 5 mg by mouth Every Night., Disp: , Rfl:     Past Medical History:   Diagnosis Date   • Benign essential hypertension    • Disease of thyroid gland    • Hyperlipidemia    • Paroxysmal atrial fibrillation (CMS/HCC)      Past Surgical History:   Procedure Laterality Date   • HERNIA REPAIR       Family History   Problem Relation Age of Onset   • No Known Problems Mother    • No Known Problems Father    • No Known Problems Maternal Grandmother    • No Known Problems Maternal Grandfather    • No Known Problems Paternal Grandmother    • No Known Problems Paternal Grandfather      Social History     Tobacco Use   • Smoking status: Former Smoker   •  "Smokeless tobacco: Never Used   Substance Use Topics   • Alcohol use: Yes     Alcohol/week: 0.6 oz     Types: 1 Shots of liquor per week     Comment: occ   • Drug use: Defer           ECG 12 Lead  Date/Time: 7/18/2019 2:52 PM  Performed by: Licha Osuna MD  Authorized by: Licha Osuna MD   Comparison: compared with previous ECG   Similar to previous ECG  Rhythm: sinus rhythm  Conduction: 1st degree AV block               Objective:         Visit Vitals  /68   Pulse 70   Ht 170.2 cm (67\")   Wt 71.2 kg (157 lb)   BMI 24.59 kg/m²          Physical Exam   Constitutional: He is oriented to person, place, and time. He appears well-developed and well-nourished.   HENT:   Head: Normocephalic and atraumatic.   Neck: No JVD present. Carotid bruit is not present.   Cardiovascular: Normal rate, regular rhythm, S1 normal and S2 normal. Exam reveals no gallop.   No murmur heard.  Pulses:       Radial pulses are 2+ on the right side, and 2+ on the left side.   No bilateral lower extremity edema   Pulmonary/Chest: Effort normal and breath sounds normal.   Abdominal: Soft. Normal appearance.   Neurological: He is alert and oriented to person, place, and time.   Skin: Skin is warm, dry and intact.   Psychiatric: He has a normal mood and affect.       Lab Review:       Assessment:          Diagnosis Plan   1. Paroxysmal atrial fibrillation (CMS/HCC)     2. Essential hypertension     3. Hyperlipidemia, unspecified hyperlipidemia type     4. Acquired hypothyroidism            Plan:       1.  Paroxysmal atrial fibrillation.  His episodes were in the setting of sepsis.  He has had no further documented episodes.  I suspect that his atrial fibrillation was related to his sepsis and since he has no structural heart disease (including a normal left atrial size and no evidence of mitral valve disease) I do not feel that he is at high risk for recurrence.  The patient and his daughter interested in coming off the metoprolol if " possible.  I think this is reasonable to do and since he is already on a low dose I told him to go ahead and stop it.  If he does have any recurrent or worsening palpitations off of the metoprolol we could resume it as needed.  2.  History of hypertension.  Well controlled.  Patient and his daughter reports that his blood pressures were never really an issue even before taking the metoprolol tartrate.  We will see how he does off of the metoprolol tartrate.  Asked him to notify me if they notice that his blood pressures start rising.  3.  Hyperlipidemia  4.  Hypothyroidism    At this point we will plan on seeing the patient back again as needed.     Atrial Fibrillation and Atrial Flutter  Assessment  • The patient has paroxysmal atrial fibrillation  • This is non-valvular in etiology  • The patient's CHADS2-VASc score is 2  • A [id number] score of 2 or more is considered a high risk for a thromboembolic event    Plan  • Attempt to maintain sinus rhythm  • Continue beta blocker for rhythm control  • Continue beta blocker for rate control

## 2019-11-19 DIAGNOSIS — E78.5 HYPERLIPIDEMIA, UNSPECIFIED HYPERLIPIDEMIA TYPE: ICD-10-CM

## 2019-11-19 DIAGNOSIS — E03.9 ACQUIRED HYPOTHYROIDISM: ICD-10-CM

## 2019-11-19 DIAGNOSIS — R73.9 HYPERGLYCEMIA: ICD-10-CM

## 2019-11-21 LAB
ALBUMIN SERPL-MCNC: 3.8 G/DL (ref 3.5–5.2)
ALBUMIN/GLOB SERPL: 1.1 G/DL
ALP SERPL-CCNC: 85 U/L (ref 39–117)
ALT SERPL-CCNC: 11 U/L (ref 1–41)
AST SERPL-CCNC: 14 U/L (ref 1–40)
BASOPHILS # BLD AUTO: 0.09 10*3/MM3 (ref 0–0.2)
BASOPHILS NFR BLD AUTO: 1 % (ref 0–1.5)
BILIRUB SERPL-MCNC: 0.3 MG/DL (ref 0.2–1.2)
BUN SERPL-MCNC: 23 MG/DL (ref 8–23)
BUN/CREAT SERPL: 17.6 (ref 7–25)
CALCIUM SERPL-MCNC: 8.9 MG/DL (ref 8.2–9.6)
CHLORIDE SERPL-SCNC: 104 MMOL/L (ref 98–107)
CHOLEST SERPL-MCNC: 204 MG/DL (ref 0–200)
CO2 SERPL-SCNC: 28.5 MMOL/L (ref 22–29)
CREAT SERPL-MCNC: 1.31 MG/DL (ref 0.76–1.27)
EOSINOPHIL # BLD AUTO: 0.18 10*3/MM3 (ref 0–0.4)
EOSINOPHIL NFR BLD AUTO: 2 % (ref 0.3–6.2)
ERYTHROCYTE [DISTWIDTH] IN BLOOD BY AUTOMATED COUNT: 13 % (ref 12.3–15.4)
GLOBULIN SER CALC-MCNC: 3.4 GM/DL
GLUCOSE SERPL-MCNC: 96 MG/DL (ref 65–99)
HBA1C MFR BLD: 5.8 % (ref 4.8–5.6)
HCT VFR BLD AUTO: 37 % (ref 37.5–51)
HDLC SERPL-MCNC: 43 MG/DL (ref 40–60)
HGB BLD-MCNC: 12.5 G/DL (ref 13–17.7)
IMM GRANULOCYTES # BLD AUTO: 0.02 10*3/MM3 (ref 0–0.05)
IMM GRANULOCYTES NFR BLD AUTO: 0.2 % (ref 0–0.5)
LDLC SERPL CALC-MCNC: 126 MG/DL (ref 0–100)
LDLC/HDLC SERPL: 2.93 {RATIO}
LYMPHOCYTES # BLD AUTO: 2.4 10*3/MM3 (ref 0.7–3.1)
LYMPHOCYTES NFR BLD AUTO: 26.5 % (ref 19.6–45.3)
MCH RBC QN AUTO: 32 PG (ref 26.6–33)
MCHC RBC AUTO-ENTMCNC: 33.8 G/DL (ref 31.5–35.7)
MCV RBC AUTO: 94.6 FL (ref 79–97)
MONOCYTES # BLD AUTO: 0.95 10*3/MM3 (ref 0.1–0.9)
MONOCYTES NFR BLD AUTO: 10.5 % (ref 5–12)
NEUTROPHILS # BLD AUTO: 5.42 10*3/MM3 (ref 1.7–7)
NEUTROPHILS NFR BLD AUTO: 59.8 % (ref 42.7–76)
NRBC BLD AUTO-RTO: 0 /100 WBC (ref 0–0.2)
PLATELET # BLD AUTO: 186 10*3/MM3 (ref 140–450)
POTASSIUM SERPL-SCNC: 3.8 MMOL/L (ref 3.5–5.2)
PROT SERPL-MCNC: 7.2 G/DL (ref 6–8.5)
RBC # BLD AUTO: 3.91 10*6/MM3 (ref 4.14–5.8)
SODIUM SERPL-SCNC: 141 MMOL/L (ref 136–145)
TRIGL SERPL-MCNC: 174 MG/DL (ref 0–150)
TSH SERPL DL<=0.005 MIU/L-ACNC: 2.22 UIU/ML (ref 0.27–4.2)
VLDLC SERPL CALC-MCNC: 34.8 MG/DL
WBC # BLD AUTO: 9.06 10*3/MM3 (ref 3.4–10.8)

## 2019-11-27 ENCOUNTER — OFFICE VISIT (OUTPATIENT)
Dept: FAMILY MEDICINE CLINIC | Facility: CLINIC | Age: 84
End: 2019-11-27

## 2019-11-27 VITALS
DIASTOLIC BLOOD PRESSURE: 78 MMHG | SYSTOLIC BLOOD PRESSURE: 116 MMHG | OXYGEN SATURATION: 97 % | BODY MASS INDEX: 24.33 KG/M2 | HEART RATE: 75 BPM | HEIGHT: 67 IN | WEIGHT: 155 LBS

## 2019-11-27 DIAGNOSIS — I10 BENIGN ESSENTIAL HYPERTENSION: ICD-10-CM

## 2019-11-27 DIAGNOSIS — E78.5 HYPERLIPIDEMIA, UNSPECIFIED HYPERLIPIDEMIA TYPE: Primary | ICD-10-CM

## 2019-11-27 DIAGNOSIS — N18.30 CHRONIC KIDNEY DISEASE, STAGE III (MODERATE) (HCC): ICD-10-CM

## 2019-11-27 DIAGNOSIS — N18.30 ANEMIA DUE TO STAGE 3 CHRONIC KIDNEY DISEASE (HCC): ICD-10-CM

## 2019-11-27 DIAGNOSIS — R73.9 HYPERGLYCEMIA: ICD-10-CM

## 2019-11-27 DIAGNOSIS — D63.1 ANEMIA DUE TO STAGE 3 CHRONIC KIDNEY DISEASE (HCC): ICD-10-CM

## 2019-11-27 DIAGNOSIS — E03.9 ACQUIRED HYPOTHYROIDISM: ICD-10-CM

## 2019-11-27 DIAGNOSIS — I48.0 PAROXYSMAL ATRIAL FIBRILLATION (HCC): ICD-10-CM

## 2019-11-27 DIAGNOSIS — I10 ESSENTIAL HYPERTENSION: ICD-10-CM

## 2019-11-27 PROBLEM — E87.6 HYPOKALEMIA: Status: RESOLVED | Noted: 2018-12-03 | Resolved: 2019-11-27

## 2019-11-27 PROCEDURE — 90653 IIV ADJUVANT VACCINE IM: CPT | Performed by: INTERNAL MEDICINE

## 2019-11-27 PROCEDURE — G0008 ADMIN INFLUENZA VIRUS VAC: HCPCS | Performed by: INTERNAL MEDICINE

## 2019-11-27 PROCEDURE — 99214 OFFICE O/P EST MOD 30 MIN: CPT | Performed by: INTERNAL MEDICINE

## 2019-11-27 RX ORDER — POLYMYXIN B SULFATE AND TRIMETHOPRIM 1; 10000 MG/ML; [USP'U]/ML
1 SOLUTION OPHTHALMIC 4 TIMES DAILY
COMMUNITY
Start: 2019-06-25 | End: 2021-06-15

## 2019-11-27 RX ORDER — METOPROLOL TARTRATE 50 MG/1
50 TABLET, FILM COATED ORAL
COMMUNITY
End: 2021-06-15

## 2019-11-27 RX ORDER — TAMSULOSIN HYDROCHLORIDE 0.4 MG/1
0.4 CAPSULE ORAL DAILY
COMMUNITY
End: 2021-06-15

## 2019-11-27 NOTE — PROGRESS NOTES
Subjective   Lavelle Soliman is a 89 y.o. male. Patient is here today for follow-up on his history of atrial fibrillation, hypertension, hyperlipidemia, hypothyroidism, chronic kidney disease stage III, borderline anemia and borderline hyperglycemia.  He is generally been stable and has no acute complaints.  [facility]  He denies any chest pain, shortness of breath, edema or myalgias  Chief Complaint   Patient presents with   • Hypertension          Vitals:    11/27/19 1006   BP: 116/78   Pulse: 75   SpO2: 97%     Body mass index is 24.28 kg/m².  The following portions of the patient's history were reviewed and updated as appropriate: allergies, current medications, past family history, past medical history, past social history, past surgical history and problem list.    Past Medical History:   Diagnosis Date   • Benign essential hypertension    • Disease of thyroid gland    • Hyperlipidemia    • Paroxysmal atrial fibrillation (CMS/HCC)       Allergies   Allergen Reactions   • Latex Rash      Social History     Socioeconomic History   • Marital status:      Spouse name: Not on file   • Number of children: Not on file   • Years of education: Not on file   • Highest education level: Not on file   Tobacco Use   • Smoking status: Former Smoker   • Smokeless tobacco: Never Used   Substance and Sexual Activity   • Alcohol use: Yes     Alcohol/week: 0.6 oz     Types: 1 Shots of liquor per week     Comment: occ   • Drug use: Defer   • Sexual activity: Defer        Current Outpatient Medications:   •  famotidine (PEPCID) 20 MG tablet, Take 1 tablet by mouth Daily., Disp: 90 tablet, Rfl: 3  •  finasteride (PROSCAR) 5 MG tablet, Take 1 tablet by mouth Daily., Disp: 30 tablet, Rfl: 0  •  levothyroxine (SYNTHROID, LEVOTHROID) 88 MCG tablet, Take 1 tablet by mouth daily., Disp: , Rfl:   •  metoprolol tartrate (LOPRESSOR) 50 MG tablet, Take 50 mg by mouth., Disp: , Rfl:   •  tamsulosin (FLOMAX)  0.4 MG capsule 24 hr capsule, Take 0.4 mg by mouth Daily., Disp: , Rfl:   •  trimethoprim-polymyxin b (POLYTRIM) 17754-1.1 UNIT/ML-% ophthalmic solution, Apply 1 drop to eye(s) as directed by provider 4 (Four) Times a Day., Disp: , Rfl:      Objective     History of Present Illness     Review of Systems   Constitutional: Negative.    HENT: Negative.    Eyes: Negative.    Respiratory: Negative.    Cardiovascular: Negative.    Gastrointestinal: Negative.    Genitourinary: Negative.    Musculoskeletal: Negative.    Skin: Negative.    Neurological: Negative.    Psychiatric/Behavioral: Negative.        Physical Exam   Constitutional: He is oriented to person, place, and time. He appears well-developed and well-nourished.   Pleasant, cooperative no acute distress, blood pressure 110/80   HENT:   Head: Normocephalic and atraumatic.   Eyes: Conjunctivae are normal. Pupils are equal, round, and reactive to light. No scleral icterus.   Neck: Normal range of motion. Neck supple.   Cardiovascular: Normal rate and normal heart sounds.   Pulmonary/Chest: Effort normal and breath sounds normal. No respiratory distress. He has no wheezes. He has no rales.   Musculoskeletal: Normal range of motion.   Neurological: He is alert and oriented to person, place, and time.   Skin: Skin is warm and dry.   Psychiatric: He has a normal mood and affect. His behavior is normal.   Nursing note and vitals reviewed.      ASSESSMENT CBC is stable with a minimally low RBC count of 3.91, hemoglobin 12.5 and hematocrit 37.0.  TSH was quite normal.  Sugar was normal at 96 and creatinine stable at 1.31 and other values were fine.  Hemoglobin A1c is minimally high at 5.8 and lipid panel is stable and acceptable considering age with a total cholesterol of 204, HDL of 43 and   #1-hypertension, well controlled  #2-hyperlipidemia, fair control by diet  #3-hypothyroidism, euthyroid on replacement  #4-mild borderline anemia, stable  #5-history of  chronic kidney disease, asymptomatic and stable  #6-history of hyperglycemia with normal sugar and minimally elevated but acceptable hemoglobin A1c, diet controlled       Problem List Items Addressed This Visit        Cardiovascular and Mediastinum    Benign essential hypertension    Relevant Medications    metoprolol tartrate (LOPRESSOR) 50 MG tablet    Hyperlipidemia - Primary    HTN (hypertension)    Relevant Medications    metoprolol tartrate (LOPRESSOR) 50 MG tablet    Atrial fibrillation (CMS/HCC)    Relevant Medications    metoprolol tartrate (LOPRESSOR) 50 MG tablet       Endocrine    Hypothyroidism    Relevant Medications    metoprolol tartrate (LOPRESSOR) 50 MG tablet       Genitourinary    Chronic kidney disease, stage III (moderate) (CMS/HCC)       Hematopoietic and Hemostatic    Anemia       Other    Hyperglycemia          PLAN the patient received a flu shot today and I plan on rechecking him in 6 months with a CBC, CMP, lipid panel and TSH and free T4    There are no Patient Instructions on file for this visit.  Return in about 6 months (around 5/27/2020) for with labs.

## 2020-01-29 ENCOUNTER — HOSPITAL ENCOUNTER (EMERGENCY)
Facility: HOSPITAL | Age: 85
Discharge: HOME | End: 2020-01-29
Attending: EMERGENCY MEDICINE | Admitting: EMERGENCY MEDICINE

## 2020-01-29 VITALS
BODY MASS INDEX: 23.49 KG/M2 | WEIGHT: 155 LBS | OXYGEN SATURATION: 96 % | TEMPERATURE: 97.4 F | SYSTOLIC BLOOD PRESSURE: 147 MMHG | RESPIRATION RATE: 12 BRPM | HEIGHT: 68 IN | HEART RATE: 70 BPM | DIASTOLIC BLOOD PRESSURE: 77 MMHG

## 2020-01-29 DIAGNOSIS — E87.6 HYPOKALEMIA: ICD-10-CM

## 2020-01-29 DIAGNOSIS — E83.51 HYPOCALCEMIA: ICD-10-CM

## 2020-01-29 DIAGNOSIS — R19.7 DIARRHEA, UNSPECIFIED TYPE: Primary | ICD-10-CM

## 2020-01-29 DIAGNOSIS — N18.9 CHRONIC KIDNEY DISEASE, UNSPECIFIED CKD STAGE: ICD-10-CM

## 2020-01-29 LAB
ALBUMIN SERPL-MCNC: 3.4 G/DL (ref 3.5–5.2)
ALBUMIN/GLOB SERPL: 1 G/DL
ALP SERPL-CCNC: 77 U/L (ref 39–117)
ALT SERPL W P-5'-P-CCNC: 51 U/L (ref 1–41)
ANION GAP SERPL CALCULATED.3IONS-SCNC: 14.1 MMOL/L (ref 5–15)
AST SERPL-CCNC: 40 U/L (ref 1–40)
BASOPHILS # BLD AUTO: 0.05 10*3/MM3 (ref 0–0.2)
BASOPHILS NFR BLD AUTO: 0.5 % (ref 0–1.5)
BILIRUB SERPL-MCNC: 0.2 MG/DL (ref 0.2–1.2)
BILIRUB UR QL STRIP: NEGATIVE
BUN BLD-MCNC: 22 MG/DL (ref 8–23)
BUN/CREAT SERPL: 15.2 (ref 7–25)
CALCIUM SPEC-SCNC: 7.8 MG/DL (ref 8.2–9.6)
CHLORIDE SERPL-SCNC: 98 MMOL/L (ref 98–107)
CLARITY UR: CLEAR
CO2 SERPL-SCNC: 21.9 MMOL/L (ref 22–29)
COLOR UR: YELLOW
CREAT BLD-MCNC: 1.45 MG/DL (ref 0.76–1.27)
DEPRECATED RDW RBC AUTO: 46.1 FL (ref 37–54)
EOSINOPHIL # BLD AUTO: 0.15 10*3/MM3 (ref 0–0.4)
EOSINOPHIL NFR BLD AUTO: 1.6 % (ref 0.3–6.2)
ERYTHROCYTE [DISTWIDTH] IN BLOOD BY AUTOMATED COUNT: 12.8 % (ref 12.3–15.4)
GFR SERPL CREATININE-BSD FRML MDRD: 46 ML/MIN/1.73
GLOBULIN UR ELPH-MCNC: 3.4 GM/DL
GLUCOSE BLD-MCNC: 107 MG/DL (ref 65–99)
GLUCOSE UR STRIP-MCNC: NEGATIVE MG/DL
HCT VFR BLD AUTO: 37.1 % (ref 37.5–51)
HGB BLD-MCNC: 12.4 G/DL (ref 13–17.7)
HGB UR QL STRIP.AUTO: NEGATIVE
IMM GRANULOCYTES # BLD AUTO: 0.04 10*3/MM3 (ref 0–0.05)
IMM GRANULOCYTES NFR BLD AUTO: 0.4 % (ref 0–0.5)
KETONES UR QL STRIP: NEGATIVE
LEUKOCYTE ESTERASE UR QL STRIP.AUTO: NEGATIVE
LIPASE SERPL-CCNC: 30 U/L (ref 13–60)
LYMPHOCYTES # BLD AUTO: 2.06 10*3/MM3 (ref 0.7–3.1)
LYMPHOCYTES NFR BLD AUTO: 21.8 % (ref 19.6–45.3)
MAGNESIUM SERPL-MCNC: 2 MG/DL (ref 1.7–2.3)
MCH RBC QN AUTO: 32.4 PG (ref 26.6–33)
MCHC RBC AUTO-ENTMCNC: 33.4 G/DL (ref 31.5–35.7)
MCV RBC AUTO: 96.9 FL (ref 79–97)
MONOCYTES # BLD AUTO: 1.12 10*3/MM3 (ref 0.1–0.9)
MONOCYTES NFR BLD AUTO: 11.8 % (ref 5–12)
NEUTROPHILS # BLD AUTO: 6.05 10*3/MM3 (ref 1.7–7)
NEUTROPHILS NFR BLD AUTO: 63.9 % (ref 42.7–76)
NITRITE UR QL STRIP: NEGATIVE
NRBC BLD AUTO-RTO: 0 /100 WBC (ref 0–0.2)
PH UR STRIP.AUTO: 6 [PH] (ref 5–8)
PLATELET # BLD AUTO: 169 10*3/MM3 (ref 140–450)
PMV BLD AUTO: 11 FL (ref 6–12)
POTASSIUM BLD-SCNC: 3.4 MMOL/L (ref 3.5–5.2)
PROT SERPL-MCNC: 6.8 G/DL (ref 6–8.5)
PROT UR QL STRIP: NEGATIVE
RBC # BLD AUTO: 3.83 10*6/MM3 (ref 4.14–5.8)
SODIUM BLD-SCNC: 134 MMOL/L (ref 136–145)
SP GR UR STRIP: 1.01 (ref 1–1.03)
UROBILINOGEN UR QL STRIP: NORMAL
WBC NRBC COR # BLD: 9.47 10*3/MM3 (ref 3.4–10.8)

## 2020-01-29 PROCEDURE — 83735 ASSAY OF MAGNESIUM: CPT | Performed by: EMERGENCY MEDICINE

## 2020-01-29 PROCEDURE — 83690 ASSAY OF LIPASE: CPT | Performed by: EMERGENCY MEDICINE

## 2020-01-29 PROCEDURE — 85025 COMPLETE CBC W/AUTO DIFF WBC: CPT | Performed by: EMERGENCY MEDICINE

## 2020-01-29 PROCEDURE — 81003 URINALYSIS AUTO W/O SCOPE: CPT | Performed by: EMERGENCY MEDICINE

## 2020-01-29 PROCEDURE — 99284 EMERGENCY DEPT VISIT MOD MDM: CPT

## 2020-01-29 PROCEDURE — 80053 COMPREHEN METABOLIC PANEL: CPT | Performed by: EMERGENCY MEDICINE

## 2020-01-29 PROCEDURE — 96365 THER/PROPH/DIAG IV INF INIT: CPT

## 2020-01-29 PROCEDURE — 96361 HYDRATE IV INFUSION ADD-ON: CPT

## 2020-01-29 PROCEDURE — 25010000002 CALCIUM GLUCONATE PER 10 ML: Performed by: EMERGENCY MEDICINE

## 2020-01-29 RX ORDER — POTASSIUM CHLORIDE 750 MG/1
40 CAPSULE, EXTENDED RELEASE ORAL ONCE
Status: COMPLETED | OUTPATIENT
Start: 2020-01-29 | End: 2020-01-29

## 2020-01-29 RX ORDER — FAMOTIDINE 20 MG/1
40 TABLET, FILM COATED ORAL ONCE
Status: COMPLETED | OUTPATIENT
Start: 2020-01-29 | End: 2020-01-29

## 2020-01-29 RX ORDER — LIDOCAINE HYDROCHLORIDE 20 MG/ML
15 SOLUTION OROPHARYNGEAL ONCE
Status: COMPLETED | OUTPATIENT
Start: 2020-01-29 | End: 2020-01-29

## 2020-01-29 RX ORDER — ALUMINA, MAGNESIA, AND SIMETHICONE 2400; 2400; 240 MG/30ML; MG/30ML; MG/30ML
15 SUSPENSION ORAL ONCE
Status: COMPLETED | OUTPATIENT
Start: 2020-01-29 | End: 2020-01-29

## 2020-01-29 RX ADMIN — POTASSIUM CHLORIDE 40 MEQ: 750 CAPSULE, EXTENDED RELEASE ORAL at 12:20

## 2020-01-29 RX ADMIN — FAMOTIDINE 40 MG: 20 TABLET, FILM COATED ORAL at 11:12

## 2020-01-29 RX ADMIN — SODIUM CHLORIDE 500 ML: 9 INJECTION, SOLUTION INTRAVENOUS at 10:59

## 2020-01-29 RX ADMIN — LIDOCAINE HYDROCHLORIDE 15 ML: 20 SOLUTION ORAL; TOPICAL at 11:11

## 2020-01-29 RX ADMIN — CALCIUM GLUCONATE 1 G: 98 INJECTION, SOLUTION INTRAVENOUS at 12:20

## 2020-01-29 RX ADMIN — ALUMINUM HYDROXIDE, MAGNESIUM HYDROXIDE, AND DIMETHICONE 15 ML: 400; 400; 40 SUSPENSION ORAL at 11:11

## 2020-01-31 ENCOUNTER — EPISODE CHANGES (OUTPATIENT)
Dept: CASE MANAGEMENT | Facility: OTHER | Age: 85
End: 2020-01-31

## 2020-02-05 ENCOUNTER — EPISODE CHANGES (OUTPATIENT)
Dept: CASE MANAGEMENT | Facility: OTHER | Age: 85
End: 2020-02-05

## 2020-02-05 ENCOUNTER — PATIENT OUTREACH (OUTPATIENT)
Dept: CASE MANAGEMENT | Facility: OTHER | Age: 85
End: 2020-02-05

## 2020-02-05 NOTE — OUTREACH NOTE
Patient Outreach Note  Talked with patient. Discussed 1/31/20 ED visit regarding diarrhea. Patient states to be compliant with ED recommendations and states symptoms have improved. He has contacted PCP; received recommendations; has May appointment and follow up as needed. Patient lives with daughter and family; independent with ADL's; receiving assistance with transportation and ambulates without assistive device. He is  compliant with medications; medical appointments and daughter is a nurse and assists as needed. He reports no difficulty with chest pain; SOB; appetite or sleeping.Patient declines 24/7 Nurse Line; Advance Directives filed; flu vaccine completed. Patient states to appreciate phone call and declines need for further phone calls. No further questions or concerns voiced at this time.    Monica Nicole RN  Ambulatory     2/5/2020, 3:54 PM

## 2020-05-19 DIAGNOSIS — E78.5 HYPERLIPIDEMIA, UNSPECIFIED HYPERLIPIDEMIA TYPE: ICD-10-CM

## 2020-05-19 DIAGNOSIS — E03.9 ACQUIRED HYPOTHYROIDISM: ICD-10-CM

## 2020-05-21 LAB
ALBUMIN SERPL-MCNC: 3.8 G/DL (ref 3.5–5.2)
ALBUMIN/GLOB SERPL: 1 G/DL
ALP SERPL-CCNC: 91 U/L (ref 39–117)
ALT SERPL-CCNC: 14 U/L (ref 1–41)
AST SERPL-CCNC: 14 U/L (ref 1–40)
BASOPHILS # BLD AUTO: 0.09 10*3/MM3 (ref 0–0.2)
BASOPHILS NFR BLD AUTO: 1.2 % (ref 0–1.5)
BILIRUB SERPL-MCNC: 0.5 MG/DL (ref 0.2–1.2)
BUN SERPL-MCNC: 23 MG/DL (ref 8–23)
BUN/CREAT SERPL: 15.5 (ref 7–25)
CALCIUM SERPL-MCNC: 8.9 MG/DL (ref 8.2–9.6)
CHLORIDE SERPL-SCNC: 101 MMOL/L (ref 98–107)
CHOLEST SERPL-MCNC: 190 MG/DL (ref 0–200)
CO2 SERPL-SCNC: 28.9 MMOL/L (ref 22–29)
CREAT SERPL-MCNC: 1.48 MG/DL (ref 0.76–1.27)
EOSINOPHIL # BLD AUTO: 0.12 10*3/MM3 (ref 0–0.4)
EOSINOPHIL NFR BLD AUTO: 1.6 % (ref 0.3–6.2)
ERYTHROCYTE [DISTWIDTH] IN BLOOD BY AUTOMATED COUNT: 12.8 % (ref 12.3–15.4)
GLOBULIN SER CALC-MCNC: 3.7 GM/DL
GLUCOSE SERPL-MCNC: 104 MG/DL (ref 65–99)
HCT VFR BLD AUTO: 37.8 % (ref 37.5–51)
HDLC SERPL-MCNC: 47 MG/DL (ref 40–60)
HGB BLD-MCNC: 12.7 G/DL (ref 13–17.7)
IMM GRANULOCYTES # BLD AUTO: 0.04 10*3/MM3 (ref 0–0.05)
IMM GRANULOCYTES NFR BLD AUTO: 0.5 % (ref 0–0.5)
LDLC SERPL CALC-MCNC: 118 MG/DL (ref 0–100)
LDLC/HDLC SERPL: 2.51 {RATIO}
LYMPHOCYTES # BLD AUTO: 2.01 10*3/MM3 (ref 0.7–3.1)
LYMPHOCYTES NFR BLD AUTO: 27.1 % (ref 19.6–45.3)
MCH RBC QN AUTO: 31.4 PG (ref 26.6–33)
MCHC RBC AUTO-ENTMCNC: 33.6 G/DL (ref 31.5–35.7)
MCV RBC AUTO: 93.6 FL (ref 79–97)
MONOCYTES # BLD AUTO: 0.74 10*3/MM3 (ref 0.1–0.9)
MONOCYTES NFR BLD AUTO: 10 % (ref 5–12)
NEUTROPHILS # BLD AUTO: 4.43 10*3/MM3 (ref 1.7–7)
NEUTROPHILS NFR BLD AUTO: 59.6 % (ref 42.7–76)
NRBC BLD AUTO-RTO: 0 /100 WBC (ref 0–0.2)
PLATELET # BLD AUTO: 177 10*3/MM3 (ref 140–450)
POTASSIUM SERPL-SCNC: 3.8 MMOL/L (ref 3.5–5.2)
PROT SERPL-MCNC: 7.5 G/DL (ref 6–8.5)
RBC # BLD AUTO: 4.04 10*6/MM3 (ref 4.14–5.8)
SODIUM SERPL-SCNC: 138 MMOL/L (ref 136–145)
T4 FREE SERPL-MCNC: 1.25 NG/DL (ref 0.93–1.7)
TRIGL SERPL-MCNC: 126 MG/DL (ref 0–150)
TSH SERPL DL<=0.005 MIU/L-ACNC: 1.84 UIU/ML (ref 0.27–4.2)
VLDLC SERPL CALC-MCNC: 25.2 MG/DL
WBC # BLD AUTO: 7.43 10*3/MM3 (ref 3.4–10.8)

## 2020-06-05 RX ORDER — FAMOTIDINE 20 MG/1
TABLET, FILM COATED ORAL
Qty: 90 TABLET | Refills: 0 | Status: SHIPPED | OUTPATIENT
Start: 2020-06-05

## 2020-09-30 ENCOUNTER — TELEPHONE (OUTPATIENT)
Dept: FAMILY MEDICINE CLINIC | Facility: CLINIC | Age: 85
End: 2020-09-30

## 2020-09-30 NOTE — TELEPHONE ENCOUNTER
Patient daughter called to schedule flu shot for pt. Attempted warm transfer unsuccessful. Please call back and advise at [phone].

## 2020-10-22 ENCOUNTER — FLU SHOT (OUTPATIENT)
Dept: FAMILY MEDICINE CLINIC | Facility: CLINIC | Age: 85
End: 2020-10-22

## 2020-10-22 DIAGNOSIS — Z23 NEED FOR INFLUENZA VACCINATION: ICD-10-CM

## 2020-10-22 PROCEDURE — G0008 ADMIN INFLUENZA VIRUS VAC: HCPCS | Performed by: INTERNAL MEDICINE

## 2020-10-22 PROCEDURE — 90694 VACC AIIV4 NO PRSRV 0.5ML IM: CPT | Performed by: INTERNAL MEDICINE

## 2021-02-19 ENCOUNTER — TELEPHONE (OUTPATIENT)
Dept: FAMILY MEDICINE CLINIC | Facility: CLINIC | Age: 86
End: 2021-02-19

## 2021-06-03 DIAGNOSIS — Z12.5 ENCOUNTER FOR PROSTATE CANCER SCREENING: ICD-10-CM

## 2021-06-03 DIAGNOSIS — E78.5 HYPERLIPIDEMIA, UNSPECIFIED HYPERLIPIDEMIA TYPE: Primary | ICD-10-CM

## 2021-06-08 LAB
ALBUMIN SERPL-MCNC: 3.9 G/DL (ref 3.5–5.2)
ALBUMIN/GLOB SERPL: 1.2 G/DL
ALP SERPL-CCNC: 103 U/L (ref 39–117)
ALT SERPL-CCNC: 11 U/L (ref 1–41)
APPEARANCE UR: CLEAR
AST SERPL-CCNC: 12 U/L (ref 1–40)
BACTERIA #/AREA URNS HPF: NORMAL /HPF
BASOPHILS # BLD AUTO: 0.1 10*3/MM3 (ref 0–0.2)
BASOPHILS NFR BLD AUTO: 1.4 % (ref 0–1.5)
BILIRUB SERPL-MCNC: 0.4 MG/DL (ref 0–1.2)
BILIRUB UR QL STRIP: NEGATIVE
BUN SERPL-MCNC: 22 MG/DL (ref 8–23)
BUN/CREAT SERPL: 17.3 (ref 7–25)
CALCIUM SERPL-MCNC: 9.1 MG/DL (ref 8.2–9.6)
CASTS URNS MICRO: NORMAL
CHLORIDE SERPL-SCNC: 107 MMOL/L (ref 98–107)
CHOLEST SERPL-MCNC: 168 MG/DL (ref 0–200)
CO2 SERPL-SCNC: 27.5 MMOL/L (ref 22–29)
COLOR UR: YELLOW
CREAT SERPL-MCNC: 1.27 MG/DL (ref 0.76–1.27)
EOSINOPHIL # BLD AUTO: 0.14 10*3/MM3 (ref 0–0.4)
EOSINOPHIL NFR BLD AUTO: 2 % (ref 0.3–6.2)
EPI CELLS #/AREA URNS HPF: NORMAL /HPF
ERYTHROCYTE [DISTWIDTH] IN BLOOD BY AUTOMATED COUNT: 12.7 % (ref 12.3–15.4)
GLOBULIN SER CALC-MCNC: 3.3 GM/DL
GLUCOSE SERPL-MCNC: 101 MG/DL (ref 65–99)
GLUCOSE UR QL: NEGATIVE
HCT VFR BLD AUTO: 38 % (ref 37.5–51)
HDLC SERPL-MCNC: 51 MG/DL (ref 40–60)
HGB BLD-MCNC: 12.9 G/DL (ref 13–17.7)
HGB UR QL STRIP: NEGATIVE
IMM GRANULOCYTES # BLD AUTO: 0.02 10*3/MM3 (ref 0–0.05)
IMM GRANULOCYTES NFR BLD AUTO: 0.3 % (ref 0–0.5)
KETONES UR QL STRIP: NEGATIVE
LDLC SERPL CALC-MCNC: 100 MG/DL (ref 0–100)
LDLC/HDLC SERPL: 1.94 {RATIO}
LEUKOCYTE ESTERASE UR QL STRIP: NEGATIVE
LYMPHOCYTES # BLD AUTO: 2.26 10*3/MM3 (ref 0.7–3.1)
LYMPHOCYTES NFR BLD AUTO: 32 % (ref 19.6–45.3)
MCH RBC QN AUTO: 32.4 PG (ref 26.6–33)
MCHC RBC AUTO-ENTMCNC: 33.9 G/DL (ref 31.5–35.7)
MCV RBC AUTO: 95.5 FL (ref 79–97)
MONOCYTES # BLD AUTO: 0.68 10*3/MM3 (ref 0.1–0.9)
MONOCYTES NFR BLD AUTO: 9.6 % (ref 5–12)
NEUTROPHILS # BLD AUTO: 3.87 10*3/MM3 (ref 1.7–7)
NEUTROPHILS NFR BLD AUTO: 54.7 % (ref 42.7–76)
NITRITE UR QL STRIP: NEGATIVE
NRBC BLD AUTO-RTO: 0 /100 WBC (ref 0–0.2)
PH UR STRIP: 6.5 [PH] (ref 5–8)
PLATELET # BLD AUTO: 188 10*3/MM3 (ref 140–450)
POTASSIUM SERPL-SCNC: 4.2 MMOL/L (ref 3.5–5.2)
PROT SERPL-MCNC: 7.2 G/DL (ref 6–8.5)
PROT UR QL STRIP: NEGATIVE
PSA SERPL-MCNC: 0.38 NG/ML (ref 0–4)
RBC # BLD AUTO: 3.98 10*6/MM3 (ref 4.14–5.8)
RBC #/AREA URNS HPF: NORMAL /HPF
SODIUM SERPL-SCNC: 141 MMOL/L (ref 136–145)
SP GR UR: 1.01 (ref 1–1.03)
TRIGL SERPL-MCNC: 91 MG/DL (ref 0–150)
TSH SERPL DL<=0.005 MIU/L-ACNC: 2.38 UIU/ML (ref 0.27–4.2)
UROBILINOGEN UR STRIP-MCNC: NORMAL MG/DL
VLDLC SERPL CALC-MCNC: 17 MG/DL (ref 5–40)
WBC # BLD AUTO: 7.07 10*3/MM3 (ref 3.4–10.8)
WBC #/AREA URNS HPF: NORMAL /HPF

## 2021-06-15 ENCOUNTER — OFFICE VISIT (OUTPATIENT)
Dept: FAMILY MEDICINE CLINIC | Facility: CLINIC | Age: 86
End: 2021-06-15

## 2021-06-15 VITALS
RESPIRATION RATE: 18 BRPM | DIASTOLIC BLOOD PRESSURE: 78 MMHG | HEIGHT: 67 IN | TEMPERATURE: 97.7 F | BODY MASS INDEX: 23.48 KG/M2 | WEIGHT: 149.6 LBS | HEART RATE: 75 BPM | OXYGEN SATURATION: 97 % | SYSTOLIC BLOOD PRESSURE: 110 MMHG

## 2021-06-15 DIAGNOSIS — E03.9 ACQUIRED HYPOTHYROIDISM: ICD-10-CM

## 2021-06-15 DIAGNOSIS — E78.5 HYPERLIPIDEMIA, UNSPECIFIED HYPERLIPIDEMIA TYPE: Primary | ICD-10-CM

## 2021-06-15 DIAGNOSIS — R73.9 HYPERGLYCEMIA: ICD-10-CM

## 2021-06-15 DIAGNOSIS — D63.1 ANEMIA DUE TO STAGE 3 CHRONIC KIDNEY DISEASE, UNSPECIFIED WHETHER STAGE 3A OR 3B CKD (HCC): ICD-10-CM

## 2021-06-15 DIAGNOSIS — N18.30 ANEMIA DUE TO STAGE 3 CHRONIC KIDNEY DISEASE, UNSPECIFIED WHETHER STAGE 3A OR 3B CKD (HCC): ICD-10-CM

## 2021-06-15 DIAGNOSIS — I10 BENIGN ESSENTIAL HYPERTENSION: ICD-10-CM

## 2021-06-15 DIAGNOSIS — I48.0 PAROXYSMAL ATRIAL FIBRILLATION (HCC): ICD-10-CM

## 2021-06-15 DIAGNOSIS — N18.30 STAGE 3 CHRONIC KIDNEY DISEASE, UNSPECIFIED WHETHER STAGE 3A OR 3B CKD (HCC): ICD-10-CM

## 2021-06-15 PROCEDURE — 99214 OFFICE O/P EST MOD 30 MIN: CPT | Performed by: INTERNAL MEDICINE

## 2021-06-15 RX ORDER — HYDROCHLOROTHIAZIDE 25 MG/1
12.5 TABLET ORAL DAILY
COMMUNITY
End: 2023-02-08 | Stop reason: HOSPADM

## 2021-06-15 RX ORDER — TERAZOSIN 5 MG/1
5 CAPSULE ORAL NIGHTLY
COMMUNITY
End: 2023-02-08 | Stop reason: HOSPADM

## 2021-06-15 NOTE — PROGRESS NOTES
Subjective   Lavelle Soliman is a 89 y.o. male. Patient is here today for follow-up on his hypertension, hyperlipidemia, paroxysmal atrial fibrillation, hypothyroidism, hyperglycemia, chronic kidney disease stage III and borderline anemia.  Patient's generally been stable.  [facility] and [facility] both Shing[facility] immunizations there.  He has no acute complaints today and has had no chest pain, shortness of breath, edema or myalgias  Chief Complaint   Patient presents with   • Hypertension     HYPOTHYROIDISM, HYPERLIPIDEMIA- FOLLOW UP LABS          Vitals:    06/15/21 1357   BP: 110/78   Pulse: 75   Resp: 18   Temp: 97.7 °F (36.5 °C)   SpO2: 97%     Body mass index is 23.43 kg/m².  The following portions of the patient's history were reviewed and updated as appropriate: allergies, current medications, past family history, past medical history, past social history, past surgical history and problem list.    Past Medical History:   Diagnosis Date   • Benign essential hypertension    • Disease of thyroid gland    • Hyperlipidemia    • Paroxysmal atrial fibrillation (CMS/HCC)       Allergies   Allergen Reactions   • Latex Rash      Social History     Socioeconomic History   • Marital status:      Spouse name: Not on file   • Number of children: Not on file   • Years of education: Not on file   • Highest education level: Not on file   Tobacco Use   • Smoking status: Former Smoker   • Smokeless tobacco: Never Used   Substance and Sexual Activity   • Alcohol use: Yes     Alcohol/week: 1.0 standard drinks     Types: 1 Shots of liquor per week     Comment: occ   • Drug use: Defer   • Sexual activity: Defer        Current Outpatient Medications:   •  famotidine (PEPCID) 20 MG tablet, TAKE ONE TABLET BY MOUTH DAILY, Disp: 90 tablet, Rfl: 0  •  finasteride (PROSCAR) 5 MG tablet, Take 1 tablet by mouth Daily., Disp: 30 tablet, Rfl: 0  •  hydroCHLOROthiazide (HYDRODIURIL) 25 MG tablet, Take 12.5 mg  by mouth Daily., Disp: , Rfl:   •  levothyroxine (SYNTHROID, LEVOTHROID) 88 MCG tablet, Take 1 tablet by mouth daily., Disp: , Rfl:   •  terazosin (HYTRIN) 5 MG capsule, terazosin (as terazosin hydrochloride) 5 MG Oral Capsule         Active, Disp: , Rfl:      Objective     History of Present Illness     Review of Systems   Constitutional: Negative.    HENT: Negative.    Respiratory: Negative.    Cardiovascular: Negative.    Gastrointestinal: Negative.    Genitourinary: Negative.    Musculoskeletal: Negative.    Skin: Negative.    Neurological: Negative.    Hematological: Negative.    Psychiatric/Behavioral: Negative.        Physical Exam  Vitals and nursing note reviewed.   Constitutional:       General: He is not in acute distress.     Appearance: Normal appearance. He is normal weight. He is not ill-appearing.   HENT:      Head: Normocephalic and atraumatic.   Eyes:      General: No scleral icterus.     Conjunctiva/sclera: Conjunctivae normal.   Cardiovascular:      Rate and Rhythm: Normal rate and regular rhythm.      Heart sounds: Normal heart sounds.   Pulmonary:      Effort: Pulmonary effort is normal. No respiratory distress.      Breath sounds: Normal breath sounds. No wheezing or rales.   Musculoskeletal:         General: Normal range of motion.      Cervical back: Normal range of motion and neck supple.   Skin:     General: Skin is warm and dry.   Neurological:      General: No focal deficit present.      Mental Status: He is alert and oriented to person, place, and time.   Psychiatric:         Mood and Affect: Mood normal.         Behavior: Behavior normal.         ASSESSMENT CBC had a minimally low RBC count of 3.98, hemoglobin 12.9 and normal hematocrit of 38.0 with normal indices.  CMP had a sugar of 101 and a normal creatinine of 1.27 and was otherwise normal.  TSH was quite normal at 2.3.  Lipid panel had a total cholesterol 168, HDL 51 .  Urinalysis was clear.  PSA was low at  0.381  #1-hypertension controlled  #2-hyperlipidemia, diet controlled  #3-paroxysmal atrial fibrillation, regular rhythm today  #4-hypothyroidism, euthyroid on replacement  #5-hyperglycemia, mild and asymptomatic and stable, diet controlled  #6-chronic kidney disease stage III with normal creatinine today  #7-borderline anemia, stable and asymptomatic     Problems Addressed this Visit        Cardiac and Vasculature    Benign essential hypertension    Relevant Medications    terazosin (HYTRIN) 5 MG capsule    hydroCHLOROthiazide (HYDRODIURIL) 25 MG tablet    Hyperlipidemia - Primary    Atrial fibrillation (CMS/[facility])       Endocrine and Metabolic    Hypothyroidism    Hyperglycemia       Genitourinary and Reproductive     Chronic kidney disease, stage III (moderate) (CMS/[facility])    Relevant Medications    hydroCHLOROthiazide (HYDRODIURIL) 25 MG tablet       Hematology and Neoplasia    Anemia      Diagnoses       Codes Comments    Hyperlipidemia, unspecified hyperlipidemia type    -  Primary ICD-10-CM: E78.5  ICD-9-CM: 272.4     Benign essential hypertension     ICD-10-CM: I10  ICD-9-CM: 401.1     Paroxysmal atrial fibrillation (CMS/[facility])     ICD-10-CM: I48.0  ICD-9-CM: 427.31     Acquired hypothyroidism     ICD-10-CM: E03.9  ICD-9-CM: 244.9     Hyperglycemia     ICD-10-CM: R73.9  ICD-9-CM: 790.29     Stage 3 chronic kidney disease, unspecified whether stage 3a or 3b CKD (CMS/[facility])     ICD-10-CM: N18.30  ICD-9-CM: 585.3     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  Return in about 6 months (around 12/15/2021) for with labs.

## 2021-11-30 DIAGNOSIS — R73.9 HYPERGLYCEMIA: ICD-10-CM

## 2021-11-30 DIAGNOSIS — E03.9 ACQUIRED HYPOTHYROIDISM: ICD-10-CM

## 2021-12-02 LAB
ALBUMIN SERPL-MCNC: 3.7 G/DL (ref 3.5–4.6)
ALBUMIN/GLOB SERPL: 1.1 {RATIO} (ref 1.2–2.2)
ALP SERPL-CCNC: 96 IU/L (ref 44–121)
ALT SERPL-CCNC: 15 IU/L (ref 0–44)
AST SERPL-CCNC: 22 IU/L (ref 0–40)
BASOPHILS # BLD AUTO: 0.1 X10E3/UL (ref 0–0.2)
BASOPHILS NFR BLD AUTO: 1 %
BILIRUB SERPL-MCNC: 0.4 MG/DL (ref 0–1.2)
BUN SERPL-MCNC: 23 MG/DL (ref 10–36)
BUN/CREAT SERPL: 18 (ref 10–24)
CALCIUM SERPL-MCNC: 8.8 MG/DL (ref 8.6–10.2)
CHLORIDE SERPL-SCNC: 105 MMOL/L (ref 96–106)
CO2 SERPL-SCNC: 23 MMOL/L (ref 20–29)
CREAT SERPL-MCNC: 1.31 MG/DL (ref 0.76–1.27)
EOSINOPHIL # BLD AUTO: 0.2 X10E3/UL (ref 0–0.4)
EOSINOPHIL NFR BLD AUTO: 2 %
ERYTHROCYTE [DISTWIDTH] IN BLOOD BY AUTOMATED COUNT: 12.8 % (ref 11.6–15.4)
GLOBULIN SER CALC-MCNC: 3.4 G/DL (ref 1.5–4.5)
GLUCOSE SERPL-MCNC: 98 MG/DL (ref 65–99)
HBA1C MFR BLD: 5.5 % (ref 4.8–5.6)
HCT VFR BLD AUTO: 39.2 % (ref 37.5–51)
HGB BLD-MCNC: 12.9 G/DL (ref 13–17.7)
IMM GRANULOCYTES # BLD AUTO: 0 X10E3/UL (ref 0–0.1)
IMM GRANULOCYTES NFR BLD AUTO: 0 %
LYMPHOCYTES # BLD AUTO: 2.1 X10E3/UL (ref 0.7–3.1)
LYMPHOCYTES NFR BLD AUTO: 31 %
MCH RBC QN AUTO: 32 PG (ref 26.6–33)
MCHC RBC AUTO-ENTMCNC: 32.9 G/DL (ref 31.5–35.7)
MCV RBC AUTO: 97 FL (ref 79–97)
MONOCYTES # BLD AUTO: 0.7 X10E3/UL (ref 0.1–0.9)
MONOCYTES NFR BLD AUTO: 10 %
NEUTROPHILS # BLD AUTO: 3.8 X10E3/UL (ref 1.4–7)
NEUTROPHILS NFR BLD AUTO: 56 %
PLATELET # BLD AUTO: 183 X10E3/UL (ref 150–450)
POTASSIUM SERPL-SCNC: 4.7 MMOL/L (ref 3.5–5.2)
PROT SERPL-MCNC: 7.1 G/DL (ref 6–8.5)
RBC # BLD AUTO: 4.03 X10E6/UL (ref 4.14–5.8)
SODIUM SERPL-SCNC: 140 MMOL/L (ref 134–144)
T4 FREE SERPL-MCNC: 1.17 NG/DL (ref 0.82–1.77)
TSH SERPL DL<=0.005 MIU/L-ACNC: 1.98 UIU/ML (ref 0.45–4.5)
WBC # BLD AUTO: 6.8 X10E3/UL (ref 3.4–10.8)

## 2021-12-08 ENCOUNTER — OFFICE VISIT (OUTPATIENT)
Dept: FAMILY MEDICINE CLINIC | Facility: CLINIC | Age: 86
End: 2021-12-08

## 2021-12-08 VITALS
DIASTOLIC BLOOD PRESSURE: 70 MMHG | TEMPERATURE: 97.3 F | OXYGEN SATURATION: 95 % | HEIGHT: 67 IN | HEART RATE: 70 BPM | WEIGHT: 154.2 LBS | BODY MASS INDEX: 24.2 KG/M2 | SYSTOLIC BLOOD PRESSURE: 108 MMHG | RESPIRATION RATE: 18 BRPM

## 2021-12-08 DIAGNOSIS — E03.9 ACQUIRED HYPOTHYROIDISM: ICD-10-CM

## 2021-12-08 DIAGNOSIS — R73.9 HYPERGLYCEMIA: ICD-10-CM

## 2021-12-08 DIAGNOSIS — I10 BENIGN ESSENTIAL HYPERTENSION: ICD-10-CM

## 2021-12-08 DIAGNOSIS — N18.30 ANEMIA DUE TO STAGE 3 CHRONIC KIDNEY DISEASE, UNSPECIFIED WHETHER STAGE 3A OR 3B CKD (HCC): ICD-10-CM

## 2021-12-08 DIAGNOSIS — D63.1 ANEMIA DUE TO STAGE 3 CHRONIC KIDNEY DISEASE, UNSPECIFIED WHETHER STAGE 3A OR 3B CKD (HCC): ICD-10-CM

## 2021-12-08 DIAGNOSIS — E78.5 HYPERLIPIDEMIA, UNSPECIFIED HYPERLIPIDEMIA TYPE: Primary | ICD-10-CM

## 2021-12-08 DIAGNOSIS — N18.30 STAGE 3 CHRONIC KIDNEY DISEASE, UNSPECIFIED WHETHER STAGE 3A OR 3B CKD (HCC): ICD-10-CM

## 2021-12-08 PROCEDURE — 1170F FXNL STATUS ASSESSED: CPT | Performed by: INTERNAL MEDICINE

## 2021-12-08 PROCEDURE — G0439 PPPS, SUBSEQ VISIT: HCPCS | Performed by: INTERNAL MEDICINE

## 2021-12-08 PROCEDURE — 1125F AMNT PAIN NOTED PAIN PRSNT: CPT | Performed by: INTERNAL MEDICINE

## 2021-12-08 PROCEDURE — 1160F RVW MEDS BY RX/DR IN RCRD: CPT | Performed by: INTERNAL MEDICINE

## 2021-12-08 NOTE — PROGRESS NOTES
The ABCs of the Annual Wellness Visit  Subsequent Medicare Wellness Visit    Chief Complaint   Patient presents with   • Hypertension     HYPERLIPIDEMIA, HYPERGLYCEMIA, HYPOTHYROIDISM- F/U LABS      Subjective    History of Present Illness:  Lavelle Soliman is a 89 y.o. male who presents for a Subsequent Medicare Wellness Visit.  He is also here for follow-up on his hypertension, hyperlipidemia, hypothyroidism, hyperglycemia, chronic kidney disease stage III and borderline anemia.  He is generally been stable and has no acute complaints.  He does have macular degeneration and receives eye injections.    The following portions of the patient's history were reviewed and   updated as appropriate: allergies, current medications, past family history, past medical history, past social history, past surgical history and problem list.    Compared to one year ago, the patient feels his physical   health is the same.    Compared to one year ago, the patient feels his mental   health is the same.    Recent Hospitalizations:  He was not admitted to the hospital during the last year.       Current Medical Providers:  Patient Care Team:  Rafita Colunga MD as PCP - General    Outpatient Medications Prior to Visit   Medication Sig Dispense Refill   • famotidine (PEPCID) 20 MG tablet TAKE ONE TABLET BY MOUTH DAILY 90 tablet 0   • finasteride (PROSCAR) 5 MG tablet Take 1 tablet by mouth Daily. 30 tablet 0   • hydroCHLOROthiazide (HYDRODIURIL) 25 MG tablet Take 12.5 mg by mouth Daily.     • levothyroxine (SYNTHROID, LEVOTHROID) 88 MCG tablet Take 1 tablet by mouth daily.     • terazosin (HYTRIN) 5 MG capsule terazosin (as terazosin hydrochloride) 5 MG Oral Capsule         Active       No facility-administered medications prior to visit.       No opioid medication identified on active medication list. I have reviewed chart for other potential  high risk medication/s and harmful drug interactions in the elderly.          Aspirin is  "not on active medication list.  Aspirin use is not indicated based on review of current medical condition/s. Risk of harm outweighs potential benefits.  .    Patient Active Problem List   Diagnosis   • Benign essential hypertension   • Chronic kidney disease, stage III (moderate) (HCC)   • Hyperlipidemia   • Hypothyroidism   • Muscle cramps   • Anemia   • Hyperglycemia   • Urinary retention   • UTI (urinary tract infection), bacterial   • HTN (hypertension)   • Atrial fibrillation (HCC)   • BPH with obstruction/lower urinary tract symptoms     Advance Care Planning  Advance Directive is on file.  ACP discussion was held with the patient during this visit. Patient has an advance directive in EMR which is still valid.     Review of Systems   Constitutional: Negative.    HENT: Negative.    Respiratory: Negative.    Cardiovascular: Negative.    Gastrointestinal: Negative.    Genitourinary: Negative.    Musculoskeletal: Negative.    Skin: Negative.    Neurological: Negative.    Hematological: Negative.    Psychiatric/Behavioral: Negative.         Objective    Vitals:    12/08/21 1001   BP: 108/70   Pulse: 70   Resp: 18   Temp: 97.3 °F (36.3 °C)   TempSrc: Oral   SpO2: 95%   Weight: 69.9 kg (154 lb 3.2 oz)   Height: 170.2 cm (67.01\")     BMI Readings from Last 1 Encounters:   12/08/21 24.15 kg/m²   BMI is within normal parameters. No follow-up required.    Does the patient have evidence of cognitive impairment? No    Physical Exam  Vitals and nursing note reviewed.   Constitutional:       General: He is not in acute distress.     Appearance: Normal appearance. He is not ill-appearing.   HENT:      Head: Normocephalic and atraumatic.   Cardiovascular:      Rate and Rhythm: Normal rate and regular rhythm.      Heart sounds: Normal heart sounds.   Pulmonary:      Effort: Pulmonary effort is normal. No respiratory distress.      Breath sounds: Normal breath sounds. No wheezing or rales.   Musculoskeletal:         General: " Normal range of motion.      Cervical back: Normal range of motion and neck supple.      Right lower leg: No edema.      Left lower leg: No edema.   Skin:     General: Skin is warm and dry.   Neurological:      General: No focal deficit present.      Mental Status: He is alert and oriented to person, place, and time.   Psychiatric:         Mood and Affect: Mood normal.         Behavior: Behavior normal.       Lab Results   Component Value Date    HGBA1C 5.5 12/01/2021            HEALTH RISK ASSESSMENT    Smoking Status:  Social History     Tobacco Use   Smoking Status Former Smoker   Smokeless Tobacco Never Used     Alcohol Consumption:  Social History     Substance and Sexual Activity   Alcohol Use Yes   • Alcohol/week: 1.0 standard drink   • Types: 1 Shots of liquor per week    Comment: occ     Fall Risk Screen:    STEADI Fall Risk Assessment was completed, and patient is at LOW risk for falls.Assessment completed on:12/8/2021    Depression Screening:  PHQ-2/PHQ-9 Depression Screening 12/8/2021   Little interest or pleasure in doing things 0   Feeling down, depressed, or hopeless 0   Total Score 0       Health Habits and Functional and Cognitive Screening:  Functional & Cognitive Status 12/8/2021   Do you have difficulty preparing food and eating? Yes   Do you have difficulty bathing yourself, getting dressed or grooming yourself? No   Do you have difficulty using the toilet? No   Do you have difficulty moving around from place to place? No   Do you have trouble with steps or getting out of a bed or a chair? No   Current Diet Well Balanced Diet   Dental Exam Up to date   Eye Exam Up to date   Exercise (times per week) 4 times per week   Current Exercises Include Other   Do you need help using the phone?  No   Are you deaf or do you have serious difficulty hearing?  Yes   Do you need help with transportation? No   Do you need help shopping? No   Do you need help preparing meals?  No   Do you need help with  housework?  No   Do you need help with laundry? No   Do you need help taking your medications? No   Do you need help managing money? No   Do you ever drive or ride in a car without wearing a seat belt? No   Have you felt unusual stress, anger or loneliness in the last month? No   Who do you live with? Child   If you need help, do you have trouble finding someone available to you? No   Have you been bothered in the last four weeks by sexual problems? No   Do you have difficulty concentrating, remembering or making decisions? Yes       Age-appropriate Screening Schedule:  Refer to the list below for future screening recommendations based on patient's age, sex and/or medical conditions. Orders for these recommended tests are listed in the plan section. The patient has been provided with a written plan.    Health Maintenance   Topic Date Due   • LIPID PANEL  06/08/2022   • TDAP/TD VACCINES (2 - Td or Tdap) 07/19/2023   • INFLUENZA VACCINE  Completed   • ZOSTER VACCINE  Discontinued              Assessment/Plan CBC is stable with a minimally low RBC count of 4.03, hemoglobin 12.9 and hematocrit normal at 39.2 with normal indices.  CMP has a normal sugar of 98, creatinine 1.31 and other values normal and hemoglobin A1c is again normal at 5.5.  TSH is normal at 1.98 and free T4 is normal at 1.17.  #1-hypertension controlled on medication  #2-hyperlipidemia, asymptomatic  #3-hypothyroidism, euthyroid on replacement  #4-hyperglycemia with normal sugar and hemoglobin A1c today  #5-chronic kidney disease stage III, stable and asymptomatic  #6-mild borderline anemia, stable and asymptomatic    CMS Preventative Services Quick Reference  Risk Factors Identified During Encounter  Immunizations Discussed/Encouraged (specific Immunizations; Shingrix  The above risks/problems have been discussed with the patient.  Follow up actions/plans if indicated are seen below in the Assessment/Plan Section.  Pertinent information has been  shared with the patient in the After Visit Summary.    There are no diagnoses linked to this encounter.    Follow Up: I recommended the patient check on the shingles immunizations and see if [facility] and if not to try and get them.  He will continue current medicines as now and I plan on rechecking him in 6 months with a CBC, CMP, lipid panel, TSH and free T4 and hemoglobin A1c    No follow-ups on file.     An After Visit Summary and PPPS were made available to the patient.

## 2022-06-02 DIAGNOSIS — E78.5 HYPERLIPIDEMIA, UNSPECIFIED HYPERLIPIDEMIA TYPE: Primary | ICD-10-CM

## 2022-06-02 DIAGNOSIS — R73.9 HYPERGLYCEMIA: ICD-10-CM

## 2022-06-02 DIAGNOSIS — E03.9 ACQUIRED HYPOTHYROIDISM: ICD-10-CM

## 2022-06-14 ENCOUNTER — OFFICE VISIT (OUTPATIENT)
Dept: FAMILY MEDICINE CLINIC | Facility: CLINIC | Age: 87
End: 2022-06-14

## 2022-06-14 VITALS
SYSTOLIC BLOOD PRESSURE: 110 MMHG | RESPIRATION RATE: 18 BRPM | WEIGHT: 150.6 LBS | OXYGEN SATURATION: 97 % | TEMPERATURE: 97.7 F | HEART RATE: 90 BPM | HEIGHT: 67 IN | DIASTOLIC BLOOD PRESSURE: 72 MMHG | BODY MASS INDEX: 23.64 KG/M2

## 2022-06-14 DIAGNOSIS — N18.30 STAGE 3 CHRONIC KIDNEY DISEASE, UNSPECIFIED WHETHER STAGE 3A OR 3B CKD: ICD-10-CM

## 2022-06-14 DIAGNOSIS — R73.9 HYPERGLYCEMIA: ICD-10-CM

## 2022-06-14 DIAGNOSIS — D63.1 ANEMIA DUE TO STAGE 3 CHRONIC KIDNEY DISEASE, UNSPECIFIED WHETHER STAGE 3A OR 3B CKD: ICD-10-CM

## 2022-06-14 DIAGNOSIS — I10 BENIGN ESSENTIAL HYPERTENSION: Primary | ICD-10-CM

## 2022-06-14 DIAGNOSIS — E78.5 HYPERLIPIDEMIA, UNSPECIFIED HYPERLIPIDEMIA TYPE: ICD-10-CM

## 2022-06-14 DIAGNOSIS — N18.30 ANEMIA DUE TO STAGE 3 CHRONIC KIDNEY DISEASE, UNSPECIFIED WHETHER STAGE 3A OR 3B CKD: ICD-10-CM

## 2022-06-14 DIAGNOSIS — I48.0 PAROXYSMAL ATRIAL FIBRILLATION: ICD-10-CM

## 2022-06-14 DIAGNOSIS — E03.9 ACQUIRED HYPOTHYROIDISM: ICD-10-CM

## 2022-06-14 PROCEDURE — 99214 OFFICE O/P EST MOD 30 MIN: CPT | Performed by: INTERNAL MEDICINE

## 2022-06-14 NOTE — PROGRESS NOTES
Subjective   Lavelle Soliman is a 89 y.o. male. Patient is here today for follow-up on his paroxysmal atrial fibrillation, hypertension, hyperlipidemia, hyperglycemia, hypothyroidism and BPH.  He also has some mild chronic kidney disease and borderline anemia.  He is generally been feeling well and has no acute complaints and has not had any chest pains or shortness of breath.  [facility]  Chief Complaint   Patient presents with   • Hypertension     HYPERLIPIDEMIA, HYPOTHYROIDISM- F/U LABS          Vitals:    22 1013   BP: 110/72   Pulse: 90   Resp: 18   Temp: 97.7 °F (36.5 °C)   SpO2: 97%     Body mass index is 23.58 kg/m².  The following portions of the patient's history were reviewed and updated as appropriate: allergies, current medications, past family history, past medical history, past social history, past surgical history and problem list.    Past Medical History:   Diagnosis Date   • Benign essential hypertension    • Cataract    • Disease of thyroid gland    • HL (hearing loss)    • Hyperlipidemia    • Hypothyroidism    • Paroxysmal atrial fibrillation (HCC)    • Visual impairment S      Allergies   Allergen Reactions   • Latex Rash      Social History     Socioeconomic History   • Marital status:    Tobacco Use   • Smoking status: Former Smoker     Years: 15.00     Types: Pipe     Start date: 1955     Quit date: 1970     Years since quittin.4   • Smokeless tobacco: Never Used   Vaping Use   • Vaping Use: Never used   Substance and Sexual Activity   • Alcohol use: Yes     Alcohol/week: 1.0 standard drink     Types: 1 Shots of liquor per week     Comment: occ   • Drug use: Never   • Sexual activity: Not Currently     Partners: Female     Birth control/protection: None        Current Outpatient Medications:   •  famotidine (PEPCID) 20 MG tablet, TAKE ONE TABLET BY MOUTH DAILY, Disp: 90 tablet, Rfl: 0  •  finasteride (PROSCAR) 5 MG tablet, Take 1 tablet by  mouth Daily., Disp: 30 tablet, Rfl: 0  •  hydroCHLOROthiazide (HYDRODIURIL) 25 MG tablet, Take 12.5 mg by mouth Daily., Disp: , Rfl:   •  levothyroxine (SYNTHROID, LEVOTHROID) 88 MCG tablet, Take 1 tablet by mouth daily., Disp: , Rfl:   •  terazosin (HYTRIN) 5 MG capsule, terazosin (as terazosin hydrochloride) 5 MG Oral Capsule         Active, Disp: , Rfl:      Objective     History of Present Illness     Review of Systems   All other systems reviewed and are negative.      Physical Exam  Vitals and nursing note reviewed.   Constitutional:       General: He is not in acute distress.     Appearance: Normal appearance. He is not ill-appearing.   HENT:      Head: Normocephalic and atraumatic.   Cardiovascular:      Rate and Rhythm: Normal rate and regular rhythm.      Heart sounds: Normal heart sounds.   Pulmonary:      Effort: Pulmonary effort is normal. No respiratory distress.      Breath sounds: Normal breath sounds. No wheezing or rales.   Musculoskeletal:         General: Normal range of motion.      Cervical back: Normal range of motion and neck supple.   Skin:     General: Skin is warm and dry.   Neurological:      General: No focal deficit present.      Mental Status: He is alert and oriented to person, place, and time.   Psychiatric:         Mood and Affect: Mood normal.         Behavior: Behavior normal.         ASSESSMENT CBC is stable with an RBC count of 3.93, hemoglobin 12.4 and normal hematocrit of 38.1.  CMP has a minimally elevated creatinine of 1.3, calcium 8.4 and otherwise is normal.  Lipid panel has total cholesterol 183, HDL 47,  that is overall stable.  Hemoglobin A1c is minimally elevated at 5.8 but stable.  TSH and free T4 both normal on supplement  #1-history of atrial fibrillation, regular rhythm today  #2-hypertension controlled on medication  #3-hyperlipidemia, stable acceptable on diet controlled  #4-history of hyperglycemia with normal sugar today and acceptable hemoglobin A1c,  diet controlled  #5-hypothyroidism, euthyroid on replacement  #6-chronic kidney disease stage III, asymptomatic and stable  #7-borderline anemia, stable       Problems Addressed this Visit        Cardiac and Vasculature    Benign essential hypertension - Primary    Hyperlipidemia    Atrial fibrillation (HCC)       Endocrine and Metabolic    Hypothyroidism    Hyperglycemia       Genitourinary and Reproductive     Chronic kidney disease, stage III (moderate) (HCC)       Hematology and Neoplasia    Anemia      Diagnoses       Codes Comments    Benign essential hypertension    -  Primary ICD-10-CM: I10  ICD-9-CM: 401.1     Paroxysmal atrial fibrillation (HCC)     ICD-10-CM: I48.0  ICD-9-CM: 427.31     Hyperlipidemia, unspecified hyperlipidemia type     ICD-10-CM: E78.5  ICD-9-CM: 272.4     Hyperglycemia     ICD-10-CM: R73.9  ICD-9-CM: 790.29     Acquired hypothyroidism     ICD-10-CM: E03.9  ICD-9-CM: 244.9     Stage 3 chronic kidney disease, unspecified whether stage 3a or 3b CKD (HCC)     ICD-10-CM: N18.30  ICD-9-CM: 585.3     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  I plan on rechecking him in 6 months with laboratory studies    There are no Patient Instructions on file for this visit.  Return in about 6 months (around 12/14/2022) for with labs.

## 2022-10-07 ENCOUNTER — FLU SHOT (OUTPATIENT)
Dept: FAMILY MEDICINE CLINIC | Facility: CLINIC | Age: 87
End: 2022-10-07

## 2022-10-07 DIAGNOSIS — Z23 ENCOUNTER FOR VACCINATION: Primary | ICD-10-CM

## 2022-10-07 PROCEDURE — 90662 IIV NO PRSV INCREASED AG IM: CPT | Performed by: INTERNAL MEDICINE

## 2022-10-07 PROCEDURE — G0008 ADMIN INFLUENZA VIRUS VAC: HCPCS | Performed by: INTERNAL MEDICINE

## 2022-11-23 LAB
ALBUMIN SERPL-MCNC: 3.6 G/DL (ref 3.5–4.6)
ALBUMIN/GLOB SERPL: 1 {RATIO} (ref 1.2–2.2)
ALP SERPL-CCNC: 100 IU/L (ref 44–121)
ALT SERPL-CCNC: 8 IU/L (ref 0–44)
AST SERPL-CCNC: 14 IU/L (ref 0–40)
BASOPHILS # BLD AUTO: 0.1 X10E3/UL (ref 0–0.2)
BASOPHILS NFR BLD AUTO: 1 %
BILIRUB SERPL-MCNC: 0.3 MG/DL (ref 0–1.2)
BUN SERPL-MCNC: 21 MG/DL (ref 10–36)
BUN/CREAT SERPL: 16 (ref 10–24)
CALCIUM SERPL-MCNC: 8.5 MG/DL (ref 8.6–10.2)
CHLORIDE SERPL-SCNC: 103 MMOL/L (ref 96–106)
CHOLEST SERPL-MCNC: 168 MG/DL (ref 100–199)
CO2 SERPL-SCNC: 25 MMOL/L (ref 20–29)
CREAT SERPL-MCNC: 1.35 MG/DL (ref 0.76–1.27)
EGFRCR SERPLBLD CKD-EPI 2021: 48 ML/MIN/1.73
EOSINOPHIL # BLD AUTO: 0.1 X10E3/UL (ref 0–0.4)
EOSINOPHIL NFR BLD AUTO: 2 %
ERYTHROCYTE [DISTWIDTH] IN BLOOD BY AUTOMATED COUNT: 12.5 % (ref 11.6–15.4)
GLOBULIN SER CALC-MCNC: 3.5 G/DL (ref 1.5–4.5)
GLUCOSE SERPL-MCNC: 106 MG/DL (ref 70–99)
HBA1C MFR BLD: 5.7 % (ref 4.8–5.6)
HCT VFR BLD AUTO: 36.8 % (ref 37.5–51)
HDLC SERPL-MCNC: 45 MG/DL
HGB BLD-MCNC: 12.5 G/DL (ref 13–17.7)
IMM GRANULOCYTES # BLD AUTO: 0 X10E3/UL (ref 0–0.1)
IMM GRANULOCYTES NFR BLD AUTO: 0 %
LDLC SERPL CALC-MCNC: 101 MG/DL (ref 0–99)
LDLC/HDLC SERPL: 2.2 RATIO (ref 0–3.6)
LYMPHOCYTES # BLD AUTO: 1.9 X10E3/UL (ref 0.7–3.1)
LYMPHOCYTES NFR BLD AUTO: 24 %
MCH RBC QN AUTO: 31.7 PG (ref 26.6–33)
MCHC RBC AUTO-ENTMCNC: 34 G/DL (ref 31.5–35.7)
MCV RBC AUTO: 93 FL (ref 79–97)
MONOCYTES # BLD AUTO: 0.7 X10E3/UL (ref 0.1–0.9)
MONOCYTES NFR BLD AUTO: 9 %
NEUTROPHILS # BLD AUTO: 5.1 X10E3/UL (ref 1.4–7)
NEUTROPHILS NFR BLD AUTO: 64 %
PLATELET # BLD AUTO: 199 X10E3/UL (ref 150–450)
POTASSIUM SERPL-SCNC: 3.9 MMOL/L (ref 3.5–5.2)
PROT SERPL-MCNC: 7.1 G/DL (ref 6–8.5)
RBC # BLD AUTO: 3.94 X10E6/UL (ref 4.14–5.8)
SODIUM SERPL-SCNC: 140 MMOL/L (ref 134–144)
T4 FREE SERPL-MCNC: 1.25 NG/DL (ref 0.82–1.77)
TRIGL SERPL-MCNC: 123 MG/DL (ref 0–149)
TSH SERPL DL<=0.005 MIU/L-ACNC: 1.56 UIU/ML (ref 0.45–4.5)
VLDLC SERPL CALC-MCNC: 22 MG/DL (ref 5–40)
WBC # BLD AUTO: 7.9 X10E3/UL (ref 3.4–10.8)

## 2022-11-29 ENCOUNTER — OFFICE VISIT (OUTPATIENT)
Dept: FAMILY MEDICINE CLINIC | Facility: CLINIC | Age: 87
End: 2022-11-29

## 2022-11-29 VITALS
TEMPERATURE: 97.5 F | HEIGHT: 67 IN | OXYGEN SATURATION: 96 % | WEIGHT: 150.2 LBS | SYSTOLIC BLOOD PRESSURE: 124 MMHG | DIASTOLIC BLOOD PRESSURE: 68 MMHG | BODY MASS INDEX: 23.57 KG/M2 | HEART RATE: 74 BPM

## 2022-11-29 DIAGNOSIS — D63.1 ANEMIA DUE TO STAGE 3 CHRONIC KIDNEY DISEASE, UNSPECIFIED WHETHER STAGE 3A OR 3B CKD: ICD-10-CM

## 2022-11-29 DIAGNOSIS — N18.30 ANEMIA DUE TO STAGE 3 CHRONIC KIDNEY DISEASE, UNSPECIFIED WHETHER STAGE 3A OR 3B CKD: ICD-10-CM

## 2022-11-29 DIAGNOSIS — N18.30 STAGE 3 CHRONIC KIDNEY DISEASE, UNSPECIFIED WHETHER STAGE 3A OR 3B CKD: ICD-10-CM

## 2022-11-29 DIAGNOSIS — I48.0 PAROXYSMAL ATRIAL FIBRILLATION: ICD-10-CM

## 2022-11-29 DIAGNOSIS — I10 BENIGN ESSENTIAL HYPERTENSION: Primary | ICD-10-CM

## 2022-11-29 DIAGNOSIS — R73.9 HYPERGLYCEMIA: ICD-10-CM

## 2022-11-29 DIAGNOSIS — E03.9 ACQUIRED HYPOTHYROIDISM: ICD-10-CM

## 2022-11-29 DIAGNOSIS — E78.5 HYPERLIPIDEMIA, UNSPECIFIED HYPERLIPIDEMIA TYPE: ICD-10-CM

## 2022-11-29 PROCEDURE — G0009 ADMIN PNEUMOCOCCAL VACCINE: HCPCS | Performed by: INTERNAL MEDICINE

## 2022-11-29 PROCEDURE — 99214 OFFICE O/P EST MOD 30 MIN: CPT | Performed by: INTERNAL MEDICINE

## 2022-11-29 PROCEDURE — 90677 PCV20 VACCINE IM: CPT | Performed by: INTERNAL MEDICINE

## 2022-11-29 NOTE — PROGRESS NOTES
Subjective   Lavelle Soliman is a 89 y.o. male. Patient is here today for follow-up on his hypertension, hyperlipidemia, history of paroxysmal atrial fibrillation, hypothyroidism, hyperglycemia and chronic kidney disease stage III.  Patient also has some borderline anemia.  He has been stable.  He does complain of some mild swallowing problems and says he has had some exercises before.  [facility] and probably get a speech evaluation and swallowing [facility].    Chief Complaint   Patient presents with   • Hypertension     6 month f/u with labs   • Hyperlipidemia     6 month f/u with labs   • Hypothyroidism     6 month f/u with labs   • Hyperglycemia     6 month f/u with labs          Vitals:    22 1007   BP: 124/68   Pulse: 74   Temp: 97.5 °F (36.4 °C)   SpO2: 96%     Body mass index is 23.52 kg/m².  The following portions of the patient's history were reviewed and updated as appropriate: allergies, current medications, past family history, past medical history, past social history, past surgical history and problem list.    Past Medical History:   Diagnosis Date   • Benign essential hypertension    • Cataract    • Disease of thyroid gland    • HL (hearing loss)    • Hyperlipidemia    • Hypothyroidism    • Paroxysmal atrial fibrillation (HCC)    • Visual impairment S      Allergies   Allergen Reactions   • Latex Rash      Social History     Socioeconomic History   • Marital status:    Tobacco Use   • Smoking status: Former     Types: Pipe     Start date: 1955     Quit date: 1970     Years since quittin.9   • Smokeless tobacco: Never   Vaping Use   • Vaping Use: Never used   Substance and Sexual Activity   • Alcohol use: Yes     Alcohol/week: 1.0 standard drink     Types: 1 Shots of liquor per week     Comment: occ   • Drug use: Never   • Sexual activity: Not Currently     Partners: Female     Birth control/protection: None        Current Outpatient  Medications:   •  famotidine (PEPCID) 20 MG tablet, TAKE ONE TABLET BY MOUTH DAILY, Disp: 90 tablet, Rfl: 0  •  finasteride (PROSCAR) 5 MG tablet, Take 1 tablet by mouth Daily., Disp: 30 tablet, Rfl: 0  •  hydroCHLOROthiazide (HYDRODIURIL) 25 MG tablet, Take 12.5 mg by mouth Daily., Disp: , Rfl:   •  levothyroxine (SYNTHROID, LEVOTHROID) 88 MCG tablet, Take 1 tablet by mouth daily., Disp: , Rfl:   •  terazosin (HYTRIN) 5 MG capsule, terazosin (as terazosin hydrochloride) 5 MG Oral Capsule         Active, Disp: , Rfl:      Objective     History of Present Illness     Review of Systems    Physical Exam  Vitals and nursing note reviewed.   Constitutional:       General: He is not in acute distress.     Appearance: Normal appearance. He is not ill-appearing.   HENT:      Head: Normocephalic and atraumatic.   Cardiovascular:      Rate and Rhythm: Normal rate and regular rhythm.      Heart sounds: Normal heart sounds.   Pulmonary:      Effort: Pulmonary effort is normal. No respiratory distress.      Breath sounds: Normal breath sounds. No wheezing or rales.   Musculoskeletal:         General: Normal range of motion.   Skin:     General: Skin is warm and dry.   Neurological:      General: No focal deficit present.      Mental Status: He is alert and oriented to person, place, and time.   Psychiatric:         Mood and Affect: Mood normal.         Behavior: Behavior normal.         ASSESSMENT CBC is stable with a minimally low RBC count of 3.94, hemoglobin 12.5 hematocrit 36.8.  CMP has a sugar of 106, creatinine 1.35, calcium 8.5 and other values normal.  Hemoglobin A1c was improved at 5.7.  Lipid panel is stable with total cholesterol 168, HDL 45,  and TSH and free T4 were both normal on supplement  #1-hypertension controlled on medication  #2-hyperlipidemia, stable on diet controlled  #3-history of paroxysmal atrial fibrillation with regular rhythm today  #4-hypothyroidism, euthyroid on  replacement  #5-hyperglycemia with stable acceptable hemoglobin A1c, diet controlled  #6-chronic kidney disease stage III, asymptomatic and stable  #7-mild borderline anemia, asymptomatic and stable  #8-complaints of some swallowing difficulties, will [facility]       Problems Addressed this Visit        Cardiac and Vasculature    Benign essential hypertension - Primary    Hyperlipidemia    Paroxysmal atrial fibrillation (HCC)       Endocrine and Metabolic    Hypothyroidism    Hyperglycemia       Genitourinary and Reproductive     Chronic kidney disease, stage III (moderate) (HCC)       Hematology and Neoplasia    Anemia   Diagnoses       Codes Comments    Benign essential hypertension    -  Primary ICD-10-CM: I10  ICD-9-CM: 401.1     Hyperlipidemia, unspecified hyperlipidemia type     ICD-10-CM: E78.5  ICD-9-CM: 272.4     Hyperglycemia     ICD-10-CM: R73.9  ICD-9-CM: 790.29     Acquired hypothyroidism     ICD-10-CM: E03.9  ICD-9-CM: 244.9     Stage 3 chronic kidney disease, unspecified whether stage 3a or 3b CKD (HCC)     ICD-10-CM: N18.30  ICD-9-CM: 585.3     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  He will continue current medicines as now.  [facility].  I plan on rechecking him in 6 months with a CBC, CMP, hemoglobin A1c, lipid panel, TSH and free T4    There are no Patient Instructions on file for this visit.  Return in about 6 months (around 5/29/2023) for with labs.

## 2022-12-28 ENCOUNTER — OFFICE VISIT (OUTPATIENT)
Dept: FAMILY MEDICINE CLINIC | Facility: CLINIC | Age: 87
End: 2022-12-28

## 2022-12-28 VITALS
OXYGEN SATURATION: 98 % | HEART RATE: 80 BPM | SYSTOLIC BLOOD PRESSURE: 120 MMHG | TEMPERATURE: 98.2 F | HEIGHT: 67 IN | DIASTOLIC BLOOD PRESSURE: 78 MMHG | RESPIRATION RATE: 18 BRPM | BODY MASS INDEX: 23.52 KG/M2

## 2022-12-28 DIAGNOSIS — R05.8 POST-VIRAL COUGH SYNDROME: ICD-10-CM

## 2022-12-28 DIAGNOSIS — J06.9 UPPER RESPIRATORY TRACT INFECTION, UNSPECIFIED TYPE: Primary | ICD-10-CM

## 2022-12-28 PROCEDURE — 99213 OFFICE O/P EST LOW 20 MIN: CPT | Performed by: STUDENT IN AN ORGANIZED HEALTH CARE EDUCATION/TRAINING PROGRAM

## 2022-12-28 RX ORDER — BENZONATATE 200 MG/1
200 CAPSULE ORAL
Status: ON HOLD | COMMUNITY
Start: 2022-12-20 | End: 2023-01-25

## 2022-12-28 RX ORDER — GUAIFENESIN AND DEXTROMETHORPHAN HYDROBROMIDE 1200; 60 MG/1; MG/1
1 TABLET, EXTENDED RELEASE ORAL EVERY 12 HOURS
Status: ON HOLD | COMMUNITY
Start: 2022-12-20 | End: 2023-01-25

## 2022-12-28 NOTE — PROGRESS NOTES
"Chief Complaint  Cough (Productive cough-chest xray done last Tuesday at [facility]- pt had a zpack and over the counter medication.)    Subjective        Lavelle Soliman presents to [facility]  History of Present Illness  For cough for almost 3 weeks.  Patient was accompanied with his daughter Little.  Patient stated last Tuesday on December 28 he went to the urgent care for cough and fever.  He underwent x-ray chest which came out negative for any pneumonia and he was treated with antibiotics.  Patient stated he he never had another episode of fever since then but his cough is not resolving.  Denies having any chest pain or shortness of breath or chest tightness.  Patient stated cough is not that bad that it is interfering with the sleep or daily activities.  Cough is productive in nature and associated with clear sputum production.  Denies having any other symptoms such as body aches, runny nose, headache, sinus pressure, sore throat, earache.  Review of system is negative for fever, headache, chest pain, shortness of breath, palpitation, nausea, vomiting, any recent change in bladder habits.        Objective   Vital Signs:  /78   Pulse 80   Temp 98.2 °F (36.8 °C)   Resp 18   Ht 170.2 cm (67.01\")   SpO2 98%   BMI 23.52 kg/m²   Estimated body mass index is 23.52 kg/m² as calculated from the following:    Height as of this encounter: 170.2 cm (67.01\").    Weight as of 11/29/22: 68.1 kg (150 lb 3.2 oz).    BMI is within normal parameters. No other follow-up for BMI required.      Physical Exam  HENT:      Head: Normocephalic and atraumatic.      Mouth/Throat:      Mouth: Mucous membranes are moist.      Pharynx: Oropharynx is clear.   Eyes:      Extraocular Movements: Extraocular movements intact.      Conjunctiva/sclera: Conjunctivae normal.      Pupils: Pupils are equal, round, and reactive to light.   Cardiovascular:      Rate and Rhythm: Normal rate and regular rhythm. "   Pulmonary:      Effort: Pulmonary effort is normal.      Breath sounds: Normal breath sounds.   Abdominal:      General: Bowel sounds are normal.      Palpations: Abdomen is soft.   Musculoskeletal:         General: Normal range of motion.      Cervical back: Neck supple.   Skin:     General: Skin is warm.      Capillary Refill: Capillary refill takes less than 2 seconds.   Neurological:      General: No focal deficit present.      Mental Status: He is alert and oriented to person, place, and time. Mental status is at baseline.   Psychiatric:         Mood and Affect: Mood normal.        Result Review :                Assessment and Plan   Diagnoses and all orders for this visit:    1. Upper respiratory tract infection, unspecified type (Primary)  -     XR Chest PA & Lateral    2. Post-viral cough syndrome    Based on patient history and examination it is my impression that this cough is postviral cough.  Explained in detail to the patient and his daughter about prognosis of viral infection and sometimes cough lingers longer than other symptoms.  Patient is explained in detail to monitor symptoms and if his cough does not improve in a week then definitely he should undergo an x-ray chest and will follow-up from there.  Patient and his daughter agrees with the plan and showed verbalized understanding  RTC or ER if symptoms worsen or persist       Follow Up   No follow-ups on file.  Patient was given instructions and counseling regarding his condition or for health maintenance advice. Please see specific information pulled into the AVS if appropriate.

## 2023-01-24 ENCOUNTER — APPOINTMENT (OUTPATIENT)
Dept: CT IMAGING | Facility: HOSPITAL | Age: 88
DRG: 481 | End: 2023-01-24
Payer: MEDICARE

## 2023-01-24 ENCOUNTER — APPOINTMENT (OUTPATIENT)
Dept: GENERAL RADIOLOGY | Facility: HOSPITAL | Age: 88
DRG: 481 | End: 2023-01-24
Payer: MEDICARE

## 2023-01-24 ENCOUNTER — HOSPITAL ENCOUNTER (INPATIENT)
Facility: HOSPITAL | Age: 88
LOS: 15 days | Discharge: NURSING, REHAB OR LONG-TERM CARE | DRG: 481 | End: 2023-02-08
Attending: EMERGENCY MEDICINE | Admitting: INTERNAL MEDICINE
Payer: MEDICARE

## 2023-01-24 DIAGNOSIS — S72.002A CLOSED FRACTURE OF LEFT HIP, INITIAL ENCOUNTER: Primary | ICD-10-CM

## 2023-01-24 LAB
ALBUMIN SERPL-MCNC: 3.6 G/DL (ref 3.5–5.2)
ALBUMIN/GLOB SERPL: 1.1 G/DL
ALP SERPL-CCNC: 91 U/L (ref 39–117)
ALT SERPL W P-5'-P-CCNC: 9 U/L (ref 1–41)
ANION GAP SERPL CALCULATED.3IONS-SCNC: 6 MMOL/L (ref 5–15)
APTT PPP: 30 SECONDS (ref 22.7–35.4)
AST SERPL-CCNC: 13 U/L (ref 1–40)
BASOPHILS # BLD AUTO: 0.08 10*3/MM3 (ref 0–0.2)
BASOPHILS NFR BLD AUTO: 0.6 % (ref 0–1.5)
BILIRUB SERPL-MCNC: 0.2 MG/DL (ref 0–1.2)
BUN SERPL-MCNC: 20 MG/DL (ref 8–23)
BUN/CREAT SERPL: 16.9 (ref 7–25)
CALCIUM SPEC-SCNC: 8.6 MG/DL (ref 8.2–9.6)
CHLORIDE SERPL-SCNC: 105 MMOL/L (ref 98–107)
CK SERPL-CCNC: 118 U/L (ref 20–200)
CO2 SERPL-SCNC: 27 MMOL/L (ref 22–29)
CREAT SERPL-MCNC: 1.18 MG/DL (ref 0.76–1.27)
DEPRECATED RDW RBC AUTO: 42.3 FL (ref 37–54)
EGFRCR SERPLBLD CKD-EPI 2021: 56.8 ML/MIN/1.73
EOSINOPHIL # BLD AUTO: 0.02 10*3/MM3 (ref 0–0.4)
EOSINOPHIL NFR BLD AUTO: 0.1 % (ref 0.3–6.2)
ERYTHROCYTE [DISTWIDTH] IN BLOOD BY AUTOMATED COUNT: 12.3 % (ref 12.3–15.4)
GLOBULIN UR ELPH-MCNC: 3.3 GM/DL
GLUCOSE SERPL-MCNC: 136 MG/DL (ref 65–99)
HCT VFR BLD AUTO: 34.3 % (ref 37.5–51)
HGB BLD-MCNC: 11.5 G/DL (ref 13–17.7)
IMM GRANULOCYTES # BLD AUTO: 0.1 10*3/MM3 (ref 0–0.05)
IMM GRANULOCYTES NFR BLD AUTO: 0.7 % (ref 0–0.5)
INR PPP: 0.99 (ref 0.9–1.1)
LYMPHOCYTES # BLD AUTO: 1.18 10*3/MM3 (ref 0.7–3.1)
LYMPHOCYTES NFR BLD AUTO: 8.2 % (ref 19.6–45.3)
MCH RBC QN AUTO: 31.2 PG (ref 26.6–33)
MCHC RBC AUTO-ENTMCNC: 33.5 G/DL (ref 31.5–35.7)
MCV RBC AUTO: 93 FL (ref 79–97)
MONOCYTES # BLD AUTO: 0.89 10*3/MM3 (ref 0.1–0.9)
MONOCYTES NFR BLD AUTO: 6.2 % (ref 5–12)
NEUTROPHILS NFR BLD AUTO: 12.18 10*3/MM3 (ref 1.7–7)
NEUTROPHILS NFR BLD AUTO: 84.2 % (ref 42.7–76)
NRBC BLD AUTO-RTO: 0 /100 WBC (ref 0–0.2)
PLATELET # BLD AUTO: 172 10*3/MM3 (ref 140–450)
PMV BLD AUTO: 10.9 FL (ref 6–12)
POTASSIUM SERPL-SCNC: 3.7 MMOL/L (ref 3.5–5.2)
PROT SERPL-MCNC: 6.9 G/DL (ref 6–8.5)
PROTHROMBIN TIME: 13.2 SECONDS (ref 11.7–14.2)
RBC # BLD AUTO: 3.69 10*6/MM3 (ref 4.14–5.8)
SODIUM SERPL-SCNC: 138 MMOL/L (ref 136–145)
WBC NRBC COR # BLD: 14.45 10*3/MM3 (ref 3.4–10.8)

## 2023-01-24 PROCEDURE — 73552 X-RAY EXAM OF FEMUR 2/>: CPT

## 2023-01-24 PROCEDURE — 82550 ASSAY OF CK (CPK): CPT | Performed by: EMERGENCY MEDICINE

## 2023-01-24 PROCEDURE — 70450 CT HEAD/BRAIN W/O DYE: CPT

## 2023-01-24 PROCEDURE — 85025 COMPLETE CBC W/AUTO DIFF WBC: CPT | Performed by: EMERGENCY MEDICINE

## 2023-01-24 PROCEDURE — 85610 PROTHROMBIN TIME: CPT | Performed by: EMERGENCY MEDICINE

## 2023-01-24 PROCEDURE — 99285 EMERGENCY DEPT VISIT HI MDM: CPT

## 2023-01-24 PROCEDURE — 93010 ELECTROCARDIOGRAM REPORT: CPT | Performed by: INTERNAL MEDICINE

## 2023-01-24 PROCEDURE — 71045 X-RAY EXAM CHEST 1 VIEW: CPT

## 2023-01-24 PROCEDURE — 73502 X-RAY EXAM HIP UNI 2-3 VIEWS: CPT

## 2023-01-24 PROCEDURE — 85730 THROMBOPLASTIN TIME PARTIAL: CPT | Performed by: EMERGENCY MEDICINE

## 2023-01-24 PROCEDURE — 72125 CT NECK SPINE W/O DYE: CPT

## 2023-01-24 PROCEDURE — 80053 COMPREHEN METABOLIC PANEL: CPT | Performed by: EMERGENCY MEDICINE

## 2023-01-24 PROCEDURE — 93005 ELECTROCARDIOGRAM TRACING: CPT | Performed by: EMERGENCY MEDICINE

## 2023-01-24 RX ORDER — METOPROLOL TARTRATE 50 MG/1
50 TABLET, FILM COATED ORAL 2 TIMES DAILY
Status: ON HOLD | COMMUNITY
End: 2023-01-25

## 2023-01-24 RX ORDER — HYDROCODONE BITARTRATE AND ACETAMINOPHEN 5; 325 MG/1; MG/1
1 TABLET ORAL EVERY 4 HOURS PRN
Status: DISCONTINUED | OUTPATIENT
Start: 2023-01-24 | End: 2023-01-26

## 2023-01-24 RX ORDER — AMOXICILLIN 250 MG
2 CAPSULE ORAL 2 TIMES DAILY
Status: DISCONTINUED | OUTPATIENT
Start: 2023-01-24 | End: 2023-02-08 | Stop reason: HOSPADM

## 2023-01-24 RX ORDER — ACETAMINOPHEN 160 MG/5ML
650 SOLUTION ORAL EVERY 4 HOURS PRN
Status: DISCONTINUED | OUTPATIENT
Start: 2023-01-24 | End: 2023-02-08 | Stop reason: HOSPADM

## 2023-01-24 RX ORDER — BISACODYL 10 MG
10 SUPPOSITORY, RECTAL RECTAL DAILY PRN
Status: DISCONTINUED | OUTPATIENT
Start: 2023-01-24 | End: 2023-02-08 | Stop reason: HOSPADM

## 2023-01-24 RX ORDER — CHOLECALCIFEROL (VITAMIN D3) 125 MCG
5 CAPSULE ORAL NIGHTLY PRN
Status: DISCONTINUED | OUTPATIENT
Start: 2023-01-24 | End: 2023-02-08 | Stop reason: HOSPADM

## 2023-01-24 RX ORDER — ACETAMINOPHEN 500 MG
1000 TABLET ORAL ONCE
Status: COMPLETED | OUTPATIENT
Start: 2023-01-24 | End: 2023-01-24

## 2023-01-24 RX ORDER — CALCIUM CARBONATE 200(500)MG
2 TABLET,CHEWABLE ORAL 3 TIMES DAILY PRN
Status: DISCONTINUED | OUTPATIENT
Start: 2023-01-24 | End: 2023-02-08 | Stop reason: HOSPADM

## 2023-01-24 RX ORDER — SODIUM CHLORIDE 9 MG/ML
40 INJECTION, SOLUTION INTRAVENOUS AS NEEDED
Status: DISCONTINUED | OUTPATIENT
Start: 2023-01-24 | End: 2023-02-08 | Stop reason: HOSPADM

## 2023-01-24 RX ORDER — ACETAMINOPHEN 325 MG/1
650 TABLET ORAL EVERY 4 HOURS PRN
Status: DISCONTINUED | OUTPATIENT
Start: 2023-01-24 | End: 2023-02-08 | Stop reason: HOSPADM

## 2023-01-24 RX ORDER — ONDANSETRON 2 MG/ML
4 INJECTION INTRAMUSCULAR; INTRAVENOUS EVERY 6 HOURS PRN
Status: DISCONTINUED | OUTPATIENT
Start: 2023-01-24 | End: 2023-02-08 | Stop reason: HOSPADM

## 2023-01-24 RX ORDER — NALOXONE HCL 0.4 MG/ML
0.4 VIAL (ML) INJECTION
Status: DISCONTINUED | OUTPATIENT
Start: 2023-01-24 | End: 2023-02-08 | Stop reason: HOSPADM

## 2023-01-24 RX ORDER — SODIUM CHLORIDE 0.9 % (FLUSH) 0.9 %
10 SYRINGE (ML) INJECTION AS NEEDED
Status: DISCONTINUED | OUTPATIENT
Start: 2023-01-24 | End: 2023-02-08 | Stop reason: HOSPADM

## 2023-01-24 RX ORDER — POLYETHYLENE GLYCOL 3350 17 G/17G
17 POWDER, FOR SOLUTION ORAL DAILY PRN
Status: DISCONTINUED | OUTPATIENT
Start: 2023-01-24 | End: 2023-02-08 | Stop reason: HOSPADM

## 2023-01-24 RX ORDER — SODIUM CHLORIDE 0.9 % (FLUSH) 0.9 %
10 SYRINGE (ML) INJECTION EVERY 12 HOURS SCHEDULED
Status: DISCONTINUED | OUTPATIENT
Start: 2023-01-24 | End: 2023-02-08 | Stop reason: HOSPADM

## 2023-01-24 RX ORDER — BISACODYL 5 MG/1
5 TABLET, DELAYED RELEASE ORAL DAILY PRN
Status: DISCONTINUED | OUTPATIENT
Start: 2023-01-24 | End: 2023-02-08 | Stop reason: HOSPADM

## 2023-01-24 RX ORDER — SODIUM CHLORIDE 9 MG/ML
75 INJECTION, SOLUTION INTRAVENOUS CONTINUOUS
Status: DISCONTINUED | OUTPATIENT
Start: 2023-01-25 | End: 2023-01-27

## 2023-01-24 RX ORDER — ONDANSETRON 4 MG/1
4 TABLET, FILM COATED ORAL EVERY 6 HOURS PRN
Status: DISCONTINUED | OUTPATIENT
Start: 2023-01-24 | End: 2023-02-08 | Stop reason: HOSPADM

## 2023-01-24 RX ORDER — ACETAMINOPHEN 650 MG/1
650 SUPPOSITORY RECTAL EVERY 4 HOURS PRN
Status: DISCONTINUED | OUTPATIENT
Start: 2023-01-24 | End: 2023-02-08 | Stop reason: HOSPADM

## 2023-01-24 RX ORDER — HYDROMORPHONE HYDROCHLORIDE 1 MG/ML
0.5 INJECTION, SOLUTION INTRAMUSCULAR; INTRAVENOUS; SUBCUTANEOUS
Status: DISCONTINUED | OUTPATIENT
Start: 2023-01-24 | End: 2023-01-25

## 2023-01-24 RX ADMIN — ACETAMINOPHEN 1000 MG: 500 TABLET, FILM COATED ORAL at 22:05

## 2023-01-25 ENCOUNTER — ANESTHESIA (OUTPATIENT)
Dept: PERIOP | Facility: HOSPITAL | Age: 88
DRG: 481 | End: 2023-01-25
Payer: MEDICARE

## 2023-01-25 ENCOUNTER — ANESTHESIA EVENT (OUTPATIENT)
Dept: PERIOP | Facility: HOSPITAL | Age: 88
DRG: 481 | End: 2023-01-25
Payer: MEDICARE

## 2023-01-25 ENCOUNTER — APPOINTMENT (OUTPATIENT)
Dept: GENERAL RADIOLOGY | Facility: HOSPITAL | Age: 88
DRG: 481 | End: 2023-01-25
Payer: MEDICARE

## 2023-01-25 LAB
ANION GAP SERPL CALCULATED.3IONS-SCNC: 8 MMOL/L (ref 5–15)
BACTERIA UR QL AUTO: ABNORMAL /HPF
BASOPHILS # BLD AUTO: 0.05 10*3/MM3 (ref 0–0.2)
BASOPHILS NFR BLD AUTO: 0.4 % (ref 0–1.5)
BILIRUB UR QL STRIP: NEGATIVE
BUN SERPL-MCNC: 18 MG/DL (ref 8–23)
BUN/CREAT SERPL: 15.1 (ref 7–25)
CALCIUM SPEC-SCNC: 7.9 MG/DL (ref 8.2–9.6)
CHLORIDE SERPL-SCNC: 108 MMOL/L (ref 98–107)
CLARITY UR: CLEAR
CO2 SERPL-SCNC: 24 MMOL/L (ref 22–29)
COLOR UR: YELLOW
CREAT SERPL-MCNC: 1.19 MG/DL (ref 0.76–1.27)
DEPRECATED RDW RBC AUTO: 42.4 FL (ref 37–54)
EGFRCR SERPLBLD CKD-EPI 2021: 56.3 ML/MIN/1.73
EOSINOPHIL # BLD AUTO: 0.01 10*3/MM3 (ref 0–0.4)
EOSINOPHIL NFR BLD AUTO: 0.1 % (ref 0.3–6.2)
ERYTHROCYTE [DISTWIDTH] IN BLOOD BY AUTOMATED COUNT: 12.1 % (ref 12.3–15.4)
GLUCOSE SERPL-MCNC: 179 MG/DL (ref 65–99)
GLUCOSE UR STRIP-MCNC: NEGATIVE MG/DL
HCT VFR BLD AUTO: 29.1 % (ref 37.5–51)
HGB BLD-MCNC: 9.5 G/DL (ref 13–17.7)
HGB UR QL STRIP.AUTO: NEGATIVE
HYALINE CASTS UR QL AUTO: ABNORMAL /LPF
IMM GRANULOCYTES # BLD AUTO: 0.09 10*3/MM3 (ref 0–0.05)
IMM GRANULOCYTES NFR BLD AUTO: 0.7 % (ref 0–0.5)
KETONES UR QL STRIP: ABNORMAL
LEUKOCYTE ESTERASE UR QL STRIP.AUTO: ABNORMAL
LYMPHOCYTES # BLD AUTO: 1.25 10*3/MM3 (ref 0.7–3.1)
LYMPHOCYTES NFR BLD AUTO: 9.2 % (ref 19.6–45.3)
MCH RBC QN AUTO: 30.9 PG (ref 26.6–33)
MCHC RBC AUTO-ENTMCNC: 32.6 G/DL (ref 31.5–35.7)
MCV RBC AUTO: 94.8 FL (ref 79–97)
MONOCYTES # BLD AUTO: 1.35 10*3/MM3 (ref 0.1–0.9)
MONOCYTES NFR BLD AUTO: 10 % (ref 5–12)
NEUTROPHILS NFR BLD AUTO: 10.78 10*3/MM3 (ref 1.7–7)
NEUTROPHILS NFR BLD AUTO: 79.6 % (ref 42.7–76)
NITRITE UR QL STRIP: NEGATIVE
NRBC BLD AUTO-RTO: 0 /100 WBC (ref 0–0.2)
PH UR STRIP.AUTO: <=5 [PH] (ref 5–8)
PLATELET # BLD AUTO: 163 10*3/MM3 (ref 140–450)
PMV BLD AUTO: 11.5 FL (ref 6–12)
POTASSIUM SERPL-SCNC: 3.5 MMOL/L (ref 3.5–5.2)
PROT UR QL STRIP: NEGATIVE
QT INTERVAL: 436 MS
RBC # BLD AUTO: 3.07 10*6/MM3 (ref 4.14–5.8)
RBC # UR STRIP: ABNORMAL /HPF
REF LAB TEST METHOD: ABNORMAL
SODIUM SERPL-SCNC: 140 MMOL/L (ref 136–145)
SP GR UR STRIP: 1.02 (ref 1–1.03)
SQUAMOUS #/AREA URNS HPF: ABNORMAL /HPF
UROBILINOGEN UR QL STRIP: ABNORMAL
WBC # UR STRIP: ABNORMAL /HPF
WBC NRBC COR # BLD: 13.53 10*3/MM3 (ref 3.4–10.8)

## 2023-01-25 PROCEDURE — 25010000002 PROPOFOL 10 MG/ML EMULSION: Performed by: ANESTHESIOLOGY

## 2023-01-25 PROCEDURE — C1769 GUIDE WIRE: HCPCS | Performed by: ORTHOPAEDIC SURGERY

## 2023-01-25 PROCEDURE — 76000 FLUOROSCOPY <1 HR PHYS/QHP: CPT

## 2023-01-25 PROCEDURE — C1713 ANCHOR/SCREW BN/BN,TIS/BN: HCPCS | Performed by: ORTHOPAEDIC SURGERY

## 2023-01-25 PROCEDURE — 25010000002 CEFTRIAXONE PER 250 MG: Performed by: HOSPITALIST

## 2023-01-25 PROCEDURE — 81001 URINALYSIS AUTO W/SCOPE: CPT | Performed by: EMERGENCY MEDICINE

## 2023-01-25 PROCEDURE — 85025 COMPLETE CBC W/AUTO DIFF WBC: CPT | Performed by: INTERNAL MEDICINE

## 2023-01-25 PROCEDURE — 36415 COLL VENOUS BLD VENIPUNCTURE: CPT | Performed by: ORTHOPAEDIC SURGERY

## 2023-01-25 PROCEDURE — 73502 X-RAY EXAM HIP UNI 2-3 VIEWS: CPT

## 2023-01-25 PROCEDURE — 0QH736Z INSERTION OF INTRAMEDULLARY INTERNAL FIXATION DEVICE INTO LEFT UPPER FEMUR, PERCUTANEOUS APPROACH: ICD-10-PCS | Performed by: ORTHOPAEDIC SURGERY

## 2023-01-25 PROCEDURE — 80048 BASIC METABOLIC PNL TOTAL CA: CPT | Performed by: ORTHOPAEDIC SURGERY

## 2023-01-25 DEVICE — LAG SCREW, TI
Type: IMPLANTABLE DEVICE | Site: FEMUR | Status: FUNCTIONAL
Brand: GAMMA

## 2023-01-25 DEVICE — K-WIRE: Type: IMPLANTABLE DEVICE | Site: FEMUR | Status: FUNCTIONAL

## 2023-01-25 DEVICE — TROCHANTERIC NAIL KIT, TI
Type: IMPLANTABLE DEVICE | Site: FEMUR | Status: FUNCTIONAL
Brand: GAMMA

## 2023-01-25 DEVICE — LOCKING SCREW, FULLY THREADED: Type: IMPLANTABLE DEVICE | Site: FEMUR | Status: FUNCTIONAL

## 2023-01-25 RX ORDER — FAMOTIDINE 10 MG/ML
20 INJECTION, SOLUTION INTRAVENOUS ONCE
Status: COMPLETED | OUTPATIENT
Start: 2023-01-25 | End: 2023-01-25

## 2023-01-25 RX ORDER — FENTANYL CITRATE 50 UG/ML
50 INJECTION, SOLUTION INTRAMUSCULAR; INTRAVENOUS
Status: DISCONTINUED | OUTPATIENT
Start: 2023-01-25 | End: 2023-01-25

## 2023-01-25 RX ORDER — DIPHENHYDRAMINE HYDROCHLORIDE 50 MG/ML
12.5 INJECTION INTRAMUSCULAR; INTRAVENOUS
Status: DISCONTINUED | OUTPATIENT
Start: 2023-01-25 | End: 2023-01-25

## 2023-01-25 RX ORDER — FLUMAZENIL 0.1 MG/ML
0.2 INJECTION INTRAVENOUS AS NEEDED
Status: DISCONTINUED | OUTPATIENT
Start: 2023-01-25 | End: 2023-01-25

## 2023-01-25 RX ORDER — SODIUM CHLORIDE, SODIUM LACTATE, POTASSIUM CHLORIDE, CALCIUM CHLORIDE 600; 310; 30; 20 MG/100ML; MG/100ML; MG/100ML; MG/100ML
9 INJECTION, SOLUTION INTRAVENOUS CONTINUOUS
Status: DISCONTINUED | OUTPATIENT
Start: 2023-01-25 | End: 2023-01-25

## 2023-01-25 RX ORDER — LABETALOL HYDROCHLORIDE 5 MG/ML
5 INJECTION, SOLUTION INTRAVENOUS
Status: DISCONTINUED | OUTPATIENT
Start: 2023-01-25 | End: 2023-01-25

## 2023-01-25 RX ORDER — HYDROMORPHONE HYDROCHLORIDE 1 MG/ML
0.5 INJECTION, SOLUTION INTRAMUSCULAR; INTRAVENOUS; SUBCUTANEOUS
Status: DISCONTINUED | OUTPATIENT
Start: 2023-01-25 | End: 2023-01-25

## 2023-01-25 RX ORDER — SACCHAROMYCES BOULARDII 250 MG
250 CAPSULE ORAL 2 TIMES DAILY
Status: DISCONTINUED | OUTPATIENT
Start: 2023-01-25 | End: 2023-02-08 | Stop reason: HOSPADM

## 2023-01-25 RX ORDER — EPHEDRINE SULFATE 50 MG/ML
5 INJECTION, SOLUTION INTRAVENOUS ONCE AS NEEDED
Status: DISCONTINUED | OUTPATIENT
Start: 2023-01-25 | End: 2023-01-25

## 2023-01-25 RX ORDER — FAMOTIDINE 20 MG/1
20 TABLET, FILM COATED ORAL NIGHTLY
Status: DISCONTINUED | OUTPATIENT
Start: 2023-01-25 | End: 2023-02-08 | Stop reason: HOSPADM

## 2023-01-25 RX ORDER — PROPOFOL 10 MG/ML
VIAL (ML) INTRAVENOUS AS NEEDED
Status: DISCONTINUED | OUTPATIENT
Start: 2023-01-25 | End: 2023-01-25 | Stop reason: SURG

## 2023-01-25 RX ORDER — ENOXAPARIN SODIUM 100 MG/ML
30 INJECTION SUBCUTANEOUS DAILY
Status: DISCONTINUED | OUTPATIENT
Start: 2023-01-26 | End: 2023-02-08 | Stop reason: HOSPADM

## 2023-01-25 RX ORDER — TERAZOSIN 5 MG/1
5 CAPSULE ORAL NIGHTLY
Status: DISCONTINUED | OUTPATIENT
Start: 2023-01-25 | End: 2023-02-04

## 2023-01-25 RX ORDER — LEVOTHYROXINE SODIUM 0.07 MG/1
75 TABLET ORAL
Status: DISCONTINUED | OUTPATIENT
Start: 2023-01-26 | End: 2023-02-08 | Stop reason: HOSPADM

## 2023-01-25 RX ORDER — HYDRALAZINE HYDROCHLORIDE 20 MG/ML
5 INJECTION INTRAMUSCULAR; INTRAVENOUS
Status: DISCONTINUED | OUTPATIENT
Start: 2023-01-25 | End: 2023-01-25

## 2023-01-25 RX ORDER — LEVOTHYROXINE SODIUM 0.07 MG/1
75 TABLET ORAL DAILY
COMMUNITY
End: 2023-03-22 | Stop reason: SDUPTHER

## 2023-01-25 RX ORDER — NALOXONE HCL 0.4 MG/ML
0.2 VIAL (ML) INJECTION AS NEEDED
Status: DISCONTINUED | OUTPATIENT
Start: 2023-01-25 | End: 2023-01-25

## 2023-01-25 RX ORDER — EPHEDRINE SULFATE 50 MG/ML
INJECTION, SOLUTION INTRAVENOUS AS NEEDED
Status: DISCONTINUED | OUTPATIENT
Start: 2023-01-25 | End: 2023-01-25 | Stop reason: SURG

## 2023-01-25 RX ORDER — LIDOCAINE HYDROCHLORIDE 10 MG/ML
0.5 INJECTION, SOLUTION EPIDURAL; INFILTRATION; INTRACAUDAL; PERINEURAL ONCE AS NEEDED
Status: DISCONTINUED | OUTPATIENT
Start: 2023-01-25 | End: 2023-01-25 | Stop reason: HOSPADM

## 2023-01-25 RX ORDER — METOPROLOL TARTRATE 50 MG/1
50 TABLET, FILM COATED ORAL 2 TIMES DAILY
Status: DISCONTINUED | OUTPATIENT
Start: 2023-01-25 | End: 2023-01-30

## 2023-01-25 RX ORDER — MIDAZOLAM HYDROCHLORIDE 1 MG/ML
0.5 INJECTION INTRAMUSCULAR; INTRAVENOUS
Status: DISCONTINUED | OUTPATIENT
Start: 2023-01-25 | End: 2023-01-25 | Stop reason: HOSPADM

## 2023-01-25 RX ORDER — OXYCODONE AND ACETAMINOPHEN 7.5; 325 MG/1; MG/1
1 TABLET ORAL EVERY 4 HOURS PRN
Status: DISCONTINUED | OUTPATIENT
Start: 2023-01-25 | End: 2023-01-25

## 2023-01-25 RX ORDER — LEVOTHYROXINE SODIUM 88 UG/1
88 TABLET ORAL
Status: DISCONTINUED | OUTPATIENT
Start: 2023-01-25 | End: 2023-01-25

## 2023-01-25 RX ORDER — HYDROMORPHONE HYDROCHLORIDE 1 MG/ML
0.25 INJECTION, SOLUTION INTRAMUSCULAR; INTRAVENOUS; SUBCUTANEOUS
Status: DISCONTINUED | OUTPATIENT
Start: 2023-01-25 | End: 2023-01-26

## 2023-01-25 RX ORDER — MAGNESIUM HYDROXIDE 1200 MG/15ML
LIQUID ORAL AS NEEDED
Status: DISCONTINUED | OUTPATIENT
Start: 2023-01-25 | End: 2023-01-25 | Stop reason: HOSPADM

## 2023-01-25 RX ORDER — SODIUM CHLORIDE 0.9 % (FLUSH) 0.9 %
3-10 SYRINGE (ML) INJECTION AS NEEDED
Status: DISCONTINUED | OUTPATIENT
Start: 2023-01-25 | End: 2023-01-25 | Stop reason: HOSPADM

## 2023-01-25 RX ORDER — ONDANSETRON 2 MG/ML
4 INJECTION INTRAMUSCULAR; INTRAVENOUS ONCE AS NEEDED
Status: DISCONTINUED | OUTPATIENT
Start: 2023-01-25 | End: 2023-01-25

## 2023-01-25 RX ORDER — SODIUM CHLORIDE 0.9 % (FLUSH) 0.9 %
3 SYRINGE (ML) INJECTION EVERY 12 HOURS SCHEDULED
Status: DISCONTINUED | OUTPATIENT
Start: 2023-01-25 | End: 2023-01-25 | Stop reason: HOSPADM

## 2023-01-25 RX ORDER — PROMETHAZINE HYDROCHLORIDE 25 MG/1
25 SUPPOSITORY RECTAL ONCE AS NEEDED
Status: DISCONTINUED | OUTPATIENT
Start: 2023-01-25 | End: 2023-01-25

## 2023-01-25 RX ORDER — HYDROCODONE BITARTRATE AND ACETAMINOPHEN 7.5; 325 MG/1; MG/1
1 TABLET ORAL ONCE AS NEEDED
Status: COMPLETED | OUTPATIENT
Start: 2023-01-25 | End: 2023-01-25

## 2023-01-25 RX ORDER — PROMETHAZINE HYDROCHLORIDE 25 MG/1
25 TABLET ORAL ONCE AS NEEDED
Status: DISCONTINUED | OUTPATIENT
Start: 2023-01-25 | End: 2023-01-25

## 2023-01-25 RX ORDER — DIPHENHYDRAMINE HCL 25 MG
25 CAPSULE ORAL
Status: DISCONTINUED | OUTPATIENT
Start: 2023-01-25 | End: 2023-01-25

## 2023-01-25 RX ORDER — FINASTERIDE 5 MG/1
5 TABLET, FILM COATED ORAL NIGHTLY
Status: DISCONTINUED | OUTPATIENT
Start: 2023-01-25 | End: 2023-02-08 | Stop reason: HOSPADM

## 2023-01-25 RX ORDER — FENTANYL CITRATE 50 UG/ML
50 INJECTION, SOLUTION INTRAMUSCULAR; INTRAVENOUS
Status: DISCONTINUED | OUTPATIENT
Start: 2023-01-25 | End: 2023-01-25 | Stop reason: HOSPADM

## 2023-01-25 RX ADMIN — METOPROLOL TARTRATE 25 MG: 25 TABLET, FILM COATED ORAL at 14:16

## 2023-01-25 RX ADMIN — TERAZOSIN HYDROCHLORIDE 5 MG: 5 CAPSULE ORAL at 21:22

## 2023-01-25 RX ADMIN — FAMOTIDINE 20 MG: 10 INJECTION INTRAVENOUS at 13:53

## 2023-01-25 RX ADMIN — EPHEDRINE SULFATE 15 MG: 50 INJECTION INTRAVENOUS at 16:50

## 2023-01-25 RX ADMIN — PROPOFOL 100 MG: 10 INJECTION, EMULSION INTRAVENOUS at 16:34

## 2023-01-25 RX ADMIN — CEFTRIAXONE SODIUM 1 G: 1 INJECTION, POWDER, FOR SOLUTION INTRAMUSCULAR; INTRAVENOUS at 11:11

## 2023-01-25 RX ADMIN — Medication 10 ML: at 21:23

## 2023-01-25 RX ADMIN — METOPROLOL TARTRATE 50 MG: 50 TABLET, FILM COATED ORAL at 21:22

## 2023-01-25 RX ADMIN — DOCUSATE SODIUM 50MG AND SENNOSIDES 8.6MG 2 TABLET: 8.6; 5 TABLET, FILM COATED ORAL at 21:21

## 2023-01-25 RX ADMIN — Medication 10 ML: at 00:55

## 2023-01-25 RX ADMIN — FAMOTIDINE 20 MG: 20 TABLET, FILM COATED ORAL at 21:22

## 2023-01-25 RX ADMIN — Medication 10 ML: at 08:25

## 2023-01-25 RX ADMIN — SODIUM CHLORIDE 75 ML/HR: 9 INJECTION, SOLUTION INTRAVENOUS at 00:53

## 2023-01-25 RX ADMIN — Medication 250 MG: at 11:22

## 2023-01-25 RX ADMIN — Medication 250 MG: at 21:23

## 2023-01-25 RX ADMIN — HYDROCODONE BITARTRATE AND ACETAMINOPHEN 1 TABLET: 7.5; 325 TABLET ORAL at 17:50

## 2023-01-25 RX ADMIN — SODIUM CHLORIDE, POTASSIUM CHLORIDE, SODIUM LACTATE AND CALCIUM CHLORIDE 9 ML/HR: 600; 310; 30; 20 INJECTION, SOLUTION INTRAVENOUS at 13:56

## 2023-01-25 RX ADMIN — EPHEDRINE SULFATE 15 MG: 50 INJECTION INTRAVENOUS at 16:40

## 2023-01-25 RX ADMIN — FINASTERIDE 5 MG: 5 TABLET, FILM COATED ORAL at 21:23

## 2023-01-25 RX ADMIN — SODIUM CHLORIDE 75 ML/HR: 9 INJECTION, SOLUTION INTRAVENOUS at 18:32

## 2023-01-25 NOTE — ANESTHESIA PROCEDURE NOTES
Airway  Urgency: elective    Date/Time: 1/25/2023 4:36 PM  End Time:1/25/2023 4:36 PM  Airway not difficult    General Information and Staff    Patient location during procedure: OR  Anesthesiologist: Johnathon Samayoa MD    Indications and Patient Condition  Indications for airway management: airway protection    Preoxygenated: yes  Mask difficulty assessment: 0 - not attempted    Final Airway Details  Final airway type: supraglottic airway      Successful airway: classic  Size 5     Number of attempts at approach: 1  Assessment: lips, teeth, and gum same as pre-op and atraumatic intubation

## 2023-01-25 NOTE — ANESTHESIA POSTPROCEDURE EVALUATION
"Patient: Lavelle Soliman    Procedure Summary     Date: 01/25/23 Room / Location: [facility] OR  / [facility] MAIN OR    Anesthesia Start: 1631 Anesthesia Stop: 1729    Procedure: LT. HIP INTERTROCHANTERIC NAILING (Left: Thigh) Diagnosis:       Closed fracture of left hip, initial encounter ([facility])      (Closed fracture of left hip, initial encounter ([facility]) [S72.002A])    Surgeons: Leland Hair MD Provider: Johnathon Samayoa MD    Anesthesia Type: general ASA Status: 3          Anesthesia Type: general    Vitals  Vitals Value Taken Time   /60 01/25/23 1751   Temp 36.4 °C (97.5 °F) 01/25/23 1725   Pulse 74 01/25/23 1753   Resp 16 01/25/23 1725   SpO2 99 % 01/25/23 1753   Vitals shown include unvalidated device data.        Post Anesthesia Care and Evaluation    Patient location during evaluation: bedside  Patient participation: complete - patient participated  Level of consciousness: awake and alert  Pain management: adequate    Airway patency: patent  Anesthetic complications: No anesthetic complications  PONV Status: none  Cardiovascular status: acceptable  Respiratory status: acceptable  Hydration status: acceptable    Comments: /57   Pulse 78   Temp 36.4 °C (97.5 °F) (Oral)   Resp 16   Ht 170.2 cm (67\")   Wt 72.6 kg (160 lb)   SpO2 99%   BMI 25.06 kg/m²         "

## 2023-01-25 NOTE — ANESTHESIA PREPROCEDURE EVALUATION
Anesthesia Evaluation     Patient summary reviewed and Nursing notes reviewed                Airway   Mallampati: II  TM distance: >3 FB  Dental      Pulmonary    (+) a smoker Former,   Cardiovascular     ECG reviewed  Rhythm: irregular  Rate: normal    (+) hypertension, dysrhythmias Atrial Fib, Paroxysmal Atrial Fib, hyperlipidemia,       Neuro/Psych- negative ROS  GI/Hepatic/Renal/Endo    (+)   renal disease CRI, thyroid problem hypothyroidism    Musculoskeletal (-) negative ROS    Abdominal    Substance History - negative use     OB/GYN negative ob/gyn ROS         Other                        Anesthesia Plan    ASA 3     general     (PAF currently in AFib  NPO  Patient's daughter Little wiley retired [employer] nurse  25 mg metoprolol given po pre op    I have reviewed the patient's history with the patient and the chart, including all pertinent laboratory results and imaging. I have explained the risks of anesthesia including but not limited to dental damage, corneal abrasion, nerve injury, MI, stroke, and death. Questions asked and answered. Anesthetic plan discussed with patient and team as indicated. Patient expressed understanding of the above.  )  intravenous induction     Anesthetic plan, risks, benefits, and alternatives have been provided, discussed and informed consent has been obtained with: patient.        CODE STATUS:    While under anesthesia and immediate perioperative period:  Code Status: CPR - Full Support

## 2023-01-26 PROBLEM — D62 POSTOPERATIVE ANEMIA DUE TO ACUTE BLOOD LOSS: Status: ACTIVE | Noted: 2023-01-26

## 2023-01-26 LAB
ANION GAP SERPL CALCULATED.3IONS-SCNC: 5 MMOL/L (ref 5–15)
BASOPHILS # BLD AUTO: 0.05 10*3/MM3 (ref 0–0.2)
BASOPHILS NFR BLD AUTO: 0.3 % (ref 0–1.5)
BUN SERPL-MCNC: 19 MG/DL (ref 8–23)
BUN/CREAT SERPL: 17.3 (ref 7–25)
CALCIUM SPEC-SCNC: 8 MG/DL (ref 8.2–9.6)
CHLORIDE SERPL-SCNC: 108 MMOL/L (ref 98–107)
CO2 SERPL-SCNC: 26 MMOL/L (ref 22–29)
CREAT SERPL-MCNC: 1.1 MG/DL (ref 0.76–1.27)
DEPRECATED RDW RBC AUTO: 41.7 FL (ref 37–54)
EGFRCR SERPLBLD CKD-EPI 2021: 61.8 ML/MIN/1.73
EOSINOPHIL # BLD AUTO: 0 10*3/MM3 (ref 0–0.4)
EOSINOPHIL NFR BLD AUTO: 0 % (ref 0.3–6.2)
ERYTHROCYTE [DISTWIDTH] IN BLOOD BY AUTOMATED COUNT: 12.4 % (ref 12.3–15.4)
GLUCOSE SERPL-MCNC: 139 MG/DL (ref 65–99)
HCT VFR BLD AUTO: 25.3 % (ref 37.5–51)
HGB BLD-MCNC: 8.6 G/DL (ref 13–17.7)
IMM GRANULOCYTES # BLD AUTO: 0.1 10*3/MM3 (ref 0–0.05)
IMM GRANULOCYTES NFR BLD AUTO: 0.6 % (ref 0–0.5)
LYMPHOCYTES # BLD AUTO: 0.82 10*3/MM3 (ref 0.7–3.1)
LYMPHOCYTES NFR BLD AUTO: 5.3 % (ref 19.6–45.3)
MCH RBC QN AUTO: 31.4 PG (ref 26.6–33)
MCHC RBC AUTO-ENTMCNC: 34 G/DL (ref 31.5–35.7)
MCV RBC AUTO: 92.3 FL (ref 79–97)
MONOCYTES # BLD AUTO: 1.71 10*3/MM3 (ref 0.1–0.9)
MONOCYTES NFR BLD AUTO: 11 % (ref 5–12)
NEUTROPHILS NFR BLD AUTO: 12.87 10*3/MM3 (ref 1.7–7)
NEUTROPHILS NFR BLD AUTO: 82.8 % (ref 42.7–76)
NRBC BLD AUTO-RTO: 0 /100 WBC (ref 0–0.2)
PLATELET # BLD AUTO: 147 10*3/MM3 (ref 140–450)
PMV BLD AUTO: 11.2 FL (ref 6–12)
POTASSIUM SERPL-SCNC: 3.7 MMOL/L (ref 3.5–5.2)
RBC # BLD AUTO: 2.74 10*6/MM3 (ref 4.14–5.8)
SODIUM SERPL-SCNC: 139 MMOL/L (ref 136–145)
WBC NRBC COR # BLD: 15.55 10*3/MM3 (ref 3.4–10.8)

## 2023-01-26 PROCEDURE — 25010000002 HYDROMORPHONE PER 4 MG: Performed by: ORTHOPAEDIC SURGERY

## 2023-01-26 PROCEDURE — 80048 BASIC METABOLIC PNL TOTAL CA: CPT | Performed by: ORTHOPAEDIC SURGERY

## 2023-01-26 PROCEDURE — 25010000002 CEFTRIAXONE PER 250 MG: Performed by: ORTHOPAEDIC SURGERY

## 2023-01-26 PROCEDURE — 25010000002 ENOXAPARIN PER 10 MG: Performed by: ORTHOPAEDIC SURGERY

## 2023-01-26 PROCEDURE — 92610 EVALUATE SWALLOWING FUNCTION: CPT

## 2023-01-26 PROCEDURE — 36415 COLL VENOUS BLD VENIPUNCTURE: CPT | Performed by: ORTHOPAEDIC SURGERY

## 2023-01-26 PROCEDURE — 85025 COMPLETE CBC W/AUTO DIFF WBC: CPT | Performed by: ORTHOPAEDIC SURGERY

## 2023-01-26 RX ORDER — OLANZAPINE 10 MG/1
5 INJECTION, POWDER, LYOPHILIZED, FOR SOLUTION INTRAMUSCULAR ONCE
Status: COMPLETED | OUTPATIENT
Start: 2023-01-26 | End: 2023-01-26

## 2023-01-26 RX ORDER — ACETAMINOPHEN 500 MG
500 TABLET ORAL EVERY 6 HOURS PRN
Status: DISCONTINUED | OUTPATIENT
Start: 2023-01-26 | End: 2023-02-08 | Stop reason: HOSPADM

## 2023-01-26 RX ORDER — OLANZAPINE 10 MG/1
5 INJECTION, POWDER, LYOPHILIZED, FOR SOLUTION INTRAMUSCULAR EVERY 8 HOURS PRN
Status: DISCONTINUED | OUTPATIENT
Start: 2023-01-26 | End: 2023-01-27

## 2023-01-26 RX ORDER — OLANZAPINE 5 MG/1
5 TABLET ORAL NIGHTLY
Status: DISCONTINUED | OUTPATIENT
Start: 2023-01-26 | End: 2023-01-27

## 2023-01-26 RX ADMIN — OLANZAPINE 5 MG: 10 INJECTION, POWDER, FOR SOLUTION INTRAMUSCULAR at 01:14

## 2023-01-26 RX ADMIN — DOCUSATE SODIUM 50MG AND SENNOSIDES 8.6MG 2 TABLET: 8.6; 5 TABLET, FILM COATED ORAL at 09:29

## 2023-01-26 RX ADMIN — HYDROMORPHONE HYDROCHLORIDE 0.25 MG: 1 INJECTION, SOLUTION INTRAMUSCULAR; INTRAVENOUS; SUBCUTANEOUS at 04:32

## 2023-01-26 RX ADMIN — Medication 250 MG: at 21:44

## 2023-01-26 RX ADMIN — TERAZOSIN HYDROCHLORIDE 5 MG: 5 CAPSULE ORAL at 21:47

## 2023-01-26 RX ADMIN — CEFTRIAXONE SODIUM 1 G: 1 INJECTION, POWDER, FOR SOLUTION INTRAMUSCULAR; INTRAVENOUS at 11:02

## 2023-01-26 RX ADMIN — DOCUSATE SODIUM 50MG AND SENNOSIDES 8.6MG 2 TABLET: 8.6; 5 TABLET, FILM COATED ORAL at 21:44

## 2023-01-26 RX ADMIN — OLANZAPINE 5 MG: 10 INJECTION, POWDER, FOR SOLUTION INTRAMUSCULAR at 18:14

## 2023-01-26 RX ADMIN — LEVOTHYROXINE SODIUM 75 MCG: 0.07 TABLET ORAL at 05:42

## 2023-01-26 RX ADMIN — Medication 10 ML: at 21:48

## 2023-01-26 RX ADMIN — FAMOTIDINE 20 MG: 20 TABLET, FILM COATED ORAL at 21:46

## 2023-01-26 RX ADMIN — FINASTERIDE 5 MG: 5 TABLET, FILM COATED ORAL at 21:47

## 2023-01-26 RX ADMIN — ENOXAPARIN SODIUM 30 MG: 30 INJECTION SUBCUTANEOUS at 09:28

## 2023-01-26 RX ADMIN — Medication 250 MG: at 09:30

## 2023-01-26 RX ADMIN — HYDROMORPHONE HYDROCHLORIDE 0.25 MG: 1 INJECTION, SOLUTION INTRAMUSCULAR; INTRAVENOUS; SUBCUTANEOUS at 00:56

## 2023-01-26 RX ADMIN — METOPROLOL TARTRATE 50 MG: 50 TABLET, FILM COATED ORAL at 09:29

## 2023-01-26 RX ADMIN — METOPROLOL TARTRATE 50 MG: 50 TABLET, FILM COATED ORAL at 21:48

## 2023-01-27 LAB
ALBUMIN SERPL-MCNC: 2.7 G/DL (ref 3.5–5.2)
ANION GAP SERPL CALCULATED.3IONS-SCNC: 9.7 MMOL/L (ref 5–15)
BASOPHILS # BLD AUTO: 0.04 10*3/MM3 (ref 0–0.2)
BASOPHILS NFR BLD AUTO: 0.3 % (ref 0–1.5)
BUN SERPL-MCNC: 22 MG/DL (ref 8–23)
BUN/CREAT SERPL: 17.6 (ref 7–25)
CALCIUM SPEC-SCNC: 7.7 MG/DL (ref 8.2–9.6)
CHLORIDE SERPL-SCNC: 108 MMOL/L (ref 98–107)
CO2 SERPL-SCNC: 22.3 MMOL/L (ref 22–29)
CREAT SERPL-MCNC: 1.25 MG/DL (ref 0.76–1.27)
DEPRECATED RDW RBC AUTO: 43.5 FL (ref 37–54)
EGFRCR SERPLBLD CKD-EPI 2021: 53 ML/MIN/1.73
EOSINOPHIL # BLD AUTO: 0.01 10*3/MM3 (ref 0–0.4)
EOSINOPHIL NFR BLD AUTO: 0.1 % (ref 0.3–6.2)
ERYTHROCYTE [DISTWIDTH] IN BLOOD BY AUTOMATED COUNT: 12.7 % (ref 12.3–15.4)
GLUCOSE SERPL-MCNC: 115 MG/DL (ref 65–99)
HCT VFR BLD AUTO: 23.8 % (ref 37.5–51)
HGB BLD-MCNC: 8.2 G/DL (ref 13–17.7)
LYMPHOCYTES # BLD AUTO: 1.2 10*3/MM3 (ref 0.7–3.1)
LYMPHOCYTES NFR BLD AUTO: 8.1 % (ref 19.6–45.3)
MAGNESIUM SERPL-MCNC: 2 MG/DL (ref 1.7–2.3)
MCH RBC QN AUTO: 32.5 PG (ref 26.6–33)
MCHC RBC AUTO-ENTMCNC: 34.5 G/DL (ref 31.5–35.7)
MCV RBC AUTO: 94.4 FL (ref 79–97)
MONOCYTES # BLD AUTO: 1.04 10*3/MM3 (ref 0.1–0.9)
MONOCYTES NFR BLD AUTO: 7.1 % (ref 5–12)
NEUTROPHILS NFR BLD AUTO: 12.38 10*3/MM3 (ref 1.7–7)
NEUTROPHILS NFR BLD AUTO: 83.9 % (ref 42.7–76)
PHOSPHATE SERPL-MCNC: 2.4 MG/DL (ref 2.5–4.5)
PLATELET # BLD AUTO: 137 10*3/MM3 (ref 140–450)
PMV BLD AUTO: 11.9 FL (ref 6–12)
POTASSIUM SERPL-SCNC: 3.8 MMOL/L (ref 3.5–5.2)
RBC # BLD AUTO: 2.52 10*6/MM3 (ref 4.14–5.8)
SODIUM SERPL-SCNC: 140 MMOL/L (ref 136–145)
WBC NRBC COR # BLD: 14.75 10*3/MM3 (ref 3.4–10.8)

## 2023-01-27 PROCEDURE — 83735 ASSAY OF MAGNESIUM: CPT | Performed by: HOSPITALIST

## 2023-01-27 PROCEDURE — 25010000002 ENOXAPARIN PER 10 MG: Performed by: ORTHOPAEDIC SURGERY

## 2023-01-27 PROCEDURE — 85025 COMPLETE CBC W/AUTO DIFF WBC: CPT | Performed by: ORTHOPAEDIC SURGERY

## 2023-01-27 PROCEDURE — 80069 RENAL FUNCTION PANEL: CPT | Performed by: HOSPITALIST

## 2023-01-27 PROCEDURE — 97162 PT EVAL MOD COMPLEX 30 MIN: CPT

## 2023-01-27 PROCEDURE — 25010000002 CEFTRIAXONE PER 250 MG: Performed by: ORTHOPAEDIC SURGERY

## 2023-01-27 PROCEDURE — 97530 THERAPEUTIC ACTIVITIES: CPT

## 2023-01-27 RX ORDER — OLANZAPINE 10 MG/1
7.5 INJECTION, POWDER, LYOPHILIZED, FOR SOLUTION INTRAMUSCULAR EVERY 8 HOURS PRN
Status: DISCONTINUED | OUTPATIENT
Start: 2023-01-27 | End: 2023-02-02

## 2023-01-27 RX ORDER — OLANZAPINE 10 MG/1
10 TABLET ORAL NIGHTLY
Status: DISCONTINUED | OUTPATIENT
Start: 2023-01-27 | End: 2023-01-29

## 2023-01-27 RX ADMIN — SODIUM CHLORIDE 75 ML/HR: 9 INJECTION, SOLUTION INTRAVENOUS at 03:00

## 2023-01-27 RX ADMIN — ACETAMINOPHEN 500 MG: 500 TABLET, FILM COATED ORAL at 18:36

## 2023-01-27 RX ADMIN — DOCUSATE SODIUM 50MG AND SENNOSIDES 8.6MG 2 TABLET: 8.6; 5 TABLET, FILM COATED ORAL at 21:52

## 2023-01-27 RX ADMIN — FAMOTIDINE 20 MG: 20 TABLET, FILM COATED ORAL at 21:52

## 2023-01-27 RX ADMIN — Medication 250 MG: at 21:52

## 2023-01-27 RX ADMIN — Medication 250 MG: at 08:16

## 2023-01-27 RX ADMIN — METOPROLOL TARTRATE 50 MG: 50 TABLET, FILM COATED ORAL at 21:52

## 2023-01-27 RX ADMIN — TERAZOSIN HYDROCHLORIDE 5 MG: 5 CAPSULE ORAL at 21:53

## 2023-01-27 RX ADMIN — CEFTRIAXONE SODIUM 1 G: 1 INJECTION, POWDER, FOR SOLUTION INTRAMUSCULAR; INTRAVENOUS at 10:28

## 2023-01-27 RX ADMIN — DOCUSATE SODIUM 50MG AND SENNOSIDES 8.6MG 2 TABLET: 8.6; 5 TABLET, FILM COATED ORAL at 08:16

## 2023-01-27 RX ADMIN — ENOXAPARIN SODIUM 30 MG: 30 INJECTION SUBCUTANEOUS at 08:19

## 2023-01-27 RX ADMIN — LEVOTHYROXINE SODIUM 75 MCG: 0.07 TABLET ORAL at 06:34

## 2023-01-27 RX ADMIN — ACETAMINOPHEN 650 MG: 325 TABLET, FILM COATED ORAL at 08:18

## 2023-01-27 RX ADMIN — Medication 10 ML: at 08:18

## 2023-01-27 RX ADMIN — FINASTERIDE 5 MG: 5 TABLET, FILM COATED ORAL at 21:53

## 2023-01-27 RX ADMIN — METOPROLOL TARTRATE 50 MG: 50 TABLET, FILM COATED ORAL at 08:16

## 2023-01-27 RX ADMIN — OLANZAPINE 10 MG: 10 TABLET ORAL at 18:36

## 2023-01-28 ENCOUNTER — APPOINTMENT (OUTPATIENT)
Dept: GENERAL RADIOLOGY | Facility: HOSPITAL | Age: 88
DRG: 481 | End: 2023-01-28
Payer: MEDICARE

## 2023-01-28 LAB
ALBUMIN SERPL-MCNC: 2.4 G/DL (ref 3.5–5.2)
ANION GAP SERPL CALCULATED.3IONS-SCNC: 9.5 MMOL/L (ref 5–15)
BASOPHILS # BLD AUTO: 0.04 10*3/MM3 (ref 0–0.2)
BASOPHILS NFR BLD AUTO: 0.3 % (ref 0–1.5)
BUN SERPL-MCNC: 27 MG/DL (ref 8–23)
BUN/CREAT SERPL: 22 (ref 7–25)
CALCIUM SPEC-SCNC: 7.8 MG/DL (ref 8.2–9.6)
CHLORIDE SERPL-SCNC: 111 MMOL/L (ref 98–107)
CO2 SERPL-SCNC: 22.5 MMOL/L (ref 22–29)
CREAT SERPL-MCNC: 1.23 MG/DL (ref 0.76–1.27)
DEPRECATED RDW RBC AUTO: 43.8 FL (ref 37–54)
EGFRCR SERPLBLD CKD-EPI 2021: 54.1 ML/MIN/1.73
EOSINOPHIL # BLD AUTO: 0.07 10*3/MM3 (ref 0–0.4)
EOSINOPHIL NFR BLD AUTO: 0.6 % (ref 0.3–6.2)
ERYTHROCYTE [DISTWIDTH] IN BLOOD BY AUTOMATED COUNT: 12.7 % (ref 12.3–15.4)
GLUCOSE SERPL-MCNC: 126 MG/DL (ref 65–99)
HCT VFR BLD AUTO: 23.5 % (ref 37.5–51)
HGB BLD-MCNC: 7.7 G/DL (ref 13–17.7)
IMM GRANULOCYTES # BLD AUTO: 0.08 10*3/MM3 (ref 0–0.05)
IMM GRANULOCYTES NFR BLD AUTO: 0.7 % (ref 0–0.5)
LYMPHOCYTES # BLD AUTO: 1.6 10*3/MM3 (ref 0.7–3.1)
LYMPHOCYTES NFR BLD AUTO: 13 % (ref 19.6–45.3)
MAGNESIUM SERPL-MCNC: 2.2 MG/DL (ref 1.7–2.3)
MCH RBC QN AUTO: 31.2 PG (ref 26.6–33)
MCHC RBC AUTO-ENTMCNC: 32.8 G/DL (ref 31.5–35.7)
MCV RBC AUTO: 95.1 FL (ref 79–97)
MONOCYTES # BLD AUTO: 1.32 10*3/MM3 (ref 0.1–0.9)
MONOCYTES NFR BLD AUTO: 10.7 % (ref 5–12)
NEUTROPHILS NFR BLD AUTO: 74.7 % (ref 42.7–76)
NEUTROPHILS NFR BLD AUTO: 9.17 10*3/MM3 (ref 1.7–7)
NRBC BLD AUTO-RTO: 0 /100 WBC (ref 0–0.2)
PHOSPHATE SERPL-MCNC: 2.7 MG/DL (ref 2.5–4.5)
PLATELET # BLD AUTO: 168 10*3/MM3 (ref 140–450)
PMV BLD AUTO: 12 FL (ref 6–12)
POTASSIUM SERPL-SCNC: 3.2 MMOL/L (ref 3.5–5.2)
RBC # BLD AUTO: 2.47 10*6/MM3 (ref 4.14–5.8)
SODIUM SERPL-SCNC: 143 MMOL/L (ref 136–145)
WBC NRBC COR # BLD: 12.28 10*3/MM3 (ref 3.4–10.8)

## 2023-01-28 PROCEDURE — 97530 THERAPEUTIC ACTIVITIES: CPT

## 2023-01-28 PROCEDURE — 72170 X-RAY EXAM OF PELVIS: CPT

## 2023-01-28 PROCEDURE — 97110 THERAPEUTIC EXERCISES: CPT

## 2023-01-28 PROCEDURE — 85025 COMPLETE CBC W/AUTO DIFF WBC: CPT | Performed by: ORTHOPAEDIC SURGERY

## 2023-01-28 PROCEDURE — 25010000002 ENOXAPARIN PER 10 MG: Performed by: ORTHOPAEDIC SURGERY

## 2023-01-28 PROCEDURE — 80069 RENAL FUNCTION PANEL: CPT | Performed by: HOSPITALIST

## 2023-01-28 PROCEDURE — 83735 ASSAY OF MAGNESIUM: CPT | Performed by: HOSPITALIST

## 2023-01-28 RX ORDER — POTASSIUM CHLORIDE 1.5 G/1.77G
40 POWDER, FOR SOLUTION ORAL 2 TIMES DAILY
Status: COMPLETED | OUTPATIENT
Start: 2023-01-28 | End: 2023-01-28

## 2023-01-28 RX ADMIN — TERAZOSIN HYDROCHLORIDE 5 MG: 5 CAPSULE ORAL at 22:14

## 2023-01-28 RX ADMIN — POTASSIUM CHLORIDE 40 MEQ: 1.5 POWDER, FOR SOLUTION ORAL at 10:33

## 2023-01-28 RX ADMIN — Medication 5 MG: at 00:02

## 2023-01-28 RX ADMIN — ENOXAPARIN SODIUM 30 MG: 30 INJECTION SUBCUTANEOUS at 08:36

## 2023-01-28 RX ADMIN — ACETAMINOPHEN 650 MG: 325 TABLET, FILM COATED ORAL at 00:02

## 2023-01-28 RX ADMIN — Medication 250 MG: at 08:36

## 2023-01-28 RX ADMIN — LEVOTHYROXINE SODIUM 75 MCG: 0.07 TABLET ORAL at 05:13

## 2023-01-28 RX ADMIN — POTASSIUM CHLORIDE 40 MEQ: 1.5 POWDER, FOR SOLUTION ORAL at 22:20

## 2023-01-28 RX ADMIN — OLANZAPINE 10 MG: 10 TABLET ORAL at 18:02

## 2023-01-28 RX ADMIN — METOPROLOL TARTRATE 50 MG: 50 TABLET, FILM COATED ORAL at 22:14

## 2023-01-28 RX ADMIN — Medication 5 MG: at 22:14

## 2023-01-28 RX ADMIN — ACETAMINOPHEN 500 MG: 500 TABLET, FILM COATED ORAL at 05:13

## 2023-01-28 RX ADMIN — Medication 10 ML: at 22:15

## 2023-01-28 RX ADMIN — FAMOTIDINE 20 MG: 20 TABLET, FILM COATED ORAL at 22:14

## 2023-01-28 RX ADMIN — FINASTERIDE 5 MG: 5 TABLET, FILM COATED ORAL at 22:14

## 2023-01-28 RX ADMIN — Medication 10 ML: at 08:36

## 2023-01-28 RX ADMIN — Medication 250 MG: at 22:14

## 2023-01-29 LAB
ALBUMIN SERPL-MCNC: 2.7 G/DL (ref 3.5–5.2)
ANION GAP SERPL CALCULATED.3IONS-SCNC: 7 MMOL/L (ref 5–15)
BASOPHILS # BLD AUTO: 0.07 10*3/MM3 (ref 0–0.2)
BASOPHILS NFR BLD AUTO: 0.6 % (ref 0–1.5)
BUN SERPL-MCNC: 26 MG/DL (ref 8–23)
BUN/CREAT SERPL: 25.2 (ref 7–25)
CALCIUM SPEC-SCNC: 8 MG/DL (ref 8.2–9.6)
CHLORIDE SERPL-SCNC: 115 MMOL/L (ref 98–107)
CO2 SERPL-SCNC: 23 MMOL/L (ref 22–29)
CREAT SERPL-MCNC: 1.03 MG/DL (ref 0.76–1.27)
DEPRECATED RDW RBC AUTO: 44.2 FL (ref 37–54)
EGFRCR SERPLBLD CKD-EPI 2021: 66.9 ML/MIN/1.73
EOSINOPHIL # BLD AUTO: 0.16 10*3/MM3 (ref 0–0.4)
EOSINOPHIL NFR BLD AUTO: 1.4 % (ref 0.3–6.2)
ERYTHROCYTE [DISTWIDTH] IN BLOOD BY AUTOMATED COUNT: 12.9 % (ref 12.3–15.4)
GLUCOSE SERPL-MCNC: 124 MG/DL (ref 65–99)
HCT VFR BLD AUTO: 23.9 % (ref 37.5–51)
HGB BLD-MCNC: 8 G/DL (ref 13–17.7)
IMM GRANULOCYTES # BLD AUTO: 0.06 10*3/MM3 (ref 0–0.05)
IMM GRANULOCYTES NFR BLD AUTO: 0.5 % (ref 0–0.5)
LYMPHOCYTES # BLD AUTO: 1.46 10*3/MM3 (ref 0.7–3.1)
LYMPHOCYTES NFR BLD AUTO: 12.9 % (ref 19.6–45.3)
MCH RBC QN AUTO: 31.9 PG (ref 26.6–33)
MCHC RBC AUTO-ENTMCNC: 33.5 G/DL (ref 31.5–35.7)
MCV RBC AUTO: 95.2 FL (ref 79–97)
MONOCYTES # BLD AUTO: 1.1 10*3/MM3 (ref 0.1–0.9)
MONOCYTES NFR BLD AUTO: 9.7 % (ref 5–12)
NEUTROPHILS NFR BLD AUTO: 74.9 % (ref 42.7–76)
NEUTROPHILS NFR BLD AUTO: 8.49 10*3/MM3 (ref 1.7–7)
NRBC BLD AUTO-RTO: 0 /100 WBC (ref 0–0.2)
PHOSPHATE SERPL-MCNC: 2.3 MG/DL (ref 2.5–4.5)
PLATELET # BLD AUTO: 206 10*3/MM3 (ref 140–450)
PMV BLD AUTO: 10.7 FL (ref 6–12)
POTASSIUM SERPL-SCNC: 4 MMOL/L (ref 3.5–5.2)
RBC # BLD AUTO: 2.51 10*6/MM3 (ref 4.14–5.8)
SODIUM SERPL-SCNC: 145 MMOL/L (ref 136–145)
WBC NRBC COR # BLD: 11.34 10*3/MM3 (ref 3.4–10.8)

## 2023-01-29 PROCEDURE — 80069 RENAL FUNCTION PANEL: CPT | Performed by: HOSPITALIST

## 2023-01-29 PROCEDURE — 85025 COMPLETE CBC W/AUTO DIFF WBC: CPT | Performed by: ORTHOPAEDIC SURGERY

## 2023-01-29 PROCEDURE — 25010000002 ENOXAPARIN PER 10 MG: Performed by: ORTHOPAEDIC SURGERY

## 2023-01-29 RX ORDER — OLANZAPINE 7.5 MG/1
15 TABLET ORAL NIGHTLY
Status: DISCONTINUED | OUTPATIENT
Start: 2023-01-29 | End: 2023-02-01

## 2023-01-29 RX ADMIN — SODIUM PHOSPHATE, MONOBASIC, MONOHYDRATE AND SODIUM PHOSPHATE, DIBASIC, ANHYDROUS 20 MMOL: 276; 142 INJECTION, SOLUTION INTRAVENOUS at 10:38

## 2023-01-29 RX ADMIN — ENOXAPARIN SODIUM 30 MG: 30 INJECTION SUBCUTANEOUS at 09:29

## 2023-01-29 RX ADMIN — OLANZAPINE 7.5 MG: 10 INJECTION, POWDER, LYOPHILIZED, FOR SOLUTION INTRAMUSCULAR at 20:44

## 2023-01-29 RX ADMIN — LEVOTHYROXINE SODIUM 75 MCG: 0.07 TABLET ORAL at 05:17

## 2023-01-29 RX ADMIN — Medication 250 MG: at 22:39

## 2023-01-29 RX ADMIN — Medication 10 ML: at 09:29

## 2023-01-29 RX ADMIN — Medication 5 MG: at 22:39

## 2023-01-29 RX ADMIN — Medication 10 ML: at 22:54

## 2023-01-29 RX ADMIN — METOPROLOL TARTRATE 50 MG: 50 TABLET, FILM COATED ORAL at 22:39

## 2023-01-29 RX ADMIN — ACETAMINOPHEN 650 MG: 325 TABLET, FILM COATED ORAL at 22:37

## 2023-01-29 RX ADMIN — FAMOTIDINE 20 MG: 20 TABLET, FILM COATED ORAL at 22:39

## 2023-01-29 RX ADMIN — METOPROLOL TARTRATE 50 MG: 50 TABLET, FILM COATED ORAL at 09:27

## 2023-01-29 RX ADMIN — Medication 250 MG: at 09:27

## 2023-01-30 LAB
ABO GROUP BLD: NORMAL
ANION GAP SERPL CALCULATED.3IONS-SCNC: 6.8 MMOL/L (ref 5–15)
BASOPHILS # BLD AUTO: 0.1 10*3/MM3 (ref 0–0.2)
BASOPHILS NFR BLD AUTO: 0.9 % (ref 0–1.5)
BLD GP AB SCN SERPL QL: NEGATIVE
BUN SERPL-MCNC: 34 MG/DL (ref 8–23)
BUN/CREAT SERPL: 26.8 (ref 7–25)
CALCIUM SPEC-SCNC: 8.3 MG/DL (ref 8.2–9.6)
CHLORIDE SERPL-SCNC: 117 MMOL/L (ref 98–107)
CO2 SERPL-SCNC: 24.2 MMOL/L (ref 22–29)
CREAT SERPL-MCNC: 1.27 MG/DL (ref 0.76–1.27)
DEPRECATED RDW RBC AUTO: 45.8 FL (ref 37–54)
EGFRCR SERPLBLD CKD-EPI 2021: 52 ML/MIN/1.73
EOSINOPHIL # BLD AUTO: 0.2 10*3/MM3 (ref 0–0.4)
EOSINOPHIL NFR BLD AUTO: 1.7 % (ref 0.3–6.2)
ERYTHROCYTE [DISTWIDTH] IN BLOOD BY AUTOMATED COUNT: 12.9 % (ref 12.3–15.4)
GLUCOSE SERPL-MCNC: 107 MG/DL (ref 65–99)
HCT VFR BLD AUTO: 22.6 % (ref 37.5–51)
HCT VFR BLD AUTO: 28 % (ref 37.5–51)
HGB BLD-MCNC: 7.3 G/DL (ref 13–17.7)
HGB BLD-MCNC: 8.5 G/DL (ref 13–17.7)
IMM GRANULOCYTES # BLD AUTO: 0.07 10*3/MM3 (ref 0–0.05)
IMM GRANULOCYTES NFR BLD AUTO: 0.6 % (ref 0–0.5)
LYMPHOCYTES # BLD AUTO: 1.77 10*3/MM3 (ref 0.7–3.1)
LYMPHOCYTES NFR BLD AUTO: 15.5 % (ref 19.6–45.3)
MCH RBC QN AUTO: 31.6 PG (ref 26.6–33)
MCHC RBC AUTO-ENTMCNC: 32.3 G/DL (ref 31.5–35.7)
MCV RBC AUTO: 97.8 FL (ref 79–97)
MONOCYTES # BLD AUTO: 1.46 10*3/MM3 (ref 0.1–0.9)
MONOCYTES NFR BLD AUTO: 12.8 % (ref 5–12)
NEUTROPHILS NFR BLD AUTO: 68.5 % (ref 42.7–76)
NEUTROPHILS NFR BLD AUTO: 7.84 10*3/MM3 (ref 1.7–7)
NRBC BLD AUTO-RTO: 0.1 /100 WBC (ref 0–0.2)
PLATELET # BLD AUTO: 204 10*3/MM3 (ref 140–450)
PMV BLD AUTO: 11.8 FL (ref 6–12)
POTASSIUM SERPL-SCNC: 3.9 MMOL/L (ref 3.5–5.2)
RBC # BLD AUTO: 2.31 10*6/MM3 (ref 4.14–5.8)
RH BLD: POSITIVE
SODIUM SERPL-SCNC: 148 MMOL/L (ref 136–145)
T&S EXPIRATION DATE: NORMAL
TSH SERPL DL<=0.05 MIU/L-ACNC: 1.6 UIU/ML (ref 0.27–4.2)
WBC NRBC COR # BLD: 11.44 10*3/MM3 (ref 3.4–10.8)

## 2023-01-30 PROCEDURE — 92526 ORAL FUNCTION THERAPY: CPT | Performed by: SPEECH-LANGUAGE PATHOLOGIST

## 2023-01-30 PROCEDURE — 86901 BLOOD TYPING SEROLOGIC RH(D): CPT

## 2023-01-30 PROCEDURE — P9016 RBC LEUKOCYTES REDUCED: HCPCS

## 2023-01-30 PROCEDURE — 86850 RBC ANTIBODY SCREEN: CPT | Performed by: HOSPITALIST

## 2023-01-30 PROCEDURE — 86901 BLOOD TYPING SEROLOGIC RH(D): CPT | Performed by: HOSPITALIST

## 2023-01-30 PROCEDURE — 86900 BLOOD TYPING SEROLOGIC ABO: CPT

## 2023-01-30 PROCEDURE — 97530 THERAPEUTIC ACTIVITIES: CPT

## 2023-01-30 PROCEDURE — 86900 BLOOD TYPING SEROLOGIC ABO: CPT | Performed by: HOSPITALIST

## 2023-01-30 PROCEDURE — 85025 COMPLETE CBC W/AUTO DIFF WBC: CPT | Performed by: ORTHOPAEDIC SURGERY

## 2023-01-30 PROCEDURE — 36430 TRANSFUSION BLD/BLD COMPNT: CPT

## 2023-01-30 PROCEDURE — 85014 HEMATOCRIT: CPT | Performed by: HOSPITALIST

## 2023-01-30 PROCEDURE — 36415 COLL VENOUS BLD VENIPUNCTURE: CPT | Performed by: ORTHOPAEDIC SURGERY

## 2023-01-30 PROCEDURE — 80048 BASIC METABOLIC PNL TOTAL CA: CPT | Performed by: ORTHOPAEDIC SURGERY

## 2023-01-30 PROCEDURE — 84443 ASSAY THYROID STIM HORMONE: CPT | Performed by: HOSPITALIST

## 2023-01-30 PROCEDURE — 85018 HEMOGLOBIN: CPT | Performed by: HOSPITALIST

## 2023-01-30 PROCEDURE — 86923 COMPATIBILITY TEST ELECTRIC: CPT

## 2023-01-30 PROCEDURE — 25010000002 ENOXAPARIN PER 10 MG: Performed by: ORTHOPAEDIC SURGERY

## 2023-01-30 RX ORDER — DEXTROSE, SODIUM CHLORIDE, AND POTASSIUM CHLORIDE 5; .45; .15 G/100ML; G/100ML; G/100ML
75 INJECTION INTRAVENOUS CONTINUOUS
Status: DISCONTINUED | OUTPATIENT
Start: 2023-01-30 | End: 2023-01-31

## 2023-01-30 RX ADMIN — POTASSIUM CHLORIDE, DEXTROSE MONOHYDRATE AND SODIUM CHLORIDE 75 ML/HR: 150; 5; 450 INJECTION, SOLUTION INTRAVENOUS at 14:53

## 2023-01-30 RX ADMIN — ACETAMINOPHEN 500 MG: 500 TABLET, FILM COATED ORAL at 09:25

## 2023-01-30 RX ADMIN — FAMOTIDINE 20 MG: 20 TABLET, FILM COATED ORAL at 21:00

## 2023-01-30 RX ADMIN — DOCUSATE SODIUM 50MG AND SENNOSIDES 8.6MG 2 TABLET: 8.6; 5 TABLET, FILM COATED ORAL at 21:00

## 2023-01-30 RX ADMIN — LEVOTHYROXINE SODIUM 75 MCG: 0.07 TABLET ORAL at 09:28

## 2023-01-30 RX ADMIN — Medication 250 MG: at 09:28

## 2023-01-30 RX ADMIN — Medication 10 ML: at 21:04

## 2023-01-30 RX ADMIN — METOPROLOL TARTRATE 25 MG: 25 TABLET, FILM COATED ORAL at 21:01

## 2023-01-30 RX ADMIN — TERAZOSIN HYDROCHLORIDE 5 MG: 5 CAPSULE ORAL at 21:00

## 2023-01-30 RX ADMIN — Medication 250 MG: at 21:00

## 2023-01-30 RX ADMIN — Medication 10 ML: at 09:28

## 2023-01-30 RX ADMIN — METOPROLOL TARTRATE 50 MG: 50 TABLET, FILM COATED ORAL at 09:45

## 2023-01-30 RX ADMIN — Medication 5 MG: at 21:00

## 2023-01-30 RX ADMIN — DOCUSATE SODIUM 50MG AND SENNOSIDES 8.6MG 2 TABLET: 8.6; 5 TABLET, FILM COATED ORAL at 09:26

## 2023-01-30 RX ADMIN — ENOXAPARIN SODIUM 30 MG: 30 INJECTION SUBCUTANEOUS at 09:26

## 2023-01-30 RX ADMIN — OLANZAPINE 15 MG: 7.5 TABLET ORAL at 21:00

## 2023-01-31 LAB
ALBUMIN SERPL-MCNC: 2.5 G/DL (ref 3.5–5.2)
ANION GAP SERPL CALCULATED.3IONS-SCNC: 5.6 MMOL/L (ref 5–15)
BASOPHILS # BLD AUTO: 0.07 10*3/MM3 (ref 0–0.2)
BASOPHILS NFR BLD AUTO: 0.8 % (ref 0–1.5)
BH BB BLOOD EXPIRATION DATE: NORMAL
BH BB BLOOD TYPE BARCODE: 5100
BH BB DISPENSE STATUS: NORMAL
BH BB PRODUCT CODE: NORMAL
BH BB UNIT NUMBER: NORMAL
BUN SERPL-MCNC: 33 MG/DL (ref 8–23)
BUN/CREAT SERPL: 29.2 (ref 7–25)
CALCIUM SPEC-SCNC: 7.7 MG/DL (ref 8.2–9.6)
CHLORIDE SERPL-SCNC: 119 MMOL/L (ref 98–107)
CO2 SERPL-SCNC: 24.4 MMOL/L (ref 22–29)
CREAT SERPL-MCNC: 1.13 MG/DL (ref 0.76–1.27)
CROSSMATCH INTERPRETATION: NORMAL
DEPRECATED RDW RBC AUTO: 48.2 FL (ref 37–54)
EGFRCR SERPLBLD CKD-EPI 2021: 59.9 ML/MIN/1.73
EOSINOPHIL # BLD AUTO: 0.42 10*3/MM3 (ref 0–0.4)
EOSINOPHIL NFR BLD AUTO: 4.9 % (ref 0.3–6.2)
ERYTHROCYTE [DISTWIDTH] IN BLOOD BY AUTOMATED COUNT: 13.6 % (ref 12.3–15.4)
GLUCOSE SERPL-MCNC: 125 MG/DL (ref 65–99)
HCT VFR BLD AUTO: 24.6 % (ref 37.5–51)
HGB BLD-MCNC: 7.9 G/DL (ref 13–17.7)
IMM GRANULOCYTES # BLD AUTO: 0.05 10*3/MM3 (ref 0–0.05)
IMM GRANULOCYTES NFR BLD AUTO: 0.6 % (ref 0–0.5)
LYMPHOCYTES # BLD AUTO: 2.11 10*3/MM3 (ref 0.7–3.1)
LYMPHOCYTES NFR BLD AUTO: 24.9 % (ref 19.6–45.3)
MAGNESIUM SERPL-MCNC: 2.4 MG/DL (ref 1.7–2.3)
MCH RBC QN AUTO: 31.1 PG (ref 26.6–33)
MCHC RBC AUTO-ENTMCNC: 32.1 G/DL (ref 31.5–35.7)
MCV RBC AUTO: 96.9 FL (ref 79–97)
MONOCYTES # BLD AUTO: 0.96 10*3/MM3 (ref 0.1–0.9)
MONOCYTES NFR BLD AUTO: 11.3 % (ref 5–12)
NEUTROPHILS NFR BLD AUTO: 4.88 10*3/MM3 (ref 1.7–7)
NEUTROPHILS NFR BLD AUTO: 57.5 % (ref 42.7–76)
NRBC BLD AUTO-RTO: 0.1 /100 WBC (ref 0–0.2)
PHOSPHATE SERPL-MCNC: 3 MG/DL (ref 2.5–4.5)
PLATELET # BLD AUTO: 188 10*3/MM3 (ref 140–450)
PMV BLD AUTO: 11.2 FL (ref 6–12)
POTASSIUM SERPL-SCNC: 3.8 MMOL/L (ref 3.5–5.2)
RBC # BLD AUTO: 2.54 10*6/MM3 (ref 4.14–5.8)
SODIUM SERPL-SCNC: 149 MMOL/L (ref 136–145)
UNIT  ABO: NORMAL
UNIT  RH: NORMAL
WBC NRBC COR # BLD: 8.49 10*3/MM3 (ref 3.4–10.8)

## 2023-01-31 PROCEDURE — 85025 COMPLETE CBC W/AUTO DIFF WBC: CPT | Performed by: ORTHOPAEDIC SURGERY

## 2023-01-31 PROCEDURE — 80069 RENAL FUNCTION PANEL: CPT | Performed by: HOSPITALIST

## 2023-01-31 PROCEDURE — 83735 ASSAY OF MAGNESIUM: CPT | Performed by: HOSPITALIST

## 2023-01-31 PROCEDURE — 25010000002 ENOXAPARIN PER 10 MG: Performed by: ORTHOPAEDIC SURGERY

## 2023-01-31 RX ORDER — DEXTROSE MONOHYDRATE 50 MG/ML
50 INJECTION, SOLUTION INTRAVENOUS CONTINUOUS
Status: DISCONTINUED | OUTPATIENT
Start: 2023-01-31 | End: 2023-02-02

## 2023-01-31 RX ADMIN — TERAZOSIN HYDROCHLORIDE 5 MG: 5 CAPSULE ORAL at 21:52

## 2023-01-31 RX ADMIN — METOPROLOL TARTRATE 25 MG: 25 TABLET, FILM COATED ORAL at 21:54

## 2023-01-31 RX ADMIN — Medication 250 MG: at 21:52

## 2023-01-31 RX ADMIN — LEVOTHYROXINE SODIUM 75 MCG: 0.07 TABLET ORAL at 06:06

## 2023-01-31 RX ADMIN — Medication 250 MG: at 08:42

## 2023-01-31 RX ADMIN — METOPROLOL TARTRATE 25 MG: 25 TABLET, FILM COATED ORAL at 08:42

## 2023-01-31 RX ADMIN — FAMOTIDINE 20 MG: 20 TABLET, FILM COATED ORAL at 21:51

## 2023-01-31 RX ADMIN — POTASSIUM CHLORIDE, DEXTROSE MONOHYDRATE AND SODIUM CHLORIDE 75 ML/HR: 150; 5; 450 INJECTION, SOLUTION INTRAVENOUS at 04:09

## 2023-01-31 RX ADMIN — DEXTROSE MONOHYDRATE 75 ML/HR: 50 INJECTION, SOLUTION INTRAVENOUS at 12:56

## 2023-01-31 RX ADMIN — ENOXAPARIN SODIUM 30 MG: 30 INJECTION SUBCUTANEOUS at 08:42

## 2023-01-31 RX ADMIN — Medication 10 ML: at 08:42

## 2023-01-31 RX ADMIN — OLANZAPINE 15 MG: 7.5 TABLET ORAL at 18:57

## 2023-02-01 LAB
ANION GAP SERPL CALCULATED.3IONS-SCNC: 5.4 MMOL/L (ref 5–15)
BASOPHILS # BLD AUTO: 0.09 10*3/MM3 (ref 0–0.2)
BASOPHILS NFR BLD AUTO: 0.9 % (ref 0–1.5)
BUN SERPL-MCNC: 24 MG/DL (ref 8–23)
BUN/CREAT SERPL: 22.6 (ref 7–25)
CALCIUM SPEC-SCNC: 7.7 MG/DL (ref 8.2–9.6)
CHLORIDE SERPL-SCNC: 116 MMOL/L (ref 98–107)
CO2 SERPL-SCNC: 24.6 MMOL/L (ref 22–29)
CREAT SERPL-MCNC: 1.06 MG/DL (ref 0.76–1.27)
DEPRECATED RDW RBC AUTO: 45.7 FL (ref 37–54)
EGFRCR SERPLBLD CKD-EPI 2021: 64.6 ML/MIN/1.73
EOSINOPHIL # BLD AUTO: 0.36 10*3/MM3 (ref 0–0.4)
EOSINOPHIL NFR BLD AUTO: 3.5 % (ref 0.3–6.2)
ERYTHROCYTE [DISTWIDTH] IN BLOOD BY AUTOMATED COUNT: 13.4 % (ref 12.3–15.4)
GLUCOSE SERPL-MCNC: 105 MG/DL (ref 65–99)
HCT VFR BLD AUTO: 26.4 % (ref 37.5–51)
HGB BLD-MCNC: 8.5 G/DL (ref 13–17.7)
IMM GRANULOCYTES # BLD AUTO: 0.12 10*3/MM3 (ref 0–0.05)
IMM GRANULOCYTES NFR BLD AUTO: 1.2 % (ref 0–0.5)
LYMPHOCYTES # BLD AUTO: 2.35 10*3/MM3 (ref 0.7–3.1)
LYMPHOCYTES NFR BLD AUTO: 22.6 % (ref 19.6–45.3)
MCH RBC QN AUTO: 30.6 PG (ref 26.6–33)
MCHC RBC AUTO-ENTMCNC: 32.2 G/DL (ref 31.5–35.7)
MCV RBC AUTO: 95 FL (ref 79–97)
MONOCYTES # BLD AUTO: 1.13 10*3/MM3 (ref 0.1–0.9)
MONOCYTES NFR BLD AUTO: 10.8 % (ref 5–12)
NEUTROPHILS NFR BLD AUTO: 6.37 10*3/MM3 (ref 1.7–7)
NEUTROPHILS NFR BLD AUTO: 61 % (ref 42.7–76)
NRBC BLD AUTO-RTO: 0.2 /100 WBC (ref 0–0.2)
PLATELET # BLD AUTO: 240 10*3/MM3 (ref 140–450)
PMV BLD AUTO: 10.6 FL (ref 6–12)
POTASSIUM SERPL-SCNC: 3.5 MMOL/L (ref 3.5–5.2)
RBC # BLD AUTO: 2.78 10*6/MM3 (ref 4.14–5.8)
SODIUM SERPL-SCNC: 146 MMOL/L (ref 136–145)
WBC NRBC COR # BLD: 10.42 10*3/MM3 (ref 3.4–10.8)

## 2023-02-01 PROCEDURE — 36415 COLL VENOUS BLD VENIPUNCTURE: CPT | Performed by: ORTHOPAEDIC SURGERY

## 2023-02-01 PROCEDURE — 85025 COMPLETE CBC W/AUTO DIFF WBC: CPT | Performed by: ORTHOPAEDIC SURGERY

## 2023-02-01 PROCEDURE — 80048 BASIC METABOLIC PNL TOTAL CA: CPT | Performed by: ORTHOPAEDIC SURGERY

## 2023-02-01 PROCEDURE — 25010000002 ENOXAPARIN PER 10 MG: Performed by: ORTHOPAEDIC SURGERY

## 2023-02-01 RX ORDER — OLANZAPINE 10 MG/1
10 TABLET ORAL NIGHTLY
Status: DISCONTINUED | OUTPATIENT
Start: 2023-02-01 | End: 2023-02-02

## 2023-02-01 RX ADMIN — Medication 5 MG: at 21:50

## 2023-02-01 RX ADMIN — OLANZAPINE 10 MG: 10 TABLET ORAL at 18:51

## 2023-02-01 RX ADMIN — METOPROLOL TARTRATE 25 MG: 25 TABLET, FILM COATED ORAL at 08:25

## 2023-02-01 RX ADMIN — Medication 10 ML: at 21:00

## 2023-02-01 RX ADMIN — Medication 250 MG: at 21:49

## 2023-02-01 RX ADMIN — ACETAMINOPHEN 500 MG: 500 TABLET, FILM COATED ORAL at 18:07

## 2023-02-01 RX ADMIN — Medication 10 ML: at 08:26

## 2023-02-01 RX ADMIN — ENOXAPARIN SODIUM 30 MG: 30 INJECTION SUBCUTANEOUS at 08:26

## 2023-02-01 RX ADMIN — LEVOTHYROXINE SODIUM 75 MCG: 0.07 TABLET ORAL at 05:34

## 2023-02-01 RX ADMIN — FAMOTIDINE 20 MG: 20 TABLET, FILM COATED ORAL at 21:50

## 2023-02-01 RX ADMIN — Medication 250 MG: at 08:26

## 2023-02-01 RX ADMIN — TERAZOSIN HYDROCHLORIDE 5 MG: 5 CAPSULE ORAL at 21:50

## 2023-02-01 RX ADMIN — METOPROLOL TARTRATE 25 MG: 25 TABLET, FILM COATED ORAL at 21:50

## 2023-02-01 NOTE — SIGNIFICANT NOTE
02/01/23 1630   OTHER   Discipline physical therapist   Rehab Time/Intention   Session Not Performed other (see comments)  (attempted to see this afternoon, pt adamently refusing despite encouragement, RN notified. Will follow up tomorrow.)   Recommendation   PT - Next Appointment 02/02/23

## 2023-02-01 NOTE — PROGRESS NOTES
"Nutrition Services    Patient Name:  Lavelle Soliman  YOB: 1927  MRN: [id number]  Admit Date:  1/24/2023    Assessment Date:  02/01/23    CLINICAL NUTRITION ASSESSMENT     Encounter Information         Reason for Encounter Length of stay     Admitting Diagnosis Closed fracture of left hip    Pertinent Medical History Afib, hypothyroidism, HLD, CKD stage 3    Current Issues 89 y.o. male admitted for left hip pain following a fall. Pt underwent orthopedic surgery to treat left hip fracture. Pt lives with family and at baseline is mostly independent. Pt has experienced episodes of altered mental status and agitation during admission requiring restraints.  Seen by speech therapy and recommended pureed/honey thick liquid diet. Poor po intake during admission, average less than 50% per meal likely related to AMS and modified diet. Pt out of restraints today and possible discharge soon. Visited pt at bedside, pt with garbled speech. Observed signs of muscle wasting and fat loss, performed nutrition focused physical exam (NFPE).     Patient meets criteria for : Moderate (non-severe) Malnutrition according to ASPEN/AND guidelines related to chronic illness based on the following: Poor PO intake, signs of muscle wasting and fat loss.    Recommendations/Plan of Care:  1. Ordered Magic Cup BID to provide additional calories and protein.  2. Monitor PO intake, weight status and clinical course.     RD to follow per protocol.     Current Nutrition Orders & Evaluation of Intake       Oral Nutrition     Current PO Diet Diet: Regular/House Diet; No Straw, Feeding Assistance - Nursing; Texture: Pureed (NDD 1); Fluid Consistency: Honey Thick   Supplement n/a   PO Evaluation     Trending % PO Intake <50%- 8 days    --  Anthropometrics          Height    Weight Height: 170.2 cm (67\")  Weight: 72.6 kg (160 lb) (01/24/23 2005)    BMI kg/m2 Body mass index is 25.06 kg/m².    Weight Trend/Change Stable    Weight History  Wt " Readings from Last 15 Encounters:   01/24/23 2005 72.6 kg (160 lb)   11/29/22 1007 68.1 kg (150 lb 3.2 oz)   06/14/22 1013 68.3 kg (150 lb 9.6 oz)   12/08/21 1001 69.9 kg (154 lb 3.2 oz)   06/15/21 1357 67.9 kg (149 lb 9.6 oz)   01/29/20 1055 70.3 kg (155 lb)   11/27/19 1006 70.3 kg (155 lb)   07/18/19 1040 71.2 kg (157 lb)   05/21/19 1001 70.3 kg (155 lb)   01/16/19 1347 69.9 kg (154 lb)   12/19/18 1021 69.4 kg (153 lb)   12/13/18 1524 72.1 kg (159 lb)   12/10/18 0917 72.5 kg (159 lb 14.4 oz)   12/04/18 1639 70.8 kg (156 lb)   12/03/18 1529 71.1 kg (156 lb 12.8 oz)   12/03/18 1107 72.6 kg (160 lb)   11/13/18 0941 71.7 kg (158 lb)      --  Labs        Pertinent Labs Reviewed, listed below     Results from last 7 days   Lab Units 02/01/23 0448 01/31/23  0519 01/30/23  0426   SODIUM mmol/L 146* 149* 148*   POTASSIUM mmol/L 3.5 3.8 3.9   CHLORIDE mmol/L 116* 119* 117*   CO2 mmol/L 24.6 24.4 24.2   BUN mg/dL 24* 33* 34*   CREATININE mg/dL 1.06 1.13 1.27   CALCIUM mg/dL 7.7* 7.7* 8.3   GLUCOSE mg/dL 105* 125* 107*     Results from last 7 days   Lab Units 02/01/23  0448 01/31/23  0519 01/29/23  0552 01/28/23  0623 01/27/23  0506   MAGNESIUM mg/dL  --  2.4*  --  2.2 2.0   PHOSPHORUS mg/dL  --  3.0   < > 2.7 2.4*   HEMOGLOBIN g/dL 8.5* 7.9*   < > 7.7* 8.2*   HEMATOCRIT % 26.4* 24.6*   < > 23.5* 23.8*   WBC 10*3/mm3 10.42 8.49   < > 12.28* 14.75*   ALBUMIN g/dL  --  2.5*   < > 2.4* 2.7*    < > = values in this interval not displayed.     Results from last 7 days   Lab Units 02/01/23  0448 01/31/23  0519 01/30/23  0426 01/29/23  0552 01/28/23  0623   PLATELETS 10*3/mm3 240 188 204 206 168     No results found for: COVID19  Lab Results   Component Value Date    HGBA1C 5.7 (H) 11/22/2022          Medications            Scheduled Medications enoxaparin, 30 mg, Subcutaneous, Daily  famotidine, 20 mg, Oral, Nightly  finasteride, 5 mg, Oral, Nightly  levothyroxine, 75 mcg, Oral, Q AM  metoprolol tartrate, 25 mg, Oral,  BID  OLANZapine, 15 mg, Oral, Nightly  saccharomyces boulardii, 250 mg, Oral, BID  senna-docusate sodium, 2 tablet, Oral, BID  sodium chloride, 10 mL, Intravenous, Q12H  terazosin, 5 mg, Oral, Nightly        Infusions dextrose, 75 mL/hr, Last Rate: 75 mL/hr (01/31/23 1256)        PRN Medications •  acetaminophen **OR** acetaminophen **OR** acetaminophen  •  acetaminophen  •  senna-docusate sodium **AND** polyethylene glycol **AND** bisacodyl **AND** bisacodyl  •  calcium carbonate  •  melatonin  •  [DISCONTINUED] HYDROmorphone **AND** naloxone  •  OLANZapine  •  ondansetron **OR** ondansetron  •  [COMPLETED] Insert Peripheral IV **AND** sodium chloride  •  sodium chloride  •  sodium chloride     Physical Findings         Skin, GI, Oral, LDA Skin: left lateral thigh incision, pale, left leg edema +1, +2  GI:nondistended, normoactive bowel sounds, LBM: 1/31  Oral: teeth missing    NFPE Consented to exam   --  Malnutrition Severity Assessment      Patient meets criteria for : Moderate (non-severe) Malnutrition (according to ASPEN/AND guidelines related to chronic illness based on the following: Poor PO intake, signs of muscle wasting and fat loss.)  Malnutrition Type (last 8 hours)     Malnutrition Severity Assessment     Row Name 02/01/23 1517       Malnutrition Severity Assessment    Malnutrition Type Chronic Disease - Related Malnutrition    Row Name 02/01/23 1517       Insufficient Energy Intake     Insufficient Energy Intake  <75% of est. energy requirement for > or equal to 1 month    Row Name 02/01/23 1517       Muscle Loss    Loss of Muscle Mass Findings Moderate    Denominational Region Moderate - slight depression    Clavicle Bone Region Moderate - some protrusion in females, visible in males    Acromion Bone Region Moderate - acromion may slightly protrude    Dorsal Hand Region Moderate - slight depression    Row Name 02/01/23 1517       Fat Loss    Subcutaneous Fat Loss Findings Moderate    Orbital Region  Moderate  -  somewhat hollowness, slightly dark circles    Upper Arm Region Moderate - some fat tissue, not ample    Row Name 02/01/23 1517       Fluid Accumulation (Edema)    Fluid Accumulation  Moderate equals 2+ pitting edema    Row Name 02/01/23 1517       Declining Functional Status    Declining Functional Status Findings Measurably Reduced    Row Name 02/01/23 1517       Criteria Met (Must meet criteria for severity in at least 2 of these categories: M Wasting, Fat Loss, Fluid, Secondary Signs, Wt. Status, Intake)    Patient meets criteria for  Moderate (non-severe) Malnutrition  according to ASPEN/AND guidelines related to chronic illness based on the following: Poor PO intake, signs of muscle wasting and fat loss.                   PES STATEMENT / NUTRITION DIAGNOSIS      Nutrition Dx Problem  Problem: Malnutrition  Etiology: Factors Affecting Nutrition (AMS and swallow impairment)  Signs/Symptoms: Report of Minimal PO Intake and NFPE Results    Comment:      NUTRITION INTERVENTION / PLAN OF CARE  Intervention Goal        Intervention Goal(s) Meet estimated needs, Increase intake, Maintain weight and No significant weight loss     Nutrition Intervention        RD Action Supplement provided, Encourage intake, Follow Tx Progress and Care plan reviewed     Nutrition Prescription          Diet      Supplement/Snack Magic Cup BID    EN/PN      Prescription Ordered Yes     Monitor/Evaluation        Monitor Per protocol   Discharge Needs Pending clinical course   Education Will instruct as appropriate     RD to follow up per protocol.    Electronically signed by:  Umm Gutierrez RD  02/01/23 07:52 EST

## 2023-02-01 NOTE — PROGRESS NOTES
The patient is quite groggy today but is able to participate, albeit haltingly, with interview today.  He is aware that he is in the hospital but otherwise his speech is so garbled is to be incomprehensible.  He denies, however, appear to be more awake and alert than when seen yesterday.  There have been no management issues.

## 2023-02-01 NOTE — CASE MANAGEMENT/SOCIAL WORK
Continued Stay Note  [facility]     Patient Name: Lavelle Soliman  MRN: [id number]  Today's Date: 2/1/2023    Admit Date: 1/24/2023    Plan: SNF, referrals pending   Discharge Plan     Row Name 02/01/23 1649       Plan    Plan SNF, referrals pending    Plan Comments CCP followed up with patient's daughter for additional SNF referrals due to [facility] declining due to no beds. New referrals placed for 1. [facility] and 2. [facility]. CCP made outbound call to Missy with [facility] regarding referrals. Missy to follow up with CCP regarding bed availability.               Discharge Codes    No documentation.               Expected Discharge Date and Time     Expected Discharge Date Expected Discharge Time    Feb 1, 2023

## 2023-02-01 NOTE — PLAN OF CARE
Goal Outcome Evaluation:  Plan of Care Reviewed With: patient        Progress: improving   Pt is a post op day 7 of a left hip. Dressings are clean dry and intact. Pt has moments of confusion. Pt continues with a the pureed diet with HTL. Pt continues with a brief for incontinent episodes, voiding function intact. Attempted to educate on comorbidities, but no evidence of learning observed. Pt is resting at this time, will continue to monitor.

## 2023-02-01 NOTE — PLAN OF CARE
Problem: Malnutrition  Goal: Improved Nutritional Intake  Outcome: Ongoing, Not Progressing   Goal Outcome Evaluation:      Patient meets criteria for : Moderate (non-severe) Malnutrition according to ASPEN/AND guidelines related to chronic illness based on the following: Poor PO intake, signs of muscle wasting and fat loss.      Recommendations/Plan of Care:  1. Ordered Magic Cup BID to provide additional calories and protein.  2. Monitor PO intake, weight status and clinical course.

## 2023-02-01 NOTE — PROGRESS NOTES
Name: Lavelle Soliman ADMIT: 2023   : 1927  PCP: Rafita Colunga MD    MRN: [id number] LOS: 8 days   AGE/SEX: 89 y.o. male  ROOM: [facility]   Subjective   Chief Complaint   Patient presents with   • Fall   • Leg Injury     +confusion  Better than a few days ago  Drowsy this morning    ROS  Unreliable due to dementia/poor memory    Objective   Vital Signs  Temp:  [96.8 °F (36 °C)-98.2 °F (36.8 °C)] 97.7 °F (36.5 °C)  Heart Rate:  [] 63  Resp:  [16] 16  BP: ()/(64-84) 119/67  SpO2:  [93 %-98 %] 96 %  on   ;   Device (Oxygen Therapy): room air  Body mass index is 25.06 kg/m².    Physical Exam  HENT:      Head: Normocephalic and atraumatic.   Eyes:      General: No scleral icterus.  Cardiovascular:      Rate and Rhythm: Rhythm irregular.   Pulmonary:      Effort: Pulmonary effort is normal. No respiratory distress (decreased bs at bases).   Abdominal:      General: There is no distension.      Palpations: Abdomen is soft.      Tenderness: There is no abdominal tenderness.   Musculoskeletal:      Cervical back: Neck supple.   Skin:     Coloration: Skin is pale.     chronically ill   Elderly  Some confusion- slow speech  Poor vision and hard of hearing    Results Review:       I reviewed the patient's new clinical results.  Results from last 7 days   Lab Units 23  1802 23  0552   WBC 10*3/mm3 10.42 8.49  --  11.44* 11.34*   HEMOGLOBIN g/dL 8.5* 7.9* 8.5* 7.3* 8.0*   PLATELETS 10*3/mm3 240 188  --  204 206     Results from last 7 days   Lab Units 2319 23  0552   SODIUM mmol/L 146* 149* 148* 145   POTASSIUM mmol/L 3.5 3.8 3.9 4.0   CHLORIDE mmol/L 116* 119* 117* 115*   CO2 mmol/L 24.6 24.4 24.2 23.0   BUN mg/dL 24* 33* 34* 26*   CREATININE mg/dL 1.06 1.13 1.27 1.03   GLUCOSE mg/dL 105* 125* 107* 124*   Estimated Creatinine Clearance: 42.8 mL/min (by C-G formula based on SCr of 1.06  mg/dL).  Results from last 7 days   Lab Units 01/31/23  0519 01/29/23  0552 01/28/23  0623 01/27/23  0506   ALBUMIN g/dL 2.5* 2.7* 2.4* 2.7*     Results from last 7 days   Lab Units 02/01/23  0448 01/31/23  0519 01/30/23  0426 01/29/23  0552 01/28/23  0623 01/27/23  0506   CALCIUM mg/dL 7.7* 7.7* 8.3 8.0* 7.8* 7.7*   ALBUMIN g/dL  --  2.5*  --  2.7* 2.4* 2.7*   MAGNESIUM mg/dL  --  2.4*  --   --  2.2 2.0   PHOSPHORUS mg/dL  --  3.0  --  2.3* 2.7 2.4*         Coag       HbA1C   Lab Results   Component Value Date    HGBA1C 5.7 (H) 11/22/2022    HGBA1C 5.8 (H) 06/08/2022    HGBA1C 5.5 12/01/2021     Infection     Radiology(recent) No radiology results for the last day  No results found for: TROPONINT, TROPONINI, BNP  No components found for: TSH;2    enoxaparin, 30 mg, Subcutaneous, Daily  famotidine, 20 mg, Oral, Nightly  finasteride, 5 mg, Oral, Nightly  levothyroxine, 75 mcg, Oral, Q AM  metoprolol tartrate, 25 mg, Oral, BID  OLANZapine, 10 mg, Oral, Nightly  saccharomyces boulardii, 250 mg, Oral, BID  senna-docusate sodium, 2 tablet, Oral, BID  sodium chloride, 10 mL, Intravenous, Q12H  terazosin, 5 mg, Oral, Nightly      dextrose, 75 mL/hr, Last Rate: 75 mL/hr (01/31/23 1256)    Diet: Regular/House Diet; No Straw, Feeding Assistance - Nursing; Texture: Pureed (NDD 1); Fluid Consistency: Honey Thick      Assessment & Plan      Active Hospital Problems    Diagnosis  POA   • **Closed fracture of left hip, initial encounter ([facility]) [S72.002A]  Yes   • Postoperative anemia due to acute blood loss [D62]  Yes   • Paroxysmal atrial fibrillation (HCC) [I48.0]  Yes   • 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     This is very functional independent 89-year-old gentleman who presented to the hospital after a fall  and suffered a intertrochanteric femur fracture on the left side who underwent operative intervention and postoperatively is suffering from severe delirium    · On delirium precautions as well as scheduled zyprexa. Seems to have helped as his nurse said he is doing much better than previous days. Sitter is dcd. He was drowsy this morning so I am going to decrease zyprexa from 15mg to 10mg and monitor  · S/p PRBC transfusion, monitor H/H  · Hypernatremia- change fluids to D5W  · H/o p. afib on metoprolol  · On agents for BPH. Did require I/O cath x 1- monitor PVR  · He is doing better from discussion with his daughter at bedside (on 1/31).  That being said he is 89 years old and has had a significant decline.  The goals is to get to rehab to work on getting stronger but knowing that he may be in the final chapter of his life. It is fairly likely for other medical issues to come up in the coming weeks or months that then could end up being fatal but hopefully can get stronger and get to rehab soon.       RAMBO RN  Dispo- SNF 2/2 depending on clinical status    Salinas Mathis MD  [facility]  02/01/23  12:34 EST

## 2023-02-01 NOTE — PLAN OF CARE
Goal Outcome Evaluation:   Patient is disoriented to time, place, situation, and is confused. Patient's speech is very garbled and difficult to understand. Patient was able to stand and pivot to the bedside commode with x2 assist and a walker this morning. As the day progressed he has became more lethargic and physical therapy was unable to work with him. Patient has difficulty feeding himself. Medications are crushed and fed with applesauce and an NA has been helping him eat his meals. VSS, discharge pending.

## 2023-02-02 PROBLEM — E44.0 MODERATE MALNUTRITION: Status: ACTIVE | Noted: 2023-02-02

## 2023-02-02 LAB
ANION GAP SERPL CALCULATED.3IONS-SCNC: 5.6 MMOL/L (ref 5–15)
BASOPHILS # BLD AUTO: 0.07 10*3/MM3 (ref 0–0.2)
BASOPHILS NFR BLD AUTO: 0.6 % (ref 0–1.5)
BUN SERPL-MCNC: 20 MG/DL (ref 8–23)
BUN/CREAT SERPL: 19.2 (ref 7–25)
CALCIUM SPEC-SCNC: 7.9 MG/DL (ref 8.2–9.6)
CHLORIDE SERPL-SCNC: 109 MMOL/L (ref 98–107)
CO2 SERPL-SCNC: 24.4 MMOL/L (ref 22–29)
CREAT SERPL-MCNC: 1.04 MG/DL (ref 0.76–1.27)
DEPRECATED RDW RBC AUTO: 47.5 FL (ref 37–54)
EGFRCR SERPLBLD CKD-EPI 2021: 66.1 ML/MIN/1.73
EOSINOPHIL # BLD AUTO: 0.18 10*3/MM3 (ref 0–0.4)
EOSINOPHIL NFR BLD AUTO: 1.5 % (ref 0.3–6.2)
ERYTHROCYTE [DISTWIDTH] IN BLOOD BY AUTOMATED COUNT: 13.6 % (ref 12.3–15.4)
GLUCOSE SERPL-MCNC: 122 MG/DL (ref 65–99)
HCT VFR BLD AUTO: 25.4 % (ref 37.5–51)
HGB BLD-MCNC: 8.5 G/DL (ref 13–17.7)
IMM GRANULOCYTES # BLD AUTO: 0.11 10*3/MM3 (ref 0–0.05)
IMM GRANULOCYTES NFR BLD AUTO: 0.9 % (ref 0–0.5)
LYMPHOCYTES # BLD AUTO: 1.58 10*3/MM3 (ref 0.7–3.1)
LYMPHOCYTES NFR BLD AUTO: 13 % (ref 19.6–45.3)
MCH RBC QN AUTO: 32.3 PG (ref 26.6–33)
MCHC RBC AUTO-ENTMCNC: 33.5 G/DL (ref 31.5–35.7)
MCV RBC AUTO: 96.6 FL (ref 79–97)
MONOCYTES # BLD AUTO: 1.4 10*3/MM3 (ref 0.1–0.9)
MONOCYTES NFR BLD AUTO: 11.5 % (ref 5–12)
NEUTROPHILS NFR BLD AUTO: 72.5 % (ref 42.7–76)
NEUTROPHILS NFR BLD AUTO: 8.85 10*3/MM3 (ref 1.7–7)
NRBC BLD AUTO-RTO: 0.1 /100 WBC (ref 0–0.2)
PLATELET # BLD AUTO: 235 10*3/MM3 (ref 140–450)
PMV BLD AUTO: 10.6 FL (ref 6–12)
POTASSIUM SERPL-SCNC: 3.5 MMOL/L (ref 3.5–5.2)
RBC # BLD AUTO: 2.63 10*6/MM3 (ref 4.14–5.8)
SODIUM SERPL-SCNC: 139 MMOL/L (ref 136–145)
WBC NRBC COR # BLD: 12.19 10*3/MM3 (ref 3.4–10.8)

## 2023-02-02 PROCEDURE — 36415 COLL VENOUS BLD VENIPUNCTURE: CPT | Performed by: ORTHOPAEDIC SURGERY

## 2023-02-02 PROCEDURE — 25010000002 ENOXAPARIN PER 10 MG: Performed by: ORTHOPAEDIC SURGERY

## 2023-02-02 PROCEDURE — 80048 BASIC METABOLIC PNL TOTAL CA: CPT | Performed by: ORTHOPAEDIC SURGERY

## 2023-02-02 PROCEDURE — 85025 COMPLETE CBC W/AUTO DIFF WBC: CPT | Performed by: ORTHOPAEDIC SURGERY

## 2023-02-02 PROCEDURE — 97530 THERAPEUTIC ACTIVITIES: CPT

## 2023-02-02 RX ORDER — LORAZEPAM 2 MG/ML
1 INJECTION INTRAMUSCULAR EVERY 4 HOURS PRN
Status: DISCONTINUED | OUTPATIENT
Start: 2023-02-02 | End: 2023-02-03

## 2023-02-02 RX ORDER — POTASSIUM CHLORIDE 1.5 G/1.77G
40 POWDER, FOR SOLUTION ORAL ONCE
Status: DISCONTINUED | OUTPATIENT
Start: 2023-02-02 | End: 2023-02-05

## 2023-02-02 RX ADMIN — OLANZAPINE 7.5 MG: 10 INJECTION, POWDER, LYOPHILIZED, FOR SOLUTION INTRAMUSCULAR at 01:34

## 2023-02-02 RX ADMIN — TERAZOSIN HYDROCHLORIDE 5 MG: 5 CAPSULE ORAL at 21:35

## 2023-02-02 RX ADMIN — ENOXAPARIN SODIUM 30 MG: 30 INJECTION SUBCUTANEOUS at 08:54

## 2023-02-02 RX ADMIN — METOPROLOL TARTRATE 25 MG: 25 TABLET, FILM COATED ORAL at 21:36

## 2023-02-02 RX ADMIN — Medication 10 ML: at 21:00

## 2023-02-02 RX ADMIN — FAMOTIDINE 20 MG: 20 TABLET, FILM COATED ORAL at 21:36

## 2023-02-02 RX ADMIN — LEVOTHYROXINE SODIUM 75 MCG: 0.07 TABLET ORAL at 06:49

## 2023-02-02 RX ADMIN — Medication 250 MG: at 21:35

## 2023-02-02 RX ADMIN — Medication 5 MG: at 21:35

## 2023-02-02 NOTE — CASE MANAGEMENT/SOCIAL WORK
Continued Stay Note  [facility]     Patient Name: Lavelle Soliman  MRN: [id number]  Today's Date: 2/2/2023    Admit Date: 1/24/2023    Plan: Pending LTC PP bed vs Palliative   Discharge Plan     Row Name 02/02/23 1131       Plan    Plan Pending LTC PP bed vs Palliative    Plan Comments CCP made outbound call to Missy Peterson regarding LTC PP beds, Missy said none of the [facility] facilities have any LTC PP beds. Ros Hoyos states there are no LTC PP beds available at this time at either [place] facility. Aliyah Martinze looking into bed availability and will let CCP know. CCP to follow.    Row Name 02/02/23 1119       Plan    Plan SNF, referrals pending    Plan Comments CCP received an [facility]bound call from [facility] this date at 9:07am regarding patient's referral. She states that patient received an IM Zyprexia injection last night due to agitation. She said she will continue to follow patient. CCP received an inbound call from patient's daughter this date at 11:10am regarding SNF referrals. She said patient has not been doing well since surgery and might need a LTC bed instead of a rehab bed. CCP to follow up with SNF referrals regarding LTC beds vs rehab beds. CCP to follow.               Discharge Codes    No documentation.               Expected Discharge Date and Time     Expected Discharge Date Expected Discharge Time    Feb 1, 2023

## 2023-02-02 NOTE — PLAN OF CARE
Goal Outcome Evaluation:              Outcome Evaluation: Some agitation this shift. Dressing intact with dried drainage. VSS. RA. Alert to self. Up to chair this shift, assist x2 to pivot. Family is considering palliative care and wants to wait another day. Zyprexa d/c. D/c Plan pending. Unable to educate. Will continue to monitor. Post void residual bladder scan done and straight cath done

## 2023-02-02 NOTE — PROGRESS NOTES
Name: Lavelle Soliman ADMIT: 2023   : 1927  PCP: Rafita Colunga MD    MRN: [id number] LOS: 9 days   AGE/SEX: 89 y.o. male  ROOM: [facility]   Subjective   Chief Complaint   Patient presents with   • Fall   • Leg Injury     +confusion-still present  Has been drowsy in the mornings   Still agitated at nights  On zyprexa- lower dose last night      ROS  Unreliable due to dementia/poor memory    Objective   Vital Signs  Temp:  [97.3 °F (36.3 °C)-98.4 °F (36.9 °C)] 97.3 °F (36.3 °C)  Heart Rate:  [63-98] 94  Resp:  [16] 16  BP: (110-121)/(54-70) 110/70  SpO2:  [95 %-96 %] 95 %  on   ;   Device (Oxygen Therapy): room air  Body mass index is 25.06 kg/m².    Physical Exam  Constitutional:       General: He is in acute distress.      Appearance: He is ill-appearing.   HENT:      Head: Normocephalic and atraumatic.   Eyes:      General: No scleral icterus.  Cardiovascular:      Rate and Rhythm: Rhythm irregular.   Pulmonary:      Effort: Pulmonary effort is normal. No respiratory distress (decreased bs at bases).   Abdominal:      General: There is no distension.      Palpations: Abdomen is soft.      Tenderness: There is no abdominal tenderness.   Musculoskeletal:      Cervical back: Neck supple.   Skin:     Coloration: Skin is pale.     chronically ill   Elderly  Some confusion- slow speech  Poor vision and hard of hearing  Dry mouth  Similar exam to     Results Review:       I reviewed the patient's new clinical results.  Results from last 7 days   Lab Units 23  04523  1802 23  0426   WBC 10*3/mm3 12.19* 10.42 8.49  --  11.44*   HEMOGLOBIN g/dL 8.5* 8.5* 7.9* 8.5* 7.3*   PLATELETS 10*3/mm3 235 240 188  --  204     Results from last 7 days   Lab Units 23  0450 238 23  0519 23  0426   SODIUM mmol/L 139 146* 149* 148*   POTASSIUM mmol/L 3.5 3.5 3.8 3.9   CHLORIDE mmol/L 109* 116* 119* 117*   CO2 mmol/L 24.4 24.6 24.4 24.2   BUN  mg/dL 20 24* 33* 34*   CREATININE mg/dL 1.04 1.06 1.13 1.27   GLUCOSE mg/dL 122* 105* 125* 107*   Estimated Creatinine Clearance: 43.6 mL/min (by C-G formula based on SCr of 1.04 mg/dL).  Results from last 7 days   Lab Units 01/31/23  0519 01/29/23  0552 01/28/23  0623 01/27/23  0506   ALBUMIN g/dL 2.5* 2.7* 2.4* 2.7*     Results from last 7 days   Lab Units 02/02/23  0450 02/01/23  0448 01/31/23  0519 01/30/23  0426 01/29/23  0552 01/28/23  0623 01/27/23  0506   CALCIUM mg/dL 7.9* 7.7* 7.7* 8.3 8.0* 7.8* 7.7*   ALBUMIN g/dL  --   --  2.5*  --  2.7* 2.4* 2.7*   MAGNESIUM mg/dL  --   --  2.4*  --   --  2.2 2.0   PHOSPHORUS mg/dL  --   --  3.0  --  2.3* 2.7 2.4*         Coag       HbA1C   Lab Results   Component Value Date    HGBA1C 5.7 (H) 11/22/2022    HGBA1C 5.8 (H) 06/08/2022    HGBA1C 5.5 12/01/2021     Infection     Radiology(recent) No radiology results for the last day  No results found for: TROPONINT, TROPONINI, BNP  No components found for: TSH;2    enoxaparin, 30 mg, Subcutaneous, Daily  famotidine, 20 mg, Oral, Nightly  finasteride, 5 mg, Oral, Nightly  levothyroxine, 75 mcg, Oral, Q AM  metoprolol tartrate, 25 mg, Oral, BID  potassium chloride, 40 mEq, Oral, Once  saccharomyces boulardii, 250 mg, Oral, BID  senna-docusate sodium, 2 tablet, Oral, BID  sodium chloride, 10 mL, Intravenous, Q12H  terazosin, 5 mg, Oral, Nightly       Diet: Regular/House Diet; No Straw, Feeding Assistance - Nursing; Texture: Pureed (NDD 1); Fluid Consistency: Honey Thick      Assessment & Plan      Active Hospital Problems    Diagnosis  POA   • **Closed fracture of left hip, initial encounter ([facility]) [S72.002A]  Yes   • Moderate malnutrition ([facility]) [E44.0]  Yes   • Postoperative anemia due to acute blood loss [D62]  Yes   • Paroxysmal atrial fibrillation ([facility]) [I48.0]  Yes   • 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     This is very functional independent 89-year-old gentleman who presented to the hospital after a fall and suffered a intertrochanteric femur fracture on the left side who underwent operative intervention and postoperatively is suffering from severe delirium    · On delirium precautions as well as scheduled zyprexa. 1/31 was better but the past two days worse again. Going to stop zyprexa after discussion with his daughter.  Were now at about 7 days of delirium/agitation with still significant weakness and confusion as well as poor p.o. intake.  He is 89 years old and this may be a terminal event.  Again going to try to hold the Zyprexa tonight.  We will have Ativan as needed but not scheduled.  Things are day today but if not having improvement potentially by tomorrow discussed with daughter may pursue palliative care      · S/p PRBC transfusion, monitor H/H  · Hypernatremia- changed fluids to D5W and now stopped  · H/o p. afib on metoprolol  · On agents for BPH. Did require I/O cath- monitor PVR  · That being said he is 89 years old and has had a significant decline.  The goal had been to get to rehab to work on getting stronger but knowing that he may be in the final chapter of his life. It is fairly likely for other medical issues to come up in the coming weeks or months that then could end up being fatal- and if delirium not improved may end up being palliative care as listed above.         RAMBO RN  RAMBO Daughter    Salinas Mathis MD  [facility]  02/02/23  12:34 EST

## 2023-02-02 NOTE — THERAPY TREATMENT NOTE
Patient Name: Lavelle Soliman  : 1927    MRN: [id number]                              Today's Date: 2023       Admit Date: 2023    Visit Dx:     ICD-10-CM ICD-9-CM   1. Closed fracture of left hip, initial encounter ([facility])  S72.002A 820.8     Patient Active Problem List   Diagnosis   • Benign essential hypertension   • Stage 3a chronic kidney disease (HCC)   • Hyperlipidemia   • Hypothyroidism   • Muscle cramps   • Anemia   • Hyperglycemia   • Urinary retention   • UTI (urinary tract infection), bacterial   • BPH with obstruction/lower urinary tract symptoms   • Paroxysmal atrial fibrillation ([facility])   • Closed fracture of left hip, initial encounter ([facility])   • Postoperative anemia due to acute blood loss   • Moderate malnutrition ([facility])     Past Medical History:   Diagnosis Date   • Benign essential hypertension    • Cataract    • Disease of thyroid gland    • HL (hearing loss)    • Hyperlipidemia    • Hypothyroidism    • Paroxysmal atrial fibrillation ([facility])    • Visual impairment S     Past Surgical History:   Procedure Laterality Date   • EYE SURGERY     • HERNIA REPAIR     • HIP INTERTROCHANTERIC NAILING Left 2023    Procedure: LT. HIP INTERTROCHANTERIC NAILING;  Surgeon: Leland Hair MD;  Location: [facility];  Service: Orthopedics;  Laterality: Left;      General Information     Row Name 23 1140          Physical Therapy Time and Intention    Document Type therapy note (daily note)  -     Mode of Treatment physical therapy;individual therapy  -     Row Name 23 1140          General Information    Existing Precautions/Restrictions fall  -     Row Name 23 1140          Cognition    Orientation Status (Cognition) oriented to;person  pt with confused and garbled speach but following commands better today  -     Row Name 23 1140          Safety Issues, Functional Mobility    Safety Issues Affecting Function (Mobility) awareness of need for  assistance;impulsivity;insight into deficits/self-awareness;judgment;safety precaution awareness  -     Impairments Affecting Function (Mobility) balance;cognition;endurance/activity tolerance;strength;pain  -           User Key  (r) = Recorded By, (t) = Taken By, (c) = Cosigned By    Initials Name Provider Type     Lydia Sanon, PT Physical Therapist               Mobility     Row Name 02/02/23 1142          Bed Mobility    Bed Mobility supine-sit;sit-supine  -     Supine-Sit [place] (Bed Mobility) verbal cues;nonverbal cues (demo/gesture);moderate assist (50% patient effort);2 person assist  -     Sit-Supine [place] (Bed Mobility) not tested  sitting in chair  -     Assistive Device (Bed Mobility) bed rails;head of bed elevated  -     Row Name 02/02/23 1142          Sit-Stand Transfer    Sit-Stand [place] (Transfers) verbal cues;nonverbal cues (demo/gesture);moderate assist (50% patient effort);2 person assist  -     Assistive Device (Sit-Stand Transfers) walker, 4-wheeled  -     Comment, (Sit-Stand Transfer) pt performed sit to stand x2, B LEs very shakey  -     Row Name 02/02/23 1142          Gait/Stairs (Locomotion)    [place] Level (Gait) verbal cues;nonverbal cues (demo/gesture);maximum assist (25% patient effort)  -     Assistive Device (Gait) walker, front-wheeled  -     Distance in Feet (Gait) 3 ft bed to chair, B LEs shakey, cues for weight shifting and advancing feet, cues to stand tall  -     Deviations/Abnormal Patterns (Gait) zaid decreased;festinating/shuffling;gait speed decreased;stride length decreased  -     Bilateral Gait Deviations forward flexed posture;weight shift ability decreased  -     Row Name 02/02/23 1142          Mobility    Extremity Weight-bearing Status left lower extremity  -     Left Lower Extremity (Weight-bearing Status) weight-bearing as tolerated (WBAT)  -           User Key  (r) = Recorded By, (t) = Taken By, (c)  = Cosigned By    Initials Name Provider Type    Lydia Osorio, PT Physical Therapist               Obj/Interventions    No documentation.                Goals/Plan    No documentation.                Clinical Impression     Row Name 02/02/23 1144          Pain    Pretreatment Pain Rating 0/10 - no pain  -     Posttreatment Pain Rating 0/10 - no pain  -     Row Name 02/02/23 1144          Plan of Care Review    Plan of Care Reviewed With patient  -     Outcome Evaluation Pt demonstrates some increased activity tolerance and functional strength as he was able to take some steps bed to chair with cuing and assistance. Pt continues to require a lot of assistance secondary to generalized weakness, confusion, and decreased activity tolerance. PT will continue to follow to address strength, mobility, and gait.  -     Row Name 02/02/23 1144          Positioning and Restraints    Pre-Treatment Position in bed  -     Post Treatment Position chair  -     In Chair reclined;call light within reach;encouraged to call for assist;exit alarm on;notified [facility]  -           User Key  (r) = Recorded By, (t) = Taken By, (c) = Cosigned By    Initials Name Provider Type    Lydia Osorio PT Physical Therapist               Outcome Measures     Row Name 02/02/23 1146 02/02/23 0857       How much help from another person do you currently need...    Turning from your back to your side while in flat bed without using bedrails? 2  -CH 1  -LD    Moving from lying on back to sitting on the side of a flat bed without bedrails? 2  -CH 1  -LD    Moving to and from a bed to a chair (including a wheelchair)? 2  -CH 1  -LD    Standing up from a chair using your arms (e.g., wheelchair, bedside chair)? 2  -CH 1  -LD    Climbing 3-5 steps with a railing? 1  -CH 1  -LD    To walk in hospital room? 1  -CH 1  -LD    AM-PAC 6 Clicks Score (PT) 10  -CH 6  -LD    Highest level of mobility 4 --> Transferred to chair/commode  -CH 2  --> Bed activities/dependent transfer  -LD    Row Name 02/02/23 1146          Functional Assessment    Outcome Measure Options AM-PAC 6 Clicks Basic Mobility (PT)  -           User Key  (r) = Recorded By, (t) = Taken By, (c) = Cosigned By    Initials Name Provider Type     Lydia Sanon, PT Physical Therapist    Anya Arnold, RN Registered Nurse                             Physical Therapy Education     Title: PT OT SLP Therapies (In Progress)     Topic: Physical Therapy (In Progress)     Point: Mobility training (Done)     Learning Progress Summary           Patient Acceptance, E,TB,D, VU,NR by  at 2/2/2023 1146    Acceptance, E,TB,D, VU,NR by  at 1/30/2023 1028    Acceptance, E, NR by CW at 1/28/2023 1241    Acceptance, E, NR by DB at 1/27/2023 1527   Family Acceptance, E, VU by DB at 1/27/2023 1527                   Point: Home exercise program (In Progress)     Learning Progress Summary           Patient Acceptance, E, NR by CW at 1/28/2023 1241    Acceptance, E, NR by DB at 1/27/2023 1527   Family Acceptance, E, VU by DB at 1/27/2023 1527                   Point: Body mechanics (Done)     Learning Progress Summary           Patient Acceptance, E,TB,D, VU,NR by  at 2/2/2023 1146    Acceptance, E,TB,D, VU,NR by  at 1/30/2023 1028    Acceptance, E, NR by CW at 1/28/2023 1241    Acceptance, E, NR by DB at 1/27/2023 1527   Family Acceptance, E, VU by DB at 1/27/2023 1527                   Point: Precautions (Done)     Learning Progress Summary           Patient Acceptance, E,TB,D, VU,NR by  at 2/2/2023 1146    Acceptance, E,TB,D, VU,NR by  at 1/30/2023 1028    Acceptance, E, NR by CW at 1/28/2023 1241    Acceptance, E, NR by DB at 1/27/2023 1527   Family Acceptance, E, VU by DB at 1/27/2023 1527                               User Key     Initials Effective Dates Name Provider Type formerly [facility] 06/16/21 -  Lydia Sanon, PT Physical Therapist PT    DB 06/16/21 -  Nydia Rooney, PT  Physical Therapist PT    CW 12/13/22 -  Lizzeth Negron PT Physical Therapist PT              PT Recommendation and Plan     Plan of Care Reviewed With: patient  Outcome Evaluation: Pt demonstrates some increased activity tolerance and functional strength as he was able to take some steps bed to chair with cuing and assistance. Pt continues to require a lot of assistance secondary to generalized weakness, confusion, and decreased activity tolerance. PT will continue to follow to address strength, mobility, and gait.     Time Calculation:    PT Charges     Row Name 02/02/23 1146             Time Calculation    Start Time 1036  -CH      Stop Time 1053  -CH      Time Calculation (min) 17 min  -CH      PT Received On 02/02/23  -      PT - Next Appointment 02/03/23  -         Time Calculation- PT    Total Timed Code Minutes- PT 17 minute(s)  -CH         Timed Charges    52880 - PT Therapeutic Activity Minutes 17  -CH         Total Minutes    Timed Charges Total Minutes 17  -CH       Total Minutes 17  -CH            User Key  (r) = Recorded By, (t) = Taken By, (c) = Cosigned By    Initials Name Provider Type     Lydia Sanon, PT Physical Therapist              Therapy Charges for Today     Code Description Service Date Service Provider Modifiers Qty    46947699560  PT THERAPEUTIC ACT EA 15 MIN 2/2/2023 Lydia Sanon, PT GP 1    81044824099 HC PT THER SUPP EA 15 MIN 2/2/2023 Lydia Sanon, PT GP 1          PT G-Codes  Outcome Measure Options: AM-PAC 6 Clicks Basic Mobility (PT)  AM-PAC 6 Clicks Score (PT): 10  PT Discharge Summary  Anticipated Discharge Disposition (PT): skilled nursing facility    Lydia Sanon PT  2/2/2023

## 2023-02-02 NOTE — PLAN OF CARE
Goal Outcome Evaluation:  Plan of Care Reviewed With: patient        Progress: no change   Pt is a post op day 8 of a left hip IM nailing. Dressing is dry and intact. Dried drainage noted. Pt continues with the 3 optioam dressings. Pt has been restless all night and trying to get out of bed. PRN meds given, but pt still very restless. Pt tolerated meds crushed in AS with HTL. Pt continues to mumble with garbled speech, able to understand few words. Pt keeps pulling at his brief and the incontinent wrap. [place], hearing aides on charge. Pt is attempting to get some rest, will continue to monitor.

## 2023-02-02 NOTE — PLAN OF CARE
Goal Outcome Evaluation:  Plan of Care Reviewed With: patient           Outcome Evaluation: Pt demonstrates some increased activity tolerance and functional strength as he was able to take some steps bed to chair with cuing and assistance. Pt continues to require a lot of assistance secondary to generalized weakness, confusion, and decreased activity tolerance. PT will continue to follow to address strength, mobility, and gait.

## 2023-02-02 NOTE — CASE MANAGEMENT/SOCIAL WORK
Continued Stay Note  [facility]     Patient Name: Lavelle Soliman  MRN: [id number]  Today's Date: 2/2/2023    Admit Date: 1/24/2023    Plan: SNF, referrals pending   Discharge Plan     Row Name 02/02/23 1119       Plan    Plan SNF, referrals pending    Plan Comments CCP received an inbound call from [facility] this date at 9:07am regarding patient's referral. She states that patient received an IM Zyprexia injection last night due to agitation. She said she will continue to follow patient. CCP received an inbound call from patient's daughter this date at 11:10am regarding SNF referrals. She said patient has not been doing well since surgery and might need a LTC bed instead of a rehab bed. CCP to follow up with SNF referrals regarding LTC beds vs rehab beds. CCP to follow.               Discharge Codes    No documentation.               Expected Discharge Date and Time     Expected Discharge Date Expected Discharge Time    Feb 1, 2023

## 2023-02-03 LAB
ANION GAP SERPL CALCULATED.3IONS-SCNC: 6 MMOL/L (ref 5–15)
BASOPHILS # BLD AUTO: 0.1 10*3/MM3 (ref 0–0.2)
BASOPHILS NFR BLD AUTO: 0.8 % (ref 0–1.5)
BUN SERPL-MCNC: 23 MG/DL (ref 8–23)
BUN/CREAT SERPL: 21.9 (ref 7–25)
CALCIUM SPEC-SCNC: 8 MG/DL (ref 8.2–9.6)
CHLORIDE SERPL-SCNC: 112 MMOL/L (ref 98–107)
CO2 SERPL-SCNC: 24 MMOL/L (ref 22–29)
CREAT SERPL-MCNC: 1.05 MG/DL (ref 0.76–1.27)
DEPRECATED RDW RBC AUTO: 46.6 FL (ref 37–54)
EGFRCR SERPLBLD CKD-EPI 2021: 65.4 ML/MIN/1.73
EOSINOPHIL # BLD AUTO: 0.37 10*3/MM3 (ref 0–0.4)
EOSINOPHIL NFR BLD AUTO: 2.9 % (ref 0.3–6.2)
ERYTHROCYTE [DISTWIDTH] IN BLOOD BY AUTOMATED COUNT: 13.3 % (ref 12.3–15.4)
GLUCOSE SERPL-MCNC: 95 MG/DL (ref 65–99)
HCT VFR BLD AUTO: 28.1 % (ref 37.5–51)
HGB BLD-MCNC: 9.1 G/DL (ref 13–17.7)
IMM GRANULOCYTES # BLD AUTO: 0.14 10*3/MM3 (ref 0–0.05)
IMM GRANULOCYTES NFR BLD AUTO: 1.1 % (ref 0–0.5)
LYMPHOCYTES # BLD AUTO: 2.03 10*3/MM3 (ref 0.7–3.1)
LYMPHOCYTES NFR BLD AUTO: 16.1 % (ref 19.6–45.3)
MCH RBC QN AUTO: 31.4 PG (ref 26.6–33)
MCHC RBC AUTO-ENTMCNC: 32.4 G/DL (ref 31.5–35.7)
MCV RBC AUTO: 96.9 FL (ref 79–97)
MONOCYTES # BLD AUTO: 1.57 10*3/MM3 (ref 0.1–0.9)
MONOCYTES NFR BLD AUTO: 12.4 % (ref 5–12)
NEUTROPHILS NFR BLD AUTO: 66.7 % (ref 42.7–76)
NEUTROPHILS NFR BLD AUTO: 8.43 10*3/MM3 (ref 1.7–7)
NRBC BLD AUTO-RTO: 0 /100 WBC (ref 0–0.2)
PLATELET # BLD AUTO: 266 10*3/MM3 (ref 140–450)
PMV BLD AUTO: 10.7 FL (ref 6–12)
POTASSIUM SERPL-SCNC: 4 MMOL/L (ref 3.5–5.2)
RBC # BLD AUTO: 2.9 10*6/MM3 (ref 4.14–5.8)
SODIUM SERPL-SCNC: 142 MMOL/L (ref 136–145)
WBC NRBC COR # BLD: 12.64 10*3/MM3 (ref 3.4–10.8)

## 2023-02-03 PROCEDURE — 25010000002 LORAZEPAM PER 2 MG: Performed by: INTERNAL MEDICINE

## 2023-02-03 PROCEDURE — 80048 BASIC METABOLIC PNL TOTAL CA: CPT | Performed by: ORTHOPAEDIC SURGERY

## 2023-02-03 PROCEDURE — 97530 THERAPEUTIC ACTIVITIES: CPT

## 2023-02-03 PROCEDURE — 25010000002 ENOXAPARIN PER 10 MG: Performed by: ORTHOPAEDIC SURGERY

## 2023-02-03 PROCEDURE — 85025 COMPLETE CBC W/AUTO DIFF WBC: CPT | Performed by: ORTHOPAEDIC SURGERY

## 2023-02-03 RX ORDER — DEXTROSE AND SODIUM CHLORIDE 5; .45 G/100ML; G/100ML
75 INJECTION, SOLUTION INTRAVENOUS CONTINUOUS
Status: DISCONTINUED | OUTPATIENT
Start: 2023-02-03 | End: 2023-02-05

## 2023-02-03 RX ORDER — HALOPERIDOL 2 MG/1
2 TABLET ORAL EVERY 6 HOURS PRN
Status: DISCONTINUED | OUTPATIENT
Start: 2023-02-03 | End: 2023-02-08 | Stop reason: HOSPADM

## 2023-02-03 RX ADMIN — DEXTROSE AND SODIUM CHLORIDE 75 ML/HR: 5; 450 INJECTION, SOLUTION INTRAVENOUS at 18:16

## 2023-02-03 RX ADMIN — LORAZEPAM 1 MG: 2 INJECTION INTRAMUSCULAR; INTRAVENOUS at 00:23

## 2023-02-03 RX ADMIN — ENOXAPARIN SODIUM 30 MG: 30 INJECTION SUBCUTANEOUS at 09:19

## 2023-02-03 RX ADMIN — ACETAMINOPHEN 650 MG: 325 TABLET, FILM COATED ORAL at 18:15

## 2023-02-03 RX ADMIN — Medication 10 ML: at 09:20

## 2023-02-03 RX ADMIN — DOCUSATE SODIUM 50MG AND SENNOSIDES 8.6MG 2 TABLET: 8.6; 5 TABLET, FILM COATED ORAL at 20:53

## 2023-02-03 RX ADMIN — Medication 10 ML: at 20:57

## 2023-02-03 RX ADMIN — FINASTERIDE 5 MG: 5 TABLET, FILM COATED ORAL at 20:53

## 2023-02-03 RX ADMIN — FAMOTIDINE 20 MG: 20 TABLET, FILM COATED ORAL at 20:53

## 2023-02-03 RX ADMIN — Medication 250 MG: at 20:53

## 2023-02-03 NOTE — CASE MANAGEMENT/SOCIAL WORK
Continued Stay Note  [facility]     Patient Name: Lavelle Soliman  MRN: [id number]  Today's Date: 2/3/2023    Admit Date: 1/24/2023    Plan: Pending, possible Palliative Care   Discharge Plan     Row Name 02/03/23 1347       Plan    Plan Pending, possible Palliative Care    Plan Comments [facility] states they do not have any LTC PP memory care beds  at this time. Patient may end up going Palliative care. CCP to follow.               Discharge Codes    No documentation.               Expected Discharge Date and Time     Expected Discharge Date Expected Discharge Time    Feb 1, 2023

## 2023-02-03 NOTE — PLAN OF CARE
Goal Outcome Evaluation:  Plan of Care Reviewed With: patient        Progress: no change   Pt is a post op day 9 of an left hip IM nailing dressing clean dry intact. Pt continues with the 3 optifoam dressings. Pt has been very restless this shift. Pt continues to reach out to grab things on the ceiling. Pt continues to have hallucinations. Pt received a dose of IV Ativan this shift that has helped relax pt and helped sleeping. Pt bladder scanned this shift, with 240ml noted. Pt continues with a brief and wrap  for incontinence. Pt resting at this time, will continue to monitor

## 2023-02-03 NOTE — CONSULTS
The patient is resting comfortably but required as needed Ativan last evening.  Charting indicates that he continues to respond to internal stimuli.  I believe he will do better with as needed Haloperidol than Ativan as Ativan has no antipsychotic properties and will not address his hallucinations and may in fact  disinhibit his behavior

## 2023-02-03 NOTE — PLAN OF CARE
Goal Outcome Evaluation:  Plan of Care Reviewed With: patient           Outcome Evaluation: Pt with increased lethargy today during PT session but was able to follow commands once sitting EOB. Pt demonstrates some improved balance, posture, and gait mechanics when taking steps to the chair but continues to require a lot of assistance for safety, balance, and sequencing. PT will continue to follow to address strength, mobility, and gait.

## 2023-02-03 NOTE — THERAPY TREATMENT NOTE
Patient Name: Lavelle Soliman  : 1927    MRN: [id number]                              Today's Date: 2/3/2023       Admit Date: 2023    Visit Dx:     ICD-10-CM ICD-9-CM   1. Closed fracture of left hip, initial encounter ([facility])  S72.002A 820.8     Patient Active Problem List   Diagnosis   • Benign essential hypertension   • Stage 3a chronic kidney disease (HCC)   • Hyperlipidemia   • Hypothyroidism   • Muscle cramps   • Anemia   • Hyperglycemia   • Urinary retention   • UTI (urinary tract infection), bacterial   • BPH with obstruction/lower urinary tract symptoms   • Paroxysmal atrial fibrillation ([facility])   • Closed fracture of left hip, initial encounter ([facility])   • Postoperative anemia due to acute blood loss   • Moderate malnutrition ([facility])     Past Medical History:   Diagnosis Date   • Benign essential hypertension    • Cataract    • Disease of thyroid gland    • HL (hearing loss)    • Hyperlipidemia    • Hypothyroidism    • Paroxysmal atrial fibrillation ([facility])    • Visual impairment S     Past Surgical History:   Procedure Laterality Date   • EYE SURGERY     • HERNIA REPAIR     • HIP INTERTROCHANTERIC NAILING Left 2023    Procedure: LT. HIP INTERTROCHANTERIC NAILING;  Surgeon: Leland Hair MD;  Location: [facility];  Service: Orthopedics;  Laterality: Left;      General Information     Row Name 23 1058          Physical Therapy Time and Intention    Document Type therapy note (daily note)  -     Mode of Treatment physical therapy;individual therapy  -     Row Name 23 1058          General Information    Existing Precautions/Restrictions fall  -     Row Name 23 1059          Cognition    Orientation Status (Cognition) other (see comments)  pt very sleepy today, opened eyes what assisted to EOB and followed commands to get to the chair but then started to fall asleepy again when RN tried to give him meds  -     Row Name 23 1059          Safety Issues, Functional  Mobility    Safety Issues Affecting Function (Mobility) ability to follow commands;awareness of need for assistance;impulsivity;insight into deficits/self-awareness;judgment;safety precaution awareness;safety precautions follow-through/compliance  -     Impairments Affecting Function (Mobility) balance;cognition;endurance/activity tolerance;strength;pain  -           User Key  (r) = Recorded By, (t) = Taken By, (c) = Cosigned By    Initials Name Provider Type     Lydia Sanon, PT Physical Therapist               Mobility     Row Name 02/03/23 1105          Bed Mobility    Bed Mobility supine-sit;sit-supine  -     Supine-Sit [place] (Bed Mobility) verbal cues;nonverbal cues (demo/gesture);maximum assist (25% patient effort);2 person assist  -     Sit-Supine [place] (Bed Mobility) not tested  sitting in chair  -     Assistive Device (Bed Mobility) bed rails;head of bed elevated  -     Row Name 02/03/23 1105          Sit-Stand Transfer    Sit-Stand [place] (Transfers) verbal cues;nonverbal cues (demo/gesture);moderate assist (50% patient effort);2 person assist  -     Assistive Device (Sit-Stand Transfers) walker, front-wheeled  -     Row Name 02/03/23 1105          Gait/Stairs (Locomotion)    [place] Level (Gait) verbal cues;nonverbal cues (demo/gesture);moderate assist (50% patient effort);2 person assist  -     Assistive Device (Gait) walker, front-wheeled  -     Distance in Feet (Gait) 3 ft bed to chair  -     Deviations/Abnormal Patterns (Gait) zaid decreased;festinating/shuffling;gait speed decreased;stride length decreased  -     Bilateral Gait Deviations forward flexed posture;weight shift ability decreased  -     Comment, (Gait/Stairs) pt with improved posture and gait mechanics this date, continues to require a lot of cuing for safety and assistance for balance  -     Row Name 02/03/23 1105          Mobility    Extremity Weight-bearing Status left  lower extremity  -CH     Left Lower Extremity (Weight-bearing Status) weight-bearing as tolerated (WBAT)  -           User Key  (r) = Recorded By, (t) = Taken By, (c) = Cosigned By    Initials Name Provider Type    Lydia Osorio, PT Physical Therapist               Obj/Interventions    No documentation.                Goals/Plan    No documentation.                Clinical Impression     Row Name 02/03/23 1106          Pain    Pretreatment Pain Rating 0/10 - no pain  -CH     Posttreatment Pain Rating 0/10 - no pain  -     Row Name 02/03/23 1106          Plan of Care Review    Plan of Care Reviewed With patient  -     Outcome Evaluation Pt with increased lethargy today during PT session but was able to follow commands once sitting EOB. Pt demonstrates some improved balance, posture, and gait mechanics when taking steps to the chair but continues to require a lot of assistance for safety, balance, and sequencing. PT will continue to follow to address strength, mobility, and gait.  -     Row Name 02/03/23 1106          Therapy Assessment/Plan (PT)    Therapy Frequency (PT) 5 times/wk  -     Row Name 02/03/23 1106          Positioning and Restraints    Pre-Treatment Position in bed  -CH     Post Treatment Position chair  -     In Chair reclined;call light within reach;encouraged to call for assist;exit alarm on;with nsg  -           User Key  (r) = Recorded By, (t) = Taken By, (c) = Cosigned By    Initials Name Provider Type    Lydia Osorio, PT Physical Therapist               Outcome Measures     Row Name 02/03/23 1108          How much help from another person do you currently need...    Turning from your back to your side while in flat bed without using bedrails? 2  -CH     Moving from lying on back to sitting on the side of a flat bed without bedrails? 2  -CH     Moving to and from a bed to a chair (including a wheelchair)? 2  -CH     Standing up from a chair using your arms (e.g.,  wheelchair, bedside chair)? 2  -CH     Climbing 3-5 steps with a railing? 1  -CH     To walk in hospital room? 1  -CH     AM-PAC 6 Clicks Score (PT) 10  -CH     Highest level of mobility 4 --> Transferred to chair/commode  -     Row Name 02/03/23 1108          Functional Assessment    Outcome Measure Options AM-PAC 6 Clicks Basic Mobility (PT)  -           User Key  (r) = Recorded By, (t) = Taken By, (c) = Cosigned By    Initials Name Provider Type     Lydia Sanon, PT Physical Therapist                             Physical Therapy Education     Title: PT OT SLP Therapies (In Progress)     Topic: Physical Therapy (In Progress)     Point: Mobility training (Done)     Learning Progress Summary           Patient Acceptance, E,TB,D, VU,NR by  at 2/3/2023 1109    Acceptance, E,TB,D, VU,NR by  at 2/2/2023 1146    Acceptance, E,TB,D, VU,NR by  at 1/30/2023 1028    Acceptance, E, NR by CW at 1/28/2023 1241    Acceptance, E, NR by DB at 1/27/2023 1527   Family Acceptance, E, VU by DB at 1/27/2023 1527                   Point: Home exercise program (In Progress)     Learning Progress Summary           Patient Acceptance, E, NR by CW at 1/28/2023 1241    Acceptance, E, NR by DB at 1/27/2023 1527   Family Acceptance, E, VU by DB at 1/27/2023 1527                   Point: Body mechanics (Done)     Learning Progress Summary           Patient Acceptance, E,TB,D, VU,NR by  at 2/3/2023 1109    Acceptance, E,TB,D, VU,NR by  at 2/2/2023 1146    Acceptance, E,TB,D, VU,NR by  at 1/30/2023 1028    Acceptance, E, NR by CW at 1/28/2023 1241    Acceptance, E, NR by DB at 1/27/2023 1527   Family Acceptance, E, VU by DB at 1/27/2023 1527                   Point: Precautions (Done)     Learning Progress Summary           Patient Acceptance, E,TB,D, VU,NR by  at 2/3/2023 1109    Acceptance, E,TB,D, VU,NR by  at 2/2/2023 1146    Acceptance, E,TB,D, VU,NR by CH at 1/30/2023 1028    Acceptance, E, NR by  at 1/28/2023  1241    Acceptance, E, NR by DB at 1/27/2023 1527   Family Acceptance, E, VU by DB at 1/27/2023 1527                               User Key     Initials Effective Dates Name Provider Type Discipline     06/16/21 -  Lydia Sanon, PT Physical Therapist PT    DB 06/16/21 -  Nydia Rooney, PT Physical Therapist PT     12/13/22 -  Lizzeth Negron, AROLDO Physical Therapist PT              PT Recommendation and Plan     Plan of Care Reviewed With: patient  Outcome Evaluation: Pt with increased lethargy today during PT session but was able to follow commands once sitting EOB. Pt demonstrates some improved balance, posture, and gait mechanics when taking steps to the chair but continues to require a lot of assistance for safety, balance, and sequencing. PT will continue to follow to address strength, mobility, and gait.     Time Calculation:    PT Charges     Row Name 02/03/23 1109             Time Calculation    Start Time 0952  -      Stop Time 1004  -      Time Calculation (min) 12 min  -      PT Received On 02/03/23  -      PT - Next Appointment 02/06/23  -         Time Calculation- PT    Total Timed Code Minutes- PT 12 minute(s)  -         Timed Charges    60900 - PT Therapeutic Activity Minutes 12  -CH         Total Minutes    Timed Charges Total Minutes 12  -CH       Total Minutes 12  -CH            User Key  (r) = Recorded By, (t) = Taken By, (c) = Cosigned By    Initials Name Provider Type     Lydia Sanon PT Physical Therapist              Therapy Charges for Today     Code Description Service Date Service Provider Modifiers Qty    00801355400 HC PT THERAPEUTIC ACT EA 15 MIN 2/2/2023 Lydia Sanon, PT GP 1    11234726873 HC PT THER SUPP EA 15 MIN 2/2/2023 Lydia Sanon, PT GP 1    48367866003 HC PT THERAPEUTIC ACT EA 15 MIN 2/3/2023 Lydia Sanon, PT GP 1          PT G-Codes  Outcome Measure Options: AM-PAC 6 Clicks Basic Mobility (PT)  AM-PAC 6 Clicks Score (PT):  10  PT Discharge Summary  Anticipated Discharge Disposition (PT): skilled nursing facility    Lydia Sanon, PT  2/3/2023

## 2023-02-03 NOTE — PROGRESS NOTES
Name: Lavelle Soliman ADMIT: 2023   : 1927  PCP: Rafita Colunga MD    MRN: [id number] LOS: 10 days   AGE/SEX: 89 y.o. male  ROOM: [facility]     Subjective   Subjective   Apparently was agitated during the night and got Ativan.  Sleeping all day now.  Daughter at bedside    Review of Systems     Objective   Objective   Vital Signs  Temp:  [97.1 °F (36.2 °C)-97.3 °F (36.3 °C)] 97.2 °F (36.2 °C)  Heart Rate:  [76-94] 76  Resp:  [16-18] 16  BP: (104-145)/(62-74) 104/62  SpO2:  [94 %-95 %] 94 %  on   ;   Device (Oxygen Therapy): room air  Body mass index is 25.06 kg/m².  Physical Exam  Vitals reviewed.   Constitutional:       General: He is not in acute distress.     Appearance: He is well-developed. He is ill-appearing (frail).      Comments: Very drowsy   HENT:      Head: Normocephalic and atraumatic.   Eyes:      General: No scleral icterus.  Neck:      Vascular: No JVD.   Cardiovascular:      Rate and Rhythm: Normal rate and regular rhythm.      Heart sounds: No murmur heard.  Pulmonary:      Effort: Pulmonary effort is normal. No respiratory distress.      Breath sounds: No wheezing.   Abdominal:      General: Bowel sounds are normal. There is no distension.      Palpations: Abdomen is soft.      Tenderness: There is no abdominal tenderness.   Musculoskeletal:      Right lower leg: No edema.      Left lower leg: No edema.   Skin:     General: Skin is warm and dry.      Findings: No rash.       Results Review     I reviewed the patient's new clinical results.  Results from last 7 days   Lab Units 23  0519   WBC 10*3/mm3 12.64* 12.19* 10.42 8.49   HEMOGLOBIN g/dL 9.1* 8.5* 8.5* 7.9*   PLATELETS 10*3/mm3 266 235 240 188     Results from last 7 days   Lab Units 230 23  0519   SODIUM mmol/L 142 139 146* 149*   POTASSIUM mmol/L 4.0 3.5 3.5 3.8   CHLORIDE mmol/L 112* 109* 116* 119*   CO2 mmol/L 24.0 24.4 24.6  24.4   BUN mg/dL 23 20 24* 33*   CREATININE mg/dL 1.05 1.04 1.06 1.13   GLUCOSE mg/dL 95 122* 105* 125*   EGFR mL/min/1.73 65.4 66.1 64.6 59.9*     Results from last 7 days   Lab Units 01/31/23  0519 01/29/23  0552 01/28/23  0623   ALBUMIN g/dL 2.5* 2.7* 2.4*     Results from last 7 days   Lab Units 02/03/23  0449 02/02/23  0450 02/01/23  0448 01/31/23  0519 01/30/23  0426 01/29/23  0552 01/28/23  0623   CALCIUM mg/dL 8.0* 7.9* 7.7* 7.7*   < > 8.0* 7.8*   ALBUMIN g/dL  --   --   --  2.5*  --  2.7* 2.4*   MAGNESIUM mg/dL  --   --   --  2.4*  --   --  2.2   PHOSPHORUS mg/dL  --   --   --  3.0  --  2.3* 2.7    < > = values in this interval not displayed.       No results found for: HGBA1C, POCGLU    No radiology results for the last day  I have personally reviewed all medications:  Scheduled Medications  enoxaparin, 30 mg, Subcutaneous, Daily  famotidine, 20 mg, Oral, Nightly  finasteride, 5 mg, Oral, Nightly  levothyroxine, 75 mcg, Oral, Q AM  metoprolol tartrate, 25 mg, Oral, BID  potassium chloride, 40 mEq, Oral, Once  saccharomyces boulardii, 250 mg, Oral, BID  senna-docusate sodium, 2 tablet, Oral, BID  sodium chloride, 10 mL, Intravenous, Q12H  terazosin, 5 mg, Oral, Nightly    Infusions  dextrose 5 % and sodium chloride 0.45 %, 75 mL/hr    Diet  Diet: Regular/House Diet; No Straw, Feeding Assistance - Nursing; Texture: Pureed (NDD 1); Fluid Consistency: Honey Thick    I have personally reviewed:  [x]  Laboratory   [x]  Microbiology   [x]  Radiology   []  EKG/Telemetry  []  Cardiology/Vascular   []  Pathology    [x]  Records       Assessment/Plan     Active Hospital Problems    Diagnosis  POA   • **Closed fracture of left hip, initial encounter ([facility]) [S72.002A]  Yes   • Moderate malnutrition ([facility]) [E44.0]  Yes   • Postoperative anemia due to acute blood loss [D62]  Yes   • Paroxysmal atrial fibrillation ([facility]) [I48.0]  Yes   • 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       89 y.o. male admitted with hip fracture status post repair 1/25 now dealing with severe postoperative confusion and delirium    Delirium ongoing.  Received Ativan x1 last night and is sedated most of the day today.  Psychiatric recommendations noted.  Planning only Haldol as needed and no scheduled agents tonight    Long talk with daughter.  Told her that my expectation would certainly be that he would ordinarily be doing better by now postop but that delirium can take a long time to clear.  She seems aware that his prognosis is looking fairly grim given long recovery time and advanced age prior to injury.  For now we will continue to monitor on current regimen to look for any signs for improvement over the next day or 2.  Certainly I told her if he got any worse or developed a new complication would consider discontinuation of aggressive care at that point    He is not eating so we will give gentle hydration overnight.  Labs look relatively stable.    Not really taking any of his home meds at times.  Seems to be on a general decline and I am not certain he is going to pull out of it.  We will monitor over the next day or 2 to make further decisions.    Continue Lovenox 30 mg for DVT prophylaxis        Mir Chavez MD  [facility]  02/03/23  17:07 EST

## 2023-02-04 LAB
ANION GAP SERPL CALCULATED.3IONS-SCNC: 5.8 MMOL/L (ref 5–15)
BASOPHILS # BLD AUTO: 0.09 10*3/MM3 (ref 0–0.2)
BASOPHILS NFR BLD AUTO: 0.8 % (ref 0–1.5)
BUN SERPL-MCNC: 25 MG/DL (ref 8–23)
BUN/CREAT SERPL: 20.8 (ref 7–25)
CALCIUM SPEC-SCNC: 7.7 MG/DL (ref 8.2–9.6)
CHLORIDE SERPL-SCNC: 111 MMOL/L (ref 98–107)
CO2 SERPL-SCNC: 22.2 MMOL/L (ref 22–29)
CREAT SERPL-MCNC: 1.2 MG/DL (ref 0.76–1.27)
DEPRECATED RDW RBC AUTO: 45.7 FL (ref 37–54)
EGFRCR SERPLBLD CKD-EPI 2021: 55.7 ML/MIN/1.73
EOSINOPHIL # BLD AUTO: 0.34 10*3/MM3 (ref 0–0.4)
EOSINOPHIL NFR BLD AUTO: 2.8 % (ref 0.3–6.2)
ERYTHROCYTE [DISTWIDTH] IN BLOOD BY AUTOMATED COUNT: 13.3 % (ref 12.3–15.4)
GLUCOSE SERPL-MCNC: 97 MG/DL (ref 65–99)
HCT VFR BLD AUTO: 28 % (ref 37.5–51)
HGB BLD-MCNC: 9.1 G/DL (ref 13–17.7)
IMM GRANULOCYTES # BLD AUTO: 0.15 10*3/MM3 (ref 0–0.05)
IMM GRANULOCYTES NFR BLD AUTO: 1.3 % (ref 0–0.5)
LYMPHOCYTES # BLD AUTO: 1.77 10*3/MM3 (ref 0.7–3.1)
LYMPHOCYTES NFR BLD AUTO: 14.8 % (ref 19.6–45.3)
MCH RBC QN AUTO: 31.1 PG (ref 26.6–33)
MCHC RBC AUTO-ENTMCNC: 32.5 G/DL (ref 31.5–35.7)
MCV RBC AUTO: 95.6 FL (ref 79–97)
MONOCYTES # BLD AUTO: 1.39 10*3/MM3 (ref 0.1–0.9)
MONOCYTES NFR BLD AUTO: 11.6 % (ref 5–12)
NEUTROPHILS NFR BLD AUTO: 68.7 % (ref 42.7–76)
NEUTROPHILS NFR BLD AUTO: 8.24 10*3/MM3 (ref 1.7–7)
NRBC BLD AUTO-RTO: 0 /100 WBC (ref 0–0.2)
PLATELET # BLD AUTO: 288 10*3/MM3 (ref 140–450)
PMV BLD AUTO: 11 FL (ref 6–12)
POTASSIUM SERPL-SCNC: 3.6 MMOL/L (ref 3.5–5.2)
QT INTERVAL: 343 MS
RBC # BLD AUTO: 2.93 10*6/MM3 (ref 4.14–5.8)
SODIUM SERPL-SCNC: 139 MMOL/L (ref 136–145)
WBC NRBC COR # BLD: 11.98 10*3/MM3 (ref 3.4–10.8)

## 2023-02-04 PROCEDURE — 85025 COMPLETE CBC W/AUTO DIFF WBC: CPT | Performed by: ORTHOPAEDIC SURGERY

## 2023-02-04 PROCEDURE — 80048 BASIC METABOLIC PNL TOTAL CA: CPT | Performed by: ORTHOPAEDIC SURGERY

## 2023-02-04 PROCEDURE — 25010000002 ENOXAPARIN PER 10 MG: Performed by: ORTHOPAEDIC SURGERY

## 2023-02-04 PROCEDURE — 36415 COLL VENOUS BLD VENIPUNCTURE: CPT | Performed by: ORTHOPAEDIC SURGERY

## 2023-02-04 PROCEDURE — 93010 ELECTROCARDIOGRAM REPORT: CPT | Performed by: INTERNAL MEDICINE

## 2023-02-04 PROCEDURE — 93005 ELECTROCARDIOGRAM TRACING: CPT | Performed by: HOSPITALIST

## 2023-02-04 RX ORDER — TAMSULOSIN HYDROCHLORIDE 0.4 MG/1
0.4 CAPSULE ORAL DAILY
Status: DISCONTINUED | OUTPATIENT
Start: 2023-02-05 | End: 2023-02-08 | Stop reason: HOSPADM

## 2023-02-04 RX ADMIN — Medication 250 MG: at 21:53

## 2023-02-04 RX ADMIN — METOPROLOL TARTRATE 12.5 MG: 25 TABLET, FILM COATED ORAL at 16:18

## 2023-02-04 RX ADMIN — DEXTROSE AND SODIUM CHLORIDE 75 ML/HR: 5; 450 INJECTION, SOLUTION INTRAVENOUS at 21:54

## 2023-02-04 RX ADMIN — METOPROLOL TARTRATE 12.5 MG: 25 TABLET, FILM COATED ORAL at 21:52

## 2023-02-04 RX ADMIN — FAMOTIDINE 20 MG: 20 TABLET, FILM COATED ORAL at 21:54

## 2023-02-04 RX ADMIN — LEVOTHYROXINE SODIUM 75 MCG: 0.07 TABLET ORAL at 06:50

## 2023-02-04 RX ADMIN — FINASTERIDE 5 MG: 5 TABLET, FILM COATED ORAL at 21:54

## 2023-02-04 RX ADMIN — ENOXAPARIN SODIUM 30 MG: 30 INJECTION SUBCUTANEOUS at 08:25

## 2023-02-04 RX ADMIN — Medication 10 ML: at 08:25

## 2023-02-04 RX ADMIN — Medication 250 MG: at 08:25

## 2023-02-04 RX ADMIN — DOCUSATE SODIUM 50MG AND SENNOSIDES 8.6MG 2 TABLET: 8.6; 5 TABLET, FILM COATED ORAL at 08:25

## 2023-02-04 NOTE — PLAN OF CARE
Goal Outcome Evaluation:   Patient has been much more oriented and not as lethargic today in comparison to past several days. Patient's speech is more clear, has been cooperative in care, and was able to feed himself. Patient had three bowel movements throughout the shift and was able to stand and pivot to the bedside commode for two of them. Assist x2 with a walker, gait is still very unsteady. Patient has been unable to void urine, straight caths have been utilized multiple times, urine was bloody on most recent straight cath. Urology has been consulted and an indwelling catheter is being placed. Patient was tachycardic earlier in shift but metoprolol dosing has been adjusted and now vital signs are stable. Will continue to monitor.

## 2023-02-04 NOTE — PROGRESS NOTES
Name: Lavelle Soliman ADMIT: 2023   : 1927  PCP: Rafita Colunga MD    MRN: [id number] LOS: 11 days   AGE/SEX: 89 y.o. male  ROOM: [facility]     Subjective   Subjective    Patient looks better, alert and talking.  Still having urine retention and apparently having gross hematuria now.    He has been tachycardic for most of the afternoon.  EKG personally reviewed and shows A. fib with RVR.  Lopressor has been held the last several doses due to BP hold parameters.    Review of Systems     Objective   Objective   Vital Signs  Temp:  [96.4 °F (35.8 °C)-97.6 °F (36.4 °C)] 97.6 °F (36.4 °C)  Heart Rate:  [] 129  Resp:  [16-20] 20  BP: ()/(47-71) 108/71  SpO2:  [97 %-99 %] 97 %  on  Flow (L/min):  [1-2] 1;   Device (Oxygen Therapy): nasal cannula  Body mass index is 25.06 kg/m².  Physical Exam  Vitals reviewed.   Constitutional:       General: He is not in acute distress.     Appearance: He is well-developed. He is ill-appearing (frail).   HENT:      Head: Normocephalic and atraumatic.   Eyes:      General: No scleral icterus.  Neck:      Vascular: No JVD.   Cardiovascular:      Rate and Rhythm: Tachycardia present. Rhythm irregular.      Heart sounds: No murmur heard.  Pulmonary:      Effort: Pulmonary effort is normal. No respiratory distress.      Breath sounds: No wheezing.   Abdominal:      General: Bowel sounds are normal. There is no distension.      Palpations: Abdomen is soft.      Tenderness: There is no abdominal tenderness.   Musculoskeletal:      Right lower leg: No edema.      Left lower leg: No edema.   Skin:     General: Skin is warm and dry.      Findings: No rash.   Neurological:      Mental Status: He is alert. He is disoriented.       Results Review     I reviewed the patient's new clinical results.  Results from last 7 days   Lab Units 23  0533 23  0449 23  0450 23  0448   WBC 10*3/mm3 11.98* 12.64* 12.19* 10.42   HEMOGLOBIN g/dL 9.1* 9.1* 8.5* 8.5*    PLATELETS 10*3/mm3 288 266 235 240     Results from last 7 days   Lab Units 02/04/23  0533 02/03/23 0449 02/02/23 0450 02/01/23 0448   SODIUM mmol/L 139 142 139 146*   POTASSIUM mmol/L 3.6 4.0 3.5 3.5   CHLORIDE mmol/L 111* 112* 109* 116*   CO2 mmol/L 22.2 24.0 24.4 24.6   BUN mg/dL 25* 23 20 24*   CREATININE mg/dL 1.20 1.05 1.04 1.06   GLUCOSE mg/dL 97 95 122* 105*   EGFR mL/min/1.73 55.7* 65.4 66.1 64.6     Results from last 7 days   Lab Units 01/31/23  0519 01/29/23  0552   ALBUMIN g/dL 2.5* 2.7*     Results from last 7 days   Lab Units 02/04/23  0533 02/03/23 0449 02/02/23 0450 02/01/23 0448 01/31/23 0519 01/30/23 0426 01/29/23  0552   CALCIUM mg/dL 7.7* 8.0* 7.9* 7.7* 7.7*   < > 8.0*   ALBUMIN g/dL  --   --   --   --  2.5*  --  2.7*   MAGNESIUM mg/dL  --   --   --   --  2.4*  --   --    PHOSPHORUS mg/dL  --   --   --   --  3.0  --  2.3*    < > = values in this interval not displayed.       No results found for: HGBA1C, POCGLU    No radiology results for the last day  I have personally reviewed all medications:  Scheduled Medications  enoxaparin, 30 mg, Subcutaneous, Daily  famotidine, 20 mg, Oral, Nightly  finasteride, 5 mg, Oral, Nightly  levothyroxine, 75 mcg, Oral, Q AM  metoprolol tartrate, 12.5 mg, Oral, BID  potassium chloride, 40 mEq, Oral, Once  saccharomyces boulardii, 250 mg, Oral, BID  senna-docusate sodium, 2 tablet, Oral, BID  sodium chloride, 10 mL, Intravenous, Q12H  [START ON 2/5/2023] tamsulosin, 0.4 mg, Oral, Daily    Infusions  dextrose 5 % and sodium chloride 0.45 %, 75 mL/hr, Last Rate: 75 mL/hr (02/04/23 0916)    Diet  Diet: Regular/House Diet; No Straw, Feeding Assistance - Nursing; Texture: Pureed (NDD 1); Fluid Consistency: Honey Thick    I have personally reviewed:  [x]  Laboratory   [x]  Microbiology   [x]  Radiology   [x]  EKG/Telemetry  [x]  Cardiology/Vascular   []  Pathology    [x]  Records       Assessment/Plan     Active Hospital Problems    Diagnosis  POA   • **Closed  fracture of left hip, initial encounter ([facility]) [S72.002A]  Yes   • Moderate malnutrition ([facility]) [E44.0]  Yes   • Postoperative anemia due to acute blood loss [D62]  Yes   • Paroxysmal atrial fibrillation ([facility]) [I48.0]  Yes   • 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       89 y.o. male admitted with hip fracture status post repair 1/25 now dealing with severe postoperative confusion and delirium    PAF, currently with RVR  - We will decrease dose of Lopressor and allow more liberal hold parameters (change SBP parameter to 95 instead of 110)  - Consider telemetry transfer and use of digoxin if this does not improve things  - Not on full AC but given hematuria will hold off for now.  Continue Lovenox at low-dose    Hematuria with ongoing urine retention.  Placing Carrasquillo catheter now.  Urology to consult  - Change to tamsulosin rather than terazosin given issues with hypotension.    Delirium-stable/improved.  Has not received any prn meds and is more alert today.  Continue to reorient frequently.  Would like to avoid telemetry given more propensity toward delirium, more wires to pull etc.  May not have a choice of heart rate does not respond to above intervention.    Continue gentle hydration given lowish BP and above issues.  Leukocytosis drifting downward    Continue Lovenox 30 mg for DVT prophylaxis but hold if bleeding persists  Disposition uncertain at this point  D/w CHANDRAKANT Chavez MD  [facility]  02/04/23  15:19 EST

## 2023-02-04 NOTE — NURSING NOTE
At start of shift pt was extremely lethargic, not answering questions, not able to take PO medications. Ambulated to chair with max assist x2 from PT, slept in chair through lunch, D5 1/2 NS started per order due to reduced intake. Bladder scanned in afternoon, 300cc noted.  Pt started responding more to stimuli shortly before dinner, better intake at dinner. By end of shift pt is drowsy but wakes easily, answers all orientation correctly. WCM.

## 2023-02-04 NOTE — PLAN OF CARE
Goal Outcome Evaluation:  Plan of Care Reviewed With: daughter, family           Outcome Evaluation: Pt slow to respond, Aspiration precautions maintained. PO meds given with thick liquids. Maintained on O2 2L NC. BP monitored for trend of hypotension.

## 2023-02-05 LAB
ANION GAP SERPL CALCULATED.3IONS-SCNC: 6.3 MMOL/L (ref 5–15)
BASOPHILS # BLD AUTO: 0.06 10*3/MM3 (ref 0–0.2)
BASOPHILS NFR BLD AUTO: 0.5 % (ref 0–1.5)
BUN SERPL-MCNC: 21 MG/DL (ref 8–23)
BUN/CREAT SERPL: 19.1 (ref 7–25)
CALCIUM SPEC-SCNC: 7.7 MG/DL (ref 8.2–9.6)
CHLORIDE SERPL-SCNC: 110 MMOL/L (ref 98–107)
CO2 SERPL-SCNC: 23.7 MMOL/L (ref 22–29)
CREAT SERPL-MCNC: 1.1 MG/DL (ref 0.76–1.27)
DEPRECATED RDW RBC AUTO: 45.8 FL (ref 37–54)
EGFRCR SERPLBLD CKD-EPI 2021: 61.8 ML/MIN/1.73
EOSINOPHIL # BLD AUTO: 0.23 10*3/MM3 (ref 0–0.4)
EOSINOPHIL NFR BLD AUTO: 1.8 % (ref 0.3–6.2)
ERYTHROCYTE [DISTWIDTH] IN BLOOD BY AUTOMATED COUNT: 13.4 % (ref 12.3–15.4)
GLUCOSE SERPL-MCNC: 97 MG/DL (ref 65–99)
HCT VFR BLD AUTO: 24.5 % (ref 37.5–51)
HGB BLD-MCNC: 8.1 G/DL (ref 13–17.7)
IMM GRANULOCYTES # BLD AUTO: 0.13 10*3/MM3 (ref 0–0.05)
IMM GRANULOCYTES NFR BLD AUTO: 1 % (ref 0–0.5)
LYMPHOCYTES # BLD AUTO: 2.09 10*3/MM3 (ref 0.7–3.1)
LYMPHOCYTES NFR BLD AUTO: 16.1 % (ref 19.6–45.3)
MCH RBC QN AUTO: 31.5 PG (ref 26.6–33)
MCHC RBC AUTO-ENTMCNC: 33.1 G/DL (ref 31.5–35.7)
MCV RBC AUTO: 95.3 FL (ref 79–97)
MONOCYTES # BLD AUTO: 1.32 10*3/MM3 (ref 0.1–0.9)
MONOCYTES NFR BLD AUTO: 10.1 % (ref 5–12)
NEUTROPHILS NFR BLD AUTO: 70.5 % (ref 42.7–76)
NEUTROPHILS NFR BLD AUTO: 9.19 10*3/MM3 (ref 1.7–7)
NRBC BLD AUTO-RTO: 0 /100 WBC (ref 0–0.2)
PLATELET # BLD AUTO: 258 10*3/MM3 (ref 140–450)
PMV BLD AUTO: 10.8 FL (ref 6–12)
POTASSIUM SERPL-SCNC: 3.4 MMOL/L (ref 3.5–5.2)
RBC # BLD AUTO: 2.57 10*6/MM3 (ref 4.14–5.8)
SODIUM SERPL-SCNC: 140 MMOL/L (ref 136–145)
WBC NRBC COR # BLD: 13.02 10*3/MM3 (ref 3.4–10.8)

## 2023-02-05 PROCEDURE — 25010000002 ENOXAPARIN PER 10 MG: Performed by: HOSPITALIST

## 2023-02-05 PROCEDURE — 80048 BASIC METABOLIC PNL TOTAL CA: CPT | Performed by: ORTHOPAEDIC SURGERY

## 2023-02-05 PROCEDURE — 85025 COMPLETE CBC W/AUTO DIFF WBC: CPT | Performed by: ORTHOPAEDIC SURGERY

## 2023-02-05 RX ORDER — POTASSIUM CHLORIDE 750 MG/1
40 TABLET, FILM COATED, EXTENDED RELEASE ORAL ONCE
Status: COMPLETED | OUTPATIENT
Start: 2023-02-05 | End: 2023-02-05

## 2023-02-05 RX ADMIN — Medication 10 ML: at 21:10

## 2023-02-05 RX ADMIN — METOPROLOL TARTRATE 12.5 MG: 25 TABLET, FILM COATED ORAL at 21:02

## 2023-02-05 RX ADMIN — ENOXAPARIN SODIUM 30 MG: 30 INJECTION SUBCUTANEOUS at 09:08

## 2023-02-05 RX ADMIN — Medication 250 MG: at 21:10

## 2023-02-05 RX ADMIN — FINASTERIDE 5 MG: 5 TABLET, FILM COATED ORAL at 20:59

## 2023-02-05 RX ADMIN — Medication 10 ML: at 09:11

## 2023-02-05 RX ADMIN — POTASSIUM CHLORIDE 40 MEQ: 750 TABLET, EXTENDED RELEASE ORAL at 17:03

## 2023-02-05 RX ADMIN — LEVOTHYROXINE SODIUM 75 MCG: 0.07 TABLET ORAL at 06:31

## 2023-02-05 RX ADMIN — METOPROLOL TARTRATE 12.5 MG: 25 TABLET, FILM COATED ORAL at 09:30

## 2023-02-05 RX ADMIN — Medication 250 MG: at 09:09

## 2023-02-05 RX ADMIN — FAMOTIDINE 20 MG: 20 TABLET, FILM COATED ORAL at 20:59

## 2023-02-05 RX ADMIN — DOCUSATE SODIUM 50MG AND SENNOSIDES 8.6MG 2 TABLET: 8.6; 5 TABLET, FILM COATED ORAL at 09:09

## 2023-02-05 NOTE — PROGRESS NOTES
Name: Lavelle Soliman ADMIT: 2023   : 1927  PCP: Rafita Colunga MD    MRN: [id number] LOS: 12 days   AGE/SEX: 89 y.o. male  ROOM: [facility]     Subjective   Subjective    Patient looks better, alert and talking.  Carrasquillo catheter in place with clear urine.  Did not require any prn for agitation    Review of Systems     Objective   Objective   Vital Signs  Temp:  [97.1 °F (36.2 °C)-98.2 °F (36.8 °C)] 98.2 °F (36.8 °C)  Heart Rate:  [67-94] 67  Resp:  [16-18] 16  BP: (102-114)/(53-70) 110/55  SpO2:  [93 %-95 %] 94 %  on  Flow (L/min):  [1] 1;   Device (Oxygen Therapy): room air  Body mass index is 25.06 kg/m².  Physical Exam  Vitals reviewed.   Constitutional:       General: He is not in acute distress.     Appearance: He is well-developed. He is ill-appearing (frail).   HENT:      Head: Normocephalic and atraumatic.   Eyes:      General: No scleral icterus.  Neck:      Vascular: No JVD.   Cardiovascular:      Rate and Rhythm: Normal rate. Rhythm irregular.      Heart sounds: No murmur heard.  Pulmonary:      Effort: Pulmonary effort is normal. No respiratory distress.      Breath sounds: No wheezing.   Abdominal:      General: Bowel sounds are normal. There is no distension.      Palpations: Abdomen is soft.      Tenderness: There is no abdominal tenderness.   Musculoskeletal:      Right lower leg: No edema.      Left lower leg: No edema.   Skin:     General: Skin is warm and dry.      Findings: No rash.   Neurological:      Mental Status: He is alert. He is disoriented.       Results Review     I reviewed the patient's new clinical results.  Results from last 7 days   Lab Units 23  0533 23  0449 23  0450   WBC 10*3/mm3 13.02* 11.98* 12.64* 12.19*   HEMOGLOBIN g/dL 8.1* 9.1* 9.1* 8.5*   PLATELETS 10*3/mm3 258 288 266 235     Results from last 7 days   Lab Units 23  0416 23  0533 23  0449 23  0450   SODIUM mmol/L 140 139 142 139   POTASSIUM  mmol/L 3.4* 3.6 4.0 3.5   CHLORIDE mmol/L 110* 111* 112* 109*   CO2 mmol/L 23.7 22.2 24.0 24.4   BUN mg/dL 21 25* 23 20   CREATININE mg/dL 1.10 1.20 1.05 1.04   GLUCOSE mg/dL 97 97 95 122*   EGFR mL/min/1.73 61.8 55.7* 65.4 66.1     Results from last 7 days   Lab Units 01/31/23  0519   ALBUMIN g/dL 2.5*     Results from last 7 days   Lab Units 02/05/23  0416 02/04/23  0533 02/03/23  0449 02/02/23  0450 02/01/23 0448 01/31/23  0519   CALCIUM mg/dL 7.7* 7.7* 8.0* 7.9*   < > 7.7*   ALBUMIN g/dL  --   --   --   --   --  2.5*   MAGNESIUM mg/dL  --   --   --   --   --  2.4*   PHOSPHORUS mg/dL  --   --   --   --   --  3.0    < > = values in this interval not displayed.       No results found for: HGBA1C, POCGLU    No radiology results for the last day  I have personally reviewed all medications:  Scheduled Medications  enoxaparin, 30 mg, Subcutaneous, Daily  famotidine, 20 mg, Oral, Nightly  finasteride, 5 mg, Oral, Nightly  levothyroxine, 75 mcg, Oral, Q AM  metoprolol tartrate, 12.5 mg, Oral, BID  potassium chloride, 40 mEq, Oral, Once  saccharomyces boulardii, 250 mg, Oral, BID  senna-docusate sodium, 2 tablet, Oral, BID  sodium chloride, 10 mL, Intravenous, Q12H  tamsulosin, 0.4 mg, Oral, Daily    Infusions  dextrose 5 % and sodium chloride 0.45 %, 75 mL/hr, Last Rate: 75 mL/hr (02/04/23 2154)    Diet  Diet: Regular/House Diet; No Straw, Feeding Assistance - Nursing; Texture: Pureed (NDD 1); Fluid Consistency: Honey Thick    I have personally reviewed:  [x]  Laboratory   [x]  Microbiology   [x]  Radiology   [x]  EKG/Telemetry  [x]  Cardiology/Vascular   []  Pathology    [x]  Records       Assessment/Plan     Active Hospital Problems    Diagnosis  POA   • **Closed fracture of left hip, initial encounter ([facility]) [S72.002A]  Yes   • Moderate malnutrition ([facility]) [E44.0]  Yes   • Postoperative anemia due to acute blood loss [D62]  Yes   • Paroxysmal atrial fibrillation ([facility]) [I48.0]  Yes   • BPH with obstruction/lower urinary  tract symptoms [N40.1, N13.8]  Yes   • Hypothyroidism [E03.9]  Yes   • Hyperlipidemia [E78.5]  Yes   • Stage 3a chronic kidney disease (HCC) [N18.31]  Yes      Resolved Hospital Problems   No resolved problems to display.       89 y.o. male admitted with hip fracture status post repair 1/25 now dealing with severe postoperative confusion and delirium    PAF-briefly in RVR but responded well to Lopressor  - Continue Lopressor 12.5 twice daily with hold parameters  - Not on full AC but given hematuria will hold off for now.  Continue Lovenox at low-dose.  Will need to discuss with family    Hematuria resolved.  Continue Carrasquillo for urine retention.  Appreciate recommendations from urology.  - Continue tamsulosin instead of terazosin    Delirium-stable/improved.  Has not received any prn meds for days now.  Continue to reorient frequently and take measures to prevent delirium.    We will DC IV fluids  Discussed with granddaughter at bedside.  He is eating some    Continue Lovenox 30 mg for DVT prophylaxis   Disposition: Looking stable for DC to SNF when bed available.      Mir Chavez MD  [facility]  02/05/23  16:09 EST

## 2023-02-05 NOTE — PROGRESS NOTES
Lavelle VÁSQUEZ Jourdan  [id number]    Urology Consult note    Referring provider: Mir Chavez*    CC/reason for consult: urinary retention    HPI: Pt found down 1/24/23, mechanical fall, L hip fx. IM nailng 1/25/23  Pt unable to void 2/04/23, multiple straight catheter failures,  Indwelling catheter placed    ROS:   Review of Systems -   Psych: no depression, no anxiety  HEENT: normal vision, no runny nose  Pulm: no shortness of air, no cough/wheeze  Endocrine: no excess thirst, no polyuria.    Cardiovascular ROS: no chest pain or dyspnea on exertion  Gastrointestinal ROS: no abdominal pain, change in bowel habits, or black or bloody stools  Genito-Urinary ROS: no abd or pelvic pain  Musculoskeletal ROS: no chronic muscle or joint aches, normal range of motion  Neurological ROS: no lateralizing weakness, no chronic HA  Dermatological ROS: no rashes, no skin lesions.       Past Medical History:   Diagnosis Date   • Benign essential hypertension    • Cataract    • Disease of thyroid gland    • HL (hearing loss)    • Hyperlipidemia    • Hypothyroidism    • Paroxysmal atrial fibrillation (HCC)    • Visual impairment S       Past Surgical History:   Procedure Laterality Date   • EYE SURGERY     • HERNIA REPAIR     • HIP INTERTROCHANTERIC NAILING Left 1/25/2023    Procedure: LT. HIP INTERTROCHANTERIC NAILING;  Surgeon: Leland Hair MD;  Location: [facility];  Service: Orthopedics;  Laterality: Left;       No current facility-administered medications on file prior to encounter.     Current Outpatient Medications on File Prior to Encounter   Medication Sig Dispense Refill   • famotidine (PEPCID) 20 MG tablet TAKE ONE TABLET BY MOUTH DAILY (Patient taking differently: 20 mg Every Night.) 90 tablet 0   • finasteride (PROSCAR) 5 MG tablet Take 1 tablet by mouth Daily. (Patient taking differently: Take 5 mg by mouth Every Night.) 30 tablet 0   • hydroCHLOROthiazide (HYDRODIURIL) 25 MG tablet Take 12.5 mg by mouth  Daily.     • levothyroxine (SYNTHROID, LEVOTHROID) 75 MCG tablet Take 75 mcg by mouth Daily.     • terazosin (HYTRIN) 5 MG capsule 5 mg Every Night.         Current Facility-Administered Medications   Medication Dose Route Frequency Provider Last Rate Last Admin   • acetaminophen (TYLENOL) tablet 650 mg  650 mg Oral Q4H PRN Leland Hair MD   650 mg at 02/03/23 1815    Or   • acetaminophen (TYLENOL) 160 MG/5ML solution 650 mg  650 mg Oral Q4H PRN Leland Hair MD        Or   • acetaminophen (TYLENOL) suppository 650 mg  650 mg Rectal Q4H PRN Leland Hair MD       • acetaminophen (TYLENOL) tablet 500 mg  500 mg Oral Q6H PRN Leland Hair MD   500 mg at 02/01/23 1807   • sennosides-docusate (PERICOLACE) 8.6-50 MG per tablet 2 tablet  2 tablet Oral BID Leland Hair MD   2 tablet at 02/04/23 0825    And   • polyethylene glycol (MIRALAX) packet 17 g  17 g Oral Daily PRN Leland Hair MD        And   • bisacodyl (DULCOLAX) EC tablet 5 mg  5 mg Oral Daily PRN Leland Hair MD        And   • bisacodyl (DULCOLAX) suppository 10 mg  10 mg Rectal Daily PRN Leland Hair MD       • calcium carbonate (TUMS) chewable tablet 500 mg (200 mg elemental)  2 tablet Oral TID PRN Leland Hair MD       • dextrose 5 % and sodium chloride 0.45 % infusion  75 mL/hr Intravenous Continuous Mir Chavez MD 75 mL/hr at 02/04/23 2154 75 mL/hr at 02/04/23 2154   • Enoxaparin Sodium (LOVENOX) syringe 30 mg  30 mg Subcutaneous Daily Mir Chavez MD   30 mg at 02/04/23 0825   • famotidine (PEPCID) tablet 20 mg  20 mg Oral Nightly Leland Hair MD   20 mg at 02/04/23 2154   • finasteride (PROSCAR) tablet 5 mg  5 mg Oral Nightly Leland Hair MD   5 mg at 02/04/23 2154   • haloperidol (HALDOL) tablet 2 mg  2 mg Oral Q6H PRN Michael Tovar [place] III, MD       • levothyroxine (SYNTHROID, LEVOTHROID) tablet 75 mcg  75 mcg Oral Q AM Leland Hair MD   75 mcg at 02/05/23 0631   • melatonin tablet 5 mg  5 mg  Oral Nightly PRN Leland Hair MD   5 mg at 23   • metoprolol tartrate (LOPRESSOR) tablet 12.5 mg  12.5 mg Oral BID Mir Chavez MD   12.5 mg at 23   • naloxone (NARCAN) injection 0.4 mg  0.4 mg Intravenous Q5 Min PRN Leland Hair MD       • ondansetron (ZOFRAN) tablet 4 mg  4 mg Oral Q6H PRN Leland Hair MD        Or   • ondansetron (ZOFRAN) injection 4 mg  4 mg Intravenous Q6H PRN Leland Hair MD       • potassium chloride (KLOR-CON) packet 40 mEq  40 mEq Oral Once Salinas Mathis MD       • saccharomyces boulardii (FLORASTOR) capsule 250 mg  250 mg Oral BID Leland Hair MD   250 mg at 23   • sodium chloride 0.9 % flush 10 mL  10 mL Intravenous PRN Leland Hair MD       • sodium chloride 0.9 % flush 10 mL  10 mL Intravenous Q12H Leland Hair MD   10 mL at 23   • sodium chloride 0.9 % flush 10 mL  10 mL Intravenous PRN Leland Hair MD       • sodium chloride 0.9 % infusion 40 mL  40 mL Intravenous PRN Leland Hair MD       • tamsulosin (FLOMAX) 24 hr capsule 0.4 mg  0.4 mg Oral Daily Mir Chavez MD           Allergies   Allergen Reactions   • Latex Rash       Social History     Socioeconomic History   • Marital status:    Tobacco Use   • Smoking status: Former     Types: Pipe     Start date: 1955     Quit date: 1970     Years since quittin.1   • Smokeless tobacco: Never   Vaping Use   • Vaping Use: Never used   Substance and Sexual Activity   • Alcohol use: Yes     Alcohol/week: 1.0 standard drink     Types: 1 Shots of liquor per week     Comment: occ   • Drug use: Never   • Sexual activity: Not Currently     Partners: Female     Birth control/protection: None       Family History   Problem Relation Age of Onset   • No Known Problems Mother    • No Known Problems Father    • Hyperlipidemia Brother    • Hearing loss Paternal Aunt    • Vision loss Paternal Aunt    • No Known Problems Maternal Grandmother     • No Known Problems Maternal Grandfather    • No Known Problems Paternal Grandmother    • No Known Problems Paternal Grandfather    • Malig Hyperthermia Neg Hx      Exam:   Gen: Afeb, VSS, NAD  HEENT: NCAT, normal conjunctivae  Psych: normal mood, normal affect  Resp: unlabored breathing  CV: RRR, no c/c/e  Abd: soft/nt/nd  Ext: moves all extremities well, no calf tenderness  Skin: no rashes or lesions on exposed skin.   : cristobal patent clear urine    Labs and imaging reviewed  Pertinent in HPI    Assessment:  Stable urinary retention wiith 16F indwelling catheter  Cr 1.1 2/05/23    PLAN:     Continue cristobal catheter  Attempt voiding trial 7/07/23  - Bladder scan after q shift, replace with scans > 300cc    Fernando Mann, APRN   [facility]  [phone]

## 2023-02-05 NOTE — PLAN OF CARE
Goal Outcome Evaluation:  Plan of Care Reviewed With: patient        Progress: no change  Outcome Evaluation: VSS, pt cont voiding per f/c. Cont on aspiration precaution and po meds given crushed with honey thickin liquid. Wound dressing c/d/i. Educated on Medication management, bp meds and monitorring.

## 2023-02-06 LAB
ANION GAP SERPL CALCULATED.3IONS-SCNC: 6.3 MMOL/L (ref 5–15)
BASOPHILS # BLD AUTO: 0.08 10*3/MM3 (ref 0–0.2)
BASOPHILS NFR BLD AUTO: 0.7 % (ref 0–1.5)
BUN SERPL-MCNC: 16 MG/DL (ref 8–23)
BUN/CREAT SERPL: 14.5 (ref 7–25)
CALCIUM SPEC-SCNC: 7.9 MG/DL (ref 8.2–9.6)
CHLORIDE SERPL-SCNC: 111 MMOL/L (ref 98–107)
CO2 SERPL-SCNC: 23.7 MMOL/L (ref 22–29)
CREAT SERPL-MCNC: 1.1 MG/DL (ref 0.76–1.27)
DEPRECATED RDW RBC AUTO: 47.3 FL (ref 37–54)
EGFRCR SERPLBLD CKD-EPI 2021: 61.8 ML/MIN/1.73
EOSINOPHIL # BLD AUTO: 0.23 10*3/MM3 (ref 0–0.4)
EOSINOPHIL NFR BLD AUTO: 1.9 % (ref 0.3–6.2)
ERYTHROCYTE [DISTWIDTH] IN BLOOD BY AUTOMATED COUNT: 13.4 % (ref 12.3–15.4)
GLUCOSE SERPL-MCNC: 91 MG/DL (ref 65–99)
HCT VFR BLD AUTO: 26 % (ref 37.5–51)
HGB BLD-MCNC: 8.5 G/DL (ref 13–17.7)
IMM GRANULOCYTES # BLD AUTO: 0.12 10*3/MM3 (ref 0–0.05)
IMM GRANULOCYTES NFR BLD AUTO: 1 % (ref 0–0.5)
LYMPHOCYTES # BLD AUTO: 2.38 10*3/MM3 (ref 0.7–3.1)
LYMPHOCYTES NFR BLD AUTO: 20 % (ref 19.6–45.3)
MCH RBC QN AUTO: 31.7 PG (ref 26.6–33)
MCHC RBC AUTO-ENTMCNC: 32.7 G/DL (ref 31.5–35.7)
MCV RBC AUTO: 97 FL (ref 79–97)
MONOCYTES # BLD AUTO: 1.2 10*3/MM3 (ref 0.1–0.9)
MONOCYTES NFR BLD AUTO: 10.1 % (ref 5–12)
NEUTROPHILS NFR BLD AUTO: 66.3 % (ref 42.7–76)
NEUTROPHILS NFR BLD AUTO: 7.87 10*3/MM3 (ref 1.7–7)
NRBC BLD AUTO-RTO: 0 /100 WBC (ref 0–0.2)
PLATELET # BLD AUTO: 272 10*3/MM3 (ref 140–450)
PMV BLD AUTO: 10.8 FL (ref 6–12)
POTASSIUM SERPL-SCNC: 4 MMOL/L (ref 3.5–5.2)
RBC # BLD AUTO: 2.68 10*6/MM3 (ref 4.14–5.8)
SODIUM SERPL-SCNC: 141 MMOL/L (ref 136–145)
WBC NRBC COR # BLD: 11.88 10*3/MM3 (ref 3.4–10.8)

## 2023-02-06 PROCEDURE — 85025 COMPLETE CBC W/AUTO DIFF WBC: CPT | Performed by: ORTHOPAEDIC SURGERY

## 2023-02-06 PROCEDURE — 97110 THERAPEUTIC EXERCISES: CPT

## 2023-02-06 PROCEDURE — 80048 BASIC METABOLIC PNL TOTAL CA: CPT | Performed by: ORTHOPAEDIC SURGERY

## 2023-02-06 PROCEDURE — 25010000002 ENOXAPARIN PER 10 MG: Performed by: HOSPITALIST

## 2023-02-06 PROCEDURE — 92526 ORAL FUNCTION THERAPY: CPT

## 2023-02-06 PROCEDURE — 36415 COLL VENOUS BLD VENIPUNCTURE: CPT | Performed by: ORTHOPAEDIC SURGERY

## 2023-02-06 PROCEDURE — 97530 THERAPEUTIC ACTIVITIES: CPT

## 2023-02-06 RX ADMIN — FINASTERIDE 5 MG: 5 TABLET, FILM COATED ORAL at 22:33

## 2023-02-06 RX ADMIN — Medication 10 ML: at 09:25

## 2023-02-06 RX ADMIN — DOCUSATE SODIUM 50MG AND SENNOSIDES 8.6MG 2 TABLET: 8.6; 5 TABLET, FILM COATED ORAL at 09:25

## 2023-02-06 RX ADMIN — FAMOTIDINE 20 MG: 20 TABLET, FILM COATED ORAL at 22:34

## 2023-02-06 RX ADMIN — METOPROLOL TARTRATE 12.5 MG: 25 TABLET, FILM COATED ORAL at 09:25

## 2023-02-06 RX ADMIN — TAMSULOSIN HYDROCHLORIDE 0.4 MG: 0.4 CAPSULE ORAL at 09:24

## 2023-02-06 RX ADMIN — Medication 250 MG: at 09:25

## 2023-02-06 RX ADMIN — Medication 10 ML: at 22:36

## 2023-02-06 RX ADMIN — ENOXAPARIN SODIUM 30 MG: 30 INJECTION SUBCUTANEOUS at 09:25

## 2023-02-06 RX ADMIN — METOPROLOL TARTRATE 12.5 MG: 25 TABLET, FILM COATED ORAL at 22:36

## 2023-02-06 RX ADMIN — LEVOTHYROXINE SODIUM 75 MCG: 0.07 TABLET ORAL at 05:22

## 2023-02-06 RX ADMIN — DOCUSATE SODIUM 50MG AND SENNOSIDES 8.6MG 2 TABLET: 8.6; 5 TABLET, FILM COATED ORAL at 22:34

## 2023-02-06 RX ADMIN — Medication 250 MG: at 22:33

## 2023-02-06 NOTE — PLAN OF CARE
Goal Outcome Evaluation:               Re-eval of swallow completed. Recommend upgrade to mechanical soft no mixed and nectar thick liquids- no straws. Meds whole/crushed with puree as tolerated. Recommend supervision/assist with PO, slow rate, alternate liquids/solids. SLP recommends VFSS to r/o aspiration and for possible upgrade.

## 2023-02-06 NOTE — PLAN OF CARE
Goal Outcome Evaluation:  Plan of Care Reviewed With: patient        Progress: improving  Outcome Evaluation: Patient vital signs stable, voiding well with cristobal catheter, alert and oriented to self, place and situation, wound clean, dry and intact.  continue on aspiration precaution with honey thickened liquid and puree. No complaint made, checked patient as per protocol, needs attended.

## 2023-02-06 NOTE — PROGRESS NOTES
The patient is in good spirits and oriented x2 when seen today.  There have been no further episodes of agitation and the patient has not required Haldol on a couple of days.  Charting indicates possible discharge to rehab later today.  I will sign off.

## 2023-02-06 NOTE — PLAN OF CARE
Goal Outcome Evaluation:  Plan of Care Reviewed With: patient        Progress: improving  Outcome Evaluation: Pt demo's significant improvement in his mobility this date. Able to ambulate over to chair with Gillian x 2 and VC's for safety with RW. Pt performs seated LE ther ex UIC. He continues to benefit from skilled PT intervention.

## 2023-02-06 NOTE — THERAPY EVALUATION
Acute Care - Speech Language Pathology   Swallow Re-Evaluation [facility]     Patient Name: Lavelle Soliman  : 1927  MRN: [id number]  Today's Date: 2023               Admit Date: 2023    Visit Dx:     ICD-10-CM ICD-9-CM   1. Closed fracture of left hip, initial encounter ([facility])  S72.002A 820.8     Patient Active Problem List   Diagnosis   • Benign essential hypertension   • Stage 3a chronic kidney disease (HCC)   • Hyperlipidemia   • Hypothyroidism   • Muscle cramps   • Anemia   • Hyperglycemia   • Urinary retention   • UTI (urinary tract infection), bacterial   • BPH with obstruction/lower urinary tract symptoms   • Paroxysmal atrial fibrillation ([facility])   • Closed fracture of left hip, initial encounter ([facility])   • Postoperative anemia due to acute blood loss   • Moderate malnutrition ([facility])     Past Medical History:   Diagnosis Date   • Benign essential hypertension    • Cataract    • Disease of thyroid gland    • HL (hearing loss)    • Hyperlipidemia    • Hypothyroidism    • Paroxysmal atrial fibrillation ([facility])    • Visual impairment S     Past Surgical History:   Procedure Laterality Date   • EYE SURGERY     • HERNIA REPAIR     • HIP INTERTROCHANTERIC NAILING Left 2023    Procedure: LT. HIP INTERTROCHANTERIC NAILING;  Surgeon: Leland Hair MD;  Location: [facility];  Service: Orthopedics;  Laterality: Left;       SLP Recommendation and Plan  SLP Swallowing Diagnosis: oral dysphagia, suspected pharyngeal dysphagia (23 1030)  SLP Diet Recommendation: nectar thick liquids, mechanical ground textures, no mixed consistencies (23 1030)  Recommended Precautions and Strategies: upright posture during/after eating, small bites of food and sips of liquid, liquid via spoon only, multiple swallows per bite of food, multiple swallows per sip of liquid, 1:1 supervision, assist with feeding (23 1030)  SLP Rec. for Method of Medication Administration: meds crushed, with puree (23  1030)     Monitor for Signs of Aspiration: notify SLP if any concerns (02/06/23 1030)  Recommended Diagnostics: reassess via VFSS ([facility]) (02/06/23 1030)  Swallow Criteria for Skilled Therapeutic Interventions Met: demonstrates skilled criteria (02/06/23 1030)  Anticipated Discharge Disposition (SLP): unknown (02/06/23 1030)  Rehab Potential/Prognosis, Swallowing: adequate, monitor progress closely (02/06/23 1030)  Therapy Frequency (Swallow): PRN (02/06/23 1030)  Predicted Duration Therapy Intervention (Days): until discharge (02/06/23 1030)                                               SWALLOW EVALUATION (last 72 hours)     SLP Adult Swallow Evaluation     Row Name 02/06/23 1030                   Rehab Evaluation    Document Type re-evaluation  -OC        Subjective Information no complaints  -OC        Patient Observations alert;cooperative;agree to therapy  -OC        Patient Effort good  -OC        Symptoms Noted During/After Treatment none  -OC           General Information    Patient Profile Reviewed yes  -OC        Current Method of Nutrition pureed;honey-thick liquids  -OC        Precautions/Limitations, Vision WFL;for purposes of eval  -OC        Precautions/Limitations, Hearing WFL;for purposes of eval  -OC        Prior Level of Function-Communication unknown  -OC        Prior Level of Function-Swallowing no diet consistency restrictions  -OC        Plans/Goals Discussed with patient;family;agreed upon  -OC        Barriers to Rehab medically complex  -OC        Patient's Goals for Discharge patient did not state  -OC        Family Goals for Discharge patient able to return to regular diet;other (see comments)  upgrade, concerns for aspiration at baseline occasionally- but tolerated it, per discussion with daughter  -OC           Pain Scale: Numbers Pre/Post-Treatment    Pretreatment Pain Rating 0/10 - no pain  -OC        Posttreatment Pain Rating 0/10 - no pain  -OC           Oral Motor Structure and  Function    Dentition Assessment natural, present and adequate  -OC        Secretion Management WNL/WFL  -OC        Mucosal Quality dry  -OC           Oral Musculature and Cranial Nerve Assessment    Oral Motor General Assessment generalized oral motor weakness  -OC           General Eating/Swallowing Observations    Respiratory Support Currently in Use nasal cannula  -OC           Clinical Swallow Eval    Clinical Swallow Evaluation Summary Re-evaluation completed. Patient mental status appears improved, speech improved- ? aged vocal quality. Patient demonstrated no overt s/s aspiration with ice chips X3, nectar thick via cup X5, puree, and mechanical soft. ? change in vocal quality s/p trials of thin via cup. Discussed with patient and patient's daughter via the telephone. Agreeable to upgrade to mechanical soft and nectar thick liquids. VFSS to r/o aspiration.  -OC           SLP Evaluation Clinical Impression    SLP Swallowing Diagnosis oral dysphagia;suspected pharyngeal dysphagia  -OC        Functional Impact risk of aspiration/pneumonia  -OC        Rehab Potential/Prognosis, Swallowing adequate, monitor progress closely  -OC        Swallow Criteria for Skilled Therapeutic Interventions Met demonstrates skilled criteria  -OC           Recommendations    Therapy Frequency (Swallow) PRN  -OC        Predicted Duration Therapy Intervention (Days) until discharge  -OC        SLP Diet Recommendation nectar thick liquids;mechanical ground textures;no mixed consistencies  -OC        Recommended Diagnostics reassess via VFSS ([facility])  -OC        Recommended Precautions and Strategies upright posture during/after eating;small bites of food and sips of liquid;liquid via spoon only;multiple swallows per bite of food;multiple swallows per sip of liquid;1:1 supervision;assist with feeding  -OC        Oral Care Recommendations Oral Care BID/PRN;Before ice/water  -OC        SLP Rec. for Method of Medication Administration meds  crushed;with puree  -OC        Monitor for Signs of Aspiration notify SLP if any concerns  -OC        Anticipated Discharge Disposition (SLP) unknown  -OC              User Key  (r) = Recorded By, (t) = Taken By, (c) = Cosigned By    Initials Name Effective Dates    Aarti Mccall MA,YARED-SLP 06/16/21 -                 EDUCATION  The patient has been educated in the following areas:   Dysphagia (Swallowing Impairment).              Time Calculation:    Time Calculation- SLP     Row Name 02/06/23 1438             Time Calculation- SLP    SLP Start Time 1030  -OC      SLP Received On 02/06/23  -OC         Untimed Charges    SLP Treatment ST Treatment Swallow Minutes  - 61693  -OC      [id number] Treatment Swallow Minutes 60  -OC         Total Minutes    Untimed Charges Total Minutes 60  -OC       Total Minutes 60  -OC            User Key  (r) = Recorded By, (t) = Taken By, (c) = Cosigned By    Initials Name Provider Type    Aarti Mccall MA,YARED-SLP Speech and Language Pathologist                Therapy Charges for Today     Code Description Service Date Service Provider Modifiers Qty    [id number]  ST TREATMENT SWALLOW 4 2/6/2023 Aarti Nugent MA,AYRED-SLP GN 1               Aarti Nuegnt MA, CCC-SLP  2/6/2023

## 2023-02-06 NOTE — PLAN OF CARE
Goal Outcome Evaluation:      Patient has been up in chair, assist x2, cristobal intact, patient ate well today, feed himself, advanced to [facility]. Soft diet. O/ax3. Call placed to urology regarding cristobal. If patient is suppose to go home with it and for how long. RN confused about dr note      Problem: Adult Inpatient Plan of Care  Goal: Absence of Hospital-Acquired Illness or Injury  Intervention: Identify and Manage Fall Risk  Recent Flowsheet Documentation  Taken 2/6/2023 1846 by Laura Zarco RN  Safety Promotion/Fall Prevention:  • activity supervised  • assistive device/personal items within reach  • clutter free environment maintained  • fall prevention program maintained  • safety round/check completed  Taken 2/6/2023 1440 by Laura Zarco RN  Safety Promotion/Fall Prevention:  • activity supervised  • assistive device/personal items within reach  • clutter free environment maintained  • fall prevention program maintained  • gait belt  • safety round/check completed  Taken 2/6/2023 1254 by Laura Zarco RN  Safety Promotion/Fall Prevention:  • activity supervised  • assistive device/personal items within reach  • clutter free environment maintained  • fall prevention program maintained  • safety round/check completed  • nonskid shoes/slippers when out of bed  Taken 2/6/2023 1000 by Laura Zarco RN  Safety Promotion/Fall Prevention:  • assistive device/personal items within reach  • activity supervised  • clutter free environment maintained  • fall prevention program maintained  • safety round/check completed  Taken 2/6/2023 0825 by Laura Zarco RN  Safety Promotion/Fall Prevention:  • activity supervised  • assistive device/personal items within reach  • clutter free environment maintained  • fall prevention program maintained  • safety round/check completed  Intervention: Prevent Skin Injury  Recent Flowsheet Documentation  Taken 2/6/2023 1846 by Laura Zarco RN  Body Position: sitting up in bed  Taken 2/6/2023 1440  by Haroldo, Laura, RN  Body Position:  • supine  • neutral body alignment  Taken 2/6/2023 1254 by Laura Zarco RN  Body Position:  • sitting up in bed  • supine, legs elevated  Taken 2/6/2023 1000 by Laura Zarco RN  Body Position:  • supine, legs elevated  • sitting up in bed  Taken 2/6/2023 0825 by Laura Zarco RN  Body Position: sitting up in bed  Skin Protection:  • adhesive use limited  • incontinence pads utilized  • protective footwear used  Intervention: Prevent and Manage VTE (Venous Thromboembolism) Risk  Recent Flowsheet Documentation  Taken 2/6/2023 1440 by Laura Zarco RN  Activity Management: back to bed  Taken 2/6/2023 1254 by Laura Zarco RN  Activity Management: up in chair  Taken 2/6/2023 1000 by Laura Zarco RN  Activity Management: up in chair  Taken 2/6/2023 0825 by Laura Zarco RN  Activity Management:  • activity adjusted per tolerance  • dorsiflexion/plantar flexion performed  • up in chair  • ambulated in room  VTE Prevention/Management:  • bilateral  • sequential compression devices on  Range of Motion: active ROM (range of motion) encouraged  Goal: Optimal Comfort and Wellbeing  Intervention: Provide Person-Centered Care  Recent Flowsheet Documentation  Taken 2/6/2023 0825 by Laura Zarco RN  Trust Relationship/Rapport:  • care explained  • thoughts/feelings acknowledged     Problem: Fall Injury Risk  Goal: Absence of Fall and Fall-Related Injury  Intervention: Identify and Manage Contributors  Recent Flowsheet Documentation  Taken 2/6/2023 1846 by Laura Zarco RN  Medication Review/Management: medications reviewed  Taken 2/6/2023 1440 by Laura Zarco RN  Medication Review/Management: medications reviewed  Taken 2/6/2023 1254 by Laura Zarco RN  Medication Review/Management: medications reviewed  Taken 2/6/2023 1000 by Laura Zarco RN  Medication Review/Management: medications reviewed  Taken 2/6/2023 0825 by Laura Zarco RN  Medication Review/Management: medications reviewed  Self-Care  Promotion:  • BADL personal objects within reach  • independence encouraged  • meal set-up provided  Intervention: Promote Injury-Free Environment  Recent Flowsheet Documentation  Taken 2/6/2023 1846 by Laura Zarco RN  Safety Promotion/Fall Prevention:  • activity supervised  • assistive device/personal items within reach  • clutter free environment maintained  • fall prevention program maintained  • safety round/check completed  Taken 2/6/2023 1440 by Laura Zarco RN  Safety Promotion/Fall Prevention:  • activity supervised  • assistive device/personal items within reach  • clutter free environment maintained  • fall prevention program maintained  • gait belt  • safety round/check completed  Taken 2/6/2023 1254 by Laura Zarco RN  Safety Promotion/Fall Prevention:  • activity supervised  • assistive device/personal items within reach  • clutter free environment maintained  • fall prevention program maintained  • safety round/check completed  • nonskid shoes/slippers when out of bed  Taken 2/6/2023 1000 by Laura Zarco RN  Safety Promotion/Fall Prevention:  • assistive device/personal items within reach  • activity supervised  • clutter free environment maintained  • fall prevention program maintained  • safety round/check completed  Taken 2/6/2023 0825 by Laura Zarco RN  Safety Promotion/Fall Prevention:  • activity supervised  • assistive device/personal items within reach  • clutter free environment maintained  • fall prevention program maintained  • safety round/check completed     Problem: Hypertension Comorbidity  Goal: Blood Pressure in Desired Range  Intervention: Maintain Blood Pressure Management  Recent Flowsheet Documentation  Taken 2/6/2023 1846 by Laura Zarco RN  Medication Review/Management: medications reviewed  Taken 2/6/2023 1440 by Laura Zarco RN  Medication Review/Management: medications reviewed  Taken 2/6/2023 1254 by Laura Zarco RN  Medication Review/Management: medications  reviewed  Taken 2/6/2023 1000 by Laura Zarco RN  Medication Review/Management: medications reviewed  Taken 2/6/2023 0825 by Laura Zarco RN  Syncope Management: position changed slowly  Medication Review/Management: medications reviewed     Problem: Skin Injury Risk Increased  Goal: Skin Health and Integrity  Intervention: Optimize Skin Protection  Recent Flowsheet Documentation  Taken 2/6/2023 1846 by Laura Zarco RN  Head of Bed (HOB) Positioning: HOB at 30-45 degrees  Taken 2/6/2023 1440 by Laura Zarco RN  Head of Bed (HOB) Positioning: HOB lowered  Taken 2/6/2023 1254 by Laura Zarco RN  Head of Bed (HOB) Positioning: HOB flat  Taken 2/6/2023 1000 by Laura Zarco RN  Head of Bed (HOB) Positioning: HOB flat  Taken 2/6/2023 0825 by Laura Zarco RN  Pressure Reduction Techniques: heels elevated off bed  Head of Bed (HOB) Positioning: HOB lowered  Pressure Reduction Devices:  • alternating pressure pump (ADD)  • heel offloading device utilized  Skin Protection:  • adhesive use limited  • incontinence pads utilized  • protective footwear used     Problem: Fracture Stabilization and Management (Orthopaedic Fracture)  Goal: Fracture Stability  Intervention: Promote Fracture Stability and Healing  Recent Flowsheet Documentation  Taken 2/6/2023 0825 by Laura Zarco RN  Fracture Immobilization: supported with pillows     Problem: Functional Ability Impaired (Orthopaedic Fracture)  Goal: Optimal Functional Ability  Intervention: Optimize Functional Ability  Recent Flowsheet Documentation  Taken 2/6/2023 1846 by Laura Zarco RN  Positioning/Transfer Devices: pillows  Taken 2/6/2023 1440 by Laura Zarco RN  Activity Management: back to bed  Activity Assistance Provided: assistance, 2 people  Positioning/Transfer Devices: pillows  Taken 2/6/2023 1254 by Laura Zarco RN  Activity Management: up in chair  Positioning/Transfer Devices:  • pillows  • in use  Taken 2/6/2023 1000 by Laura Zarco RN  Activity Management: up in  chair  Positioning/Transfer Devices: pillows  Taken 2/6/2023 0825 by Laura Zarco RN  Activity Management:  • activity adjusted per tolerance  • dorsiflexion/plantar flexion performed  • up in chair  • ambulated in room  Activity Assistance Provided: assistance, 2 people  Positioning/Transfer Devices: pillows  Self-Care Promotion:  • BADL personal objects within reach  • independence encouraged  • meal set-up provided  Range of Motion: active ROM (range of motion) encouraged     Problem: Pain (Orthopaedic Fracture)  Goal: Acceptable Pain Control  Intervention: Manage Acute Orthopaedic-Related Pain  Recent Flowsheet Documentation  Taken 2/6/2023 0825 by Laura Zarco RN  Diversional Activities: television     Problem: Restraint, Nonbehavioral (Nonviolent)  Goal: Absence of Harm or Injury  Intervention: Implement Least Restrictive Safety Strategies  Recent Flowsheet Documentation  Taken 2/6/2023 0825 by Laura Zarco RN  Diversional Activities: television  Intervention: Protect Dignity, Rights, and Personal Wellbeing  Recent Flowsheet Documentation  Taken 2/6/2023 0825 by Laura Zarco RN  Trust Relationship/Rapport:  • care explained  • thoughts/feelings acknowledged  Intervention: Protect Skin and Joint Integrity  Recent Flowsheet Documentation  Taken 2/6/2023 1846 by Laura Zarco RN  Body Position: sitting up in bed  Taken 2/6/2023 1440 by Laura Zarco RN  Body Position:  • supine  • neutral body alignment  Taken 2/6/2023 1254 by Laura Zarco RN  Body Position:  • sitting up in bed  • supine, legs elevated  Taken 2/6/2023 1000 by Laura Zarco RN  Body Position:  • supine, legs elevated  • sitting up in bed  Taken 2/6/2023 0825 by Laura Zarco RN  Body Position: sitting up in bed  Range of Motion: active ROM (range of motion) encouraged     Problem: Bleeding (Orthopaedic Fracture)  Goal: Absence of Bleeding  Intervention: Monitor and Manage Fracture Bleeding  Recent Flowsheet Documentation  Taken 2/6/2023 1440 by  Haroldo, Laura, RN  Bleeding Management: dressing monitored  Taken 2/6/2023 0825 by Laura Zarco RN  Fracture Immobilization: supported with pillows  Bleeding Management: dressing monitored     Problem: Embolism (Orthopaedic Fracture)  Goal: Absence of Embolism Signs and Symptoms  Intervention: Prevent or Manage Embolism Risk  Recent Flowsheet Documentation  Taken 2/6/2023 0825 by Laura Zarco RN  VTE Prevention/Management:  • bilateral  • sequential compression devices on     Problem: Infection (Orthopaedic Fracture)  Goal: Absence of Infection Signs and Symptoms  Intervention: Prevent or Manage Infection  Recent Flowsheet Documentation  Taken 2/6/2023 1440 by Laura Zarco RN  Infection Management: aseptic technique maintained     Problem: Respiratory Compromise (Orthopaedic Fracture)  Goal: Effective Oxygenation and Ventilation  Intervention: Promote Airway Secretion Clearance  Recent Flowsheet Documentation  Taken 2/6/2023 0825 by Laura Zarco RN  Cough And Deep Breathing: done with encouragement  Intervention: Optimize Oxygenation and Ventilation  Recent Flowsheet Documentation  Taken 2/6/2023 0825 by Laura Zarco RN  Airway/Ventilation Management: airway patency maintained

## 2023-02-06 NOTE — PROGRESS NOTES
Name: Lavelle Soliman ADMIT: 2023   : 1927  PCP: Rafita Colunga MD    MRN: [id number] LOS: 13 days   AGE/SEX: 89 y.o. male  ROOM: [facility]     Subjective   Subjective    Patient looks better, alert and talking.  Eating lunch in the chair    Review of Systems     Objective   Objective   Vital Signs  Temp:  [97.8 °F (36.6 °C)-98.5 °F (36.9 °C)] 97.8 °F (36.6 °C)  Heart Rate:  [73-80] 73  Resp:  [16-17] 16  BP: (109-116)/(59-66) 116/64  SpO2:  [93 %-95 %] 95 %  on   ;   Device (Oxygen Therapy): room air  Body mass index is 25.06 kg/m².  Physical Exam  Vitals reviewed.   Constitutional:       General: He is not in acute distress.     Appearance: He is well-developed. He is ill-appearing (frail).   HENT:      Head: Normocephalic and atraumatic.   Eyes:      General: No scleral icterus.  Neck:      Vascular: No JVD.   Cardiovascular:      Rate and Rhythm: Normal rate. Rhythm irregular.      Heart sounds: No murmur heard.  Pulmonary:      Effort: Pulmonary effort is normal. No respiratory distress.      Breath sounds: No wheezing.   Abdominal:      General: Bowel sounds are normal. There is no distension.      Palpations: Abdomen is soft.      Tenderness: There is no abdominal tenderness.   Musculoskeletal:      Right lower leg: No edema.      Left lower leg: No edema.   Skin:     General: Skin is warm and dry.      Findings: No rash.   Neurological:      Mental Status: He is alert.      Comments: Talkative and speech is appropriate though a bit guarded which is not new       Results Review     I reviewed the patient's new clinical results.  Results from last 7 days   Lab Units 23  04423   WBC 10*3/mm3 11.88* 13.02* 11.98* 12.64*   HEMOGLOBIN g/dL 8.5* 8.1* 9.1* 9.1*   PLATELETS 10*3/mm3 272 258 288 266     Results from last 7 days   Lab Units 23  0440 236 23 23  0449   SODIUM mmol/L 141 140 139 142   POTASSIUM mmol/L  4.0 3.4* 3.6 4.0   CHLORIDE mmol/L 111* 110* 111* 112*   CO2 mmol/L 23.7 23.7 22.2 24.0   BUN mg/dL 16 21 25* 23   CREATININE mg/dL 1.10 1.10 1.20 1.05   GLUCOSE mg/dL 91 97 97 95   EGFR mL/min/1.73 61.8 61.8 55.7* 65.4     Results from last 7 days   Lab Units 01/31/23  0519   ALBUMIN g/dL 2.5*     Results from last 7 days   Lab Units 02/06/23  0440 02/05/23  0416 02/04/23  0533 02/03/23  0449 02/01/23  0448 01/31/23  0519   CALCIUM mg/dL 7.9* 7.7* 7.7* 8.0*   < > 7.7*   ALBUMIN g/dL  --   --   --   --   --  2.5*   MAGNESIUM mg/dL  --   --   --   --   --  2.4*   PHOSPHORUS mg/dL  --   --   --   --   --  3.0    < > = values in this interval not displayed.       No results found for: HGBA1C, POCGLU    No radiology results for the last day  I have personally reviewed all medications:  Scheduled Medications  enoxaparin, 30 mg, Subcutaneous, Daily  famotidine, 20 mg, Oral, Nightly  finasteride, 5 mg, Oral, Nightly  levothyroxine, 75 mcg, Oral, Q AM  metoprolol tartrate, 12.5 mg, Oral, BID  saccharomyces boulardii, 250 mg, Oral, BID  senna-docusate sodium, 2 tablet, Oral, BID  sodium chloride, 10 mL, Intravenous, Q12H  tamsulosin, 0.4 mg, Oral, Daily    Infusions   Diet  Diet: Regular/House Diet; No Straw, Feeding Assistance - Nursing, No Mixed Consistencies; Texture: Mechanical Ground (NDD 2); Fluid Consistency: Nectar Thick    I have personally reviewed:  [x]  Laboratory   [x]  Microbiology   [x]  Radiology   [x]  EKG/Telemetry  [x]  Cardiology/Vascular   []  Pathology    [x]  Records       Assessment/Plan     Active Hospital Problems    Diagnosis  POA   • **Closed fracture of left hip, initial encounter ([facility]) [S72.002A]  Yes   • Moderate malnutrition (HCC) [E44.0]  Yes   • Postoperative anemia due to acute blood loss [D62]  Yes   • Paroxysmal atrial fibrillation (HCC) [I48.0]  Yes   • 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       89 y.o. male admitted with hip fracture status post repair 1/25 now dealing with severe postoperative confusion and delirium    PAF-briefly in RVR but responded well to Lopressor  - Continue Lopressor 12.5 twice daily with hold parameters  - Not on full AC but given hematuria will hold off for now.  Continue Lovenox at low-dose.  Will need to discuss with family    Hematuria resolved.  Defer to urology regarding [place] recommendations  - Continue tamsulosin instead of terazosin    Delirium-stable/improved.  Has not received any prn meds for days now.  Continue to reorient frequently and take measures to prevent delirium.    Speech reconsulted for assessment of swallow now that he is more alert.  VFSS planned for tomorrow    Anemia stable.      Continue Lovenox 30 mg for DVT prophylaxis   Disposition: Looking stable for DC to SNF when bed available.  Discussed with CCP      Mir Chavez MD  [facility]  02/06/23  16:09 EST

## 2023-02-06 NOTE — THERAPY TREATMENT NOTE
Patient Name: Lavelle Soliman  : 1927    MRN: [id number]                              Today's Date: 2023       Admit Date: 2023    Visit Dx:     ICD-10-CM ICD-9-CM   1. Closed fracture of left hip, initial encounter ([facility])  S72.002A 820.8     Patient Active Problem List   Diagnosis   • Benign essential hypertension   • Stage 3a chronic kidney disease (HCC)   • Hyperlipidemia   • Hypothyroidism   • Muscle cramps   • Anemia   • Hyperglycemia   • Urinary retention   • UTI (urinary tract infection), bacterial   • BPH with obstruction/lower urinary tract symptoms   • Paroxysmal atrial fibrillation ([facility])   • Closed fracture of left hip, initial encounter ([facility])   • Postoperative anemia due to acute blood loss   • Moderate malnutrition ([facility])     Past Medical History:   Diagnosis Date   • Benign essential hypertension    • Cataract    • Disease of thyroid gland    • HL (hearing loss)    • Hyperlipidemia    • Hypothyroidism    • Paroxysmal atrial fibrillation ([facility])    • Visual impairment S     Past Surgical History:   Procedure Laterality Date   • EYE SURGERY     • HERNIA REPAIR     • HIP INTERTROCHANTERIC NAILING Left 2023    Procedure: LT. HIP INTERTROCHANTERIC NAILING;  Surgeon: Leland Hair MD;  Location: [facility];  Service: Orthopedics;  Laterality: Left;      General Information     Row Name 23 1225          Physical Therapy Time and Intention    Document Type therapy note (daily note)  -DB     Mode of Treatment physical therapy;individual therapy  -DB     Row Name 23 1225          General Information    Patient Profile Reviewed yes  -DB     Existing Precautions/Restrictions fall  -DB           User Key  (r) = Recorded By, (t) = Taken By, (c) = Cosigned By    Initials Name Provider Type    DB Nydia Rooney PT Physical Therapist               Mobility     Row Name 23 1226          Bed Mobility    Supine-Sit Dickenson (Bed Mobility) minimum assist (75% patient  effort);verbal cues;nonverbal cues (demo/gesture)  -DB     Assistive Device (Bed Mobility) bed rails;head of bed elevated  -DB     Row Name 02/06/23 1226          Sit-Stand Transfer    Sit-Stand Waldo (Transfers) minimum assist (75% patient effort);verbal cues;nonverbal cues (demo/gesture)  -DB     Assistive Device (Sit-Stand Transfers) walker, front-wheeled  -DB     Row Name 02/06/23 1226          Gait/Stairs (Locomotion)    Waldo Level (Gait) minimum assist (75% patient effort);verbal cues;nonverbal cues (demo/gesture);2 person assist  -DB     Assistive Device (Gait) walker, front-wheeled  -DB     Distance in Feet (Gait) 5'  -DB     Deviations/Abnormal Patterns (Gait) zaid decreased;festinating/shuffling;gait speed decreased;stride length decreased  -DB     Bilateral Gait Deviations forward flexed posture;weight shift ability decreased  -DB           User Key  (r) = Recorded By, (t) = Taken By, (c) = Cosigned By    Initials Name Provider Type    Nydia Murcia PT Physical Therapist               Obj/Interventions     Row Name 02/06/23 1237          Motor Skills    Therapeutic Exercise other (see comments)  LE ther ex x 10  -DB     Row Name 02/06/23 1237          Balance    Balance Assessment sitting static balance;sitting dynamic balance;standing static balance;standing dynamic balance  -DB     Static Sitting Balance contact guard  -DB     Dynamic Sitting Balance contact guard  -DB     Position, Sitting Balance sitting edge of bed  -DB     Static Standing Balance minimal assist  -DB     Dynamic Standing Balance minimal assist;2-person assist  -DB     Position/Device Used, Standing Balance supported;walker, front-wheeled  -DB     Balance Interventions sitting;standing;sit to stand  -DB           User Key  (r) = Recorded By, (t) = Taken By, (c) = Cosigned By    Initials Name Provider Type    Nydia Murcia PT Physical Therapist               Goals/Plan    No documentation.                 Clinical Impression     Row Name 02/06/23 1240          Pain    Pain Intervention(s) Ambulation/increased activity;Repositioned  -DB     Row Name 02/06/23 1240          Plan of Care Review    Plan of Care Reviewed With patient  -DB     Progress improving  -DB     Outcome Evaluation Pt dylan's significant improvement in his mobility this date. Able to ambulate over to chair with Gillian x 2 and VC's for safety with RW. Pt performs seated LE ther ex UIC. He continues to benefit from skilled PT intervention.  -DB     Row Name 02/06/23 1240          Vital Signs    O2 Delivery Pre Treatment room air  -DB     O2 Delivery Intra Treatment room air  -DB     O2 Delivery Post Treatment room air  -DB     Pre Patient Position Supine  -DB     Intra Patient Position Standing  -DB     Post Patient Position Sitting  -DB     Row Name 02/06/23 1240          Positioning and Restraints    Pre-Treatment Position in bed  -DB     Post Treatment Position chair  -DB     In Chair reclined;sitting;call light within reach;encouraged to call for assist;exit alarm on;with nsg  -DB           User Key  (r) = Recorded By, (t) = Taken By, (c) = Cosigned By    Initials Name Provider Type    DB Nydia Rooney PT Physical Therapist               Outcome Measures     Row Name 02/06/23 1241          How much help from another person do you currently need...    Turning from your back to your side while in flat bed without using bedrails? 3  -DB     Moving from lying on back to sitting on the side of a flat bed without bedrails? 3  -DB     Moving to and from a bed to a chair (including a wheelchair)? 2  -DB     Standing up from a chair using your arms (e.g., wheelchair, bedside chair)? 3  -DB     Climbing 3-5 steps with a railing? 2  -DB     To walk in hospital room? 2  -DB     AM-PAC 6 Clicks Score (PT) 15  -DB     Highest level of mobility 4 --> Transferred to chair/commode  -DB     Row Name 02/06/23 1241          Functional Assessment    Outcome Measure  Options AM-PAC 6 Clicks Basic Mobility (PT)  -DB           User Key  (r) = Recorded By, (t) = Taken By, (c) = Cosigned By    Initials Name Provider Type    Nydia Murcia PT Physical Therapist                             Physical Therapy Education     Title: PT OT SLP Therapies (Done)     Topic: Physical Therapy (Done)     Point: Mobility training (Done)     Learning Progress Summary           Patient Acceptance, E, VU by DB at 2/6/2023 1242    Acceptance, E,TB,D, VU,NR by  at 2/3/2023 1109    Acceptance, E,TB,D, VU,NR by  at 2/2/2023 1146    Acceptance, E,TB,D, VU,NR by  at 1/30/2023 1028    Acceptance, E, NR by  at 1/28/2023 1241    Acceptance, E, NR by DB at 1/27/2023 1527   Family Acceptance, E, VU by DB at 1/27/2023 1527                   Point: Home exercise program (Done)     Learning Progress Summary           Patient Acceptance, E, VU by DB at 2/6/2023 1242    Acceptance, E, NR by  at 1/28/2023 1241    Acceptance, E, NR by DB at 1/27/2023 1527   Family Acceptance, E, VU by DB at 1/27/2023 1527                   Point: Body mechanics (Done)     Learning Progress Summary           Patient Acceptance, E, VU by DB at 2/6/2023 1242    Acceptance, E,TB,D, VU,NR by  at 2/3/2023 1109    Acceptance, E,TB,D, VU,NR by  at 2/2/2023 1146    Acceptance, E,TB,D, VU,NR by  at 1/30/2023 1028    Acceptance, E, NR by CW at 1/28/2023 1241    Acceptance, E, NR by DB at 1/27/2023 1527   Family Acceptance, E, VU by DB at 1/27/2023 1527                   Point: Precautions (Done)     Learning Progress Summary           Patient Acceptance, E, VU by DB at 2/6/2023 1242    Acceptance, E,TB,D, VU,NR by  at 2/3/2023 1109    Acceptance, E,TB,D, VU,NR by  at 2/2/2023 1146    Acceptance, E,TB,D, VU,NR by CH at 1/30/2023 1028    Acceptance, E, NR by CW at 1/28/2023 1241    Acceptance, E, NR by DB at 1/27/2023 1527   Family Acceptance, E, VU by DB at 1/27/2023 1527                               User Key      Initials Effective Dates Name Provider Type Discipline     06/16/21 -  Lydia Sanon PT Physical Therapist PT    DB 06/16/21 -  Nydia Rooney, AROLDO Physical Therapist PT    CW 12/13/22 -  Lizzeth Negron PT Physical Therapist PT              PT Recommendation and Plan  Planned Therapy Interventions (PT): balance training, bed mobility training, gait training, home exercise program, patient/family education, neuromuscular re-education, postural re-education, ROM (range of motion), stair training, strengthening, transfer training  Plan of Care Reviewed With: patient  Progress: improving  Outcome Evaluation: Pt dylan's significant improvement in his mobility this date. Able to ambulate over to chair with Gillian x 2 and VC's for safety with RW. Pt performs seated LE ther ex UIC. He continues to benefit from skilled PT intervention.     Time Calculation:    PT Charges     Row Name 02/06/23 1242             Time Calculation    Start Time 0906  -DB      Stop Time 0929  -DB      Time Calculation (min) 23 min  -DB      PT Received On 02/06/23  -DB      PT - Next Appointment 02/07/23  -DB         Time Calculation- PT    Total Timed Code Minutes- PT 23 minute(s)  -DB            User Key  (r) = Recorded By, (t) = Taken By, (c) = Cosigned By    Initials Name Provider Type    DB Nydia Rooney, PT Physical Therapist              Therapy Charges for Today     Code Description Service Date Service Provider Modifiers Qty    45624071364  PT THERAPEUTIC ACT EA 15 MIN 2/6/2023 Nydia Rooney, PT GP 1    07174909037 HC PT THER PROC EA 15 MIN 2/6/2023 Nydia Rooney, PT GP 1          PT G-Codes  Outcome Measure Options: AM-PAC 6 Clicks Basic Mobility (PT)  AM-PAC 6 Clicks Score (PT): 15  PT Discharge Summary  Anticipated Discharge Disposition (PT): skilled nursing facility    Nydia Rooney PT  2/6/2023

## 2023-02-06 NOTE — CASE MANAGEMENT/SOCIAL WORK
Continued Stay Note  [facility]     Patient Name: Lavelle Soliman  MRN: [id number]  Today's Date: 2/6/2023    Admit Date: 1/24/2023    Plan: Possibly [facility], if/when they have a memory care bed available.   Discharge Plan     Row Name 02/06/23 1605       Plan    Plan Possibly [facility], if/when they have a memory care bed available.    Plan Comments CCP reached out to [facility] and [facility]yah this date regarding them to take another look at referral now that patient is getting back to his baseline. [facility] declined staying they don't have any LTC beds available at this time. Aliyah reviewing referral and checking into memory bed availability. CCP to follow.               Discharge Codes    No documentation.               Expected Discharge Date and Time     Expected Discharge Date Expected Discharge Time    Feb 7, 2023

## 2023-02-06 NOTE — PLAN OF CARE
Goal Outcome Evaluation:  Plan of Care Reviewed With: patient, daughter        Progress: improving  Outcome Evaluation: Patient VS WDL.Potasium Lab showed 3.4 mequ Dr order K 40 mEqu once today at 5 pm.Catheter no issues ,urine features no issues.Pain improved.Patient oriented to situation.Patient with baseline slurred speech.Mobility limited to being transferred only.Discharge plans Long-term care VS. palliative Care.CCP followed.

## 2023-02-07 ENCOUNTER — APPOINTMENT (OUTPATIENT)
Dept: GENERAL RADIOLOGY | Facility: HOSPITAL | Age: 88
DRG: 481 | End: 2023-02-07
Payer: MEDICARE

## 2023-02-07 PROCEDURE — 97530 THERAPEUTIC ACTIVITIES: CPT

## 2023-02-07 PROCEDURE — 92526 ORAL FUNCTION THERAPY: CPT

## 2023-02-07 PROCEDURE — 25010000002 ENOXAPARIN PER 10 MG: Performed by: HOSPITALIST

## 2023-02-07 PROCEDURE — 74230 X-RAY XM SWLNG FUNCJ C+: CPT

## 2023-02-07 PROCEDURE — 92611 MOTION FLUOROSCOPY/SWALLOW: CPT

## 2023-02-07 RX ADMIN — ENOXAPARIN SODIUM 30 MG: 30 INJECTION SUBCUTANEOUS at 09:37

## 2023-02-07 RX ADMIN — FINASTERIDE 5 MG: 5 TABLET, FILM COATED ORAL at 20:40

## 2023-02-07 RX ADMIN — Medication 10 ML: at 09:38

## 2023-02-07 RX ADMIN — BARIUM SULFATE 50 ML: 400 SUSPENSION ORAL at 10:43

## 2023-02-07 RX ADMIN — Medication 250 MG: at 09:37

## 2023-02-07 RX ADMIN — Medication 250 MG: at 20:40

## 2023-02-07 RX ADMIN — TAMSULOSIN HYDROCHLORIDE 0.4 MG: 0.4 CAPSULE ORAL at 09:37

## 2023-02-07 RX ADMIN — FAMOTIDINE 20 MG: 20 TABLET, FILM COATED ORAL at 20:40

## 2023-02-07 RX ADMIN — LEVOTHYROXINE SODIUM 75 MCG: 0.07 TABLET ORAL at 07:17

## 2023-02-07 RX ADMIN — METOPROLOL TARTRATE 12.5 MG: 25 TABLET, FILM COATED ORAL at 20:40

## 2023-02-07 RX ADMIN — BARIUM SULFATE 4 ML: 980 POWDER, FOR SUSPENSION ORAL at 10:43

## 2023-02-07 RX ADMIN — METOPROLOL TARTRATE 12.5 MG: 25 TABLET, FILM COATED ORAL at 09:37

## 2023-02-07 RX ADMIN — BARIUM SULFATE 55 ML: 0.81 POWDER, FOR SUSPENSION ORAL at 10:43

## 2023-02-07 RX ADMIN — DOCUSATE SODIUM 50MG AND SENNOSIDES 8.6MG 2 TABLET: 8.6; 5 TABLET, FILM COATED ORAL at 09:37

## 2023-02-07 RX ADMIN — BARIUM SULFATE 1 TEASPOON(S): 0.6 CREAM ORAL at 10:43

## 2023-02-07 NOTE — PROGRESS NOTES
Name: Lavelle Soliman ADMIT: 2023   : 1927  PCP: Rafita Colunga MD    MRN: [id number] LOS: 14 days   AGE/SEX: 89 y.o. male  ROOM: [facility]     Subjective   Subjective    Patient without complaint. Alert and talking    Review of Systems     Objective   Objective   Vital Signs  Temp:  [97.3 °F (36.3 °C)-98.4 °F (36.9 °C)] 97.4 °F (36.3 °C)  Heart Rate:  [71-93] 77  Resp:  [14-18] 14  BP: (111-128)/(62-68) 111/62  SpO2:  [93 %-99 %] 94 %  on   ;   Device (Oxygen Therapy): room air  Body mass index is 25.06 kg/m².  Physical Exam  Vitals reviewed.   Constitutional:       General: He is not in acute distress.     Appearance: He is well-developed. He is ill-appearing (frail).   HENT:      Head: Normocephalic and atraumatic.   Eyes:      General: No scleral icterus.  Neck:      Vascular: No JVD.   Cardiovascular:      Rate and Rhythm: Normal rate. Rhythm irregular.      Heart sounds: No murmur heard.  Pulmonary:      Effort: Pulmonary effort is normal. No respiratory distress.      Breath sounds: No wheezing.   Abdominal:      General: Bowel sounds are normal. There is no distension.      Palpations: Abdomen is soft.      Tenderness: There is no abdominal tenderness.   Musculoskeletal:      Right lower leg: No edema.      Left lower leg: No edema.   Skin:     General: Skin is warm and dry.      Findings: No rash.   Neurological:      Mental Status: He is alert.      Comments: Talkative and speech is appropriate though a bit garbled which is not new       Results Review     I reviewed the patient's new clinical results.  Results from last 7 days   Lab Units 23  0440 239   WBC 10*3/mm3 11.88* 13.02* 11.98* 12.64*   HEMOGLOBIN g/dL 8.5* 8.1* 9.1* 9.1*   PLATELETS 10*3/mm3 272 258 288 266     Results from last 7 days   Lab Units 23  0440 23  0416 23  0533 23  0449   SODIUM mmol/L 141 140 139 142   POTASSIUM mmol/L 4.0 3.4* 3.6 4.0    CHLORIDE mmol/L 111* 110* 111* 112*   CO2 mmol/L 23.7 23.7 22.2 24.0   BUN mg/dL 16 21 25* 23   CREATININE mg/dL 1.10 1.10 1.20 1.05   GLUCOSE mg/dL 91 97 97 95   EGFR mL/min/1.73 61.8 61.8 55.7* 65.4         Results from last 7 days   Lab Units 02/06/23  0440 02/05/23  0416 02/04/23  0533 02/03/23  0449   CALCIUM mg/dL 7.9* 7.7* 7.7* 8.0*       No results found for: HGBA1C, POCGLU    No radiology results for the last day  I have personally reviewed all medications:  Scheduled Medications  enoxaparin, 30 mg, Subcutaneous, Daily  famotidine, 20 mg, Oral, Nightly  finasteride, 5 mg, Oral, Nightly  levothyroxine, 75 mcg, Oral, Q AM  metoprolol tartrate, 12.5 mg, Oral, BID  saccharomyces boulardii, 250 mg, Oral, BID  senna-docusate sodium, 2 tablet, Oral, BID  sodium chloride, 10 mL, Intravenous, Q12H  tamsulosin, 0.4 mg, Oral, Daily    Infusions   Diet  Diet: Regular/House Diet; No Straw, Feeding Assistance - Nursing; Texture: Pureed (NDD 1); Fluid Consistency: Honey Thick    I have personally reviewed:  [x]  Laboratory   [x]  Microbiology   [x]  Radiology   [x]  EKG/Telemetry  [x]  Cardiology/Vascular   []  Pathology    [x]  Records       Assessment/Plan     Active Hospital Problems    Diagnosis  POA   • **Closed fracture of left hip, initial encounter ([facility]) [S72.002A]  Yes   • Moderate malnutrition ([facility]) [E44.0]  Yes   • Postoperative anemia due to acute blood loss [D62]  Yes   • Paroxysmal atrial fibrillation (HCC) [I48.0]  Yes   • 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       89 y.o. male admitted with hip fracture status post repair 1/25 now dealing with severe postoperative confusion and delirium    PAF-briefly in RVR a few days ago but responded well to Lopressor  - Continue Lopressor 12.5 twice  daily with hold parameters  - Not on full AC but given hematuria will hold off for now.  Continue Lovenox at low-dose.  Will need to discuss with family prior to DC    Hematuria resolved.  Defer to urology regarding Carrasquillo recommendations  - Continue tamsulosin instead of terazosin    Delirium-stable/improved.  Has not received any prn meds for days now.  Continue to reorient frequently and take measures to prevent delirium.    Significant dysphagia, patient did very poorly on swallow study today.  Diet downgraded.  Not sure he will get enough calories this way, may need to discuss goals of care with family to see if they want to advance diet except for risk of aspiration.  He was already complaining about thickened liquids before being downgraded.    Anemia stable.      Continue Lovenox 30 mg for DVT prophylaxis   Disposition: Looking stable for DC to SNF when bed available.        Mir Chavez MD  [facility]  02/07/23  16:59 EST

## 2023-02-07 NOTE — PLAN OF CARE
Goal Outcome Evaluation:              Outcome Evaluation: Pt seen for VFSS and VFSS follow up to discuss results with pt and family in room afterwards. See note for all details. At this time recommnd puree solids and honey thick liquids by spoon with use of effortful swallow, multiple swallows, and intermittent throat clears/consecutive swallow. Meds crushed in puree. Pt can have ice chips and sips of water in-between meals.

## 2023-02-07 NOTE — PLAN OF CARE
Goal Outcome Evaluation:  Plan of Care Reviewed With: patient           Outcome Evaluation: Patient seen for PT session this PM. Patient supine in bed upon arrival. Patient AOx3 and agreeable to PT. Patient sat up to EOB with Gillian this date. Patient performed STS from EOB with Gillian. Patient ambulated 30ft with rwx and Gillian this date. Patient demonstrates improved gait mechanics and increased activity endurance during ambulation. Cues needed to increase IDA and keep rwx close. Patient sitting UIC at end of session today. Patient would continue to benefit from skilled PT intervention to address deficits in functional mobility. PT will continue to progress as tolerated.

## 2023-02-07 NOTE — THERAPY TREATMENT NOTE
Patient Name: Lavelle Soliman  : 1927    MRN: [id number]                              Today's Date: 2023       Admit Date: 2023    Visit Dx:     ICD-10-CM ICD-9-CM   1. Closed fracture of left hip, initial encounter ([facility])  S72.002A 820.8     Patient Active Problem List   Diagnosis   • Benign essential hypertension   • Stage 3a chronic kidney disease (HCC)   • Hyperlipidemia   • Hypothyroidism   • Muscle cramps   • Anemia   • Hyperglycemia   • Urinary retention   • UTI (urinary tract infection), bacterial   • BPH with obstruction/lower urinary tract symptoms   • Paroxysmal atrial fibrillation ([facility])   • Closed fracture of left hip, initial encounter ([facility])   • Postoperative anemia due to acute blood loss   • Moderate malnutrition ([facility])     Past Medical History:   Diagnosis Date   • Benign essential hypertension    • Cataract    • Disease of thyroid gland    • HL (hearing loss)    • Hyperlipidemia    • Hypothyroidism    • Paroxysmal atrial fibrillation ([facility])    • Visual impairment S     Past Surgical History:   Procedure Laterality Date   • EYE SURGERY     • HERNIA REPAIR     • HIP INTERTROCHANTERIC NAILING Left 2023    Procedure: LT. HIP INTERTROCHANTERIC NAILING;  Surgeon: Leland Hair MD;  Location: [facility];  Service: Orthopedics;  Laterality: Left;      General Information     Row Name 23 1604          Physical Therapy Time and Intention    Document Type therapy note (daily note)  -     Mode of Treatment individual therapy;physical therapy  -     Row Name 23 1604          General Information    Patient Profile Reviewed yes  -SM     Existing Precautions/Restrictions fall  -SM     Row Name 23 1604          Cognition    Orientation Status (Cognition) oriented x 3  -SM     Row Name 23 1604          Safety Issues, Functional Mobility    Impairments Affecting Function (Mobility) balance;cognition;endurance/activity tolerance;strength;pain  -SM           User Key   (r) = Recorded By, (t) = Taken By, (c) = Cosigned By    Initials Name Provider Type     Vernell Matta PT Physical Therapist               Mobility     Row Name 02/07/23 1604          Bed Mobility    Bed Mobility supine-sit  -     Supine-Sit [place] (Bed Mobility) minimum assist (75% patient effort);verbal cues  -     Assistive Device (Bed Mobility) bed rails;head of bed elevated  -     Comment, (Bed Mobility) Patient UIC at end of session  -     Row Name 02/07/23 1604          Sit-Stand Transfer    Sit-Stand [place] (Transfers) minimum assist (75% patient effort);verbal cues  -     Assistive Device (Sit-Stand Transfers) walker, front-wheeled  -     Comment, (Sit-Stand Transfer) From EOB  -     Row Name 02/07/23 1604          Gait/Stairs (Locomotion)    [place] Level (Gait) minimum assist (75% patient effort);verbal cues  -     Assistive Device (Gait) walker, front-wheeled  -     Distance in Feet (Gait) 30ft  -     Deviations/Abnormal Patterns (Gait) zaid decreased;festinating/shuffling;gait speed decreased;stride length decreased  -     Bilateral Gait Deviations forward flexed posture;weight shift ability decreased  -     Comment, (Gait/Stairs) Patient demonstrates improved gait mechanics and increased activity endurance during ambulation. Cues needed to increase IDA and keep rwx close.  -           User Key  (r) = Recorded By, (t) = Taken By, (c) = Cosigned By    Initials Name Provider Type     Vernell Matta PT Physical Therapist               Obj/Interventions     Row Name 02/07/23 1606          Motor Skills    Therapeutic Exercise other (see comments)  HS, SLR, GS, LAQ  -     Row Name 02/07/23 1606          Balance    Balance Assessment sitting static balance;sitting dynamic balance;sit to stand dynamic balance;standing static balance;standing dynamic balance  -     Static Sitting Balance standby assist  -     Dynamic Sitting Balance contact guard   -     Position, Sitting Balance sitting edge of bed  -     Sit to Stand Dynamic Balance minimal assist;verbal cues  -SM     Static Standing Balance contact guard;minimal assist;verbal cues  -SM     Dynamic Standing Balance minimal assist;verbal cues  -SM     Position/Device Used, Standing Balance supported;walker, front-wheeled  -     Balance Interventions sitting;standing;sit to stand;supported;static;dynamic  -           User Key  (r) = Recorded By, (t) = Taken By, (c) = Cosigned By    Initials Name Provider Type     Vernell Matta PT Physical Therapist               Goals/Plan    No documentation.                Clinical Impression     Row Name 02/07/23 1607          Pain    Pretreatment Pain Rating 0/10 - no pain  -     Posttreatment Pain Rating 0/10 - no pain  -SM     Row Name 02/07/23 1607          Plan of Care Review    Plan of Care Reviewed With patient  -     Outcome Evaluation Patient seen for PT session this PM. Patient supine in bed upon arrival. Patient AOx3 and agreeable to PT. Patient sat up to EOB with Gillian this date. Patient performed STS from EOB with Gillian. Patient ambulated 30ft with rwx and Gillian this date. Patient demonstrates improved gait mechanics and increased activity endurance during ambulation. Cues needed to increase IDA and keep rwx close. Patient sitting UIC at end of session today. Patient would continue to benefit from skilled PT intervention to address deficits in functional mobility. PT will continue to progress as tolerated.  -     Row Name 02/07/23 1607          Vital Signs    O2 Delivery Pre Treatment room air  -SM     O2 Delivery Intra Treatment room air  -SM     O2 Delivery Post Treatment room air  -SM     Pre Patient Position Supine  -     Intra Patient Position Standing  -     Post Patient Position Sitting  -     Row Name 02/07/23 1607          Positioning and Restraints    Pre-Treatment Position in bed  -     Post Treatment Position chair  -      In Chair with nsg;call light within reach;encouraged to call for assist;exit alarm on;reclined  -           User Key  (r) = Recorded By, (t) = Taken By, (c) = Cosigned By    Initials Name Provider Type    Vernell Escobedo PT Physical Therapist               Outcome Measures     Row Name 02/07/23 1611 02/07/23 0800       How much help from another person do you currently need...    Turning from your back to your side while in flat bed without using bedrails? 3  -SM 3  -KARIN    Moving from lying on back to sitting on the side of a flat bed without bedrails? 3  -SM 3  -KARIN    Moving to and from a bed to a chair (including a wheelchair)? 3  -SM 3  -KARIN    Standing up from a chair using your arms (e.g., wheelchair, bedside chair)? 3  -SM 3  -KARIN    Climbing 3-5 steps with a railing? 2  -SM 2  -KARIN    To walk in hospital room? 3  -SM 2  -KARIN    AM-PAC 6 Clicks Score (PT) 17  - 16  -KARIN    Highest level of mobility 5 --> Static standing  -SM 5 --> Static standing  -KARIN    Row Name 02/07/23 1611          Functional Assessment    Outcome Measure Options AM-PAC 6 Clicks Basic Mobility (PT)  -           User Key  (r) = Recorded By, (t) = Taken By, (c) = Cosigned By    Initials Name Provider Type    Laura Reddy, RN Registered Nurse    Vernell Escobedo, AROLDO Physical Therapist                             Physical Therapy Education     Title: PT OT SLP Therapies (Done)     Topic: Physical Therapy (Done)     Point: Mobility training (Done)     Learning Progress Summary           Patient Acceptance, E, VU by SM at 2/7/2023 1611    Acceptance, E, VU,NR by KARIN at 2/7/2023 0933    Acceptance, E, VU by DB at 2/6/2023 1242    Acceptance, E,TB,D, VU,NR by  at 2/3/2023 1109    Acceptance, E,TB,D, VU,NR by  at 2/2/2023 1146    Acceptance, E,TB,D, VU,NR by  at 1/30/2023 1028    Acceptance, E, NR by CW at 1/28/2023 1241    Acceptance, E, NR by DB at 1/27/2023 1527   Family Acceptance, E, VU by DB at 1/27/2023 1527                    Point: Home exercise program (Done)     Learning Progress Summary           Patient Acceptance, E, VU by SM at 2/7/2023 1611    Acceptance, E, VU,NR by KARIN at 2/7/2023 0933    Acceptance, E, VU by DB at 2/6/2023 1242    Acceptance, E, NR by CW at 1/28/2023 1241    Acceptance, E, NR by DB at 1/27/2023 1527   Family Acceptance, E, VU by DB at 1/27/2023 1527                   Point: Body mechanics (Done)     Learning Progress Summary           Patient Acceptance, E, VU by SM at 2/7/2023 1611    Acceptance, E, VU,NR by KARIN at 2/7/2023 0933    Acceptance, E, VU by DB at 2/6/2023 1242    Acceptance, E,TB,D, VU,NR by  at 2/3/2023 1109    Acceptance, E,TB,D, VU,NR by  at 2/2/2023 1146    Acceptance, E,TB,D, VU,NR by  at 1/30/2023 1028    Acceptance, E, NR by CW at 1/28/2023 1241    Acceptance, E, NR by DB at 1/27/2023 1527   Family Acceptance, E, VU by DB at 1/27/2023 1527                   Point: Precautions (Done)     Learning Progress Summary           Patient Acceptance, E, VU by SM at 2/7/2023 1611    Acceptance, E, VU,NR by KARIN at 2/7/2023 0933    Acceptance, E, VU by DB at 2/6/2023 1242    Acceptance, E,TB,D, VU,NR by  at 2/3/2023 1109    Acceptance, E,TB,D, VU,NR by  at 2/2/2023 1146    Acceptance, E,TB,D, VU,NR by  at 1/30/2023 1028    Acceptance, E, NR by CW at 1/28/2023 1241    Acceptance, E, NR by DB at 1/27/2023 1527   Family Acceptance, E, VU by DB at 1/27/2023 1527                               User Key     Initials Effective Dates Name Provider Type Discipline     06/16/21 -  Lydia Sanon, PT Physical Therapist PT    DB 06/16/21 -  Backes, Nydia, PT Physical Therapist PT    CW 12/13/22 -  Lizzeth Negron, PT Physical Therapist PT    KARIN 09/20/22 -  Laura Zarco, RN Registered Nurse Nurse     05/02/22 -  Vernell Matta, PT Physical Therapist PT              PT Recommendation and Plan     Plan of Care Reviewed With: patient  Outcome Evaluation: Patient seen for PT session this PM.  Patient supine in bed upon arrival. Patient AOx3 and agreeable to PT. Patient sat up to EOB with Gillian this date. Patient performed STS from EOB with Gillian. Patient ambulated 30ft with rwx and Gillian this date. Patient demonstrates improved gait mechanics and increased activity endurance during ambulation. Cues needed to increase IDA and keep rwx close. Patient sitting UIC at end of session today. Patient would continue to benefit from skilled PT intervention to address deficits in functional mobility. PT will continue to progress as tolerated.     Time Calculation:    PT Charges     Row Name 02/07/23 1612             Time Calculation    Start Time 1456  -SM      Stop Time 1508  -SM      Time Calculation (min) 12 min  -SM      PT Received On 02/07/23  -      PT - Next Appointment 02/08/23  -         Time Calculation- PT    Total Timed Code Minutes- PT 12 minute(s)  -SM         Timed Charges    38090 - PT Therapeutic Exercise Minutes 4  -SM      27688 - PT Therapeutic Activity Minutes 8  -SM         Total Minutes    Timed Charges Total Minutes 12  -SM       Total Minutes 12  -SM            User Key  (r) = Recorded By, (t) = Taken By, (c) = Cosigned By    Initials Name Provider Type     Vernell Matta PT Physical Therapist              Therapy Charges for Today     Code Description Service Date Service Provider Modifiers Qty    [id number]  PT THERAPEUTIC ACT EA 15 MIN 2/7/2023 Vernell Matta, PT GP 1    [id number]  PT THER SUPP EA 15 MIN 2/7/2023 Vernell Matta, PT GP 1          PT G-Codes  Outcome Measure Options: AM-PAC 6 Clicks Basic Mobility (PT)  AM-PAC 6 Clicks Score (PT): 17  PT Discharge Summary  Anticipated Discharge Disposition (PT): skilled nursing facility  Patient was not wearing a face mask during this therapy encounter. Therapist used appropriate personal protective equipment including mask and gloves.  Mask used was standard procedure mask. Appropriate PPE was worn during the entire  therapy session. Hand hygiene was completed before and after therapy session. Patient is not in enhanced droplet precautions.     Vernell Matta, PT  2/7/2023

## 2023-02-07 NOTE — MBS/VFSS/FEES
Acute Care - Speech Language Pathology   Swallow Initial Evaluation [facility]     Patient Name: Lavelle Soliman  : 1927  MRN: [id number]  Today's Date: 2023               Admit Date: 2023    Visit Dx:     ICD-10-CM ICD-9-CM   1. Closed fracture of left hip, initial encounter ([facility])  S72.002A 820.8     Patient Active Problem List   Diagnosis   • Benign essential hypertension   • Stage 3a chronic kidney disease (HCC)   • Hyperlipidemia   • Hypothyroidism   • Muscle cramps   • Anemia   • Hyperglycemia   • Urinary retention   • UTI (urinary tract infection), bacterial   • BPH with obstruction/lower urinary tract symptoms   • Paroxysmal atrial fibrillation ([facility])   • Closed fracture of left hip, initial encounter ([facility])   • Postoperative anemia due to acute blood loss   • Moderate malnutrition ([facility])     Past Medical History:   Diagnosis Date   • Benign essential hypertension    • Cataract    • Disease of thyroid gland    • HL (hearing loss)    • Hyperlipidemia    • Hypothyroidism    • Paroxysmal atrial fibrillation ([facility])    • Visual impairment S     Past Surgical History:   Procedure Laterality Date   • EYE SURGERY     • HERNIA REPAIR     • HIP INTERTROCHANTERIC NAILING Left 2023    Procedure: LT. HIP INTERTROCHANTERIC NAILING;  Surgeon: Leland Hair MD;  Location: [facility];  Service: Orthopedics;  Laterality: Left;       SLP Recommendation and Plan  SLP Swallowing Diagnosis: moderate, pharyngeal dysphagia, suspect acute-on-chronic (23 103)  SLP Diet Recommendation: puree, honey thick liquids (23 103)  Recommended Precautions and Strategies: upright posture during/after eating, liquid via spoon only, multiple swallows per sip of liquid, multiple swallows per bite of food, volitional throat clear (effortful swallow) (23 103)  SLP Rec. for Method of Medication Administration: meds crushed, with puree (23 103)     Monitor for Signs of Aspiration: notify SLP if any  concerns (02/07/23 1030)     Swallow Criteria for Skilled Therapeutic Interventions Met: demonstrates skilled criteria (02/07/23 1030)  Anticipated Discharge Disposition (SLP): anticipate therapy at next level of care (02/07/23 1030)  Rehab Potential/Prognosis, Swallowing: adequate, monitor progress closely (02/07/23 1030)  Therapy Frequency (Swallow): PRN (02/07/23 1030)  Predicted Duration Therapy Intervention (Days): until discharge (02/07/23 1030)       Outcome Evaluation: Pt seen for VFSS and VFSS follow up to discuss results with pt and family in room afterwards. See note for all details. At this time recommnd puree solids and honey thick liquids by spoon with use of effortful swallow, multiple swallows, and intermittent throat clears/consecutive swallow. Meds crushed in puree. Pt can have ice chips and sips of water in-between meals.      SWALLOW EVALUATION (last 72 hours)     SLP Adult Swallow Evaluation     Row Name 02/07/23 1030 02/06/23 1030          Document Type evaluation  -ML re-evaluation  -OC    Subjective Information no complaints  -ML no complaints  -OC    Patient Observations alert;cooperative  -ML alert;cooperative;agree to therapy  -OC    Patient Effort good  -ML good  -OC    Symptoms Noted During/After Treatment none  -ML none  -OC          Patient Profile Reviewed yes  -ML yes  -OC    Pertinent History Of Current Problem admitted with hip fracture status post repair 1/25, with severe postoperative confusion and delirium. Now improving. Pt's family describes baseline dysphagia at home. Clear chest x-ray upon admission.  -ML --    Current Method of Nutrition mechanical ground textures;nectar/syrup-thick liquids  upgraded yesterday, puree and HTL previously  -ML pureed;honey-thick liquids  -OC    Precautions/Limitations, Vision -- WFL;for purposes of eval  -OC    Precautions/Limitations, Hearing -- WFL;for purposes of eval  -OC    Prior Level of Function-Communication -- unknown  -OC    Prior  Level of Function-Swallowing no diet consistency restrictions  before hospitalization  -ML no diet consistency restrictions  -OC    Plans/Goals Discussed with patient;family;agreed upon  -ML patient;family;agreed upon  -OC    Barriers to Rehab -- medically complex  -OC    Patient's Goals for Discharge -- patient did not state  -OC    Family Goals for Discharge -- patient able to return to regular diet;other (see comments)  upgrade, concerns for aspiration at baseline occasionally- but tolerated it, per discussion with daughter  -OC          Additional Documentation Pain Scale: Numbers Pre/Post-Treatment (Group)  -ML --          Pretreatment Pain Rating 0/10 - no pain  -ML 0/10 - no pain  -OC    Posttreatment Pain Rating 0/10 - no pain  -ML 0/10 - no pain  -OC          Dentition Assessment natural, present and adequate  -ML natural, present and adequate  -OC    Secretion Management wet vocal quality  clears with throat clear  -ML WNL/WFL  -OC    Mucosal Quality -- dry  -OC          Oral Motor General Assessment generalized oral motor weakness  -ML generalized oral motor weakness  -OC          Respiratory Support Currently in Use room air  -ML nasal cannula  -OC          Clinical Swallow Evaluation Summary -- Re-evaluation completed. Patient mental status appears improved, speech improved- ? aged vocal quality. Patient demonstrated no overt s/s aspiration with ice chips X3, nectar thick via cup X5, puree, and mechanical soft. ? change in vocal quality s/p trials of thin via cup. Discussed with patient and patient's daughter via the telephone. Agreeable to upgrade to mechanical soft and nectar thick liquids. VFSS to r/o aspiration.  -OC          Utensils Used spoon;cup  -ML --    Consistencies Trialed chopped;mixed consistency;pureed;thin liquids;nectar/syrup-thick liquids;honey-thick liquids  -ML --          VFSS Summary VFSS completed with radiologist, Dr. Izquierdo, present in room. Pt presents with moderate  pharyngeal dysphagia characterized by impaired hyolaryngeal excursion, pharyngeal contraction, epiglottic inversion, and delayed and impaired airway closure causing penetration of thin liquids by cup during the swallow and aspiration on last swallow, penetration of nectar thick liquids by cup during swallow falling to vocal cords after the swallow, penetration to mid laryngeal vestibule of honey thick liquids by cup and puree, and aspiration of mechanical soft solids (peaches/juice) and moderate vallecular residue. Swallow strategy of chin tuck with thin liquids was not effective to eliminate penetration to the vocal cords. Effortful swallow with nectar thick liquids was not effective in eliminating penetration. Honey thick liquids via spoon was effective in eliminating penetration and aspiration. Effortful swallow with trial of mechanical soft completed adequately was effective in reducing vallecular residue to mild and eliminating penetration/aspiration. Most penetration/aspiration was silent. Pt cued to cough and complete consecutive swallow after all trials. Strategy was effective to reducing thin liquids to minimal in upper airway, not effective to clear nectar thick liquids, and reduced material of honey thick liquids by cup and puree in upper airway, but did not totally eliminate. Mulitple swallows and effortful swallows effective to reduce pharyngeal residue. Pt is at high risk of aspiration. Diet recommendation of puree solids and honey thick liquids by spoon with use of effortful swallow, multiple swallows, and intermittent throat clears appears effective in reducing risk of aspiration at this time.  -ML --          Summary Statement VFSS completed with radiologist, Dr. Izquierdo, present in room. Pt presents with moderate pharyngeal dysphagia characterized by impaired hyolaryngeal excursion, pharyngeal contraction, epiglottic inversion, and delayed and impaired airway closure causing penetration of thin  liquids by cup during the swallow and aspiration on last swallow, penetration of nectar thick liquids by cup during swallow falling to vocal cords after the swallow, penetration to mid laryngeal vestibule of honey thick liquids by cup and puree, and aspiration of mechanical soft solids (peaches/juice) and moderate vallecular residue.  -ML --          SLP Swallowing Diagnosis moderate;pharyngeal dysphagia;suspect acute-on-chronic  -ML oral dysphagia;suspected pharyngeal dysphagia  -OC    Functional Impact risk of aspiration/pneumonia;risk of malnutrition;risk of dehydration  -ML risk of aspiration/pneumonia  -OC    Rehab Potential/Prognosis, Swallowing adequate, monitor progress closely  -ML adequate, monitor progress closely  -OC    Swallow Criteria for Skilled Therapeutic Interventions Met demonstrates skilled criteria  -ML demonstrates skilled criteria  -OC          Therapy Frequency (Swallow) PRN  -ML PRN  -OC    Predicted Duration Therapy Intervention (Days) until discharge  -ML until discharge  -OC    SLP Diet Recommendation puree;honey thick liquids  -ML nectar thick liquids;mechanical ground textures;no mixed consistencies  -OC    Recommended Diagnostics -- reassess via VFSS (MBS)  -OC    Recommended Precautions and Strategies upright posture during/after eating;liquid via spoon only;multiple swallows per sip of liquid;multiple swallows per bite of food;volitional throat clear  effortful swallow  -ML upright posture during/after eating;small bites of food and sips of liquid;liquid via spoon only;multiple swallows per bite of food;multiple swallows per sip of liquid;1:1 supervision;assist with feeding  -OC    Oral Care Recommendations Oral Care BID/PRN;Before ice/water  -ML Oral Care BID/PRN;Before ice/water  -OC    SLP Rec. for Method of Medication Administration meds crushed;with puree  -ML meds crushed;with puree  -OC    Monitor for Signs of Aspiration notify SLP if any concerns  -ML notify SLP if any  concerns  -OC    Anticipated Discharge Disposition (SLP) anticipate therapy at next level of care  -ML unknown  -OC          Swallow STGs diet tolerance goal selection (SLP);pharyngeal strengthening exercise goal selection (SLP)  -ML --    Diet Tolerance Goal Selection (SLP) Swallow Short Term Goal 1  -ML --    Pharyngeal Strengthening Exercise Goal Selection (SLP) pharyngeal strengthening exercise, SLP goal 1  -ML --          (LTG) Swallow Pt will safely swallow least restrictive diet with use of swallowing strategies with min cues and no overt s/s of penetration/aspiration.  -ML --    [place] (Swallow Long Term Goal) with minimal cues (75-90% accuracy)  -ML --    Time Frame (Swallow Long Term Goal) by discharge  -ML --    Progress/Outcomes (Swallow Long Term Goal) goal ongoing  -ML --    Comment (Swallow Long Term Goal) Pt seen for VFSS follow with pt's daughter and pt's grandaughter present in room to provided education regarding results from VFSS. All VFSS images reviewed with pt's daughter and extensive education given regarding swallowing physiology, results, swallow strategies, and risk of aspiration. Provided edcuation regarding diet options and discussed pt's and family's goals of care. After discussion, pt and pt's daughter have decided to continue puree solid diet and honey thick liquids via spoon to reduce risk of aspiration at this time. Did decide to initiate ice chips and water protocol. Education complete regarding water protocol. All questions answered from family and pt at this time.  -ML --          (STG) Swallow 1 Pt will utilize swallow strategies with min cues to safely swallow diet of puree solids and honey thick liquids by spoon.  -ML --    [place] (Swallow Short Term Goal 1) with minimal cues (75-90% accuracy)  -ML --          Activity (Pharyngeal Strengthening Goal 1, SLP) increase superior movement of the hyolaryngeal complex;increase anterior movement of the hyolaryngeal  complex;increase epiglottic inversion and retroflexion;increase squeeze/positive pressure generation;increase closure at entrance to airway/closure of airway at glottis  -ML --    [place]/Accuracy (Pharyngeal Strengthening Goal 1, SLP) with minimal cues (75-90% accuracy)  -ML --          User Key  (r) = Recorded By, (t) = Taken By, (c) = Cosigned By    Initials Name Effective Dates    Aarti Mccall MA,[facility]-SLP 06/16/21 -     ML Belen Garcia MS CCC-SLP 06/16/21 -                 EDUCATION  The patient has been educated in the following areas:   Dysphagia (Swallowing Impairment).        SLP GOALS     Row Name 02/07/23 1030             (LTG) Swallow    (LTG) Swallow Pt will safely swallow least restrictive diet with use of swallowing strategies with min cues and no overt s/s of penetration/aspiration.  -ML      [place] (Swallow Long Term Goal) with minimal cues (75-90% accuracy)  -ML      Time Frame (Swallow Long Term Goal) by discharge  -ML      Progress/Outcomes (Swallow Long Term Goal) goal ongoing  -ML      Comment (Swallow Long Term Goal) Pt seen for VFSS follow with pt's daughter and pt's grandaughter present in room to provided education regarding results from VFSS. All VFSS images reviewed with pt's daughter and extensive education given regarding swallowing physiology, results, swallow strategies, and risk of aspiration. Provided edcuation regarding diet options and discussed pt's and family's goals of care. After discussion, pt and pt's daughter have decided to continue puree solid diet and honey thick liquids via spoon to reduce risk of aspiration at this time. Did decide to initiate ice chips and water protocol. Education complete regarding water protocol. All questions answered from family and pt at this time.  -ML         (STG) Swallow 1    (STG) Swallow 1 Pt will utilize swallow strategies with min cues to safely swallow diet of puree solids and honey thick liquids by spoon.  -ML       [place] (Swallow Short Term Goal 1) with minimal cues (75-90% accuracy)  -ML         (STG) Pharyngeal Strengthening Exercise Goal 1 (SLP)    Activity (Pharyngeal Strengthening Goal 1, SLP) increase superior movement of the hyolaryngeal complex;increase anterior movement of the hyolaryngeal complex;increase epiglottic inversion and retroflexion;increase squeeze/positive pressure generation;increase closure at entrance to airway/closure of airway at glottis  -ML      [place]/Accuracy (Pharyngeal Strengthening Goal 1, SLP) with minimal cues (75-90% accuracy)  -ML            User Key  (r) = Recorded By, (t) = Taken By, (c) = Cosigned By    Initials Name Provider Type    Belen Ortiz MS CCC-SLP Speech and Language Pathologist                   Time Calculation:    Time Calculation- SLP     Row Name 02/07/23 1233             Time Calculation- SLP    SLP Start Time 1015  -ML      SLP Received On 02/07/23  -ML         Untimed Charges    SLP Eval/Re-eval  ST Motion Fluoro Eval Swallow - 76334  -ML      41138-[id number] Motion Fluoro Eval Swallow Minutes 75  -ML      [id number] Treatment Swallow Minutes 45  -ML         Total Minutes    Untimed Charges Total Minutes 120  -ML       Total Minutes 120  -ML            User Key  (r) = Recorded By, (t) = Taken By, (c) = Cosigned By    Initials Name Provider Type    Belen Ortiz MS CCC-SLP Speech and Language Pathologist                Therapy Charges for Today     Code Description Service Date Service Provider Modifiers Qty    60638410497 HC ST MOTION FLUORO EVAL SWALLOW 5 2/7/2023 Belen Garcia MS CCC-SLP GN 1    83402227351 HC ST TREATMENT SWALLOW 3 2/7/2023 Belen Garcia MS CCC-SLP GN 1               MS BETH Marin  2/7/2023

## 2023-02-07 NOTE — PLAN OF CARE
Problem: Fall Injury Risk  Goal: Absence of Fall and Fall-Related Injury  Outcome: Ongoing, Progressing  Intervention: Promote Injury-Free Environment   Goal Outcome Evaluation:              Outcome Evaluation: Pt rested quietly in between care, VSS, voiding via F/C, no c/o pain, meds crushed, continues with [facility] soft diet, NTL.

## 2023-02-07 NOTE — CASE MANAGEMENT/SOCIAL WORK
Continued Stay Note  [facility]     Patient Name: Lavelle Soliman  MRN: [id number]  Today's Date: 2/7/2023    Admit Date: 1/24/2023    Plan: SNF, pending referrals   Discharge Plan     Row Name 02/07/23 1114       Plan    Plan SNF, pending referrals    Plan Comments [facility] not have any beds nor does [facility]. Missy with [facility] re-looking at referral for bed availability at [facility], [facility], [facility] CCP to follow.               Discharge Codes    No documentation.               Expected Discharge Date and Time     Expected Discharge Date Expected Discharge Time    Feb 7, 2023

## 2023-02-07 NOTE — CASE MANAGEMENT/SOCIAL WORK
Continued Stay Note  [facility]     Patient Name: Lavelle Soliman  MRN: [id number]  Today's Date: 2/7/2023    Admit Date: 1/24/2023    Plan: SNF, pending new referrals   Discharge Plan     Row Name 02/07/23 1644       Plan    Plan SNF, pending new referrals    Plan Comments Missy with [facility] said they don't have any beds available. CCP spoke with patient's daughter Little and additonal referrals were made to [facility], [facility], and [facility]. CCP to follow up with new referrals tomorrow.               Discharge Codes    No documentation.               Expected Discharge Date and Time     Expected Discharge Date Expected Discharge Time    Feb 7, 2023

## 2023-02-08 VITALS
TEMPERATURE: 97.3 F | HEART RATE: 74 BPM | WEIGHT: 160 LBS | SYSTOLIC BLOOD PRESSURE: 107 MMHG | OXYGEN SATURATION: 94 % | RESPIRATION RATE: 16 BRPM | DIASTOLIC BLOOD PRESSURE: 64 MMHG | HEIGHT: 67 IN | BODY MASS INDEX: 25.11 KG/M2

## 2023-02-08 PROCEDURE — 25010000002 ENOXAPARIN PER 10 MG: Performed by: HOSPITALIST

## 2023-02-08 PROCEDURE — 97530 THERAPEUTIC ACTIVITIES: CPT

## 2023-02-08 RX ORDER — LEVOTHYROXINE SODIUM 0.07 MG/1
75 TABLET ORAL
Qty: 30 TABLET | Refills: 0 | Status: SHIPPED | OUTPATIENT
Start: 2023-02-09

## 2023-02-08 RX ORDER — TAMSULOSIN HYDROCHLORIDE 0.4 MG/1
0.4 CAPSULE ORAL DAILY
Qty: 30 CAPSULE | Refills: 0 | Status: SHIPPED | OUTPATIENT
Start: 2023-02-09

## 2023-02-08 RX ORDER — ASPIRIN 81 MG/1
81 TABLET ORAL EVERY 12 HOURS
Qty: 34 TABLET | Refills: 0 | Status: SHIPPED | OUTPATIENT
Start: 2023-02-08 | End: 2023-02-25

## 2023-02-08 RX ADMIN — LEVOTHYROXINE SODIUM 75 MCG: 0.07 TABLET ORAL at 05:54

## 2023-02-08 RX ADMIN — Medication 10 ML: at 08:33

## 2023-02-08 RX ADMIN — METOPROLOL TARTRATE 12.5 MG: 25 TABLET, FILM COATED ORAL at 08:33

## 2023-02-08 RX ADMIN — ENOXAPARIN SODIUM 30 MG: 30 INJECTION SUBCUTANEOUS at 08:34

## 2023-02-08 RX ADMIN — TAMSULOSIN HYDROCHLORIDE 0.4 MG: 0.4 CAPSULE ORAL at 08:30

## 2023-02-08 RX ADMIN — Medication 250 MG: at 08:30

## 2023-02-08 NOTE — PLAN OF CARE
Goal Outcome Evaluation:  Plan of Care Reviewed With: patient        Progress: improving  Outcome Evaluation: VSS, pt cont voiding per f/c and cath done. Cont on pureed diet with HTL and medication crushed with apple sauce. Pt was able to safely transfer from chair to bed using a walker and assist x1. Pending d/c to SNF. Educated on bp monitoring and falls prevention.

## 2023-02-08 NOTE — PLAN OF CARE
Goal Outcome Evaluation:  Plan of Care Reviewed With: patient           Outcome Evaluation: Patient seen for PT session this AM. Patient supine in bed upon arrival. Patient sat up to EOB with Gillian and increased time to perform. Patient performed STS 3x from EOB with Gillian. Patient required increased time to obtain full upright posture and balance. Patient ambualted 40ft with rwx and CGA. Gait slow and mildly antalgic but mostly steady this date with patient continuing to demonstrate improvements in activity endurance. Patient sitting UIC at end of session. Patient performed ther ex while seated and reclined in chair. Plan is for d/c to SNF today. PT will continue to monitor.

## 2023-02-08 NOTE — CASE MANAGEMENT/SOCIAL WORK
Continued Stay Note  [facility]     Patient Name: Lavelle Soliman  MRN: [id number]  Today's Date: 2/8/2023    Admit Date: 1/24/2023    Plan: [facility] [employer] [facility] @ 4pm   Discharge Plan     Row Name 02/08/23 1021       Plan    Plan [facility] [employer] [facility] @ 4pm    Plan Comments WC [facility] to 4pm [facility], new Reservation # [id number].     Row Name 02/08/23 1010       Plan    Plan [facility] [employer]  @ 11am    Plan Comments [facility] and patient and daughter agreeable. [facility] this date and booked WC transport for 11am, reservation # [id number].               Discharge Codes    No documentation.               Expected Discharge Date and Time     Expected Discharge Date Expected Discharge Time    Feb 7, 2023

## 2023-02-08 NOTE — CASE MANAGEMENT/SOCIAL WORK
Case Management Discharge Note      Final Note: Patient discharged to [facility].         Selected Continued Care - Discharged on 2/8/2023 Admission date: 1/24/2023 - Discharge disposition: Skilled Nursing Facility (DC - External)    Destination Coordination complete.    Service Provider Selected Services Address Phone Fax Patient Preferred    [facility] [address] [place] [id number] [phone] --          Durable Medical Equipment    No services have been selected for the patient.              Dialysis/Infusion    No services have been selected for the patient.              Home Medical Care    No services have been selected for the patient.              Therapy    No services have been selected for the patient.              Community Resources    No services have been selected for the patient.              Community & DME    No services have been selected for the patient.                       Final Discharge Disposition Code: 03 - skilled nursing facility (SNF)

## 2023-02-08 NOTE — CASE MANAGEMENT/SOCIAL WORK
Continued Stay Note  [facility]     Patient Name: Lavelle Soliman  MRN: [id number]  Today's Date: 2/8/2023    Admit Date: 1/24/2023    Plan: [facility] [employer]  @ 11am   Discharge Plan     Row Name 02/08/23 1010       Plan    Plan [facility] via [employer] [facility] @ 11am    Plan Comments [facility] and patient and daughter agreeable. [facility] made outbound call to [facility] this date and booked  transport for 11am, reservation # [id number]. CCP made outbound call to [facility] to inform Dr. Herring of discharge plans/transportation arrangements.                Discharge Codes    No documentation.               Expected Discharge Date and Time     Expected Discharge Date Expected Discharge Time    Feb 7, 2023

## 2023-02-08 NOTE — DISCHARGE SUMMARY
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        Hospital Course     This is a 89-year-old male who presented to hospital after a mechanical fall resulted in intertrochanteric femur fracture of the left lower extremity.  He underwent operative intervention on .  His hospital course was complicated by episodes of atrial fibrillation with RVR, and severe delirium.  Regarding the A-fib with RVR, he was started on metoprolol and is going to be discharged with 12.5 mg twice daily.  Due to his age and history of multiple falls, a conversation was had with family and anticoagulation was deferred.  He was delirious for many days for this hospitalization.  Psychiatry was consulted.  Haldol and Zyprexa were made available, but he did not need those medications for approximately 1 week prior to discharge.  He also had some hematuria that required urology evaluation.  He was recommended indwelling Carrasquillo catheter remain inserted at discharge.  Hematuria had resolved by discharge and was thought to be due to traumatic catheterization.    He should have routine blood work done with a CBC and BMP around one week after discharge and should follow up with his PCP within two weeks.       Day of  Discharge     Physical Exam:  Temp:  [97.3 °F (36.3 °C)-98.2 °F (36.8 °C)] 97.5 °F (36.4 °C)  Heart Rate:  [65-81] 65  Resp:  [14-18] 18  BP: (102-111)/(59-68) 102/60  Body mass index is 25.06 kg/m².  Physical Exam  HENT:      Head: Normocephalic and atraumatic.   Cardiovascular:      Rate and Rhythm: Normal rate and regular rhythm.   Pulmonary:      Effort: Pulmonary effort is normal. No respiratory distress.   Abdominal:      General: There is no distension.      Palpations: Abdomen is soft.      Tenderness: There is no abdominal tenderness.   Genitourinary:     Comments: Carrasquillo catheter in place  Skin:     General: Skin is warm and dry.       Consultants     Consult Orders (all) (From admission, onward)     Start     Ordered    02/04/23 1518  Inpatient Urology Consult  Once        Specialty:  Urology  Provider:  Brice Ruiz MD    02/04/23 1517    01/30/23 1508  Inpatient Case Management  Consult  Once,   Status:  Canceled        Provider:  (Not yet assigned)    01/30/23 1507    01/30/23 0914  Inpatient Psychiatrist Consult  Once        Specialty:  Psychiatry  Provider:  Michael Tovar III, MD    01/30/23 0914    01/30/23 0852  Inpatient Palliative Care Team Consult  Once        Comments: Resistant post operative delirium after fall and hip fracture   Provider:  (Not yet assigned)    01/30/23 0851    01/30/23 0851  Inpatient Psychiatrist Consult  Once,   Status:  Canceled        Specialty:  Psychiatry  Provider:  (Not yet assigned)    01/30/23 0850    01/25/23 0044  Inpatient Orthopedic Surgery Consult  Once        Specialty:  Orthopedic Surgery  Provider:  Walter Cooney MD    01/25/23 0043    01/24/23 2054  [facility] (on-call MD unless specified) Details  Once        Specialty:  Hospitalist  Provider:  (Not yet assigned)    01/24/23 2053              Procedures     Imaging Results (All)     Procedure Component Value Units Date/Time    FL Video Swallow With Speech Single Contrast  [[id number]] Collected: 02/08/23 0931     Updated: 02/08/23 0935    Narrative:      VIDEO SWALLOWING EXAMINATION BY SPEECH PATHOLOGY     Clinical: Dysphasia     Video swallowing examination performed under the direction of speech  pathology. Imaging reviewed by radiologist who concurs with the  findings.     Speech pathology summary:VFSS completed with radiologist, Dr. Izquierdo,  present in room. Pt presents with moderate pharyngeal dysphagia  characterized by impaired hyolaryngeal excursion, pharyngeal  contraction, epiglottic inversion, and delayed and impaired airway  closure causing penetration of thin liquids by cup during the swallow  and aspiration on last swallow, penetration of nectar thick liquids by  cup during swallow falling to vocal cords after the swallow, penetration  to mid laryngeal vestibule of honey thick liquids by cup and puree, and  aspiration of mechanical soft solids (peaches/juice) and moderate  vallecular residue.     FLUOROSCOPY TIME: 2 minutes 13 seconds, 3020 images.     This report was finalized on 2/8/2023 9:32 AM by Dr. Van Izquierdo M.D.       XR Pelvis 1 or 2 View [[id number]] Collected: 01/28/23 1145     Updated: 01/28/23 1235    Narrative:      PELVIS/LEFT HIP     HISTORY: Fall.     COMPARISON: Left hip 01/24/2023.     FINDINGS: There has been left hip intertrochanteric nailing for a left  intertrochanteric fracture. There is comminution and displacement of the  lesser trochanter though alignment is otherwise improved. Recent  surgical changes include soft tissue gas and overlying surgical skin  staples. The bones are osteopenic. There are chronic-appearing right  superior and inferior pubic rami fractures. Mild arthritic changes are  present at both hips. Surgical mesh coils overlie the lower pelvis.       Impression:      Post left intertrochanteric nailing for a left  intertrochanteric fracture with comminution of the lesser trochanter.     This report was finalized on 1/28/2023  12:31 PM by Dr. Junaid Ortega M.D.       XR Hip With or Without Pelvis 2 - 3 View Left [[id number]] Collected: 01/25/23 1746     Updated: 01/25/23 2033    Narrative:      XR HIP W OR WO PELVIS 2-3 VIEW LEFT-  INTRAOPERATIVE VIEWS 01/25/2023     HISTORY: Intramedullary nail placement.     FINDINGS: Intraoperative views were used for localization during  placement of intramedullary nail and shelbi to support the proximal left  femur. There is mild displacement medially of the lesser trochanter. No  unexpected findings are noted.     Fluoroscopy time 40 seconds 3 images     This report was finalized on 1/25/2023 8:30 PM by Dr. Ivan Hastings M.D.       FL C Arm During Surgery [[id number]] Resulted: 01/25/23 1718     Updated: 01/25/23 1718    Narrative:      This procedure was auto-finalized with no dictation required.    CT Cervical Spine Without Contrast [[id number]] Collected: 01/24/23 2229     Updated: 01/24/23 2239    Narrative:      CT OF THE CERVICAL SPINE     HISTORY: Fall     COMPARISON: None available.     TECHNIQUE: Axial CT imaging was obtained through the cervical spine.  Coronal and sagittal reformatted images were obtained.     FINDINGS:  No acute fracture or subluxation of the cervical spine is seen. The  patient has some retrolisthesis of C4 on C5. There is some bony  ankylosis noted at C6-C7. Images through the lung bases do not  demonstrate any acute abnormalities. There is no prevertebral soft  tissue swelling.     C2-C3: There is no canal stenosis. There is moderate bilateral neural  foraminal narrowing.  C3-C4: There is moderate canal stenosis. There is severe neural  foraminal narrowing on the right moderate narrowing on the left.  C4-C5: There is moderate to severe canal stenosis, particularly on the  left, which is exacerbated by facet hypertrophy. There is severe neural  foraminal narrowing on the left.  C5-C6: There is some mild canal narrowing. There is moderate bilateral  neural foraminal  narrowing.  C6-C7: There is some mild canal narrowing. There is some mild neural  foraminal narrowing on the right.  C7-T1: There is no canal stenosis. There is severe neural foraminal  narrowing on the left       Impression:      No acute fracture or subluxation identified.     Radiation dose reduction techniques were utilized, including automated  exposure control and exposure modulation based on body size.     This report was finalized on 1/24/2023 10:36 PM by Dr. Charu Kc M.D.       CT Head Without Contrast [[id number]] Collected: 01/24/23 2225     Updated: 01/24/23 2232    Narrative:      CT HEAD WITHOUT CONTRAST     HISTORY: Fall     COMPARISON: None available.     TECHNIQUE: Axial CT imaging was obtained through the brain. No IV  contrast was administered.     FINDINGS:  No acute intracranial hemorrhage is seen. There is diffuse atrophy.  There is periventricular and deep white matter microangiopathic change.  There is no midline shift or mass effect. No calvarial fracture is seen.  Paranasal sinuses and mastoid air cells appear clear.       Impression:      No acute intracranial findings.     Radiation dose reduction techniques were utilized, including automated  exposure control and exposure modulation based on body size.     This report was finalized on 1/24/2023 10:29 PM by Dr. Charu Kc M.D.       XR Chest 1 View [[id number]] Collected: 01/24/23 2112     Updated: 01/24/23 2115    Narrative:      PORTABLE CHEST X-RAY     HISTORY: Hip fracture, preoperative evaluation. Atrial fibrillation.     Portable chest x-ray is provided. Correlation: January 6, 2019.     FINDINGS: The cardiomediastinal silhouette is normal. The lungs are  clear. The costophrenic sulci are dry and the bones appear normal. There  is no pneumothorax.       Impression:      Negative.     This report was finalized on 1/24/2023 9:12 PM by Dr. Mitesh Kilgore M.D.       XR Hip With or Without Pelvis 2 - 3 View Left  [[id number]] Collected: 01/24/23 2106     Updated: 01/24/23 2112    Narrative:      2 VIEWS LEFT FEMUR; AP VIEW THE PELVIS +2 VIEWS LEFT HIP     HISTORY: Fall     COMPARISON: None available.     FINDINGS:  The patient has old right superior and inferior pubic rami fractures.  There is an acute comminuted intertrochanteric left femoral fracture.  There are changes of prior inguinal hernia repair with mesh. No distal  femoral fracture is seen. There is no suprapatellar effusion.       Impression:      Comminuted intertrochanteric left femoral fracture.     This report was finalized on 1/24/2023 9:09 PM by Dr. Charu Kc M.D.       XR Femur 2 View Left [[id number]] Collected: 01/24/23 2106     Updated: 01/24/23 2112    Narrative:      2 VIEWS LEFT FEMUR; AP VIEW THE PELVIS +2 VIEWS LEFT HIP     HISTORY: Fall     COMPARISON: None available.     FINDINGS:  The patient has old right superior and inferior pubic rami fractures.  There is an acute comminuted intertrochanteric left femoral fracture.  There are changes of prior inguinal hernia repair with mesh. No distal  femoral fracture is seen. There is no suprapatellar effusion.       Impression:      Comminuted intertrochanteric left femoral fracture.     This report was finalized on 1/24/2023 9:09 PM by Dr. Charu Kc M.D.             Pertinent Labs     Results from last 7 days   Lab Units 02/06/23  0440 02/05/23  0416 02/04/23  0533 02/03/23  0449   WBC 10*3/mm3 11.88* 13.02* 11.98* 12.64*   HEMOGLOBIN g/dL 8.5* 8.1* 9.1* 9.1*   PLATELETS 10*3/mm3 272 258 288 266     Results from last 7 days   Lab Units 02/06/23  0440 02/05/23  0416 02/04/23  0533 02/03/23  0449   SODIUM mmol/L 141 140 139 142   POTASSIUM mmol/L 4.0 3.4* 3.6 4.0   CHLORIDE mmol/L 111* 110* 111* 112*   CO2 mmol/L 23.7 23.7 22.2 24.0   BUN mg/dL 16 21 25* 23   CREATININE mg/dL 1.10 1.10 1.20 1.05   GLUCOSE mg/dL 91 97 97 95   Estimated Creatinine Clearance: 41.3 mL/min (by C-G formula based  on SCr of 1.1 mg/dL).    Results from last 7 days   Lab Units 02/06/23  0440 02/05/23  0416 02/04/23  0533 02/03/23  0449   CALCIUM mg/dL 7.9* 7.7* 7.7* 8.0*               Invalid input(s): LDLCALC        Test Results Pending at Discharge       Discharge Details        Discharge Medications      New Medications      Instructions Start Date   aspirin 81 MG EC tablet   81 mg, Oral, Every 12 Hours      tamsulosin 0.4 MG capsule 24 hr capsule  Commonly known as: FLOMAX   0.4 mg, Oral, Daily   Start Date: February 9, 2023        Changes to Medications      Instructions Start Date   famotidine 20 MG tablet  Commonly known as: PEPCID  What changed:   · how to take this  · when to take this   TAKE ONE TABLET BY MOUTH DAILY      finasteride 5 MG tablet  Commonly known as: PROSCAR  What changed: when to take this   5 mg, Oral, Daily      levothyroxine 75 MCG tablet  Commonly known as: SYNTHROID, LEVOTHROID  What changed: Another medication with the same name was changed. Make sure you understand how and when to take each.   75 mcg, Oral, Daily      levothyroxine 75 MCG tablet  Commonly known as: SYNTHROID, LEVOTHROID  What changed:   · medication strength  · how much to take  · when to take this   75 mcg, Oral, Every Early Morning   Start Date: February 9, 2023     metoprolol tartrate 25 MG tablet  Commonly known as: LOPRESSOR  What changed:   · medication strength  · how much to take   12.5 mg, Oral, 2 Times Daily         Stop These Medications    hydroCHLOROthiazide 25 MG tablet  Commonly known as: HYDRODIURIL     terazosin 5 MG capsule  Commonly known as: HYTRIN            Allergies   Allergen Reactions   • Latex Rash         Discharge Disposition:  Skilled Nursing Facility (DC - External)    Discharge Diet:  Diet Order   Procedures   • Diet: Regular/House Diet; No Straw, Feeding Assistance - Nursing; Texture: Pureed (NDD 1); Fluid Consistency: Honey Thick       Discharge Activity: As tolerated       CODE STATUS:    Code  Status and Medical Interventions:   Ordered at: 01/24/23 2216     Medical Intervention Limits:    NO intubation (DNI)    NO cardioversion     Code Status (Patient has no pulse and is not breathing):    No CPR (Do Not Attempt to Resuscitate)     Medical Interventions (Patient has pulse or is breathing):    Limited Support       Future Appointments   Date Time Provider Department Center   6/27/2023  9:00 AM [facility]   7/11/2023 10:30 AM Rafita Colunga MD [facility]   7/18/2023  1:15 PM Rafita Colunga MD [facility]      Contact information for follow-up providers     Rafita Colunga MD .    Specialty: Internal Medicine  Contact information:  [address] [facility] [id number] [phone]                   Contact information for after-discharge care     Destination     [facility] .    Service: Skilled Nursing  Contact information:  [address] [facility] [address] [phone]                             Time Spent on Discharge:  Greater than 30 minutes      Leodan Herring MD  [facility]  02/08/23  10:57 EST

## 2023-02-08 NOTE — THERAPY TREATMENT NOTE
Patient Name: Lavelle Soliman  : 1927    MRN: [id number]                              Today's Date: 2023       Admit Date: 2023    Visit Dx:     ICD-10-CM ICD-9-CM   1. Closed fracture of left hip, initial encounter ([facility])  S72.002A 820.8     Patient Active Problem List   Diagnosis   • Benign essential hypertension   • Stage 3a chronic kidney disease (HCC)   • Hyperlipidemia   • Hypothyroidism   • Muscle cramps   • Anemia   • Hyperglycemia   • Urinary retention   • UTI (urinary tract infection), bacterial   • BPH with obstruction/lower urinary tract symptoms   • Paroxysmal atrial fibrillation ([facility])   • Closed fracture of left hip, initial encounter ([facility])   • Postoperative anemia due to acute blood loss   • Moderate malnutrition ([facility])     Past Medical History:   Diagnosis Date   • Benign essential hypertension    • Cataract    • Disease of thyroid gland    • HL (hearing loss)    • Hyperlipidemia    • Hypothyroidism    • Paroxysmal atrial fibrillation ([facility])    • Visual impairment S     Past Surgical History:   Procedure Laterality Date   • EYE SURGERY     • HERNIA REPAIR     • HIP INTERTROCHANTERIC NAILING Left 2023    Procedure: LT. HIP INTERTROCHANTERIC NAILING;  Surgeon: Leland Hair MD;  Location: [facility];  Service: Orthopedics;  Laterality: Left;      General Information     Row Name 23 1145          Physical Therapy Time and Intention    Document Type therapy note (daily note)  -     Mode of Treatment individual therapy;physical therapy  -     Row Name 23 1145          General Information    Patient Profile Reviewed yes  -SM     Existing Precautions/Restrictions fall  -SM     Row Name 23 1145          Cognition    Orientation Status (Cognition) oriented x 3  -SM     Row Name 23 1145          Safety Issues, Functional Mobility    Impairments Affecting Function (Mobility) balance;cognition;endurance/activity tolerance;strength;pain  -SM           User Key   (r) = Recorded By, (t) = Taken By, (c) = Cosigned By    Initials Name Provider Type     Vernell Matta PT Physical Therapist               Mobility     Row Name 02/08/23 1145          Bed Mobility    Bed Mobility supine-sit  -     Supine-Sit [place] (Bed Mobility) minimum assist (75% patient effort);verbal cues  -     Comment, (Bed Mobility) UIC at end of session  -     Row Name 02/08/23 1145          Sit-Stand Transfer    Sit-Stand [place] (Transfers) minimum assist (75% patient effort);verbal cues  -     Assistive Device (Sit-Stand Transfers) walker, front-wheeled  -     Comment, (Sit-Stand Transfer) x3 from EOB. Increased time to obtain upright posture and balance  -     Row Name 02/08/23 1145          Gait/Stairs (Locomotion)    [place] Level (Gait) minimum assist (75% patient effort);verbal cues;contact guard  -     Assistive Device (Gait) walker, front-wheeled  -     Distance in Feet (Gait) 40ft  -     Deviations/Abnormal Patterns (Gait) zaid decreased;festinating/shuffling;gait speed decreased;stride length decreased  -     Bilateral Gait Deviations forward flexed posture;weight shift ability decreased  -     [place] Level (Stairs) not tested  -     Comment, (Gait/Stairs) Gait slow but mostly steady this date with patient continuing to demonstrate improvements in activity endurance.  -     Row Name 02/08/23 1145          Mobility    Extremity Weight-bearing Status left lower extremity  -     Left Lower Extremity (Weight-bearing Status) weight-bearing as tolerated (WBAT)  -           User Key  (r) = Recorded By, (t) = Taken By, (c) = Cosigned By    Initials Name Provider Type    Vernell Escobedo PT Physical Therapist               Obj/Interventions     Row Name 02/08/23 1147          Motor Skills    Therapeutic Exercise other (see comments)  LAQ, Hip abd/add, HS x10  -     Row Name 02/08/23 1147          Balance    Balance Assessment sitting  static balance;sitting dynamic balance;sit to stand dynamic balance;standing dynamic balance;standing static balance  -     Static Sitting Balance standby assist  -     Dynamic Sitting Balance contact guard  -     Position, Sitting Balance sitting edge of bed  -     Sit to Stand Dynamic Balance minimal assist;verbal cues  -     Static Standing Balance contact guard  -     Dynamic Standing Balance contact guard;minimal assist  -     Position/Device Used, Standing Balance supported;walker, front-wheeled  -     Balance Interventions sitting;standing;sit to stand;supported;static;dynamic  -           User Key  (r) = Recorded By, (t) = Taken By, (c) = Cosigned By    Initials Name Provider Type     Vernell Matta, PT Physical Therapist               Goals/Plan    No documentation.                Clinical Impression     Row Name 02/08/23 1148          Pain    Pretreatment Pain Rating 0/10 - no pain  -     Posttreatment Pain Rating 0/10 - no pain  -     Row Name 02/08/23 1148          Plan of Care Review    Plan of Care Reviewed With patient  -     Outcome Evaluation Patient seen for PT session this AM. Patient supine in bed upon arrival. Patient sat up to EOB with Gillian and increased time to perform. Patient performed STS 3x from EOB with Gillian. Patient required increased time to obtain full upright posture and balance. Patient ambualted 40ft with rwx and CGA. Gait slow and mildly antalgic but mostly steady this date with patient continuing to demonstrate improvements in activity endurance. Patient sitting UIC at end of session. Patient performed ther ex while seated and reclined in chair. Plan is for d/c to SNF today. PT will continue to monitor.  -     Row Name 02/08/23 1148          Vital Signs    O2 Delivery Pre Treatment room air  -     O2 Delivery Intra Treatment room air  -     O2 Delivery Post Treatment room air  -     Pre Patient Position Supine  -     Intra Patient Position  Standing  -     Post Patient Position Sitting  -     Row Name 02/08/23 1148          Positioning and Restraints    Pre-Treatment Position in bed  -     Post Treatment Position chair  -SM     In Chair reclined;call light within reach;encouraged to call for assist;exit alarm on  -           User Key  (r) = Recorded By, (t) = Taken By, (c) = Cosigned By    Initials Name Provider Type     Vernell Matta, AROLDO Physical Therapist               Outcome Measures     Row Name 02/08/23 1150 02/08/23 0834       How much help from another person do you currently need...    Turning from your back to your side while in flat bed without using bedrails? 3  -SM 3  -BRADLEY    Moving from lying on back to sitting on the side of a flat bed without bedrails? 3  -SM 3  -BRADLEY    Moving to and from a bed to a chair (including a wheelchair)? 3  -SM 3  -BRADLEY    Standing up from a chair using your arms (e.g., wheelchair, bedside chair)? 3  - 3  -BRADLEY    Climbing 3-5 steps with a railing? 2  -SM 2  -BRADLEY    To walk in hospital room? 3  -SM 3  -BRADLEY    AM-PAC 6 Clicks Score (PT) 17  - 17  -BRADLEY    Highest level of mobility 5 --> Static standing  - 5 --> Static standing  -    Row Name 02/08/23 1150          Functional Assessment    Outcome Measure Options AM-PAC 6 Clicks Basic Mobility (PT)  -           User Key  (r) = Recorded By, (t) = Taken By, (c) = Cosigned By    Initials Name Provider Type     Vernell Matta, AROLDO Physical Therapist    Anibal Loya, RN Registered Nurse                             Physical Therapy Education     Title: PT OT SLP Therapies (Resolved)     Topic: Physical Therapy (Resolved)     Point: Mobility training (Resolved)     Learning Progress Summary           Patient Acceptance, E, VU by BERNICE at 2/8/2023 1150    Acceptance, E,TB, VU by BRADLEY at 2/8/2023 1150    Acceptance, E, VU by BERNICE at 2/7/2023 1611    Acceptance, E, VU,NR by KARIN at 2/7/2023 0933    Acceptance, E, VU by MANUEL at 2/6/2023 1242    Acceptance,  E,TB,D, VU,NR by CH at 2/3/2023 1109    Acceptance, E,TB,D, VU,NR by CH at 2/2/2023 1146    Acceptance, E,TB,D, VU,NR by CH at 1/30/2023 1028    Acceptance, E, NR by CW at 1/28/2023 1241    Acceptance, E, NR by DB at 1/27/2023 1527   Family Acceptance, E, VU by DB at 1/27/2023 1527                   Point: Home exercise program (Resolved)     Learning Progress Summary           Patient Acceptance, E, VU by SM at 2/8/2023 1150    Acceptance, E,TB, VU by BRADLEY at 2/8/2023 1150    Acceptance, E, VU by SM at 2/7/2023 1611    Acceptance, E, VU,NR by KARIN at 2/7/2023 0933    Acceptance, E, VU by DB at 2/6/2023 1242    Acceptance, E, NR by CW at 1/28/2023 1241    Acceptance, E, NR by DB at 1/27/2023 1527   Family Acceptance, E, VU by DB at 1/27/2023 1527                   Point: Body mechanics (Resolved)     Learning Progress Summary           Patient Acceptance, E, VU by SM at 2/8/2023 1150    Acceptance, E,TB, VU by BRADLEY at 2/8/2023 1150    Acceptance, E, VU by SM at 2/7/2023 1611    Acceptance, E, VU,NR by KARIN at 2/7/2023 0933    Acceptance, E, VU by DB at 2/6/2023 1242    Acceptance, E,TB,D, VU,NR by CH at 2/3/2023 1109    Acceptance, E,TB,D, VU,NR by CH at 2/2/2023 1146    Acceptance, E,TB,D, VU,NR by CH at 1/30/2023 1028    Acceptance, E, NR by CW at 1/28/2023 1241    Acceptance, E, NR by DB at 1/27/2023 1527   Family Acceptance, E, VU by DB at 1/27/2023 1527                   Point: Precautions (Resolved)     Learning Progress Summary           Patient Acceptance, E, VU by SM at 2/8/2023 1150    Acceptance, E,TB, VU by BRADLEY at 2/8/2023 1150    Acceptance, E, VU by SM at 2/7/2023 1611    Acceptance, E, VU,NR by KARIN at 2/7/2023 0933    Acceptance, E, VU by DB at 2/6/2023 1242    Acceptance, E,TB,D, VU,NR by CH at 2/3/2023 1109    Acceptance, E,TB,D, VU,NR by CH at 2/2/2023 1146    Acceptance, E,TB,D, VU,NR by CH at 1/30/2023 1028    Acceptance, E, NR by CW at 1/28/2023 1241    Acceptance, E, NR by DB at 1/27/2023 1527   Family  Acceptance, E, VU by DB at 1/27/2023 1527                               User Key     Initials Effective Dates Name Provider Type Discipline     06/16/21 -  Lydia Sanon, PT Physical Therapist PT    DB 06/16/21 -  Nydia Rooney, PT Physical Therapist PT    CW 12/13/22 -  Lizzeth Negron, PT Physical Therapist PT    KARIN 09/20/22 -  Laura Zarco, RN Registered Nurse Nurse    BERNICE 05/02/22 -  Vernell Matta, AROLDO Physical Therapist PT    BRADLEY 12/21/22 -  Anibal Zarco, RN Registered Nurse Nurse              PT Recommendation and Plan     Plan of Care Reviewed With: patient  Outcome Evaluation: Patient seen for PT session this AM. Patient supine in bed upon arrival. Patient sat up to EOB with Gillian and increased time to perform. Patient performed STS 3x from EOB with Gillian. Patient required increased time to obtain full upright posture and balance. Patient ambualted 40ft with rwx and CGA. Gait slow and mildly antalgic but mostly steady this date with patient continuing to demonstrate improvements in activity endurance. Patient sitting UIC at end of session. Patient performed ther ex while seated and reclined in chair. Plan is for d/c to SNF today. PT will continue to monitor.     Time Calculation:    PT Charges     Row Name 02/08/23 1150             Time Calculation    Start Time 1101  -      Stop Time 1113  -      Time Calculation (min) 12 min  -      PT Received On 02/08/23  -      PT - Next Appointment 02/09/23  -         Time Calculation- PT    Total Timed Code Minutes- PT 12 minute(s)  -SM         Timed Charges    47498 - PT Therapeutic Exercise Minutes 4  -SM      41926 - PT Therapeutic Activity Minutes 8  -SM         Total Minutes    Timed Charges Total Minutes 12  -SM       Total Minutes 12  -SM            User Key  (r) = Recorded By, (t) = Taken By, (c) = Cosigned By    Initials Name Provider Type     Vernell Matta, PT Physical Therapist              Therapy Charges for Today     Code  Description Service Date Service Provider Modifiers Qty    84045065498  PT THERAPEUTIC ACT EA 15 MIN 2/7/2023 Vernell Matta, PT GP 1    59884429780 HC PT THER SUPP EA 15 MIN 2/7/2023 Vernell Matta, PT GP 1    29068174943 HC PT THERAPEUTIC ACT EA 15 MIN 2/8/2023 Vernell Matta, PT GP 1    56936532516 HC PT THER SUPP EA 15 MIN 2/8/2023 Vernell Matta, PT GP 1          PT G-Codes  Outcome Measure Options: AM-PAC 6 Clicks Basic Mobility (PT)  AM-PAC 6 Clicks Score (PT): 17  PT Discharge Summary  Anticipated Discharge Disposition (PT): skilled nursing facility  Patient was not wearing a face mask during this therapy encounter. Therapist used appropriate personal protective equipment including mask and gloves.  Mask used was standard procedure mask. Appropriate PPE was worn during the entire therapy session. Hand hygiene was completed before and after therapy session. Patient is not in enhanced droplet precautions.     Vernell Matta PT  2/8/2023

## 2023-03-09 ENCOUNTER — READMISSION MANAGEMENT (OUTPATIENT)
Dept: CALL CENTER | Facility: HOSPITAL | Age: 88
End: 2023-03-09
Payer: MEDICARE

## 2023-03-09 NOTE — OUTREACH NOTE
Prep Survey    Flowsheet Row Responses   [facility] facility patient discharged from? Non-BH   Is LACE score < 7 ? Non-BH Discharge   Eligibility [facility]    Date of Discharge 03/09/23   Discharge Disposition Home or Self Care   Discharge diagnosis Fracture of unspecified part of neck of left femur,    Does the patient have one of the following disease processes/diagnoses(primary or secondary)? Other   Prep survey completed? Yes          Adilia ODONNELL - Registered Nurse

## 2023-03-10 ENCOUNTER — TRANSITIONAL CARE MANAGEMENT TELEPHONE ENCOUNTER (OUTPATIENT)
Dept: CALL CENTER | Facility: HOSPITAL | Age: 88
End: 2023-03-10
Payer: MEDICARE

## 2023-03-10 NOTE — OUTREACH NOTE
Call Center TCM Note    Flowsheet Row Responses   [facility] [place] patient discharged from? Non-BH   Does the patient have one of the following disease processes/diagnoses(primary or secondary)? Other   TCM attempt successful? Yes   Call start time 1521   Call end time 1522   General alerts for this patient Moved to [facility]   Discharge diagnosis Fracture of unspecified part of neck of left femur,    Is patient permission given to speak with other caregiver? Yes   List who call center can speak with daughter- Little   Person spoke with today (if not patient) and relationship daughter   Does the patient have all medications ordered at discharge? Yes   Is the patient taking all medications as directed (includes completed medication regime)? Yes   Does the patient have an appointment with their PCP within 7 days of discharge? No   Nursing Interventions Patient declined scheduling/rescheduling appointment at this time   Has home health visited the patient within 72 hours of discharge? N/A   Psychosocial issues? No   What is the patient's perception of their health status since discharge? Improving   Is the patient/caregiver able to teach back the hierarchy of who to call/visit for symptoms/problems? PCP, Specialist, Home health nurse, Urgent Care, ED, 911 Yes   TCM call completed? Yes   Wrap up additional comments Per daughter, patient is doing well, no questions, she will call back at a later date to make a f/u appt with PCP.   Call end time 1522   Would this patient benefit from a Referral to Amb Social Work? No   Is the patient interested in additional calls from an ambulatory ?  NOTE:  applies to high risk patients requiring additional follow-up. No          Rae Heredia RN    3/10/2023, 15:23 EST

## 2023-03-22 ENCOUNTER — OFFICE VISIT (OUTPATIENT)
Dept: FAMILY MEDICINE CLINIC | Facility: CLINIC | Age: 88
End: 2023-03-22
Payer: MEDICARE

## 2023-03-22 VITALS
WEIGHT: 153 LBS | TEMPERATURE: 96 F | OXYGEN SATURATION: 98 % | BODY MASS INDEX: 24.01 KG/M2 | SYSTOLIC BLOOD PRESSURE: 138 MMHG | DIASTOLIC BLOOD PRESSURE: 78 MMHG | RESPIRATION RATE: 15 BRPM | HEART RATE: 56 BPM | HEIGHT: 67 IN

## 2023-03-22 DIAGNOSIS — R73.9 HYPERGLYCEMIA: ICD-10-CM

## 2023-03-22 DIAGNOSIS — E78.5 HYPERLIPIDEMIA, UNSPECIFIED HYPERLIPIDEMIA TYPE: ICD-10-CM

## 2023-03-22 DIAGNOSIS — S72.002A CLOSED FRACTURE OF LEFT HIP, INITIAL ENCOUNTER: ICD-10-CM

## 2023-03-22 DIAGNOSIS — I48.0 PAROXYSMAL ATRIAL FIBRILLATION: ICD-10-CM

## 2023-03-22 DIAGNOSIS — D63.1 ANEMIA DUE TO STAGE 3 CHRONIC KIDNEY DISEASE, UNSPECIFIED WHETHER STAGE 3A OR 3B CKD: ICD-10-CM

## 2023-03-22 DIAGNOSIS — N18.30 ANEMIA DUE TO STAGE 3 CHRONIC KIDNEY DISEASE, UNSPECIFIED WHETHER STAGE 3A OR 3B CKD: ICD-10-CM

## 2023-03-22 DIAGNOSIS — I10 BENIGN ESSENTIAL HYPERTENSION: Primary | ICD-10-CM

## 2023-03-22 DIAGNOSIS — R33.9 URINARY RETENTION: ICD-10-CM

## 2023-03-22 DIAGNOSIS — E03.9 ACQUIRED HYPOTHYROIDISM: ICD-10-CM

## 2023-03-22 DIAGNOSIS — N18.31 STAGE 3A CHRONIC KIDNEY DISEASE: ICD-10-CM

## 2023-03-22 DIAGNOSIS — R60.9 DEPENDENT EDEMA: ICD-10-CM

## 2023-03-22 RX ORDER — FUROSEMIDE 20 MG/1
20 TABLET ORAL DAILY
Qty: 30 TABLET | Refills: 5 | Status: SHIPPED | OUTPATIENT
Start: 2023-03-22

## 2023-03-23 LAB
ALBUMIN SERPL-MCNC: 3.1 G/DL (ref 3.5–4.6)
ALBUMIN/GLOB SERPL: 0.9 {RATIO} (ref 1.2–2.2)
ALP SERPL-CCNC: 181 IU/L (ref 44–121)
ALT SERPL-CCNC: 13 IU/L (ref 0–44)
AST SERPL-CCNC: 12 IU/L (ref 0–40)
BASOPHILS # BLD AUTO: 0.1 X10E3/UL (ref 0–0.2)
BASOPHILS NFR BLD AUTO: 1 %
BILIRUB SERPL-MCNC: <0.2 MG/DL (ref 0–1.2)
BUN SERPL-MCNC: 28 MG/DL (ref 10–36)
BUN/CREAT SERPL: 18 (ref 10–24)
CALCIUM SERPL-MCNC: 7.9 MG/DL (ref 8.6–10.2)
CHLORIDE SERPL-SCNC: 108 MMOL/L (ref 96–106)
CHOLEST SERPL-MCNC: 152 MG/DL (ref 100–199)
CO2 SERPL-SCNC: 21 MMOL/L (ref 20–29)
CREAT SERPL-MCNC: 1.59 MG/DL (ref 0.76–1.27)
EGFRCR SERPLBLD CKD-EPI 2021: 40 ML/MIN/1.73
EOSINOPHIL # BLD AUTO: 0.4 X10E3/UL (ref 0–0.4)
EOSINOPHIL NFR BLD AUTO: 4 %
ERYTHROCYTE [DISTWIDTH] IN BLOOD BY AUTOMATED COUNT: 13.9 % (ref 11.6–15.4)
GLOBULIN SER CALC-MCNC: 3.3 G/DL (ref 1.5–4.5)
GLUCOSE SERPL-MCNC: 109 MG/DL (ref 70–99)
HBA1C MFR BLD: 5.4 % (ref 4.8–5.6)
HCT VFR BLD AUTO: 31.3 % (ref 37.5–51)
HDLC SERPL-MCNC: 46 MG/DL
HGB BLD-MCNC: 9.9 G/DL (ref 13–17.7)
IMM GRANULOCYTES # BLD AUTO: 0 X10E3/UL (ref 0–0.1)
IMM GRANULOCYTES NFR BLD AUTO: 0 %
LDLC SERPL CALC-MCNC: 88 MG/DL (ref 0–99)
LDLC/HDLC SERPL: 1.9 RATIO (ref 0–3.6)
LYMPHOCYTES # BLD AUTO: 2.6 X10E3/UL (ref 0.7–3.1)
LYMPHOCYTES NFR BLD AUTO: 26 %
MCH RBC QN AUTO: 31 PG (ref 26.6–33)
MCHC RBC AUTO-ENTMCNC: 31.6 G/DL (ref 31.5–35.7)
MCV RBC AUTO: 98 FL (ref 79–97)
MONOCYTES # BLD AUTO: 1.1 X10E3/UL (ref 0.1–0.9)
MONOCYTES NFR BLD AUTO: 11 %
NEUTROPHILS # BLD AUTO: 5.6 X10E3/UL (ref 1.4–7)
NEUTROPHILS NFR BLD AUTO: 58 %
PLATELET # BLD AUTO: 239 X10E3/UL (ref 150–450)
POTASSIUM SERPL-SCNC: 4.1 MMOL/L (ref 3.5–5.2)
PROT SERPL-MCNC: 6.4 G/DL (ref 6–8.5)
RBC # BLD AUTO: 3.19 X10E6/UL (ref 4.14–5.8)
SODIUM SERPL-SCNC: 141 MMOL/L (ref 134–144)
T4 FREE SERPL-MCNC: 1.17 NG/DL (ref 0.82–1.77)
TRIGL SERPL-MCNC: 94 MG/DL (ref 0–149)
TSH SERPL DL<=0.005 MIU/L-ACNC: 3.43 UIU/ML (ref 0.45–4.5)
VLDLC SERPL CALC-MCNC: 18 MG/DL (ref 5–40)
WBC # BLD AUTO: 9.9 X10E3/UL (ref 3.4–10.8)

## 2023-05-06 ENCOUNTER — APPOINTMENT (OUTPATIENT)
Dept: GENERAL RADIOLOGY | Facility: HOSPITAL | Age: 88
End: 2023-05-06
Payer: MEDICARE

## 2023-05-06 ENCOUNTER — HOSPITAL ENCOUNTER (INPATIENT)
Facility: HOSPITAL | Age: 88
LOS: 5 days | Discharge: NURSING, REHAB OR LONG-TERM CARE | End: 2023-05-11
Attending: EMERGENCY MEDICINE | Admitting: INTERNAL MEDICINE
Payer: MEDICARE

## 2023-05-06 ENCOUNTER — APPOINTMENT (OUTPATIENT)
Dept: CT IMAGING | Facility: HOSPITAL | Age: 88
End: 2023-05-06
Payer: MEDICARE

## 2023-05-06 DIAGNOSIS — J94.2 HEMOTHORAX ON RIGHT: ICD-10-CM

## 2023-05-06 DIAGNOSIS — D64.9 ACUTE ON CHRONIC ANEMIA: ICD-10-CM

## 2023-05-06 DIAGNOSIS — S22.49XA MULTIPLE RIB FRACTURES INVOLVING FOUR OR MORE RIBS: Primary | ICD-10-CM

## 2023-05-06 DIAGNOSIS — I48.91 ATRIAL FIBRILLATION WITH RVR: ICD-10-CM

## 2023-05-06 LAB
ABO GROUP BLD: NORMAL
ALBUMIN SERPL-MCNC: 2.5 G/DL (ref 3.5–5.2)
ALBUMIN/GLOB SERPL: 0.8 G/DL
ALP SERPL-CCNC: 97 U/L (ref 39–117)
ALT SERPL W P-5'-P-CCNC: 15 U/L (ref 1–41)
ANION GAP SERPL CALCULATED.3IONS-SCNC: 12 MMOL/L (ref 5–15)
AST SERPL-CCNC: 19 U/L (ref 1–40)
BASOPHILS # BLD AUTO: 0.04 10*3/MM3 (ref 0–0.2)
BASOPHILS NFR BLD AUTO: 0.3 % (ref 0–1.5)
BILIRUB SERPL-MCNC: 0.4 MG/DL (ref 0–1.2)
BLD GP AB SCN SERPL QL: NEGATIVE
BUN SERPL-MCNC: 31 MG/DL (ref 8–23)
BUN/CREAT SERPL: 17.2 (ref 7–25)
CALCIUM SPEC-SCNC: 7.5 MG/DL (ref 8.2–9.6)
CHLORIDE SERPL-SCNC: 103 MMOL/L (ref 98–107)
CO2 SERPL-SCNC: 21 MMOL/L (ref 22–29)
CREAT SERPL-MCNC: 1.8 MG/DL (ref 0.76–1.27)
DEPRECATED RDW RBC AUTO: 45.6 FL (ref 37–54)
EGFRCR SERPLBLD CKD-EPI 2021: 34 ML/MIN/1.73
EOSINOPHIL # BLD AUTO: 0 10*3/MM3 (ref 0–0.4)
EOSINOPHIL NFR BLD AUTO: 0 % (ref 0.3–6.2)
ERYTHROCYTE [DISTWIDTH] IN BLOOD BY AUTOMATED COUNT: 13.4 % (ref 12.3–15.4)
GLOBULIN UR ELPH-MCNC: 3.2 GM/DL
GLUCOSE BLDC GLUCOMTR-MCNC: 138 MG/DL (ref 70–130)
GLUCOSE SERPL-MCNC: 188 MG/DL (ref 65–99)
HCT VFR BLD AUTO: 22.5 % (ref 37.5–51)
HGB BLD-MCNC: 7.3 G/DL (ref 13–17.7)
IMM GRANULOCYTES # BLD AUTO: 0.21 10*3/MM3 (ref 0–0.05)
IMM GRANULOCYTES NFR BLD AUTO: 1.4 % (ref 0–0.5)
LYMPHOCYTES # BLD AUTO: 1.37 10*3/MM3 (ref 0.7–3.1)
LYMPHOCYTES NFR BLD AUTO: 9 % (ref 19.6–45.3)
MAGNESIUM SERPL-MCNC: 2 MG/DL (ref 1.7–2.3)
MCH RBC QN AUTO: 30.8 PG (ref 26.6–33)
MCHC RBC AUTO-ENTMCNC: 32.4 G/DL (ref 31.5–35.7)
MCV RBC AUTO: 94.9 FL (ref 79–97)
MONOCYTES # BLD AUTO: 0.96 10*3/MM3 (ref 0.1–0.9)
MONOCYTES NFR BLD AUTO: 6.3 % (ref 5–12)
NEUTROPHILS NFR BLD AUTO: 12.56 10*3/MM3 (ref 1.7–7)
NEUTROPHILS NFR BLD AUTO: 83 % (ref 42.7–76)
NRBC BLD AUTO-RTO: 0 /100 WBC (ref 0–0.2)
NT-PROBNP SERPL-MCNC: 6436 PG/ML (ref 0–1800)
PLATELET # BLD AUTO: 232 10*3/MM3 (ref 140–450)
PMV BLD AUTO: 11.8 FL (ref 6–12)
POTASSIUM SERPL-SCNC: 4.3 MMOL/L (ref 3.5–5.2)
PROT SERPL-MCNC: 5.7 G/DL (ref 6–8.5)
QT INTERVAL: 297 MS
RBC # BLD AUTO: 2.37 10*6/MM3 (ref 4.14–5.8)
RH BLD: POSITIVE
SODIUM SERPL-SCNC: 136 MMOL/L (ref 136–145)
T&S EXPIRATION DATE: NORMAL
TROPONIN T SERPL HS-MCNC: 46 NG/L
WBC NRBC COR # BLD: 15.14 10*3/MM3 (ref 3.4–10.8)

## 2023-05-06 PROCEDURE — 86850 RBC ANTIBODY SCREEN: CPT | Performed by: EMERGENCY MEDICINE

## 2023-05-06 PROCEDURE — 86901 BLOOD TYPING SEROLOGIC RH(D): CPT | Performed by: EMERGENCY MEDICINE

## 2023-05-06 PROCEDURE — 71250 CT THORAX DX C-: CPT

## 2023-05-06 PROCEDURE — 25010000002 DIGOXIN PER 500 MCG: Performed by: INTERNAL MEDICINE

## 2023-05-06 PROCEDURE — 36430 TRANSFUSION BLD/BLD COMPNT: CPT

## 2023-05-06 PROCEDURE — 86900 BLOOD TYPING SEROLOGIC ABO: CPT

## 2023-05-06 PROCEDURE — 93010 ELECTROCARDIOGRAM REPORT: CPT | Performed by: INTERNAL MEDICINE

## 2023-05-06 PROCEDURE — 80053 COMPREHEN METABOLIC PANEL: CPT | Performed by: EMERGENCY MEDICINE

## 2023-05-06 PROCEDURE — 93005 ELECTROCARDIOGRAM TRACING: CPT | Performed by: EMERGENCY MEDICINE

## 2023-05-06 PROCEDURE — 84484 ASSAY OF TROPONIN QUANT: CPT | Performed by: EMERGENCY MEDICINE

## 2023-05-06 PROCEDURE — 83880 ASSAY OF NATRIURETIC PEPTIDE: CPT | Performed by: EMERGENCY MEDICINE

## 2023-05-06 PROCEDURE — 86923 COMPATIBILITY TEST ELECTRIC: CPT

## 2023-05-06 PROCEDURE — 71045 X-RAY EXAM CHEST 1 VIEW: CPT

## 2023-05-06 PROCEDURE — 99223 1ST HOSP IP/OBS HIGH 75: CPT | Performed by: SURGERY

## 2023-05-06 PROCEDURE — 82948 REAGENT STRIP/BLOOD GLUCOSE: CPT

## 2023-05-06 PROCEDURE — 86900 BLOOD TYPING SEROLOGIC ABO: CPT | Performed by: EMERGENCY MEDICINE

## 2023-05-06 PROCEDURE — 0W9930Z DRAINAGE OF RIGHT PLEURAL CAVITY WITH DRAINAGE DEVICE, PERCUTANEOUS APPROACH: ICD-10-PCS | Performed by: SURGERY

## 2023-05-06 PROCEDURE — 85025 COMPLETE CBC W/AUTO DIFF WBC: CPT | Performed by: EMERGENCY MEDICINE

## 2023-05-06 PROCEDURE — 32550 INSERT PLEURAL CATH: CPT | Performed by: SURGERY

## 2023-05-06 PROCEDURE — 99285 EMERGENCY DEPT VISIT HI MDM: CPT

## 2023-05-06 PROCEDURE — 76376 3D RENDER W/INTRP POSTPROCES: CPT

## 2023-05-06 PROCEDURE — 83735 ASSAY OF MAGNESIUM: CPT | Performed by: EMERGENCY MEDICINE

## 2023-05-06 PROCEDURE — P9016 RBC LEUKOCYTES REDUCED: HCPCS

## 2023-05-06 RX ORDER — BISACODYL 10 MG
10 SUPPOSITORY, RECTAL RECTAL DAILY PRN
Status: DISCONTINUED | OUTPATIENT
Start: 2023-05-06 | End: 2023-05-11 | Stop reason: HOSPADM

## 2023-05-06 RX ORDER — SODIUM CHLORIDE 9 MG/ML
40 INJECTION, SOLUTION INTRAVENOUS AS NEEDED
Status: DISCONTINUED | OUTPATIENT
Start: 2023-05-06 | End: 2023-05-11 | Stop reason: HOSPADM

## 2023-05-06 RX ORDER — SODIUM CHLORIDE 0.9 % (FLUSH) 0.9 %
10 SYRINGE (ML) INJECTION AS NEEDED
Status: DISCONTINUED | OUTPATIENT
Start: 2023-05-06 | End: 2023-05-11 | Stop reason: HOSPADM

## 2023-05-06 RX ORDER — LIDOCAINE HYDROCHLORIDE 10 MG/ML
10 INJECTION, SOLUTION INFILTRATION; PERINEURAL ONCE
Status: DISCONTINUED | OUTPATIENT
Start: 2023-05-06 | End: 2023-05-11 | Stop reason: HOSPADM

## 2023-05-06 RX ORDER — ACETAMINOPHEN 650 MG/1
650 SUPPOSITORY RECTAL EVERY 4 HOURS PRN
Status: DISCONTINUED | OUTPATIENT
Start: 2023-05-06 | End: 2023-05-09

## 2023-05-06 RX ORDER — TAMSULOSIN HYDROCHLORIDE 0.4 MG/1
0.4 CAPSULE ORAL NIGHTLY
Status: DISCONTINUED | OUTPATIENT
Start: 2023-05-06 | End: 2023-05-11 | Stop reason: HOSPADM

## 2023-05-06 RX ORDER — ACETAMINOPHEN 325 MG/1
650 TABLET ORAL EVERY 4 HOURS PRN
Status: DISCONTINUED | OUTPATIENT
Start: 2023-05-06 | End: 2023-05-09

## 2023-05-06 RX ORDER — FINASTERIDE 5 MG/1
5 TABLET, FILM COATED ORAL NIGHTLY
Status: DISCONTINUED | OUTPATIENT
Start: 2023-05-06 | End: 2023-05-11 | Stop reason: HOSPADM

## 2023-05-06 RX ORDER — DIGOXIN 0.25 MG/ML
250 INJECTION INTRAMUSCULAR; INTRAVENOUS ONCE
Status: COMPLETED | OUTPATIENT
Start: 2023-05-06 | End: 2023-05-06

## 2023-05-06 RX ORDER — FAMOTIDINE 20 MG/1
20 TABLET, FILM COATED ORAL NIGHTLY
Status: DISCONTINUED | OUTPATIENT
Start: 2023-05-06 | End: 2023-05-11 | Stop reason: HOSPADM

## 2023-05-06 RX ORDER — SODIUM CHLORIDE 0.9 % (FLUSH) 0.9 %
10 SYRINGE (ML) INJECTION EVERY 12 HOURS SCHEDULED
Status: DISCONTINUED | OUTPATIENT
Start: 2023-05-06 | End: 2023-05-11 | Stop reason: HOSPADM

## 2023-05-06 RX ORDER — POLYETHYLENE GLYCOL 3350 17 G/17G
17 POWDER, FOR SOLUTION ORAL DAILY PRN
Status: DISCONTINUED | OUTPATIENT
Start: 2023-05-06 | End: 2023-05-11 | Stop reason: HOSPADM

## 2023-05-06 RX ORDER — BISACODYL 5 MG/1
5 TABLET, DELAYED RELEASE ORAL DAILY PRN
Status: DISCONTINUED | OUTPATIENT
Start: 2023-05-06 | End: 2023-05-11 | Stop reason: HOSPADM

## 2023-05-06 RX ORDER — METHYLPREDNISOLONE 8 MG/1
8 TABLET ORAL DAILY
COMMUNITY
End: 2023-05-11 | Stop reason: HOSPADM

## 2023-05-06 RX ORDER — AMOXICILLIN 250 MG
2 CAPSULE ORAL 2 TIMES DAILY
Status: DISCONTINUED | OUTPATIENT
Start: 2023-05-06 | End: 2023-05-11 | Stop reason: HOSPADM

## 2023-05-06 RX ORDER — ASPIRIN 81 MG/1
81 TABLET, CHEWABLE ORAL DAILY
COMMUNITY

## 2023-05-06 RX ORDER — LIDOCAINE HYDROCHLORIDE AND EPINEPHRINE 10; 10 MG/ML; UG/ML
30 INJECTION, SOLUTION INFILTRATION; PERINEURAL ONCE
Status: DISCONTINUED | OUTPATIENT
Start: 2023-05-06 | End: 2023-05-11 | Stop reason: HOSPADM

## 2023-05-06 RX ORDER — LEVOTHYROXINE SODIUM 0.07 MG/1
75 TABLET ORAL
Status: DISCONTINUED | OUTPATIENT
Start: 2023-05-07 | End: 2023-05-11 | Stop reason: HOSPADM

## 2023-05-06 RX ORDER — LIDOCAINE HYDROCHLORIDE 10 MG/ML
INJECTION, SOLUTION INFILTRATION; PERINEURAL
Status: ACTIVE
Start: 2023-05-06 | End: 2023-05-07

## 2023-05-06 RX ORDER — ONDANSETRON 2 MG/ML
4 INJECTION INTRAMUSCULAR; INTRAVENOUS EVERY 6 HOURS PRN
Status: DISCONTINUED | OUTPATIENT
Start: 2023-05-06 | End: 2023-05-11 | Stop reason: HOSPADM

## 2023-05-06 RX ADMIN — FINASTERIDE 5 MG: 5 TABLET, FILM COATED ORAL at 20:11

## 2023-05-06 RX ADMIN — Medication 10 ML: at 20:55

## 2023-05-06 RX ADMIN — DOCUSATE SODIUM 50MG AND SENNOSIDES 8.6MG 2 TABLET: 8.6; 5 TABLET, FILM COATED ORAL at 21:00

## 2023-05-06 RX ADMIN — FAMOTIDINE 20 MG: 20 TABLET, FILM COATED ORAL at 20:11

## 2023-05-06 RX ADMIN — DIGOXIN 250 MCG: 0.25 INJECTION INTRAMUSCULAR; INTRAVENOUS at 17:24

## 2023-05-06 RX ADMIN — TAMSULOSIN HYDROCHLORIDE 0.4 MG: 0.4 CAPSULE ORAL at 20:11

## 2023-05-06 RX ADMIN — METOPROLOL TARTRATE 12.5 MG: 25 TABLET, FILM COATED ORAL at 22:00

## 2023-05-06 RX ADMIN — DOCUSATE SODIUM 50MG AND SENNOSIDES 8.6MG 2 TABLET: 8.6; 5 TABLET, FILM COATED ORAL at 20:55

## 2023-05-06 RX ADMIN — SODIUM CHLORIDE, POTASSIUM CHLORIDE, SODIUM LACTATE AND CALCIUM CHLORIDE 1000 ML: 600; 310; 30; 20 INJECTION, SOLUTION INTRAVENOUS at 12:32

## 2023-05-06 NOTE — CONSULTS
THORACIC SURGERY INPATIENT CONSULT NOTE    REASON FOR CONSULT: History of fall, right-sided multiple rib fracture, flail segment, large hemothorax and shock.    Subjective   HISTORY OF PRESENTING ILLNESS:   Lavelle Soliman is a 89 y.o. male has significant medical problems as mentioned below.  He is nursing home resident and reportedly fell 5 days ago when shower.  He hit his right side of the chest on the edge of the shower.  He was evaluated by a nursing home physician but was not complaining of any pain so x-ray was not performed.  He developed a large bruise over the right lower chest and flank region.  Today he felt dizzy when he tried to stand up.  He was unable to walk and was brought to the ER.  He was in A-fib with heart rate in 120s.  On arrival, he was noted to be hypotensive with systolic blood pressure in the 70s.  CT scan of the chest was obtained which reported multiple right-sided significant displaced fracture and flail segment.  There was a large right-sided hemothorax filling over 50% of the volume of right hemithorax.  This was associated with midline shift towards the left.  Thoracic surgery was consulted for further management.    Patient is hard of hearing.  His granddaughter who works here at the outpatient surgery center was in the room and provides much of the history.  He is not a complainer.  He only complained when I palpated his right lower chest.  He recently had a hip surgery and had significant difficulty recovering from general anesthesia and the surgery.  He has baseline active and independent in his activities of daily living for most of the time.    Past Medical History:   Diagnosis Date   • Benign essential hypertension    • Cataract    • Disease of thyroid gland    • HL (hearing loss)    • Hyperlipidemia    • Hypothyroidism    • Paroxysmal atrial fibrillation    • Visual impairment S       Past Surgical History:   Procedure Laterality Date   • EYE SURGERY     • HERNIA REPAIR      • HIP INTERTROCHANTERIC NAILING Left 2023    Procedure: LT. HIP INTERTROCHANTERIC NAILING;  Surgeon: Leland Hair MD;  Location: [facility];  Service: Orthopedics;  Laterality: Left;       Family History   Problem Relation Age of Onset   • No Known Problems Mother    • No Known Problems Father    • Hyperlipidemia Brother    • Hearing loss Paternal Aunt    • Vision loss Paternal Aunt    • No Known Problems Maternal Grandmother    • No Known Problems Maternal Grandfather    • No Known Problems Paternal Grandmother    • No Known Problems Paternal Grandfather    • Malig Hyperthermia Neg Hx        Social History     Socioeconomic History   • Marital status:    Tobacco Use   • Smoking status: Former     Types: Pipe     Start date: 1955     Quit date: 1970     Years since quittin.3   • Smokeless tobacco: Never   Vaping Use   • Vaping Use: Never used   Substance and Sexual Activity   • Alcohol use: Yes     Alcohol/week: 1.0 standard drink     Types: 1 Shots of liquor per week     Comment: occ   • Drug use: Never   • Sexual activity: Not Currently     Partners: Female     Birth control/protection: None         Current Facility-Administered Medications:   •  acetaminophen (TYLENOL) tablet 650 mg, 650 mg, Oral, Q4H PRN **OR** acetaminophen (TYLENOL) suppository 650 mg, 650 mg, Rectal, Q4H PRN, Trip Fulton Jr., MD  •  sennosides-docusate (PERICOLACE) 8.6-50 MG per tablet 2 tablet, 2 tablet, Oral, BID **AND** polyethylene glycol (MIRALAX) packet 17 g, 17 g, Oral, Daily PRN **AND** bisacodyl (DULCOLAX) EC tablet 5 mg, 5 mg, Oral, Daily PRN **AND** bisacodyl (DULCOLAX) suppository 10 mg, 10 mg, Rectal, Daily PRN, Trip Fulton Jr., MD  •  famotidine (PEPCID) tablet 20 mg, 20 mg, Oral, Nightly, Trip Fulton Jr., MD  •  finasteride (PROSCAR) tablet 5 mg, 5 mg, Oral, Nightly, Trip Fulton Jr., MD  •  [START ON 2023] levothyroxine (SYNTHROID, LEVOTHROID) tablet 75 mcg,  75 mcg, Oral, Q AM, Trip Fulton Jr., MD  •  lidocaine (XYLOCAINE) 1 % injection  - ADS Override Pull, , , ,   •  lidocaine (XYLOCAINE) 1 % injection 10 mL, 10 mL, Infiltration, Once, Oni Rogers MD  •  lidocaine 1% - EPINEPHrine 1:907127 (XYLOCAINE W/EPI) 1 %-1:343013 injection 30 mL, 30 mL, Infiltration, Once, Cruzito Reilly MD  •  ondansetron (ZOFRAN) injection 4 mg, 4 mg, Intravenous, Q6H PRN, Trip Fulton Jr., MD  •  [COMPLETED] Insert Peripheral IV, , , Once **AND** sodium chloride 0.9 % flush 10 mL, 10 mL, Intravenous, PRN, Oni Rogers MD  •  sodium chloride 0.9 % flush 10 mL, 10 mL, Intravenous, Q12H, Trip Fulton Jr., MD  •  sodium chloride 0.9 % flush 10 mL, 10 mL, Intravenous, PRN, Trip Fulton Jr., MD  •  sodium chloride 0.9 % infusion 40 mL, 40 mL, Intravenous, PRN, Trip Fulton Jr., MD  •  tamsulosin (FLOMAX) 24 hr capsule 0.4 mg, 0.4 mg, Oral, Nightly, Trip Fulton Jr., MD     Allergies   Allergen Reactions   • Latex Rash             Objective    OBJECTIVE:     VITAL SIGNS:  /93   Pulse (!) 131   Temp 98 °F (36.7 °C) (Axillary)   Resp 20   Wt 66.4 kg (146 lb 6.2 oz)   SpO2 94%   BMI 22.92 kg/m²     PHYSICAL EXAM:  Constitutional:       Appearance: Normal appearance.   HENT:      Head: Normocephalic.      Right Ear: External ear normal.      Left Ear: External ear normal.      Nose: Nose normal.      Mouth/Throat: No obvious deformity     Pharynx: Oropharynx is clear.   Eyes:      Conjunctiva/sclera: Conjunctivae normal.   Cardiovascular:      Rate and Rhythm: Atrial fibrillation with heart rate in the 140s to 160s     Pulses: Normal pulses.   Pulmonary:      Effort: Pulmonary effort is normal.  Large bruise over the right chest extending to the right flank region  Abdominal:      Palpations: Abdomen is soft.   Musculoskeletal:         General: Normal range of motion.      Cervical back: Normal range of motion.   Skin:     General:  Skin is warm.   Neurological:      General: No focal deficit present.      Mental Status: He is alert and oriented to person, place, and time.     LAB RESULTS:  I have reviewed all the available laboratory results in the chart.    RESULTS REVIEW:  I have reviewed the patient's all relevant laboratory and imaging findings.         ASSESSMENT & PLAN:  Lavelle Soliman is a 89 y.o. male with significant medical conditions as mentioned above presented to the hospital with ground-level fall that led to multiple right-sided rib fracture, flail segment and large right hemothorax.    I placed a 28 Urdu right-sided chest tube with drainage of more than 1500 cc old blood.  His lung reexpanded and his hemodynamics improved.  He continued to be in atrial fibrillation with RVR.  He is not a complainer and somehow was able to ambulate at nursing home after the fall. Unfortunately due to his age and frailty, he is not a candidate for surgical intervention for rib stabilization.    Recommend keeping the chest tube to -20 suction.  He will need repeat CT chest at some point to reassess residual effusion/loculated hematoma.  We might consider intrapleural tPA/dornase if he has persistent loculated effusion after 48 hours.    Please do not start him on any therapeutic anticoagulation for atrial fibrillation.  Thoracic surgery will continue to follow closely.    I discussed the patients findings and my recommendations with the patient. The patient was given adequate time to ask questions and all questions were answered to patient satisfaction. Thank you for this consult and allowing us to participate in the care of your patient.      Cruzito Reilly MD  Thoracic Surgeon  [facility]        Dictated utilizing Dragon dictation    I spent 75 minutes caring for Lavelle on this date of service. This time includes time spent by me in the following activities:reviewing tests, obtaining and/or reviewing a separately  obtained history, performing a medically appropriate examination and/or evaluation , counseling and educating the patient/family/caregiver, ordering medications, tests, or procedures, referring and communicating with other health care professionals , documenting information in the medical record, independently interpreting results and communicating that information with the patient/family/caregiver, and care coordination and more than half the time was spent in direct face to face evaluation and decision making.

## 2023-05-06 NOTE — ED NOTES
Nursing report ED to floor  Lavelle Soliman  89 y.o.  male    HPI :   Chief Complaint   Patient presents with    Fall    Shortness of Breath    Rapid Heart Rate       Admitting doctor:   Trip Fulton Jr., MD    Admitting diagnosis:   The primary encounter diagnosis was Multiple rib fractures involving four or more ribs. Diagnoses of Hemothorax on right and Atrial fibrillation with RVR were also pertinent to this visit.    Code status:   Current Code Status       Date Active Code Status Order ID Comments User Context       Prior            Allergies:   Latex    Isolation:   No active isolations    Intake and Output    Intake/Output Summary (Last 24 hours) at 5/6/2023 1431  Last data filed at 5/6/2023 1348  Gross per 24 hour   Intake 1000 ml   Output --   Net 1000 ml       Weight:   There were no vitals filed for this visit.    Most recent vitals:   Vitals:    05/06/23 1347 05/06/23 1359 05/06/23 1400 05/06/23 1410   BP: 90/69 90/69 90/69 99/44   BP Location: Left arm      Patient Position: Lying      Pulse: (!) 128 (!) 130 (!) 138 (!) 140   Resp: 20 20 20 20   Temp: 97.2 °F (36.2 °C)  97.2 °F (36.2 °C) 97.9 °F (36.6 °C)   TempSrc: Tympanic      SpO2: 100% 99% 100% 100%       Active LDAs/IV Access:   Lines, Drains & Airways       Active LDAs       Name Placement date Placement time Site Days    Peripheral IV 05/06/23 1201 Right Antecubital 05/06/23  1201  Antecubital  less than 1    Peripheral IV 05/06/23 1219 Anterior;Distal;Left;Upper Arm 05/06/23  1219  Arm  less than 1    Urethral Catheter Coude 16 Fr. 02/04/23  1836  -- 90                    Labs (abnormal labs have a star):   Labs Reviewed   COMPREHENSIVE METABOLIC PANEL - Abnormal; Notable for the following components:       Result Value    Glucose 188 (*)     BUN 31 (*)     Creatinine 1.80 (*)     CO2 21.0 (*)     Calcium 7.5 (*)     Total Protein 5.7 (*)     Albumin 2.5 (*)     eGFR 34.0 (*)     All other components within normal limits    Narrative:      GFR Normal >60  Chronic Kidney Disease <60  Kidney Failure <15    The GFR formula is only valid for adults with stable renal function between ages 18 and 70.   SINGLE HSTROPONIN T - Abnormal; Notable for the following components:    HS Troponin T 46 (*)     All other components within normal limits    Narrative:     High Sensitive Troponin T Reference Range:  <10.0 ng/L- Negative Female for AMI  <15.0 ng/L- Negative Male for AMI  >=10 - Abnormal Female indicating possible myocardial injury.  >=15 - Abnormal Male indicating possible myocardial injury.   Clinicians would have to utilize clinical acumen, EKG, Troponin, and serial changes to determine if it is an Acute Myocardial Infarction or myocardial injury due to an underlying chronic condition.        BNP (IN-HOUSE) - Abnormal; Notable for the following components:    proBNP 6,436.0 (*)     All other components within normal limits    Narrative:     Among patients with dyspnea, NT-proBNP is highly sensitive for the detection of acute congestive heart failure. In addition NT-proBNP of <300 pg/ml effectively rules out acute congestive heart failure with 99% negative predictive value.    Results may be falsely decreased if patient taking Biotin.     CBC WITH AUTO DIFFERENTIAL - Abnormal; Notable for the following components:    WBC 15.14 (*)     RBC 2.37 (*)     Hemoglobin 7.3 (*)     Hematocrit 22.5 (*)     Neutrophil % 83.0 (*)     Lymphocyte % 9.0 (*)     Eosinophil % 0.0 (*)     Immature Grans % 1.4 (*)     Neutrophils, Absolute 12.56 (*)     Monocytes, Absolute 0.96 (*)     Immature Grans, Absolute 0.21 (*)     All other components within normal limits   MAGNESIUM - Normal   TYPE AND SCREEN   PREPARE RBC   CBC AND DIFFERENTIAL    Narrative:     The following orders were created for panel order CBC & Differential.  Procedure                               Abnormality         Status                     ---------                               -----------          ------                     CBC Auto Differential[[id number]]        Abnormal            Final result                 Please view results for these tests on the individual orders.       EKG:   ECG 12 Lead Dyspnea   Preliminary Result   HEART RATE= 167  bpm   RR Interval= 359  ms   OH Interval=   ms   P Horizontal Axis=   deg   P Front Axis=   deg   QRSD Interval= 69  ms   QT Interval= 297  ms   QRS Axis= 42  deg   T Wave Axis= 207  deg   - ABNORMAL ECG -   Atrial fibrillation with rapid V-rate   Low voltage, extremity leads   Repolarization abnormality, prob rate related   Electronically Signed By:    Date and Time of Study: 2023 11:54:13          Meds given in ED:   Medications   sodium chloride 0.9 % flush 10 mL (has no administration in time range)   lidocaine 1% - EPINEPHrine 1:[id number] (XYLOCAINE W/EPI) 1 %-1:[id number] injection 30 mL (has no administration in time range)   lactated ringers bolus 1,000 mL (0 mL Intravenous Stopped 23 1348)       Imaging results:  CT Chest Without Contrast Diagnostic    Result Date: 2023  1. Old T10 vertebral body compression fracture and mid sternal fracture are healed. There are also old healed rib fractures on the right. 2. Acute fractures of the right 5th through 11th ribs. Multipart fractures are observed at the 7th through 11th ribs. There is a large right hemarthrosis. No pneumothorax. No hemoperitoneum. 3. Cholelithiasis with extensive atheromatous vascular calcification.  This report was finalized on 2023 1:48 PM by Dr. Mitesh Kilgore M.D.       Ambulatory status:   - bedrest  Social issues:   Social History     Socioeconomic History    Marital status:    Tobacco Use    Smoking status: Former     Types: Pipe     Start date: 1955     Quit date: 1970     Years since quittin.3    Smokeless tobacco: Never   Vaping Use    Vaping Use: Never used   Substance and Sexual Activity    Alcohol use: Yes     Alcohol/week: 1.0 standard drink     Types:  1 Shots of liquor per week     Comment: occ    Drug use: Never    Sexual activity: Not Currently     Partners: Female     Birth control/protection: None       NIH Stroke Scale:         Rosa Maria Esparza RN  05/06/23 14:31 EDT

## 2023-05-06 NOTE — PROCEDURES
Chest Tube Insertion Procedure Note    Indications:  Clinically significant right-sided hemothorax    Pre-operative Diagnosis: Hemothorax    Post-operative Diagnosis: Hemothorax    Procedure Details   Informed consent was obtained for the procedure, including sedation.  Risks of lung perforation, hemorrhage, arrhythmia, and adverse drug reaction were discussed.     After sterile skin prep, using standard technique, a 28 German tube was placed in the right lateral 5th rib space.    Findings:  1500 ml of sanguinous fluid obtained    Estimated Blood Loss:  Minimal           Specimens:  None                Complications:  None; patient tolerated the procedure well.           Disposition: ICU           Condition: stable    Attending Attestation: I performed the procedure.

## 2023-05-06 NOTE — ED PROVIDER NOTES
EMERGENCY DEPARTMENT ENCOUNTER    Room Number:  3005/1  Date seen:  5/6/2023  PCP: Linda Taveras MD  Historian: Patient, granddaughter, EMS      HPI:  Chief Complaint: Fall  A complete HPI/ROS/PMH/PSH/SH/FH are unobtainable due to: Nothing  Context: Lavelle Soliman is a 89 y.o. male who presents to the ED from [facility] by EMS c/o recent fall.  Patient reportedly fell in the shower 5 days ago.  He struck the right side of his chest on the edge of the shower.  He was evaluated by the [facility] physician but was not complaining of any pain so x-rays were not performed.  Patient currently denies chest pain.  This morning, he felt dizzy when he tried to stand up.  He was unable to walk to the dining room for breakfast.  Per EMS, the patient was found to be in A-fib with a rate in the 120s..  He was noted to have bruising on his right chest.  Patient has a history of A-fib and is on metoprolol.  He did not take his dose of metoprolol this morning.  He is not on anticoagulants.  Patient denies headache, neck pain, chest pain, shortness of breath, or abdominal pain.            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   • Paroxysmal atrial fibrillation    • Closed fracture of left hip, initial encounter 01/24/2023   • Postoperative anemia due to acute blood loss 01/26/2023   • Moderate malnutrition 02/02/2023     Resolved Ambulatory Problems     Diagnosis Date Noted   • Sepsis 12/03/2018   • Hypokalemia 12/03/2018     Past Medical History:   Diagnosis Date   • Cataract    • Disease of  thyroid gland    • HL (hearing loss)    • Visual impairment S         PAST SURGICAL HISTORY  Past Surgical History:   Procedure Laterality Date   • EYE SURGERY     • HERNIA REPAIR     • HIP INTERTROCHANTERIC NAILING Left 2023    Procedure: LT. HIP INTERTROCHANTERIC NAILING;  Surgeon: Leland Hair MD;  Location: [facility] OR;  Service: Orthopedics;  Laterality: Left;         FAMILY HISTORY  Family History   Problem Relation Age of Onset   • No Known Problems Mother    • No Known Problems Father    • Hyperlipidemia Brother    • Hearing loss Paternal Aunt    • Vision loss Paternal Aunt    • No Known Problems Maternal Grandmother    • No Known Problems Maternal Grandfather    • No Known Problems Paternal Grandmother    • No Known Problems Paternal Grandfather    • Malig Hyperthermia Neg Hx          SOCIAL HISTORY  Social History     Socioeconomic History   • Marital status:    Tobacco Use   • Smoking status: Former     Types: Pipe     Start date: 1955     Quit date: 1970     Years since quittin.3   • Smokeless tobacco: Never   Vaping Use   • Vaping Use: Never used   Substance and Sexual Activity   • Alcohol use: Yes     Alcohol/week: 1.0 standard drink     Types: 1 Shots of liquor per week     Comment: occ   • Drug use: Never   • Sexual activity: Not Currently     Partners: Female     Birth control/protection: None         ALLERGIES  Latex        REVIEW OF SYSTEMS  Review of Systems     All systems have been reviewed and are negative except as as discussed in the HPI    PHYSICAL EXAM  ED Triage Vitals [23 1137]   Temp Heart Rate Resp BP SpO2   98.3 °F (36.8 °C) (!) 157 18 157/100 100 %      Temp src Heart Rate Source Patient Position BP Location FiO2 (%)   Tympanic -- Sitting Right arm --       Physical Exam      GENERAL: Awake and alert.  Well-developed, well-nourished elderly male.  Appears to be in no acute distress.  He is hard of hearing.  HENT: NCAT, nares patent  EYES: PERRL,  EOMI  CV: Irregularly irregular rhythm, tachycardic  RESPIRATORY: normal effort, diminished breath sounds on the right  ABDOMEN: soft  MUSCULOSKELETAL: There is a large hematoma on the right lateral and posterior chest wall.  There is tenderness over the right lateral ribs.  No crepitus.  Extremities are nontender with full range of motion.  C/T/L-spine are nontender.  NEURO: Speech is normal.  No facial droop.  Follows commands  PSYCH:  calm, cooperative  SKIN: warm, dry    Vital signs and nursing notes reviewed.          LAB RESULTS  Recent Results (from the past 24 hour(s))   ECG 12 Lead Dyspnea    Collection Time: 05/06/23 11:54 AM   Result Value Ref Range    QT Interval 297 ms   Comprehensive Metabolic Panel    Collection Time: 05/06/23 12:00 PM    Specimen: Blood   Result Value Ref Range    Glucose 188 (H) 65 - 99 mg/dL    BUN 31 (H) 8 - 23 mg/dL    Creatinine 1.80 (H) 0.76 - 1.27 mg/dL    Sodium 136 136 - 145 mmol/L    Potassium 4.3 3.5 - 5.2 mmol/L    Chloride 103 98 - 107 mmol/L    CO2 21.0 (L) 22.0 - 29.0 mmol/L    Calcium 7.5 (L) 8.2 - 9.6 mg/dL    Total Protein 5.7 (L) 6.0 - 8.5 g/dL    Albumin 2.5 (L) 3.5 - 5.2 g/dL    ALT (SGPT) 15 1 - 41 U/L    AST (SGOT) 19 1 - 40 U/L    Alkaline Phosphatase 97 39 - 117 U/L    Total Bilirubin 0.4 0.0 - 1.2 mg/dL    Globulin 3.2 gm/dL    A/G Ratio 0.8 g/dL    BUN/Creatinine Ratio 17.2 7.0 - 25.0    Anion Gap 12.0 5.0 - 15.0 mmol/L    eGFR 34.0 (L) >60.0 mL/min/1.73   Single High Sensitivity Troponin T    Collection Time: 05/06/23 12:00 PM    Specimen: Blood   Result Value Ref Range    HS Troponin T 46 (H) <15 ng/L   BNP    Collection Time: 05/06/23 12:00 PM    Specimen: Blood   Result Value Ref Range    proBNP 6,436.0 (H) 0.0 - 1,800.0 pg/mL   Magnesium    Collection Time: 05/06/23 12:00 PM    Specimen: Blood   Result Value Ref Range    Magnesium 2.0 1.7 - 2.3 mg/dL   CBC Auto Differential    Collection Time: 05/06/23 12:00 PM    Specimen: Blood   Result Value Ref  Range    WBC 15.14 (H) 3.40 - 10.80 10*3/mm3    RBC 2.37 (L) 4.14 - 5.80 10*6/mm3    Hemoglobin 7.3 (L) 13.0 - 17.7 g/dL    Hematocrit 22.5 (L) 37.5 - 51.0 %    MCV 94.9 79.0 - 97.0 fL    MCH 30.8 26.6 - 33.0 pg    MCHC 32.4 31.5 - 35.7 g/dL    RDW 13.4 12.3 - 15.4 %    RDW-SD 45.6 37.0 - 54.0 fl    MPV 11.8 6.0 - 12.0 fL    Platelets 232 140 - 450 10*3/mm3    Neutrophil % 83.0 (H) 42.7 - 76.0 %    Lymphocyte % 9.0 (L) 19.6 - 45.3 %    Monocyte % 6.3 5.0 - 12.0 %    Eosinophil % 0.0 (L) 0.3 - 6.2 %    Basophil % 0.3 0.0 - 1.5 %    Immature Grans % 1.4 (H) 0.0 - 0.5 %    Neutrophils, Absolute 12.56 (H) 1.70 - 7.00 10*3/mm3    Lymphocytes, Absolute 1.37 0.70 - 3.10 10*3/mm3    Monocytes, Absolute 0.96 (H) 0.10 - 0.90 10*3/mm3    Eosinophils, Absolute 0.00 0.00 - 0.40 10*3/mm3    Basophils, Absolute 0.04 0.00 - 0.20 10*3/mm3    Immature Grans, Absolute 0.21 (H) 0.00 - 0.05 10*3/mm3    nRBC 0.0 0.0 - 0.2 /100 WBC   Type & Screen    Collection Time: 05/06/23 12:30 PM    Specimen: Blood   Result Value Ref Range    ABO Type O     RH type Positive     Antibody Screen Negative     T&S Expiration Date 5/9/2023 11:59:59 PM    POC Glucose Once    Collection Time: 05/06/23  4:07 PM    Specimen: Blood   Result Value Ref Range    Glucose 138 (H) 70 - 130 mg/dL   Prepare RBC, 2 Units    Collection Time: 05/06/23  5:47 PM   Result Value Ref Range    Product Code W0223K82     Unit Number [id number]     UNIT  ABO O     UNIT  RH POS     Crossmatch Interpretation Compatible     Dispense Status IS     Blood Expiration Date 202305252359     Blood Type Barcode 5100     Product Code Z2844R48     Unit Number [id number]     UNIT  ABO O     UNIT  RH POS     Crossmatch Interpretation Compatible     Dispense Status IS     Blood Expiration Date 202305252359     Blood Type Barcode 5100        Ordered the above labs and reviewed the results.        RADIOLOGY  CT Chest Without Contrast Diagnostic    Result Date: 5/6/2023  CHEST CT WITHOUT  CONTRAST  HISTORY: Fall five days ago with chest wall hematoma and shortness of breath.  TECHNIQUE: Noncontrast head CT is provided. Correlation with chest x-ray 01/24/2023.  Radiation dose reduction techniques were utilized, including automated exposure control and exposure modulation based on body size.  FINDINGS: There is a large right hemothorax filling over 50% of the volume of the right hemithorax. There is minimal midline shift toward the left. There is no pneumothorax, but there is some soft tissue gas in the chest wall posterolaterally on the right.  There are old healed posterior right rib fractures and multiple acute right rib fractures. The highest 4 ribs are intact and there is normal anatomic variant osseous bridging between the lateral right 4th and 5th ribs.  At the right 5th rib, there is a nondisplaced lateral single part fracture.  At rib six, there is a mildly displaced fracture laterally.  At the 7th rib, there is a nondisplaced fracture posteriorly, an old healed, deformed fracture slightly more lateral along the posterior rib with a bridge of ossification extending caudad to the 8th rib. A fully displaced lateral fracture is associated with foreshortening at the lateral fracture site.  At the 8th rib, there is a multipart displaced fracture with fracture planes along the posterior mid clavicular line and directly laterally. The rib fragment between those fracture planes is displaced anteriorly a full bone width.  At the 9th rib, there is a similar multipart displaced fracture configuration as at rib 8.  At the 10th rib, there is also a multipart displaced fracture posteriorly and posterolaterally.  At the 11th rib, there is a comminuted, displaced and angulated fracture along the posterior midclavicular line. The rib is intact along its more lateral extent.  The 12th rib is intact.  The scapula, clavicle, left thoracic cage, and the bones of the thoracic spine appear intact although there is  an old T10 inferior endplate compression deformity, degenerative osseous ankylosis across much of the thoracic spine, and the sagittal images show an old healed sternal fracture.  There is no mediastinal hematoma. No thoracic lymphadenopathy.  Multiple hepatic cysts and a 2 cm diameter calcified gallstone. No hemorrhage in the visualized upper abdomen.      1. Old T10 vertebral body compression fracture and mid sternal fracture are healed. There are also old healed rib fractures on the right. 2. Acute fractures of the right 5th through 11th ribs. Multipart fractures are observed at the 7th through 11th ribs. There is a large right hemarthrosis. No pneumothorax. No hemoperitoneum. 3. Cholelithiasis with extensive atheromatous vascular calcification.  This report was finalized on 5/6/2023 1:48 PM by Dr. Mitesh Kilgore M.D.      XR Chest 1 View    Result Date: 5/6/2023  XR CHEST 1 VW-  HISTORY: Male who is 89 years-old,  chest tube placement  TECHNIQUE: Frontal views of the chest  COMPARISON: 01/24/2023  FINDINGS: On the second image, the right chest tube extends the right mid hemithorax. The heart size is borderline. Aorta is tortuous. Infiltrate is apparent at the right mid to lower lung with mild right pleural effusion. Minimal left basilar atelectasis or infiltrate. No pneumothorax. No acute osseous process.      Chest tube placement. Infiltrate at the right mid to lower lung. Mild right pleural effusion. Minimal left basilar atelectasis or infiltrate.  This report was finalized on 5/6/2023 3:57 PM by Dr. Rajiv Grider M.D.        Ordered the above noted radiological studies. Reviewed by me in PACS.            PROCEDURES  Critical Care  Performed by: Oni Rogers MD  Authorized by: Oni Rogers MD     Critical care provider statement:     Critical care time (minutes):  52    Critical care time was exclusive of:  Separately billable procedures and treating other patients    Critical care was  necessary to treat or prevent imminent or life-threatening deterioration of the following conditions:  Trauma, cardiac failure and circulatory failure    Critical care was time spent personally by me on the following activities:  Development of treatment plan with patient or surrogate, discussions with consultants, discussions with primary provider, evaluation of patient's response to treatment, examination of patient, obtaining history from patient or surrogate, ordering and performing treatments and interventions, ordering and review of laboratory studies, ordering and review of radiographic studies, pulse oximetry, re-evaluation of patient's condition and review of old charts    I assumed direction of critical care for this patient from another provider in my specialty: no      Care discussed with: admitting provider                    MEDICATIONS GIVEN IN ER  Medications   sodium chloride 0.9 % flush 10 mL (has no administration in time range)   lidocaine 1% - EPINEPHrine 1:[id number] (XYLOCAINE W/EPI) 1 %-1:277535 injection 30 mL (has no administration in time range)   lidocaine (XYLOCAINE) 1 % injection  - ADS Override Pull (has no administration in time range)   lidocaine (XYLOCAINE) 1 % injection 10 mL (has no administration in time range)   famotidine (PEPCID) tablet 20 mg (has no administration in time range)   finasteride (PROSCAR) tablet 5 mg (has no administration in time range)   levothyroxine (SYNTHROID, LEVOTHROID) tablet 75 mcg (has no administration in time range)   tamsulosin (FLOMAX) 24 hr capsule 0.4 mg (has no administration in time range)   sodium chloride 0.9 % flush 10 mL (has no administration in time range)   sodium chloride 0.9 % flush 10 mL (has no administration in time range)   sodium chloride 0.9 % infusion 40 mL (has no administration in time range)   acetaminophen (TYLENOL) tablet 650 mg (has no administration in time range)     Or   acetaminophen (TYLENOL) suppository 650 mg (has no  administration in time range)   sennosides-docusate (PERICOLACE) 8.6-50 MG per tablet 2 tablet (has no administration in time range)     And   polyethylene glycol (MIRALAX) packet 17 g (has no administration in time range)     And   bisacodyl (DULCOLAX) EC tablet 5 mg (has no administration in time range)     And   bisacodyl (DULCOLAX) suppository 10 mg (has no administration in time range)   ondansetron (ZOFRAN) injection 4 mg (has no administration in time range)   lactated ringers bolus 1,000 mL (0 mL Intravenous Stopped 5/6/23 1348)   digoxin (LANOXIN) injection 250 mcg (250 mcg Intravenous Given 5/6/23 1724)                   MEDICAL DECISION MAKING, PROGRESS, and CONSULTS    All labs have been independently reviewed by me.  All radiology studies have been reviewed by me and I have also reviewed the radiology report.   EKG's independently viewed and interpreted by me.  Discussion below represents my analysis of pertinent findings related to patient's condition, differential diagnosis, treatment plan and final disposition.      Additional sources:  - Discussed/ obtained information from independent historians: Granddaughter at bedside    - External (non-ED) record review: Patient was last admitted here in January 2023 for operative repair of a left hip fracture.  Following surgery, she had episodes of A-fib with RVR and delirium.  He was started on metoprolol but anticoagulation was deferred.    - Chronic or social conditions impacting care: N/A          Orders placed during this visit:  Orders Placed This Encounter   Procedures   • Critical Care   • CT Chest Without Contrast Diagnostic   • XR Chest 1 View   • Comprehensive Metabolic Panel   • Single High Sensitivity Troponin T   • BNP   • Magnesium   • CBC Auto Differential   • Basic Metabolic Panel   • CBC Auto Differential   • Hemoglobin & Hematocrit, Blood   • Diet: Regular/House Diet; Texture: Regular Texture (IDDSI 7); Fluid Consistency: Thin (IDDSI 0)   •  Monitor Blood Pressure   • Verify Informed Consent   • Obtain Informed Consent   • Vital Signs Every Hour and Per Hospital Policy Based on Patient Condition   • Cardiac Monitoring   • Continuous Pulse Oximetry   • Height & Weight   • Daily Weights   • Intake & Output   • Oral Care - Patient Not on NPPV & Not Intubated   • Use Mobility Guidelines for Advancement of Activity   • Place Sequential Compression Device   • Maintain Sequential Compression Device   • ICU / CCU - Maintain IV Access   • ICU / CCU - Place Order Consult Intensivist For Critical Care Management (If Patient Not Admitted to Cardiology for Primary Cardiology Condition)   • ICU / CCU - Notify All Physicians When Patient is Transferred   • If Patient has BG Less Than 80 & is Symptomatic But Not on IV Insulin Protocol - Use Adult Hypoglycemia Treatment Orders   • Code Status and Medical Interventions:   • Surgery (on-call MD unless specified)   • Pulmonology (on-call MD unless specified)   • IP Palliative Care Nurse Consult   • Inpatient Cardiology Consult   • POC Glucose Once   • ECG 12 Lead Dyspnea   • Type & Screen   • Prepare RBC, 2 Units   • Insert Peripheral IV   • Insert Peripheral IV   • Inpatient Admission   • CBC & Differential         Additional orders considered but not ordered:  N/A        Differential diagnosis:    Rib fractures, chest contusion, pneumothorax, hemothorax      Independent interpretation of labs, radiology studies, and discussions with consultants:  ED Course as of 05/06/23 1750   Sat May 06, 2023   1157 EKG personally interpreted by me.  My personal interpretation is:         EKG time: 11:54 AM  Rhythm/Rate: Atrial fibrillation with RVR, rate 167  P waves and RI: Irregular, very  QRS, axis: Normal  ST and T waves: ST depression in the lateral leads, no ST elevation    Interpreted Contemporaneously by me at 1157, independently viewed  EKG is changed compared to prior EKG done on 2/4/2023.  ST depression was not present at  that time   [WH]   1212 Hemoglobin(!): 7.3  9.9 one month ago [WH]   1219 CT chest personally interpreted by me.  My personal interpretation is: There are multiple comminuted right-sided rib fractures.  There is fluid in the right lower hemithorax. [WH]   1226 Patient just returned from CT and is now hypotensive.  He will be given a liter of IV fluids.  He has worsening anemia and 2 units of PRBCs have been ordered for transfusion. [WH]   1232 Blood pressure is now 80/66.  Patient still awake and alert. [WH]   1253 Results of the CT chest discussed with Dr. Kilgore, radiologist.  There are multiple right rib fractures including a flail segment at at least 4-5 levels.  There is a right hemothorax.  Blood appears to be nonacute as there is a coagulation present.    Case will be discussed with thoracic surgery [WH]   1254 Creatinine(!): 1.80  1.59 one month ago [WH]   1254 proBNP(!): 6,436.0 [WH]   1254 HS Troponin T(!): 46 [WH]   1310 CT results discussed with the patient and his granddaughter.  Heart rate is still in the 130s.  Blood pressure is 88/59.  Granddaughter confirms that the patient is a DNR. [WH]   1340 Case discussed with Dr. Reilly.  Pertinent history, exam findings, test results, ED course, and diagnoses were discussed with him.  He will see the patient in consult and recommends admitting him to the ICU. [WH]   1402 BP: 90/69 [WH]   1402 Heart Rate(!): 138 [WH]   1414 Case discussed with Dr. Fulton and he agrees to admit the patient to the ICU.  Pertinent history, exam findings, test results, ED course, and diagnoses were discussed with him.  I also informed him that I have already consulted and spoken with Dr. Reilly and he is planning on placing a chest tube in the patient later today. [WH]   1419 Dr Reilly is at bedside.  He is going to place the chest tube here in the ED. [WH]   1510 Chest tube has been placed by  [WH]   1533 Patient presented to the ED after falling 5 days ago.  On exam, he was  noted to have a large hematoma on his right chest wall.  Breath sounds were diminished on the right.  CT of the chest showed multiple right-sided rib fractures including a flail segment.  There was also a right hemothorax.  There was no pneumothorax.  Patient was also in A-fib with RVR.  Blood pressures were 80s to 90s/40s to 50s.  He was given IV fluids.  Patient was found to have worsening anemia.  He was transfused 2 units of PRBCs.  Case was discussed with thoracic surgery and pulmonology.  Dr. Reilly came and saw the patient in the ED and placed a right-sided chest tube.  Dr. Fulton agreed to admit the patient to the ICU.  Patient remained tachycardic while in the ED.  His blood pressure improved.  Critical care was performed on this patient. [WH]      ED Course User Index  [WH] Oni Rogers MD               DIAGNOSIS  Final diagnoses:   Multiple rib fractures involving four or more ribs   Hemothorax on right   Atrial fibrillation with RVR   Acute on chronic anemia         DISPOSITION  ADMISSION    Discussed treatment plan and reason for admission with pt/family and admitting physician.  Pt/family voiced understanding of the plan for admission for further testing/treatment as needed.                 Latest Documented Vital Signs:  As of 17:50 EDT  BP- 120/93 HR- (!) 131 Temp- 98 °F (36.7 °C) (Axillary) O2 sat- 94%              --    Please note that portions of this were completed with a voice recognition program.       Note Disclaimer: At [facility], we believe that sharing information builds trust and better relationships. You are receiving this note because you are receiving care at [facility] or recently visited. It is possible you will see health information before a provider has talked with you about it. This kind of information can be easy to misunderstand. To help you fully understand what it means for your health, we urge you to discuss this note with your provider.           Ken  Oni GHOSH MD  05/06/23 2743

## 2023-05-06 NOTE — H&P
Patient Care Team:  Person, Linda Centeno MD as PCP - General (Internal Medicine)      Subjective     Patient is a 89 y.o. male.  Asked to admit with flail chest with tension hemothorax.  I discussed with Dr. Rogers in the ER and Dr. Reilly at bedside.  Patient is going to get a chest tube.  Patient is pretty hard of hearing but he is normally up and functional in fact he fell for 5 days ago and has been up walking around and not really complaining of any pain he denies pain currently.  He does have a history of falls.  He has a history of paroxysmal A-fib he is not anticoagulated because of the history of falls he is usually maintained on metoprolol.  He has some hypothyroidism and hyperlipidemia and hypertension.  He is pretty with it mentally and does really well his granddaughter is at bedside she helps provide a lot of history and talk for him he is a [employer] .  He smoked a pipe but that was remotely.  He is residing at a nursing home now.  He is a DNR DNI and he would like treatment although he was in the hospital in January after a fall with a closed left hip fracture had surgery and had a very prolonged hospital course with severe delirium and they decided that he would probably never undergo general anesthesia again at that time and they want to stick with that currently.  He has a history of some chronic kidney disease stage III but is never really had any problem although since he had the A-fib back in January he has had some fluid retention and they have had to give him some diuretics.      Review of Systems:  He does not say a whole lot part of it I think it is he just does not hear well.  I had trouble getting much of a review from him I did his best we could with his granddaughter.      History  Past Medical History:   Diagnosis Date    Benign essential hypertension     Cataract     Disease of thyroid gland     HL (hearing loss)     Hyperlipidemia     Hypothyroidism      Paroxysmal atrial fibrillation     Visual impairment S     Past Surgical History:   Procedure Laterality Date    EYE SURGERY      HERNIA REPAIR      HIP INTERTROCHANTERIC NAILING Left 2023    Procedure: LT. HIP INTERTROCHANTERIC NAILING;  Surgeon: Leland Hair MD;  Location: [facility] OR;  Service: Orthopedics;  Laterality: Left;     Social History     Socioeconomic History    Marital status:    Tobacco Use    Smoking status: Former     Types: Pipe     Start date: 1955     Quit date: 1970     Years since quittin.3    Smokeless tobacco: Never   Vaping Use    Vaping Use: Never used   Substance and Sexual Activity    Alcohol use: Yes     Alcohol/week: 1.0 standard drink     Types: 1 Shots of liquor per week     Comment: occ    Drug use: Never    Sexual activity: Not Currently     Partners: Female     Birth control/protection: None     Family History   Problem Relation Age of Onset    No Known Problems Mother     No Known Problems Father     Hyperlipidemia Brother     Hearing loss Paternal Aunt     Vision loss Paternal Aunt     No Known Problems Maternal Grandmother     No Known Problems Maternal Grandfather     No Known Problems Paternal Grandmother     No Known Problems Paternal Grandfather     Malig Hyperthermia Neg Hx          Allergies:  Latex    Medications:  Prior to Admission medications    Medication Sig Start Date End Date Taking? Authorizing Provider   famotidine (PEPCID) 20 MG tablet TAKE ONE TABLET BY MOUTH DAILY  Patient taking differently: 1 tablet Every Night. 20  Yes Rafita Colunga MD   finasteride (PROSCAR) 5 MG tablet Take 1 tablet by mouth Daily.  Patient taking differently: Take 1 tablet by mouth Every Night. 18  Yes Rafita Colunga MD   furosemide (Lasix) 20 MG tablet Take 1 tablet by mouth Daily. 3/22/23  Yes Rafita Colunga MD   levothyroxine (SYNTHROID, LEVOTHROID) 75 MCG tablet Take 1 tablet by mouth Every Morning. 23  Yes Leodan Herring MD    metoprolol tartrate (LOPRESSOR) 25 MG tablet Take 0.5 tablets by mouth 2 (Two) Times a Day. 2/8/23  Yes Leodan Herring MD   tamsulosin (FLOMAX) 0.4 MG capsule 24 hr capsule Take 1 capsule by mouth Daily. 2/9/23  Yes Leodan Herring MD   aspirin 81 MG chewable tablet Chew 1 tablet Daily.    Provider, MD Axel   methylPREDNISolone (MEDROL) 8 MG tablet Take 1 tablet by mouth Daily.    Provider, MD Axel     lidocaine 1% - EPINEPHrine 1:089922, 30 mL, Infiltration, Once           Objective     Vital Signs  Vital Sign Min/Max for last 24 hours  Temp  Min: 97.2 °F (36.2 °C)  Max: 98.3 °F (36.8 °C)   BP  Min: 70/38  Max: 157/100   Pulse  Min: 128  Max: 157   Resp  Min: 18  Max: 20   SpO2  Min: 99 %  Max: 100 %   Flow (L/min)  Min: 3  Max: 3   No data recorded       Intake/Output Summary (Last 24 hours) at 5/6/2023 1422  Last data filed at 5/6/2023 1348  Gross per 24 hour   Intake 1000 ml   Output --   Net 1000 ml     I/O this shift:  In: 1000 [IV Piggyback:1000]  Out: -   Last Weight and Admission Weight    There were no vitals filed for this visit.      There is no height or weight on file to calculate BMI.           Physical Exam:  General Appearance: Super nice 89-year-old gentleman who is resting in bed I cannot believe he is not in any significant distress but he really does not appear to be having month 3 L nasal cannula O2 his oxygen saturation is 100% his heart rates in the 130s and 140s A-fib on the monitor and his blood pressures in the 90s over 40s to 60s.  Eyes: Conjunctiva are clear and anicteric he is got bilateral arcus senilis, pupils are reactive to light  ENT: Mucous membranes are pretty dry no erythema no exudates  Neck: Trachea seems to be pretty midline I do not see definite deviation there is a little bit of jugular venous distention evident  Lungs: He has almost no breath sounds on the right there are breath sounds on the left that are clear.  Cardiac: Irregularly irregular and  tachycardic  Abdomen: Soft no palpable hepatosplenomegaly there is a large hematoma extending down from the chest into the right lateral abdomen  : Not examined  Musculoskeletal: Large right hematoma there is clearly fractured ribs there is mobile segments of the chest wall consistent with a flail chest on the right  Skin: Other than the large ecchymoses skin is warm and dry no other petechiae no jaundice  Neuro: He is very hard of hearing he is alert he is cooperative  Extremities/P Vascular: No clubbing no cyanosis he does have 1+ lower extremity edema bilaterally mostly in the ankles, palpable radial and dorsalis pedis pulses  MSE: He is not real talkative but he is pleasant and appropriate he just really has trouble hearing to communicate.      Labs:  Results from last 7 days   Lab Units 05/06/23  1200   GLUCOSE mg/dL 188*   SODIUM mmol/L 136   POTASSIUM mmol/L 4.3   MAGNESIUM mg/dL 2.0   CO2 mmol/L 21.0*   CHLORIDE mmol/L 103   ANION GAP mmol/L 12.0   CREATININE mg/dL 1.80*   BUN mg/dL 31*   BUN / CREAT RATIO  17.2   CALCIUM mg/dL 7.5*   ALK PHOS U/L 97   TOTAL PROTEIN g/dL 5.7*   ALT (SGPT) U/L 15   AST (SGOT) U/L 19   BILIRUBIN mg/dL 0.4   ALBUMIN g/dL 2.5*   GLOBULIN gm/dL 3.2     CrCl cannot be calculated (Unknown ideal weight.).      Results from last 7 days   Lab Units 05/06/23  1200   WBC 10*3/mm3 15.14*   RBC 10*6/mm3 2.37*   HEMOGLOBIN g/dL 7.3*   HEMATOCRIT % 22.5*   MCV fL 94.9   MCH pg 30.8   MCHC g/dL 32.4   RDW % 13.4   RDW-SD fl 45.6   MPV fL 11.8   PLATELETS 10*3/mm3 232   NEUTROPHIL % % 83.0*   LYMPHOCYTE % % 9.0*   MONOCYTES % % 6.3   EOSINOPHIL % % 0.0*   BASOPHIL % % 0.3   IMM GRAN % % 1.4*   NEUTROS ABS 10*3/mm3 12.56*   LYMPHS ABS 10*3/mm3 1.37   MONOS ABS 10*3/mm3 0.96*   EOS ABS 10*3/mm3 0.00   BASOS ABS 10*3/mm3 0.04   IMMATURE GRANS (ABS) 10*3/mm3 0.21*   NRBC /100 WBC 0.0         Results from last 7 days   Lab Units 05/06/23  1200   HSTROP T ng/L 46*     Results from last 7 days    Lab Units 05/06/23  1200   PROBNP pg/mL 6,436.0*                 Microbiology Results (last 10 days)       ** No results found for the last 240 hours. **              Diagnostics:  CT Chest Without Contrast Diagnostic    Result Date: 5/6/2023  CHEST CT WITHOUT CONTRAST  HISTORY: Fall five days ago with chest wall hematoma and shortness of breath.  TECHNIQUE: Noncontrast head CT is provided. Correlation with chest x-ray 01/24/2023.  Radiation dose reduction techniques were utilized, including automated exposure control and exposure modulation based on body size.  FINDINGS: There is a large right hemothorax filling over 50% of the volume of the right hemithorax. There is minimal midline shift toward the left. There is no pneumothorax, but there is some soft tissue gas in the chest wall posterolaterally on the right.  There are old healed posterior right rib fractures and multiple acute right rib fractures. The highest 4 ribs are intact and there is normal anatomic variant osseous bridging between the lateral right 4th and 5th ribs.  At the right 5th rib, there is a nondisplaced lateral single part fracture.  At rib six, there is a mildly displaced fracture laterally.  At the 7th rib, there is a nondisplaced fracture posteriorly, an old healed, deformed fracture slightly more lateral along the posterior rib with a bridge of ossification extending caudad to the 8th rib. A fully displaced lateral fracture is associated with foreshortening at the lateral fracture site.  At the 8th rib, there is a multipart displaced fracture with fracture planes along the posterior mid clavicular line and directly laterally. The rib fragment between those fracture planes is displaced anteriorly a full bone width.  At the 9th rib, there is a similar multipart displaced fracture configuration as at rib 8.  At the 10th rib, there is also a multipart displaced fracture posteriorly and posterolaterally.  At the 11th rib, there is a  comminuted, displaced and angulated fracture along the posterior midclavicular line. The rib is intact along its more lateral extent.  The 12th rib is intact.  The scapula, clavicle, left thoracic cage, and the bones of the thoracic spine appear intact although there is an old T10 inferior endplate compression deformity, degenerative osseous ankylosis across much of the thoracic spine, and the sagittal images show an old healed sternal fracture.  There is no mediastinal hematoma. No thoracic lymphadenopathy.  Multiple hepatic cysts and a 2 cm diameter calcified gallstone. No hemorrhage in the visualized upper abdomen.      1. Old T10 vertebral body compression fracture and mid sternal fracture are healed. There are also old healed rib fractures on the right. 2. Acute fractures of the right 5th through 11th ribs. Multipart fractures are observed at the 7th through 11th ribs. There is a large right hemarthrosis. No pneumothorax. No hemoperitoneum. 3. Cholelithiasis with extensive atheromatous vascular calcification.  This report was finalized on 5/6/2023 1:48 PM by Dr. Mitesh Kilgore M.D.      OCT, Retina - OU - Both Eyes    Result Date: 4/21/2023  OD: Lamellar hole, many small drusen OS: CME slightly better    Results for orders placed during the hospital encounter of 12/03/18    Adult Transthoracic Echo Complete W/ Cont if Necessary Per Protocol    Interpretation Summary  · Left ventricular systolic function is normal. Estimated EF = 53%.    There is no significant valvular heart disease.    EKG is A-fib with rapid rate  I did review the CT scan of the chest the rib fractures from a flail segment and the large right pleural effusion that consistency with blood there is deviation of the mediastinal structures to the left consistent with a tension hydrothorax/hemothorax    Assessment & Plan     Right chest wall hematoma and large probable hemothorax this is deviating the mediastinum to the left so a tension  hemothorax likely.  This may be contributing to his hypotension and A-fib with rapid rate.  Thoracic surgery is here I have discussed with the patient and family they do not want general anesthesia he is going to get a chest tube to try and evacuate as much blood as possible to see if we can help with the tension and improve his ventilation.  Multiple displaced rib fractures with flail segment on the right not a candidate for surgical intervention.  Paroxysmal A-fib with rapid ventricular rate family had previously decided no anticoagulation that looks like a very good idea at this point in time unfortunately his rates up probably due to #1 and 2 above his blood pressure is low and that may make it difficult using his normal metoprolol.  May be if we get his hemoglobin up his blood pressure will be a little better he has follow-up with Dr. Osuna in the past we will ask her to follow along and help with rate control.  I will try dose of digoxin now see if it helps.  Acute blood loss anemia he is getting a transfusion now  Chronic kidney disease stage IIIa creatinine looks like it is up from his baseline some we will have to to follow  Acute heart failure I suspect this is more of a high output stress type situation but we will have to follow maybe we get his hemoglobin up we can give him a little diuretic.  Elevated high-sensitivity troponin most likely this is a demand ischemia non-ST elevation MI type II due to his A-fib and rapid rate his severe anemia etc. we will follow his troponin.  Reflex pending.  Fluids/electrolytes/nutrition we will should be able to let him eat.      Trip Fulton Jr, MD  05/06/23  14:22 EDT    Time: critical care time38 min

## 2023-05-06 NOTE — ED NOTES
Pt c/o right rib pain multiple bruises to area, c/o pain with deep inspiration.  Pt EKG shows AFIB RVR.  Pt had a fall in the shower on Monday.  Pt from [facility]

## 2023-05-07 ENCOUNTER — APPOINTMENT (OUTPATIENT)
Dept: GENERAL RADIOLOGY | Facility: HOSPITAL | Age: 88
End: 2023-05-07
Payer: MEDICARE

## 2023-05-07 LAB
ANION GAP SERPL CALCULATED.3IONS-SCNC: 7.3 MMOL/L (ref 5–15)
BASOPHILS # BLD AUTO: 0.07 10*3/MM3 (ref 0–0.2)
BASOPHILS NFR BLD AUTO: 0.4 % (ref 0–1.5)
BH BB BLOOD EXPIRATION DATE: NORMAL
BH BB BLOOD EXPIRATION DATE: NORMAL
BH BB BLOOD TYPE BARCODE: 5100
BH BB BLOOD TYPE BARCODE: 5100
BH BB DISPENSE STATUS: NORMAL
BH BB DISPENSE STATUS: NORMAL
BH BB PRODUCT CODE: NORMAL
BH BB PRODUCT CODE: NORMAL
BH BB UNIT NUMBER: NORMAL
BH BB UNIT NUMBER: NORMAL
BUN SERPL-MCNC: 33 MG/DL (ref 8–23)
BUN/CREAT SERPL: 23.9 (ref 7–25)
CALCIUM SPEC-SCNC: 7.7 MG/DL (ref 8.2–9.6)
CHLORIDE SERPL-SCNC: 104 MMOL/L (ref 98–107)
CO2 SERPL-SCNC: 24.7 MMOL/L (ref 22–29)
CREAT SERPL-MCNC: 1.38 MG/DL (ref 0.76–1.27)
CROSSMATCH INTERPRETATION: NORMAL
CROSSMATCH INTERPRETATION: NORMAL
DEPRECATED RDW RBC AUTO: 49.8 FL (ref 37–54)
EGFRCR SERPLBLD CKD-EPI 2021: 46.8 ML/MIN/1.73
EOSINOPHIL # BLD AUTO: 0.01 10*3/MM3 (ref 0–0.4)
EOSINOPHIL NFR BLD AUTO: 0.1 % (ref 0.3–6.2)
ERYTHROCYTE [DISTWIDTH] IN BLOOD BY AUTOMATED COUNT: 15.6 % (ref 12.3–15.4)
GLUCOSE SERPL-MCNC: 116 MG/DL (ref 65–99)
HCT VFR BLD AUTO: 28.8 % (ref 37.5–51)
HGB BLD-MCNC: 9.5 G/DL (ref 13–17.7)
IMM GRANULOCYTES # BLD AUTO: 0.15 10*3/MM3 (ref 0–0.05)
IMM GRANULOCYTES NFR BLD AUTO: 1 % (ref 0–0.5)
LYMPHOCYTES # BLD AUTO: 1.94 10*3/MM3 (ref 0.7–3.1)
LYMPHOCYTES NFR BLD AUTO: 12.3 % (ref 19.6–45.3)
MCH RBC QN AUTO: 29.1 PG (ref 26.6–33)
MCHC RBC AUTO-ENTMCNC: 33 G/DL (ref 31.5–35.7)
MCV RBC AUTO: 88.1 FL (ref 79–97)
MONOCYTES # BLD AUTO: 2.19 10*3/MM3 (ref 0.1–0.9)
MONOCYTES NFR BLD AUTO: 13.9 % (ref 5–12)
NEUTROPHILS NFR BLD AUTO: 11.38 10*3/MM3 (ref 1.7–7)
NEUTROPHILS NFR BLD AUTO: 72.3 % (ref 42.7–76)
NRBC BLD AUTO-RTO: 0.1 /100 WBC (ref 0–0.2)
PLATELET # BLD AUTO: 170 10*3/MM3 (ref 140–450)
PMV BLD AUTO: 12 FL (ref 6–12)
POTASSIUM SERPL-SCNC: 4.1 MMOL/L (ref 3.5–5.2)
RBC # BLD AUTO: 3.27 10*6/MM3 (ref 4.14–5.8)
SODIUM SERPL-SCNC: 136 MMOL/L (ref 136–145)
UNIT  ABO: NORMAL
UNIT  ABO: NORMAL
UNIT  RH: NORMAL
UNIT  RH: NORMAL
WBC NRBC COR # BLD: 15.74 10*3/MM3 (ref 3.4–10.8)

## 2023-05-07 PROCEDURE — 99232 SBSQ HOSP IP/OBS MODERATE 35: CPT | Performed by: NURSE PRACTITIONER

## 2023-05-07 PROCEDURE — 99222 1ST HOSP IP/OBS MODERATE 55: CPT | Performed by: INTERNAL MEDICINE

## 2023-05-07 PROCEDURE — 97162 PT EVAL MOD COMPLEX 30 MIN: CPT

## 2023-05-07 PROCEDURE — 3E1L38Z IRRIGATION OF PLEURAL CAVITY USING IRRIGATING SUBSTANCE, PERCUTANEOUS APPROACH: ICD-10-PCS | Performed by: NURSE PRACTITIONER

## 2023-05-07 PROCEDURE — 25010000002 ALTEPLASE PER 1 MG: Performed by: NURSE PRACTITIONER

## 2023-05-07 PROCEDURE — 80048 BASIC METABOLIC PNL TOTAL CA: CPT | Performed by: INTERNAL MEDICINE

## 2023-05-07 PROCEDURE — 85025 COMPLETE CBC W/AUTO DIFF WBC: CPT | Performed by: INTERNAL MEDICINE

## 2023-05-07 PROCEDURE — 32561 LYSE CHEST FIBRIN INIT DAY: CPT | Performed by: NURSE PRACTITIONER

## 2023-05-07 PROCEDURE — 71045 X-RAY EXAM CHEST 1 VIEW: CPT

## 2023-05-07 PROCEDURE — 97530 THERAPEUTIC ACTIVITIES: CPT

## 2023-05-07 RX ADMIN — LEVOTHYROXINE SODIUM 75 MCG: 0.07 TABLET ORAL at 05:47

## 2023-05-07 RX ADMIN — FAMOTIDINE 20 MG: 20 TABLET, FILM COATED ORAL at 20:03

## 2023-05-07 RX ADMIN — METOPROLOL TARTRATE 12.5 MG: 25 TABLET, FILM COATED ORAL at 20:03

## 2023-05-07 RX ADMIN — DOCUSATE SODIUM 50MG AND SENNOSIDES 8.6MG 2 TABLET: 8.6; 5 TABLET, FILM COATED ORAL at 20:03

## 2023-05-07 RX ADMIN — ACETAMINOPHEN 650 MG: 325 TABLET, FILM COATED ORAL at 12:28

## 2023-05-07 RX ADMIN — METOPROLOL TARTRATE 12.5 MG: 25 TABLET, FILM COATED ORAL at 08:09

## 2023-05-07 RX ADMIN — TAMSULOSIN HYDROCHLORIDE 0.4 MG: 0.4 CAPSULE ORAL at 21:44

## 2023-05-07 RX ADMIN — DOCUSATE SODIUM 50MG AND SENNOSIDES 8.6MG 2 TABLET: 8.6; 5 TABLET, FILM COATED ORAL at 08:09

## 2023-05-07 RX ADMIN — Medication 10 ML: at 20:03

## 2023-05-07 RX ADMIN — WATER 5 MG: 1 INJECTION INTRAMUSCULAR; INTRAVENOUS; SUBCUTANEOUS at 12:30

## 2023-05-07 RX ADMIN — ALTEPLASE 10 MG: KIT at 12:30

## 2023-05-07 RX ADMIN — FINASTERIDE 5 MG: 5 TABLET, FILM COATED ORAL at 21:44

## 2023-05-07 RX ADMIN — ACETAMINOPHEN 650 MG: 325 TABLET, FILM COATED ORAL at 19:34

## 2023-05-07 RX ADMIN — Medication 10 ML: at 08:13

## 2023-05-07 NOTE — PROGRESS NOTES
"    Chief Complaint: Multiple rib fractures, hemothorax, follow-up  S/P: Chest tube insertion  POD # 1    Subjective:  Symptoms:  Improved.  He reports weakness.    Diet:  Adequate intake.  No nausea or vomiting.    Activity level: Impaired due to weakness.    Pain:  He complains of pain that is mild.        Vital Signs:  Temp:  [97.2 °F (36.2 °C)-99 °F (37.2 °C)] 99 °F (37.2 °C)  Heart Rate:  [100-157] 107  Resp:  [18-22] 18  BP: ()/() 113/76    Intake & Output (last day)       05/06 0701  05/07 0700 05/07 0701  05/08 0700    P.O. 240 480    Blood 617.3     IV Piggyback 1000     Total Intake(mL/kg) 1857.3 (28.6) 480 (7.4)    Urine (mL/kg/hr) 0 0 (0)    Chest Tube 1850 100    Total Output 1850 100    Net +7.3 +380          Urine Unmeasured Occurrence 4 x 1 x          Objective:  General Appearance:  Comfortable, in no acute distress and ill-appearing.    Vital signs: (most recent): Blood pressure 113/76, pulse 107, temperature 99 °F (37.2 °C), temperature source Oral, resp. rate 18, height 167.6 cm (66\"), weight 65 kg (143 lb 4.8 oz), SpO2 92 %.  Vital signs are normal.  No fever.    Lungs:  Normal effort and normal respiratory rate.  There are decreased breath sounds.    Heart: Tachycardia.  Irregular rhythm.    Chest: Chest wall tenderness present.    Abdomen: Abdomen is soft.    Neurological: Patient is alert.    Skin:  Warm and dry.              Chest tube:   Site: Right, Clean, Dry, Intact and Securement device intact  Suction: -20 cm  Air Leak: negative  24 Hour Total: 1850mL    Results Review:     I reviewed the patient's new clinical results.  I reviewed the patient's new imaging results and agree with the interpretation.  I reviewed the patient's other test results and agree with the interpretation  Discussed with patient, family at bedside, RN and Dr. Reilly.     Imaging Results (Last 24 Hours)     Procedure Component Value Units Date/Time    XR Chest 1 View [[id number]] Collected: 05/07/23 0718    "  Updated: 05/07/23 0718    Narrative:        Patient: CHAPO RYAN  Time Out: 07:17  Exam(s): XR CXR 1 VIEW     EXAM:    XR Chest, 1 View    CLINICAL HISTORY:     Reason for exam: assess for right effusion.    TECHNIQUE:    Frontal view of the chest.    COMPARISON:    No relevant prior studies available.    FINDINGS:    Lungs: Medial right base consolidation.      Pleural space: Small right pleural effusion.  Right-sided chest tube in   place.  No pneumothorax.    Heart:  Unremarkable.  No cardiomegaly.    Mediastinum:  Unremarkable.    Bones joints:  Unremarkable.    IMPRESSION:       1. Right-sided chest tube in place.  No pneumothorax.  Small right   pleural effusion.    2. Medial right base consolidation.    Impression:          Electronically signed by Edward Saravia M.D. on 05-07-23 at 0717    XR Chest 1 View [[id number]] Collected: 05/06/23 1554     Updated: 05/06/23 1600    Narrative:      XR CHEST 1 VW-     HISTORY: Male who is 89 years-old,  chest tube placement     TECHNIQUE: Frontal views of the chest     COMPARISON: 01/24/2023     FINDINGS: On the second image, the right chest tube extends the right  mid hemithorax. The heart size is borderline. Aorta is tortuous.  Infiltrate is apparent at the right mid to lower lung with mild right  pleural effusion. Minimal left basilar atelectasis or infiltrate. No  pneumothorax. No acute osseous process.       Impression:      Chest tube placement. Infiltrate at the right mid to lower  lung. Mild right pleural effusion. Minimal left basilar atelectasis or  infiltrate.     This report was finalized on 5/6/2023 3:57 PM by Dr. Rajiv Grider M.D.       CT Chest Without Contrast Diagnostic [[id number]] Collected: 05/06/23 1336     Updated: 05/06/23 1351    Narrative:      CHEST CT WITHOUT CONTRAST     HISTORY: Fall five days ago with chest wall hematoma and shortness of  breath.     TECHNIQUE: Noncontrast head CT is provided. Correlation with chest  x-ray  01/24/2023.     Radiation dose reduction techniques were utilized, including automated  exposure control and exposure modulation based on body size.     FINDINGS: There is a large right hemothorax filling over 50% of the  volume of the right hemithorax. There is minimal midline shift toward  the left. There is no pneumothorax, but there is some soft tissue gas in  the chest wall posterolaterally on the right.     There are old healed posterior right rib fractures and multiple acute  right rib fractures. The highest 4 ribs are intact and there is normal  anatomic variant osseous bridging between the lateral right 4th and 5th  ribs.     At the right 5th rib, there is a nondisplaced lateral single part  fracture.     At rib six, there is a mildly displaced fracture laterally.     At the 7th rib, there is a nondisplaced fracture posteriorly, an old  healed, deformed fracture slightly more lateral along the posterior rib  with a bridge of ossification extending caudad to the 8th rib. A fully  displaced lateral fracture is associated with foreshortening at the  lateral fracture site.     At the 8th rib, there is a multipart displaced fracture with fracture  planes along the posterior mid clavicular line and directly laterally.  The rib fragment between those fracture planes is displaced anteriorly a  full bone width.     At the 9th rib, there is a similar multipart displaced fracture  configuration as at rib 8.     At the 10th rib, there is also a multipart displaced fracture  posteriorly and posterolaterally.     At the 11th rib, there is a comminuted, displaced and angulated fracture  along the posterior midclavicular line. The rib is intact along its more  lateral extent.     The 12th rib is intact.     The scapula, clavicle, left thoracic cage, and the bones of the thoracic  spine appear intact although there is an old T10 inferior endplate  compression deformity, degenerative osseous ankylosis across much of  the  thoracic spine, and the sagittal images show an old healed sternal  fracture.     There is no mediastinal hematoma. No thoracic lymphadenopathy.     Multiple hepatic cysts and a 2 cm diameter calcified gallstone. No  hemorrhage in the visualized upper abdomen.       Impression:      1. Old T10 vertebral body compression fracture and mid sternal fracture  are healed. There are also old healed rib fractures on the right.  2. Acute fractures of the right 5th through 11th ribs. Multipart  fractures are observed at the 7th through 11th ribs. There is a large  right hemarthrosis. No pneumothorax. No hemoperitoneum.  3. Cholelithiasis with extensive atheromatous vascular calcification.     This report was finalized on 5/6/2023 1:48 PM by Dr. Mitesh Kilgore M.D.             Lab Results:     Lab Results (last 24 hours)     Procedure Component Value Units Date/Time    Basic Metabolic Panel [[id number]]  (Abnormal) Collected: 05/07/23 0048    Specimen: Blood Updated: 05/07/23 0224     Glucose 116 mg/dL      BUN 33 mg/dL      Creatinine 1.38 mg/dL      Sodium 136 mmol/L      Potassium 4.1 mmol/L      Chloride 104 mmol/L      CO2 24.7 mmol/L      Calcium 7.7 mg/dL      BUN/Creatinine Ratio 23.9     Anion Gap 7.3 mmol/L      eGFR 46.8 mL/min/1.73     Narrative:      GFR Normal >60  Chronic Kidney Disease <60  Kidney Failure <15    The GFR formula is only valid for adults with stable renal function between ages 18 and 70.    CBC Auto Differential [[id number]]  (Abnormal) Collected: 05/07/23 0048    Specimen: Blood Updated: 05/07/23 0211     WBC 15.74 10*3/mm3      RBC 3.27 10*6/mm3      Hemoglobin 9.5 g/dL      Hematocrit 28.8 %      MCV 88.1 fL      MCH 29.1 pg      MCHC 33.0 g/dL      RDW 15.6 %      RDW-SD 49.8 fl      MPV 12.0 fL      Platelets 170 10*3/mm3      Neutrophil % 72.3 %      Lymphocyte % 12.3 %      Monocyte % 13.9 %      Eosinophil % 0.1 %      Basophil % 0.4 %      Immature Grans % 1.0 %      Neutrophils,  Absolute 11.38 10*3/mm3      Lymphocytes, Absolute 1.94 10*3/mm3      Monocytes, Absolute 2.19 10*3/mm3      Eosinophils, Absolute 0.01 10*3/mm3      Basophils, Absolute 0.07 10*3/mm3      Immature Grans, Absolute 0.15 10*3/mm3      nRBC 0.1 /100 WBC     POC Glucose Once [[id number]]  (Abnormal) Collected: 05/06/23 1607    Specimen: Blood Updated: 05/06/23 1609     Glucose 138 mg/dL      Comment: Meter: [id number] : [id number] Rashard Hirsch RN       Magnesium [[id number]]  (Normal) Collected: 05/06/23 1200    Specimen: Blood Updated: 05/06/23 1231     Magnesium 2.0 mg/dL     Comprehensive Metabolic Panel [[id number]]  (Abnormal) Collected: 05/06/23 1200    Specimen: Blood Updated: 05/06/23 1231     Glucose 188 mg/dL      BUN 31 mg/dL      Creatinine 1.80 mg/dL      Sodium 136 mmol/L      Potassium 4.3 mmol/L      Comment: Slight hemolysis detected by analyzer. Results may be affected.        Chloride 103 mmol/L      CO2 21.0 mmol/L      Calcium 7.5 mg/dL      Total Protein 5.7 g/dL      Albumin 2.5 g/dL      ALT (SGPT) 15 U/L      AST (SGOT) 19 U/L      Comment: Slight hemolysis detected by analyzer. Results may be affected.        Alkaline Phosphatase 97 U/L      Total Bilirubin 0.4 mg/dL      Globulin 3.2 gm/dL      A/G Ratio 0.8 g/dL      BUN/Creatinine Ratio 17.2     Anion Gap 12.0 mmol/L      eGFR 34.0 mL/min/1.73     Narrative:      GFR Normal >60  Chronic Kidney Disease <60  Kidney Failure <15    The GFR formula is only valid for adults with stable renal function between ages 18 and 70.    Single High Sensitivity Troponin T [[id number]]  (Abnormal) Collected: 05/06/23 1200    Specimen: Blood Updated: 05/06/23 1230     HS Troponin T 46 ng/L     Narrative:      High Sensitive Troponin T Reference Range:  <10.0 ng/L- Negative Female for AMI  <15.0 ng/L- Negative Male for AMI  >=10 - Abnormal Female indicating possible myocardial injury.  >=15 - Abnormal Male indicating possible myocardial injury.   Clinicians  would have to utilize clinical acumen, EKG, Troponin, and serial changes to determine if it is an Acute Myocardial Infarction or myocardial injury due to an underlying chronic condition.         BNP [[id number]]  (Abnormal) Collected: 05/06/23 1200    Specimen: Blood Updated: 05/06/23 1229     proBNP 6,436.0 pg/mL     Narrative:      Among patients with dyspnea, NT-proBNP is highly sensitive for the detection of acute congestive heart failure. In addition NT-proBNP of <300 pg/ml effectively rules out acute congestive heart failure with 99% negative predictive value.    Results may be falsely decreased if patient taking Biotin.      CBC & Differential [[id number]]  (Abnormal) Collected: 05/06/23 1200    Specimen: Blood Updated: 05/06/23 1212    Narrative:      The following orders were created for panel order CBC & Differential.  Procedure                               Abnormality         Status                     ---------                               -----------         ------                     CBC Auto Differential[[id number]]        Abnormal            Final result                 Please view results for these tests on the individual orders.    CBC Auto Differential [[id number]]  (Abnormal) Collected: 05/06/23 1200    Specimen: Blood Updated: 05/06/23 1212     WBC 15.14 10*3/mm3      RBC 2.37 10*6/mm3      Hemoglobin 7.3 g/dL      Hematocrit 22.5 %      MCV 94.9 fL      MCH 30.8 pg      MCHC 32.4 g/dL      RDW 13.4 %      RDW-SD 45.6 fl      MPV 11.8 fL      Platelets 232 10*3/mm3      Neutrophil % 83.0 %      Lymphocyte % 9.0 %      Monocyte % 6.3 %      Eosinophil % 0.0 %      Basophil % 0.3 %      Immature Grans % 1.4 %      Neutrophils, Absolute 12.56 10*3/mm3      Lymphocytes, Absolute 1.37 10*3/mm3      Monocytes, Absolute 0.96 10*3/mm3      Eosinophils, Absolute 0.00 10*3/mm3      Basophils, Absolute 0.04 10*3/mm3      Immature Grans, Absolute 0.21 10*3/mm3      nRBC 0.0 /100 WBC            Assessment &  Plan       Multiple rib fractures involving four or more ribs       Assessment & Plan     Patient is new to me today.    I have independently reviewed his radiographic imaging, labs and chart in detail.  CT of the chest performed yesterday in the emergency department identified multiple right-sided rib fractures of right ribs 5 through 11 with flail segments of right ribs 7 through 11.  There is a hemothorax on CT of the chest which is improved on today's plain film chest x-ray s/p right chest tube placement.    Multiple right-sided rib fractures: In spite of multiple nondisplaced fractures, patient's age and medical issues prohibit surgical fixation.  Additionally, patient and his family do not want him to undergo general anesthesia, given complications and prolonged hospitalization after recent right hip fracture repair.    Hemothorax: Improved today with chest tube placement.  Patient has had almost 2 L of sanguinous output.  We will instill 1 dose of lytics today via chest tube and have chest tube clamped for 2 hours.  Chest tube should then be placed back to -20 cm suction.  Discussed in detail with patient, family at bedside and RN.  Repeat chest x-ray in AM.    MARLENE Thomas  [facility]  05/07/23  09:56 EDT    Greater than 34 minutes of critical care time.  This includes reviewing the patient's chart, radiographic imaging, labs, provider notes, assessing the patient and developing a plan of care.  This was discussed with the patient, family, on-call thoracic surgeon and RN.  This is aside from any other critical care procedural time which will be dictated in a separate note.

## 2023-05-07 NOTE — THERAPY EVALUATION
Patient Name: Lavelle Soliman  : 1927    MRN: [id number]                              Today's Date: 2023       Admit Date: 2023    Visit Dx:     ICD-10-CM ICD-9-CM   1. Multiple rib fractures involving four or more ribs  S22.49XA 807.09   2. Hemothorax on right  J94.2 511.89   3. Atrial fibrillation with RVR  I48.91 427.31   4. Acute on chronic anemia  D64.9 285.9     Patient Active Problem List   Diagnosis   • Benign essential hypertension   • Stage 3a chronic kidney disease   • Hyperlipidemia   • Hypothyroidism   • Muscle cramps   • Anemia   • Hyperglycemia   • Urinary retention   • UTI (urinary tract infection), bacterial   • BPH with obstruction/lower urinary tract symptoms   • Paroxysmal atrial fibrillation   • Closed fracture of left hip, initial encounter   • Postoperative anemia due to acute blood loss   • Moderate malnutrition   • Multiple rib fractures involving four or more ribs     Past Medical History:   Diagnosis Date   • Benign essential hypertension    • Cataract    • Disease of thyroid gland    • HL (hearing loss)    • Hyperlipidemia    • Hypothyroidism    • Paroxysmal atrial fibrillation    • Visual impairment S     Past Surgical History:   Procedure Laterality Date   • EYE SURGERY     • HERNIA REPAIR     • HIP INTERTROCHANTERIC NAILING Left 2023    Procedure: LT. HIP INTERTROCHANTERIC NAILING;  Surgeon: Leland Hair MD;  Location: [facility];  Service: Orthopedics;  Laterality: Left;      General Information     Row Name 23 1144          Physical Therapy Time and Intention    Document Type evaluation  -EM     Mode of Treatment individual therapy;physical therapy  -EM     Row Name 23 1144          General Information    Patient Profile Reviewed yes  -EM     Prior Level of Function independent:  independent with use of rwx  -EM     Existing Precautions/Restrictions fall  -EM     Row Name 23 1144          Living Environment    People in Home facility  resident  lives in assisted living  -EM     Row Name 05/07/23 1144          Cognition    Orientation Status (Cognition) oriented x 3  -EM     Row Name 05/07/23 1144          Safety Issues, Functional Mobility    Impairments Affecting Function (Mobility) endurance/activity tolerance;strength;pain  -EM           User Key  (r) = Recorded By, (t) = Taken By, (c) = Cosigned By    Initials Name Provider Type    EM Tresa Nagy PT Physical Therapist               Mobility     Row Name 05/07/23 1145          Bed Mobility    Bed Mobility supine-sit  -EM     Supine-Sit [place] (Bed Mobility) minimum assist (75% patient effort)  -EM     Assistive Device (Bed Mobility) head of bed elevated  -EM     Row Name 05/07/23 1145          Bed-Chair Transfer    Bed-Chair [place] (Transfers) contact guard  -EM     Assistive Device (Bed-Chair Transfers) walker, front-wheeled  -EM     Row Name 05/07/23 1145          Sit-Stand Transfer    Sit-Stand [place] (Transfers) contact guard  -EM     Assistive Device (Sit-Stand Transfers) walker, front-wheeled  -EM     Row Name 05/07/23 1145          Gait/Stairs (Locomotion)    [place] Level (Gait) contact guard  -EM     Assistive Device (Gait) walker, front-wheeled  -EM     Distance in Feet (Gait) 5  -EM     Deviations/Abnormal Patterns (Gait) stride length decreased  -EM     Comment, (Gait/Stairs) able to take a few steps from bed to chair  -EM           User Key  (r) = Recorded By, (t) = Taken By, (c) = Cosigned By    Initials Name Provider Type    EM Tresa Nagy PT Physical Therapist               Obj/Interventions     Row Name 05/07/23 1146          Range of Motion Comprehensive    General Range of Motion no range of motion deficits identified  -EM     Row Name 05/07/23 1146          Strength Comprehensive (MMT)    General Manual Muscle Testing (MMT) Assessment other (see comments)  -EM     Comment, General Manual Muscle Testing (MMT) Assessment no focal  deficits identified, generalized weakness noted  -EM     Row Name 05/07/23 1146          Motor Skills    Therapeutic Exercise other (see comments)  AP, LAQ x 5 reps  -EM     Row Name 05/07/23 1146          Balance    Balance Assessment sitting static balance;sitting dynamic balance;standing static balance;standing dynamic balance  -EM     Static Sitting Balance supervision  -EM     Dynamic Sitting Balance supervision  -EM     Position, Sitting Balance sitting edge of bed  -EM     Static Standing Balance contact guard  -EM     Dynamic Standing Balance contact guard  -EM     Position/Device Used, Standing Balance walker, rolling  -EM     Row Name 05/07/23 1146          Sensory Assessment (Somatosensory)    Sensory Assessment (Somatosensory) sensation intact  -EM           User Key  (r) = Recorded By, (t) = Taken By, (c) = Cosigned By    Initials Name Provider Type    EM Tresa Nagy, PT Physical Therapist               Goals/Plan     Row Name 05/07/23 1151          Bed Mobility Goal 1 (PT)    Activity/Assistive Device (Bed Mobility Goal 1, PT) bed mobility activities, all  -EM     [place] Level/Cues Needed (Bed Mobility Goal 1, PT) standby assist  -EM     Time Frame (Bed Mobility Goal 1, PT) 1 week  -EM     Row Name 05/07/23 1151          Transfer Goal 1 (PT)    Activity/Assistive Device (Transfer Goal 1, PT) transfers, all;walker, rolling  -EM     [place] Level/Cues Needed (Transfer Goal 1, PT) standby assist  -EM     Time Frame (Transfer Goal 1, PT) 1 week  -EM     Row Name 05/07/23 1151          Gait Training Goal 1 (PT)    Activity/Assistive Device (Gait Training Goal 1, PT) gait (walking locomotion);walker, rolling  -EM     [place] Level (Gait Training Goal 1, PT) contact guard required  -EM     Distance (Gait Training Goal 1, PT) 150  -EM     Time Frame (Gait Training Goal 1, PT) 1 week  -EM     Row Name 05/07/23 1151          Therapy Assessment/Plan (PT)    Planned Therapy Interventions  "(PT) bed mobility training;gait training;home exercise program;patient/family education;transfer training  -EM           User Key  (r) = Recorded By, (t) = Taken By, (c) = Cosigned By    Initials Name Provider Type    EM Tresa Nagy, PT Physical Therapist               Clinical Impression     Row Name 05/07/23 1147          Pain    Pretreatment Pain Rating 1/10  -EM     Pain Location - Side/Orientation Left  -EM     Pain Location - flank  -EM     Pre/Posttreatment Pain Comment pt reports \"just a little\" pain when up to EOB, no c/o pain at rest  -EM     Pain Intervention(s) Repositioned  -EM     Row Name 05/07/23 1147          Plan of Care Review    Plan of Care Reviewed With patient  -EM     Outcome Evaluation Pt is 89 yo male admitted to [facility] after fall with multiple rib fx, flail chest, tension hemothorax with chest tube placed on 5/6/23. PMH significant for afib, HTN, HLD, L hip IM nail in Jan 2023. Patient lives in assisted living, independent with use of rwx per family report. Today, pt had little c/o pain, awake and alert, agreeable to therapy, performed bed mobility with Gillian, sit to stand with CGA, ambulated a few steps from bed to chair with rwx and CGA. Pt demonstrates impairments consisting of generalized weakness and pain, decreased activity tolerance and would benefit from skilled PT. Pt may be able to return to assisted living at current functional level, will continue to progress as able and assess for safest d/c plan.  -EM     Row Name 05/07/23 1147          Therapy Assessment/Plan (PT)    Patient/Family Therapy Goals Statement (PT) get better  -EM     Rehab Potential (PT) good, to achieve stated therapy goals  -EM     Criteria for Skilled Interventions Met (PT) yes;skilled treatment is necessary  -EM     Therapy Frequency (PT) 6 times/wk  -EM     Row Name 05/07/23 1147          Vital Signs    Pretreatment Heart Rate (beats/min) 125  -EM     Pre SpO2 (%) 94  -EM     O2 Delivery Pre Treatment " room air  -EM     Row Name 05/07/23 1147          Positioning and Restraints    Pre-Treatment Position in bed  -EM     Post Treatment Position chair  -EM     In Chair reclined;call light within reach;with family/caregiver;notified nsg  -EM           User Key  (r) = Recorded By, (t) = Taken By, (c) = Cosigned By    Initials Name Provider Type    Tresa Herrera PT Physical Therapist               Outcome Measures     Row Name 05/07/23 1153          How much help from another person do you currently need...    Turning from your back to your side while in flat bed without using bedrails? 3  -EM     Moving from lying on back to sitting on the side of a flat bed without bedrails? 3  -EM     Moving to and from a bed to a chair (including a wheelchair)? 3  -EM     Standing up from a chair using your arms (e.g., wheelchair, bedside chair)? 3  -EM     Climbing 3-5 steps with a railing? 3  -EM     To walk in hospital room? 3  -EM     AM-PAC 6 Clicks Score (PT) 18  -EM     Highest level of mobility 6 --> Walked 10 steps or more  -EM     Row Name 05/07/23 1153          Functional Assessment    Outcome Measure Options AM-PAC 6 Clicks Basic Mobility (PT)  -EM           User Key  (r) = Recorded By, (t) = Taken By, (c) = Cosigned By    Initials Name Provider Type    Tresa Herrera PT Physical Therapist                             Physical Therapy Education     Title: PT OT SLP Therapies (In Progress)     Topic: Physical Therapy (In Progress)     Point: Mobility training (Done)     Learning Progress Summary           Patient Acceptance, E, VU by EM at 5/7/2023 1153   Family Acceptance, E, VU by EM at 5/7/2023 1153                   Point: Home exercise program (Not Started)     Learner Progress:  Not documented in this visit.          Point: Body mechanics (Not Started)     Learner Progress:  Not documented in this visit.          Point: Precautions (Not Started)     Learner Progress:  Not documented in this  visit.                      User Key     Initials Effective Dates Name Provider Type Discipline    EM 06/16/21 -  Tresa Nagy PT Physical Therapist PT              PT Recommendation and Plan  Planned Therapy Interventions (PT): bed mobility training, gait training, home exercise program, patient/family education, transfer training  Plan of Care Reviewed With: patient  Outcome Evaluation: Pt is 89 yo male admitted to [facility] after fall with multiple rib fx, flail chest, tension hemothorax with chest tube placed on 5/6/23. PMH significant for afib, HTN, HLD, L hip IM nail in Jan 2023. Patient lives in assisted living, independent with use of rwx per family report. Today, pt had little c/o pain, awake and alert, agreeable to therapy, performed bed mobility with Gillian, sit to stand with CGA, ambulated a few steps from bed to chair with rwx and CGA. Pt demonstrates impairments consisting of generalized weakness and pain, decreased activity tolerance and would benefit from skilled PT. Pt may be able to return to assisted living at current functional level, will continue to progress as able and assess for safest d/c plan.     Time Calculation:    PT Charges     Row Name 05/07/23 1154             Time Calculation    Start Time 1110  -EM      Stop Time 1124  -EM      Time Calculation (min) 14 min  -EM      PT Received On 05/07/23  -EM      PT - Next Appointment 05/08/23  -EM      PT Goal Re-Cert Due Date 05/14/23  -EM         Time Calculation- PT    Total Timed Code Minutes- PT 10 minute(s)  -EM         Timed Charges    98507 - PT Therapeutic Activity Minutes 10  -EM         Total Minutes    Timed Charges Total Minutes 10  -EM       Total Minutes 10  -EM            User Key  (r) = Recorded By, (t) = Taken By, (c) = Cosigned By    Initials Name Provider Type    EM Tresa Nagy PT Physical Therapist              Therapy Charges for Today     Code Description Service Date Service Provider Modifiers Qty     37293329301 HC PT THERAPEUTIC ACT EA 15 MIN 5/7/2023 Tresa Nagy, PT GP 1    44958423234 HC PT EVAL MOD COMPLEXITY 2 5/7/2023 Tresa Nagy, PT GP 1    09853094684 HC PT THER SUPP EA 15 MIN 5/7/2023 Tresa Nagy, PT GP 1          PT G-Codes  Outcome Measure Options: AM-PAC 6 Clicks Basic Mobility (PT)  AM-PAC 6 Clicks Score (PT): 18  PT Discharge Summary  Anticipated Discharge Disposition (PT): assisted living    Tresa Nagy, PT  5/7/2023

## 2023-05-07 NOTE — PROCEDURES
Pre-op Diagnosis: Traumatic hemothorax, right     Post-Op Diagnosis: Traumatic hemothorax, right     Procedure performed: Irrigation pleural cavity with TPA and dornase    Anesthesia: None    Summary of procedure: The right pleural tube was cleansed with ChloraPrep.  Utilizing a sterile 20-gauge needle,10 mg of TPA (reconstituted in 30cc of sterile water) and 5 mg of dornase (reconstituted in 30cc normal saline) was instilled into the pleural cavity. The pleural tube was clamped.      The patient's position is to be changed to every 15 minutes for 2 hours.  The tube to be unclamped and placed back to suction.  Patient tolerated the procedure well. We will re-evaluate with a CXR in the morning.

## 2023-05-07 NOTE — PLAN OF CARE
Goal Outcome Evaluation:  Plan of Care Reviewed With: patient        Progress: improving  Outcome Evaluation: Patient alert, oriented x 4, [place]. Patient in afib with rate variable from 100s-140s; no c/o SOA or chest pain. No c/o pain at all. Right chest tube to -20 LWS with air leak only noted with hard cough; dark sanguinous drainage. Patient is eating dinner, then will assess skin. Will continue to monitor.

## 2023-05-07 NOTE — CONSULTS
[facility] Consult Note    Patient Name: Lavelle Soliman  :1927  89 y.o.    Date of Admission: 2023  Date of Consultation:  23  Encounter Provider: Licha Osuna MD  Place of Service: [facility] CARDIOLOGY  Referring Provider: Trip Fulton Jr.,*  Patient Care Team:  Person, Linda Centeno MD as PCP - General (Internal Medicine)      Chief complaint: weakness    History of Present Illness:  Ms. Grande is a 89-year-old with paroxysmal atrial fibrillation, hypertension, hyperlipidemia, chronic kidney disease, hypothyroidism, BPH, who presented to the hospital on 2023 with positional lightheadedness and significant fatigue.      He apparently fell about 5 days prior while in the shower.  He ended up hitting the right side of his chest.  At this resulted in a bruise over his right lower chest and flank.  Outside of some pain he denies any other significant symptoms.  He was evaluated by nursing home staff and because of his lack of symptoms and not pursue any further work-up.  He had been doing fairly well until the day of admission when he began having issues with positional lightheadedness and significant fatigue and weakness.    Following his arrival to the emergency room he was found to be in atrial fibrillation with rates in the 120s to 130s.  He was noted to be hypotensive with systolic pressures in the 70s.  His hemoglobin was down to 7.3.  BNP was up to 6000.  CT of the chest showed evidence of right-sided acute displaced rib fractures with flail segment and large right sided hemothorax.  He subsequently underwent chest tube placement by Dr. Reilly.      He has also received PRBC transfusion for his anemia.  He received IV digoxin for rate control.    This morning his heart rates are in the 110s to 120s.  He denies any significant complaints at this time.  He is on room air.        Past Medical History:   Diagnosis Date   • Benign essential  hypertension    • Cataract    • Disease of thyroid gland    • HL (hearing loss)    • Hyperlipidemia    • Hypothyroidism    • Paroxysmal atrial fibrillation    • Visual impairment S       Past Surgical History:   Procedure Laterality Date   • EYE SURGERY     • HERNIA REPAIR     • HIP INTERTROCHANTERIC NAILING Left 2023    Procedure: LT. HIP INTERTROCHANTERIC NAILING;  Surgeon: Leland Hair MD;  Location: [facility] OR;  Service: Orthopedics;  Laterality: Left;         Prior to Admission medications    Medication Sig Start Date End Date Taking? Authorizing Provider   aspirin 81 MG chewable tablet Chew 1 tablet Daily.   Yes Axel Soria MD   famotidine (PEPCID) 20 MG tablet TAKE ONE TABLET BY MOUTH DAILY  Patient taking differently: 1 tablet Every Night. 20  Yes Rafita Colunga MD   finasteride (PROSCAR) 5 MG tablet Take 1 tablet by mouth Daily.  Patient taking differently: Take 1 tablet by mouth Every Night. 18  Yes Rafita Colunga MD   furosemide (Lasix) 20 MG tablet Take 1 tablet by mouth Daily. 3/22/23  Yes Rafita Colunga MD   levothyroxine (SYNTHROID, LEVOTHROID) 75 MCG tablet Take 1 tablet by mouth Every Morning. 23  Yes Leodan Herring MD   metoprolol tartrate (LOPRESSOR) 25 MG tablet Take 0.5 tablets by mouth 2 (Two) Times a Day. 23  Yes Leodan Herring MD   tamsulosin (FLOMAX) 0.4 MG capsule 24 hr capsule Take 1 capsule by mouth Daily. 23  Yes Leodan Herring MD   methylPREDNISolone (MEDROL) 8 MG tablet Take 1 tablet by mouth Daily.    Axel Soria MD       Allergies   Allergen Reactions   • Latex Rash       Social History     Socioeconomic History   • Marital status:    Tobacco Use   • Smoking status: Former     Types: Pipe     Start date: 1955     Quit date: 1970     Years since quittin.3   • Smokeless tobacco: Never   Vaping Use   • Vaping Use: Never used   Substance and Sexual Activity   • Alcohol use: Yes     Alcohol/week: 1.0  standard drink     Types: 1 Shots of liquor per week     Comment: occ   • Drug use: Never   • Sexual activity: Not Currently     Partners: Female     Birth control/protection: None       Family History   Problem Relation Age of Onset   • No Known Problems Mother    • No Known Problems Father    • Hyperlipidemia Brother    • Hearing loss Paternal Aunt    • Vision loss Paternal Aunt    • No Known Problems Maternal Grandmother    • No Known Problems Maternal Grandfather    • No Known Problems Paternal Grandmother    • No Known Problems Paternal Grandfather    • Malig Hyperthermia Neg Hx        REVIEW OF SYSTEMS:   All systems reviewed.  Pertinent positives identified in HPI.  All other systems are negative.      Objective:     Vitals:    05/07/23 0700 05/07/23 0800 05/07/23 0805 05/07/23 0900   BP: 124/67  127/90 113/76   Pulse: (!) 127 (!) 129 108 107   Resp:  18  18   Temp:  99 °F (37.2 °C)     TempSrc:  Oral     SpO2: 100% 99% 98% 92%   Weight:       Height:         Body mass index is 23.13 kg/m².    General Appearance:    Alert, cooperative, in no acute distress   Head:    Normocephalic, without obvious abnormality, atraumatic   Eyes:            Lids and lashes normal, conjunctivae and sclerae normal, no icterus, no pallor, corneas clear, PERRLA   Ears:    Ears appear intact with no abnormalities noted   Neck:   No adenopathy, supple, trachea midline, no thyromegaly, no carotid bruit, no JVD   Lungs:     Clear to auscultation, respirations regular, even and unlabored    Heart:   Irregularly, irregular, normal S1 and S2, no murmur, no gallop, no rub, no click   Chest Wall:    No abnormalities observed   Abdomen:     Normal bowel sounds, no masses, no organomegaly, soft, nontender, nondistended, no guarding, no rebound  tenderness   Extremities:   Moves all extremities well, no edema, no cyanosis, no redness   Pulses:   Pulses palpable and equal bilaterally. Normal radial, carotid, femoral, dorsalis pedis and  posterior tibial pulses bilaterally. Normal abdominal aorta   Skin:  Psychiatric:   No bleeding, bruising or rash    Alert and oriented x 3, normal mood and affect   Lab Review:     Results from last 7 days   Lab Units 05/07/23  0048 05/06/23  1200   SODIUM mmol/L 136 136   POTASSIUM mmol/L 4.1 4.3   CHLORIDE mmol/L 104 103   CO2 mmol/L 24.7 21.0*   BUN mg/dL 33* 31*   CREATININE mg/dL 1.38* 1.80*   CALCIUM mg/dL 7.7* 7.5*   BILIRUBIN mg/dL  --  0.4   ALK PHOS U/L  --  97   ALT (SGPT) U/L  --  15   AST (SGOT) U/L  --  19   GLUCOSE mg/dL 116* 188*     Results from last 7 days   Lab Units 05/06/23  1200   HSTROP T ng/L 46*     Results from last 7 days   Lab Units 05/07/23  0048   WBC 10*3/mm3 15.74*   HEMOGLOBIN g/dL 9.5*   HEMATOCRIT % 28.8*   PLATELETS 10*3/mm3 170         Results from last 7 days   Lab Units 05/06/23  1200   MAGNESIUM mg/dL 2.0                   I personally viewed and interpreted the patient's EKG/Telemetry data.            Assessment and Plan:       1.  Persistent atrial fibrillation.  Rates are fair in the 110s to 120s.  Not on anticoagulation due to overall frailty and history of frequent falls.  2.  Right hemothorax.  Due to fall resulting in displaced rib fractures with flail segment.  Now status post chest tube placement.  Followed by thoracic surgery.  3.  Acute blood loss anemia.  Hemoglobin has improved and stabilized following transfusion.  4.  Chronic kidney disease stage III.  5.  History of hypertension.  His blood pressures improved overall with volume resuscitation but still soft currently.  6.  Hyperlipidemia.    -Continue to monitor his heart rates for now.  If he sustains in the 130s or higher will consider giving him another dose of digoxin.  -Resume metoprolol when his blood pressures will allow.    Licha Osuna MD  05/07/23  10:14 EDT

## 2023-05-07 NOTE — PLAN OF CARE
Goal Outcome Evaluation:  Plan of Care Reviewed With: patient           Outcome Evaluation: Pt is 89 yo male admitted to [facility] after fall with multiple rib fx, flail chest, tension hemothorax with chest tube placed on 5/6/23. PMH significant for afib, HTN, HLD, L hip IM nail in Jan 2023. Patient lives in assisted living, independent with use of rwx per family report. Today, pt had little c/o pain, awake and alert, agreeable to therapy, performed bed mobility with Gillian, sit to stand with CGA, ambulated a few steps from bed to chair with rwx and CGA. Pt demonstrates impairments consisting of generalized weakness and pain, decreased activity tolerance and would benefit from skilled PT. Pt may be able to return to assisted living at current functional level, will continue to progress as able and assess for safest d/c plan.

## 2023-05-07 NOTE — PROGRESS NOTES
PROGRESS NOTE  Patient Name: Lavelle Soliman  Age/Sex: 89 y.o. male  : 1927  MRN: [id number]    Date of Admission: 2023  Date of Encounter Visit: 23   LOS: 1 day   Patient Care Team:  Linda Taveras MD as PCP - General (Internal Medicine)    Chief Complaint: Hemothorax, rib fracture    Hospital course: Patient came in with the above problems, and was evaluated by thoracic surgery, was too risky for surgical intervention so he had a chest tube placement after consulting with the family and seems to be doing better.  Patient has amazing pain tolerance and is not requiring much narcotics for pain control.  He is afebrile, awake and responsive, on no oxygen supplementation.  He was hypotensive due to the blood loss anemia but he did respond to the transfusion and his hemoglobin is up.  He had a chronic kidney disease and his creatinine was elevated but it seems to be improving with the current supportive measures.  Patient has a history of atrial fibrillation but likely was not on any anticoagulation because history of previous falls.    Interval History: Hemothorax, traumatic with multiple rib fractures status post chest tube placement    REVIEW OF SYSTEMS:   CONSTITUTIONAL: no fever or chills  CARDIOVASCULAR: Positive for chest pain, chest tube on the right side with bloody output.   RESPIRATORY: No shortness of breath, cough or sputum.   GASTROINTESTINAL: No anorexia, nausea, vomiting or diarrhea. No abdominal pain or blood.   HEMATOLOGIC: No bleeding or bruising.     Ventilator/Non-Invasive Ventilation Settings (From admission, onward)    None            Vital Signs  Temp:  [97.6 °F (36.4 °C)-99 °F (37.2 °C)] 99 °F (37.2 °C)  Heart Rate:  [100-149] 107  Resp:  [18-22] 18  BP: ()/(64-97) 113/76  SpO2:  [77 %-100 %] 92 %  on  Flow (L/min):  [3] 3 Device (Oxygen Therapy): room air    Intake/Output Summary (Last 24 hours) at 2023 1425  Last data filed at 2023 0919  Gross per 24 hour  "  Intake 1337.25 ml   Output 1950 ml   Net -612.75 ml     Flowsheet Rows    Flowsheet Row First Filed Value   Admission Height 167.6 cm (66\") Documented at 05/06/2023 1600   Admission Weight 66.4 kg (146 lb 6.2 oz) Documented at 05/06/2023 1600        Body mass index is 23.13 kg/m².      05/06/23  1600 05/07/23  0500   Weight: 66.4 kg (146 lb 6.2 oz) 65 kg (143 lb 4.8 oz)       Physical Exam:  GEN:  No acute distress, alert, cooperative, well developed, looks better than anticipated  EYES:   Sclerae clear. No icterus. PERRL. Normal EOM  ENT:   External ears/nose normal, no oral lesions, no thrush, mucous membranes moist  NECK:  Supple, midline trachea, no JVD  LUNGS: Normal chest on inspection, slightly diminished on the right with some minimal crackles but with decent breath sounds overall with no wheezes, no use of any accessory muscles. Respirations regular, even and unlabored.  Tender to palpation on the right side  CV: The rhythm is irregular and the rate is slightly tachycardic . Normal S1/S2. No , gallops, or rubs noted.  Systolic murmur was audible  ABD:  Soft, nontender and nondistended. Normal bowel sounds. No guarding  EXT:  Moves all extremities well. No cyanosis. No redness. No edema.   Skin: Dry, intact, no bleeding    Results Review:        Results from last 7 days   Lab Units 05/07/23  0048 05/06/23  1200   SODIUM mmol/L 136 136   POTASSIUM mmol/L 4.1 4.3   CHLORIDE mmol/L 104 103   CO2 mmol/L 24.7 21.0*   BUN mg/dL 33* 31*   CREATININE mg/dL 1.38* 1.80*   CALCIUM mg/dL 7.7* 7.5*   AST (SGOT) U/L  --  19   ALT (SGPT) U/L  --  15   ANION GAP mmol/L 7.3 12.0   ALBUMIN g/dL  --  2.5*     Results from last 7 days   Lab Units 05/06/23  1200   HSTROP T ng/L 46*         Results from last 7 days   Lab Units 05/06/23  1200   PROBNP pg/mL 6,436.0*     Results from last 7 days   Lab Units 05/07/23  0048 05/06/23  1200   WBC 10*3/mm3 15.74* 15.14*   HEMOGLOBIN g/dL 9.5* 7.3*   HEMATOCRIT % 28.8* 22.5* "   PLATELETS 10*3/mm3 170 232   MCV fL 88.1 94.9   NEUTROPHIL % % 72.3 83.0*   LYMPHOCYTE % % 12.3* 9.0*   MONOCYTES % % 13.9* 6.3   EOSINOPHIL % % 0.1* 0.0*   BASOPHIL % % 0.4 0.3   IMM GRAN % % 1.0* 1.4*         Results from last 7 days   Lab Units 05/06/23  1200   MAGNESIUM mg/dL 2.0           Invalid input(s): LDLCALC          Glucose   Date/Time Value Ref Range Status   05/06/2023 1607 138 (H) 70 - 130 mg/dL Final     Comment:     Meter: [id number] : [id number] Rashard Hirsch RN                               Imaging:   Imaging Results (All)     Procedure Component Value             I reviewed the patient's new clinical results.  I personally viewed and interpreted the patient's imaging results:        Medication Review:   famotidine, 20 mg, Oral, Nightly  finasteride, 5 mg, Oral, Nightly  levothyroxine, 75 mcg, Oral, Q AM  lidocaine, 10 mL, Infiltration, Once  lidocaine 1% - EPINEPHrine 1:237114, 30 mL, Infiltration, Once  metoprolol tartrate, 12.5 mg, Oral, Q12H  senna-docusate sodium, 2 tablet, Oral, BID  sodium chloride, 10 mL, Intravenous, Q12H  tamsulosin, 0.4 mg, Oral, Nightly             ASSESSMENT:   1. Right chest wall hematoma with hemothorax, status post chest tube placement  2. Multiple displaced rib fractures with flail chest  3. Paroxysmal atrial fibrillation with RVR  4. Acute blood loss anemia, likely patient is not on any anticoagulation, responded to resuscitation  5. Chronic kidney disease stage IIIa with acute decompensation due to the anemia with hypotension, improving  6. Acute heart failure likely high-output  7. Elevated high-sensitivity troponin most likely demand ischemia/type II non-ST elevation MI  8. Leukocytosis, likely stress-induced      PLAN:  Notes reviewed, patient was already seen by thoracic surgery and their input appreciated, in spite of the multiple nondisplaced fracture the patient age and medical issues prohibits surgical fixation and the family would not interested  in general anesthesia procedure given the complication and the prolonged hospitalization from a recent hip fracture surgical repair.  Given the above patient was managed with a chest tube to manage the hemothorax with the plan to give 1 dose of lytics and further management of the chest tube will be up to the thoracic surgery team  The improvement on the renal function test and the improvement in the hemoglobin with the initial resuscitation is reassuring  Patient is good to transfer out of the intensive care unit, we will send to 5 E.  Discussed with patient and with family and with the nursing staff at the bedside    Disposition: Transfer to 5 E.    Misa Burt MD  05/07/23  14:24 EDT             Dictated utilizing Dragon dictation

## 2023-05-07 NOTE — PLAN OF CARE
Goal Outcome Evaluation:      Patient had decreased output from chest tube this shift. PRBC infusion completed and hgb improved.

## 2023-05-08 ENCOUNTER — APPOINTMENT (OUTPATIENT)
Dept: GENERAL RADIOLOGY | Facility: HOSPITAL | Age: 88
End: 2023-05-08
Payer: MEDICARE

## 2023-05-08 LAB
ANION GAP SERPL CALCULATED.3IONS-SCNC: 7 MMOL/L (ref 5–15)
BUN SERPL-MCNC: 33 MG/DL (ref 8–23)
BUN/CREAT SERPL: 23.7 (ref 7–25)
CALCIUM SPEC-SCNC: 7.4 MG/DL (ref 8.2–9.6)
CHLORIDE SERPL-SCNC: 105 MMOL/L (ref 98–107)
CO2 SERPL-SCNC: 25 MMOL/L (ref 22–29)
CREAT SERPL-MCNC: 1.39 MG/DL (ref 0.76–1.27)
DEPRECATED RDW RBC AUTO: 51.2 FL (ref 37–54)
EGFRCR SERPLBLD CKD-EPI 2021: 46.4 ML/MIN/1.73
ERYTHROCYTE [DISTWIDTH] IN BLOOD BY AUTOMATED COUNT: 15.4 % (ref 12.3–15.4)
GLUCOSE SERPL-MCNC: 143 MG/DL (ref 65–99)
HCT VFR BLD AUTO: 30.2 % (ref 37.5–51)
HGB BLD-MCNC: 9.8 G/DL (ref 13–17.7)
MCH RBC QN AUTO: 29.7 PG (ref 26.6–33)
MCHC RBC AUTO-ENTMCNC: 32.5 G/DL (ref 31.5–35.7)
MCV RBC AUTO: 91.5 FL (ref 79–97)
PLATELET # BLD AUTO: 235 10*3/MM3 (ref 140–450)
PMV BLD AUTO: 11.1 FL (ref 6–12)
POTASSIUM SERPL-SCNC: 4 MMOL/L (ref 3.5–5.2)
RBC # BLD AUTO: 3.3 10*6/MM3 (ref 4.14–5.8)
SODIUM SERPL-SCNC: 137 MMOL/L (ref 136–145)
WBC NRBC COR # BLD: 15.08 10*3/MM3 (ref 3.4–10.8)

## 2023-05-08 PROCEDURE — 94761 N-INVAS EAR/PLS OXIMETRY MLT: CPT

## 2023-05-08 PROCEDURE — 80048 BASIC METABOLIC PNL TOTAL CA: CPT | Performed by: INTERNAL MEDICINE

## 2023-05-08 PROCEDURE — 94799 UNLISTED PULMONARY SVC/PX: CPT

## 2023-05-08 PROCEDURE — 99232 SBSQ HOSP IP/OBS MODERATE 35: CPT | Performed by: NURSE PRACTITIONER

## 2023-05-08 PROCEDURE — 71045 X-RAY EXAM CHEST 1 VIEW: CPT

## 2023-05-08 PROCEDURE — 85027 COMPLETE CBC AUTOMATED: CPT | Performed by: NURSE PRACTITIONER

## 2023-05-08 PROCEDURE — 94640 AIRWAY INHALATION TREATMENT: CPT

## 2023-05-08 PROCEDURE — 94664 DEMO&/EVAL PT USE INHALER: CPT

## 2023-05-08 RX ORDER — GUAIFENESIN 600 MG/1
600 TABLET, EXTENDED RELEASE ORAL 2 TIMES DAILY
Status: DISCONTINUED | OUTPATIENT
Start: 2023-05-08 | End: 2023-05-11 | Stop reason: HOSPADM

## 2023-05-08 RX ORDER — IPRATROPIUM BROMIDE AND ALBUTEROL SULFATE 2.5; .5 MG/3ML; MG/3ML
3 SOLUTION RESPIRATORY (INHALATION)
Status: DISCONTINUED | OUTPATIENT
Start: 2023-05-08 | End: 2023-05-11 | Stop reason: HOSPADM

## 2023-05-08 RX ADMIN — METOPROLOL TARTRATE 12.5 MG: 25 TABLET, FILM COATED ORAL at 08:42

## 2023-05-08 RX ADMIN — ACETAMINOPHEN 650 MG: 325 TABLET, FILM COATED ORAL at 06:28

## 2023-05-08 RX ADMIN — GUAIFENESIN 600 MG: 600 TABLET, EXTENDED RELEASE ORAL at 12:35

## 2023-05-08 RX ADMIN — GUAIFENESIN 600 MG: 600 TABLET, EXTENDED RELEASE ORAL at 20:13

## 2023-05-08 RX ADMIN — FAMOTIDINE 20 MG: 20 TABLET, FILM COATED ORAL at 20:13

## 2023-05-08 RX ADMIN — METOPROLOL TARTRATE 12.5 MG: 25 TABLET, FILM COATED ORAL at 20:13

## 2023-05-08 RX ADMIN — FINASTERIDE 5 MG: 5 TABLET, FILM COATED ORAL at 20:13

## 2023-05-08 RX ADMIN — Medication 10 ML: at 08:43

## 2023-05-08 RX ADMIN — ACETAMINOPHEN 650 MG: 325 TABLET, FILM COATED ORAL at 15:48

## 2023-05-08 RX ADMIN — TAMSULOSIN HYDROCHLORIDE 0.4 MG: 0.4 CAPSULE ORAL at 20:12

## 2023-05-08 RX ADMIN — DOCUSATE SODIUM 50MG AND SENNOSIDES 8.6MG 2 TABLET: 8.6; 5 TABLET, FILM COATED ORAL at 08:42

## 2023-05-08 RX ADMIN — LEVOTHYROXINE SODIUM 75 MCG: 0.07 TABLET ORAL at 06:26

## 2023-05-08 RX ADMIN — IPRATROPIUM BROMIDE AND ALBUTEROL SULFATE 3 ML: 2.5; .5 SOLUTION RESPIRATORY (INHALATION) at 15:30

## 2023-05-08 NOTE — PROGRESS NOTES
"    Chief Complaint: Multiple rib fractures, hemothorax, follow-up  S/P: Chest tube insertion  POD # 2    Subjective:  Symptoms:  Stable.  He reports chest pain and weakness.    Diet:  Adequate intake.  No nausea or vomiting.    Activity level: Impaired due to weakness.    Pain:  He complains of pain that is mild.  Pain is partially controlled.        Vital Signs:  Temp:  [97 °F (36.1 °C)-98.4 °F (36.9 °C)] 97 °F (36.1 °C)  Heart Rate:  [] 97  Resp:  [14-20] 16  BP: ()/(63-94) 110/68    Intake & Output (last day)       05/07 0701  05/08 0700 05/08 0701  05/09 0700    P.O. 1080 360    Blood      IV Piggyback      Total Intake(mL/kg) 1080 (15.9) 360 (5.3)    Urine (mL/kg/hr) 800 (0.5)     Stool 0     Chest Tube 700     Total Output 1500     Net -420 +360          Urine Unmeasured Occurrence 3 x 2 x    Stool Unmeasured Occurrence 2 x 2 x          Objective:  General Appearance:  Comfortable, in no acute distress and ill-appearing.    Vital signs: (most recent): Blood pressure 110/68, pulse 97, temperature 97 °F (36.1 °C), temperature source Oral, resp. rate 16, height 167.6 cm (66\"), weight 68 kg (149 lb 14.6 oz), SpO2 97 %.  Vital signs are normal.  No fever.    HEENT: ([place])    Lungs:  Normal effort and normal respiratory rate.  There are decreased breath sounds.    Heart: Tachycardia.  Irregular rhythm.    Chest: Chest wall tenderness present.    Abdomen: Abdomen is soft.    Neurological: Patient is alert and oriented to person, place and time.    Skin:  Warm and dry.              Chest tube:   Site: Right, Clean, Dry, Intact and Securement device intact  Suction: -20 cm  Air Leak: negative  24 Hour Total: 700mL    Results Review:     I reviewed the patient's new clinical results.  I reviewed the patient's new imaging results and agree with the interpretation.  I reviewed the patient's other test results and agree with the interpretation  Discussed with patient, family at bedside, RN and Dr. Reilly. "     Imaging Results (Last 24 Hours)     Procedure Component Value Units Date/Time    XR Chest 1 View [[id number]] Collected: 05/08/23 0729     Updated: 05/08/23 0733    Narrative:      XR CHEST 1 VW-clinical: Assess for right-sided pleural effusion     COMPARISON 05/07/2023     FINDINGS: Right-sided chest tube in position. Opacity right mid and  lower lung zone likely represents combination of airspace disease and  pleural effusion, the appearance quite similar to the previous  examination. The cardiomediastinal silhouette is stable. The left lung  is clear. No pneumothorax identified.     CONCLUSION: Stable chest     This report was finalized on 5/8/2023 7:30 AM by Dr. Van Izquierdo M.D.             Lab Results:     Lab Results (last 24 hours)     Procedure Component Value Units Date/Time    Basic Metabolic Panel [[id number]]  (Abnormal) Collected: 05/08/23 1139    Specimen: Blood Updated: 05/08/23 1228     Glucose 143 mg/dL      BUN 33 mg/dL      Creatinine 1.39 mg/dL      Sodium 137 mmol/L      Potassium 4.0 mmol/L      Chloride 105 mmol/L      CO2 25.0 mmol/L      Calcium 7.4 mg/dL      BUN/Creatinine Ratio 23.7     Anion Gap 7.0 mmol/L      eGFR 46.4 mL/min/1.73     Narrative:      GFR Normal >60  Chronic Kidney Disease <60  Kidney Failure <15    The GFR formula is only valid for adults with stable renal function between ages 18 and 70.    CBC (No Diff) [[id number]]  (Abnormal) Collected: 05/08/23 1029    Specimen: Blood Updated: 05/08/23 1133     WBC 15.08 10*3/mm3      RBC 3.30 10*6/mm3      Hemoglobin 9.8 g/dL      Hematocrit 30.2 %      MCV 91.5 fL      MCH 29.7 pg      MCHC 32.5 g/dL      RDW 15.4 %      RDW-SD 51.2 fl      MPV 11.1 fL      Platelets 235 10*3/mm3            Assessment & Plan       Multiple rib fractures involving four or more ribs       Assessment & Plan     I have independently reviewed his radiographic imaging, labs and chart in detail.  CT of the chest performed yesterday in the emergency  department identified multiple right-sided rib fractures of right ribs 5 through 11 with flail segments of right ribs 7 through 11.  Patient's x-ray this morning has similar appearance to yesterday's imaging.    Multiple right-sided rib fractures: In spite of multiple nondisplaced fractures, patient's age and medical issues prohibit surgical fixation.  Additionally, patient and his family do not want him to undergo general anesthesia, given complications and prolonged hospitalization after recent right hip fracture repair.    Hemothorax: Improved today with chest tube placement.  Patient has had over 2 L of serosanguinous output.  Received 1 dose of lytics yesterday with good response.  Would like to repeat the CT of the chest today s/p drainage of hemothorax for further evaluation of remaining fluid and rib fractures.    Increase patient's activity as tolerated to reduce his risk of pneumonia.  Encourage incentive spirometry and flutter valve.    Discussed with RN and Dr. Reilly.    MARLENE Thomas  [facility]  05/08/23  12:34 EDT    Greater than 27 minutes spent reviewing the patient's chart, radiographic imaging, labs, provider notes, assessing the patient and developing a plan of care.  This was discussed with the patient, family, on-call thoracic surgeon and RN.  This is aside from any other critical care procedural time which will be dictated in a separate note.

## 2023-05-08 NOTE — CASE MANAGEMENT/SOCIAL WORK
Discharge Planning Assessment  [facility]     Patient Name: Lavelle Soliman  MRN: [id number]  Today's Date: 5/8/2023    Admit Date: 5/6/2023    Plan: Return to [facility]   Discharge Needs Assessment     Row Name 05/08/23 1529       Living Environment    People in Home facility resident    Current Living Arrangements assisted living facility    Primary Care Provided by self    Provides Primary Care For no one    Quality of Family Relationships supportive       Transition Planning    Patient/Family Anticipates Transition to inpatient rehabilitation facility       Discharge Needs Assessment    Equipment Currently Used at Home walker, rolling;cane, straight               Discharge Plan     Row Name 05/08/23 1540       Plan    Plan Return to [facility]    Patient/Family in Agreement with Plan yes    Plan Comments Met with pt at bedside. Introduced self, explained CCP role, facesheet verified. Pt states he lives at [facility] and plans to return there at discharge.  Pt states his daughter will transport or will use w/c van.  [facility] notified and will notify CCP of bed status.  CHARLINE Barnes RN              Continued Care and Services - Admitted Since 5/6/2023    Coordination has not been started for this encounter.     Selected Continued Care - Prior Encounters Includes continued care and service providers with selected services from prior encounters from 2/5/2023 to 5/8/2023    Discharged on 2/8/2023 Admission date: 1/24/2023 - Discharge disposition: Skilled Nursing Facility (DC - External)    Destination     Service Provider Selected Services Address Phone Fax Patient Preferred    [facility] [address] [place] [id number] [phone] --                    Expected Discharge Date and Time     Expected Discharge Date Expected Discharge Time    May 11, 2023          Demographic Summary     Row Name 05/08/23 1529       General Information     Admission Type inpatient    Arrived From home    Referral Source admission list    Reason for Consult discharge planning    Preferred Language English       Contact Information    Permission Granted to Share Info With family/designee  Little Soliman (daughter) [phone]               Functional Status    No documentation.                Psychosocial    No documentation.                Abuse/Neglect    No documentation.                Legal    No documentation.                Substance Abuse    No documentation.                Patient Forms    No documentation.                   Sabine Barnes RN

## 2023-05-08 NOTE — PLAN OF CARE
Goal Outcome Evaluation:      VSS ex/ Afib RVR rhythm, non-sustaining 130+ HR. RA. Sometimes incontinent. R CT -20 sxn, intermittent airleak, no fluctuation, no subQ air, old blood drainage, exstensive bruising on R side of chest/abdomen/back. PRN tylenol x1. SCDs on ирина, Accumax on bed.

## 2023-05-08 NOTE — DISCHARGE PLACEMENT REQUEST
"Chapo Soliman (89 y.o. Male)     Date of Birth   05/04/1927    Social Security Number       Address   [place] [id number]    Home Phone   [phone]    MRN   [id number]       Anabaptist   Unknown    Marital Status                               Admission Date   5/6/23    Admission Type   Emergency    Admitting Provider   Trip Fulton Jr., MD    Attending Provider   Trip Fulton Jr., MD    Department, Room/Bed   [address], E557/1       Discharge Date       Discharge Disposition       Discharge Destination                               Attending Provider: Trip Fulton Jr., MD    Allergies: Latex    Isolation: None   Infection: None   Code Status: No CPR    Ht: 167.6 cm (66\")   Wt: 68 kg (149 lb 14.6 oz)    Admission Cmt: None   Principal Problem: Multiple rib fractures involving four or more ribs [S22.49XA]                 Active Insurance as of 5/6/2023     Primary Coverage     Payor Plan Insurance Group Employer/Plan Group    MEDICARE MEDICARE A & B      Payor Plan Address Payor Plan Phone Number Payor Plan Fax Number Effective Dates    [address] [phone]  5/1/1992 - None Entered    [facility] [id number]       Subscriber Name Subscriber Birth Date Member ID       CHAPO SOLIMAN 5/4/1927 [id number]           Secondary Coverage     Payor Plan Insurance Group Employer/Plan Group    [facility] SUPP [id number]     Payor Plan Address Payor Plan Phone Number Payor Plan Fax Number Effective Dates    [address]   12/1/2016 - None Entered    [facility] [id number]       Subscriber Name Subscriber Birth Date Member ID       CHAPO SOLIMAN 5/4/1927 [id number]                 Emergency Contacts      (Rel.) Home Phone Work Phone Mobile Phone    Little Soliman (Daughter) [phone] -- [phone]    Melany Jennings (Grandchild) -- -- [phone]            "

## 2023-05-08 NOTE — PLAN OF CARE
Goal Outcome Evaluation:  Plan of Care Reviewed With: patient        Progress: improving  Outcome Evaluation: Pt alert and orient x4. On room air. Vitals WNL. Afib on tele monitor, rate varies but not sustaining > 130s. Pt with c/o right ateral chest pain, prn tylenol given per order. Pt up in chair. Ambulated about 60ft from room to nurses station with assistance and walker. Chest tube to -20 suction. No acute events during shift.

## 2023-05-08 NOTE — PROGRESS NOTES
Hospital Follow Up    LOS:  LOS: 2 days   Patient Name: Lavelle Soliman  Age/Sex: 89 y.o. male  : 1927  MRN: [id number]    Day of Service: 23   Length of Stay: 2  Encounter Provider: MARLENE Rojas  Place of Service: [facility]  Patient Care Team:  Person, Linda Centeno MD as PCP - General (Internal Medicine)    Subjective:     Chief Complaint: follow up afib    Interval History: Sitting up in chair this morning. Denies chest pain, shortness of breath or palpitations.    Objective:     Objective:  Temp:  [97.3 °F (36.3 °C)-98.4 °F (36.9 °C)] 97.3 °F (36.3 °C)  Heart Rate:  [] 117  Resp:  [14-20] 14  BP: ()/(63-95) 111/64     Intake/Output Summary (Last 24 hours) at 2023 0937  Last data filed at 2023 0900  Gross per 24 hour   Intake 960 ml   Output 1400 ml   Net -440 ml     Body mass index is 24.2 kg/m².      23  1600 23  0500 23  0500   Weight: 66.4 kg (146 lb 6.2 oz) 65 kg (143 lb 4.8 oz) 68 kg (149 lb 14.6 oz)     Weight change: 1.6 kg (3 lb 8.4 oz)    Physical Exam:   General Appearance:    Awake alert and oriented in no acute distress, frail and elderly   Color:  Skin:  Neuro:  HEENT:    Lungs:     Pink  Warm and dry  No focal, motor or sensory deficits  Neck supple, pupils equal, round and reactive. No JVD, No Bruit  Clear to auscultation,respirations regular, even and                  Unlabored, right chest tube     Heart:   irr/irr, S1 and S2, 2/6 sys murmur, no gallop, no rub. No edema, DP/PT pulses are 2+   Chest Wall:    No abnormalities observed   Abdomen:     Normal bowel sounds, no masses, no organomegaly, soft        non-tender, non-distended, no guarding, no ascites noted   Extremities:   Moves all extremities well, no edema, no cyanosis, no redness       Lab Review:   Results from last 7 days   Lab Units 23  0048 23  1200   SODIUM mmol/L 136 136   POTASSIUM mmol/L 4.1 4.3   CHLORIDE mmol/L  104 103   CO2 mmol/L 24.7 21.0*   BUN mg/dL 33* 31*   CREATININE mg/dL 1.38* 1.80*   GLUCOSE mg/dL 116* 188*   CALCIUM mg/dL 7.7* 7.5*   AST (SGOT) U/L  --  19   ALT (SGPT) U/L  --  15     Results from last 7 days   Lab Units 05/06/23  1200   HSTROP T ng/L 46*     Results from last 7 days   Lab Units 05/07/23  0048 05/06/23  1200   WBC 10*3/mm3 15.74* 15.14*   HEMOGLOBIN g/dL 9.5* 7.3*   HEMATOCRIT % 28.8* 22.5*   PLATELETS 10*3/mm3 170 232         Results from last 7 days   Lab Units 05/06/23  1200   MAGNESIUM mg/dL 2.0           Invalid input(s): LDLCALC  Results from last 7 days   Lab Units 05/06/23  1200   PROBNP pg/mL 6,436.0*         I reviewed the patient's new clinical results.  I personally viewed and interpreted the patient's EKG  Current Medications:   Scheduled Meds:famotidine, 20 mg, Oral, Nightly  finasteride, 5 mg, Oral, Nightly  levothyroxine, 75 mcg, Oral, Q AM  lidocaine, 10 mL, Infiltration, Once  lidocaine 1% - EPINEPHrine 1:545672, 30 mL, Infiltration, Once  metoprolol tartrate, 12.5 mg, Oral, Q12H  senna-docusate sodium, 2 tablet, Oral, BID  sodium chloride, 10 mL, Intravenous, Q12H  tamsulosin, 0.4 mg, Oral, Nightly      Continuous Infusions:     Allergies:  Allergies   Allergen Reactions   • Latex Rash       Assessment:       Multiple rib fractures involving four or more ribs        Plan:     1.  Persistent atrial fibrillation: Rates remaining in the lower 100s-120s overall, with a few nonsustained episodes in the 140s. Not on AC due to frailty and falls. If rates sustain, will give an extra dose of dig.   2.  Right hemothorax:  Due to fall resulting in displaced rib fractures with flail segment.  Now status post chest tube placement.  Followed by thoracic surgery.  3.  Acute blood loss anemia:  Hemoglobin has improved and stabilized following transfusion.  4.  Chronic kidney disease stage III: creatinine improved some with volume resuscitation.  5.  History of hypertension:  blood pressure  is stable. He is back on lopressor and tolerating   6.  Hyperlipidemia.        MARLENE Rojas  05/08/23  09:37 EDT  Electronically signed by MARLENE Rojas, 05/08/23, 9:37 AM EDT.

## 2023-05-08 NOTE — PROGRESS NOTES
"  PROGRESS NOTE  Patient Name: Lavelle Soliman  Age/Sex: 89 y.o. male  : 1927  MRN: [id number]    Date of Admission: 2023  Date of Encounter Visit: 23   LOS: 2 days   Patient Care Team:  Linda Taveras MD as PCP - General (Internal Medicine)    Chief Complaint: Hemothorax, rib fracture    Hospital course: Patient came in with the above problems, and was evaluated by thoracic surgery, was too risky for surgical intervention so he had a chest tube placement after consulting with the family and seems to be doing better.  Patient has amazing pain tolerance and is not requiring much narcotics for pain control.  He is afebrile, arousable and responsive, on no oxygen supplementation.  The output from the chest tube is now more s serosanguineous rather than blood.  Hypertension, not on any pressors  Transfusion needed  No fever or chills  A-fib rate controlled      REVIEW OF SYSTEMS:   CONSTITUTIONAL: no fever or chills  CARDIOVASCULAR: Positive for chest pain, chest tube on the right side with less bloody and more serosanguineous output t.   RESPIRATORY: No shortness of breath, cough or sputum.   GASTROINTESTINAL: No anorexia, nausea, vomiting or diarrhea. No abdominal pain or blood.   HEMATOLOGIC: No bleeding or bruising.     Ventilator/Non-Invasive Ventilation Settings (From admission, onward)    None            Vital Signs  Temp:  [97 °F (36.1 °C)-98.4 °F (36.9 °C)] 97 °F (36.1 °C)  Heart Rate:  [] 97  Resp:  [14-20] 16  BP: ()/(63-95) 110/68  SpO2:  [90 %-100 %] 97 %  on    Device (Oxygen Therapy): room air    Intake/Output Summary (Last 24 hours) at 2023 1148  Last data filed at 2023 0900  Gross per 24 hour   Intake 960 ml   Output 1250 ml   Net -290 ml     Flowsheet Rows    Flowsheet Row First Filed Value   Admission Height 167.6 cm (66\") Documented at 2023 1600   Admission Weight 66.4 kg (146 lb 6.2 oz) Documented at 2023 1600        Body mass index is 24.2 " kg/m².      05/06/23  1600 05/07/23  0500 05/08/23  0500   Weight: 66.4 kg (146 lb 6.2 oz) 65 kg (143 lb 4.8 oz) 68 kg (149 lb 14.6 oz)       Physical Exam:  GEN:  No acute distress, asleep arousable, well developed.  EYES:   Sclerae clear. No icterus. PERRL. Normal EOM  ENT:   External ears/nose normal, no oral lesions, no thrush, mucous membranes moist  NECK:  Supple, midline trachea, no JVD  LUNGS: Normal chest on inspection, slightly diminished on the right with some minimal crackles but with decent breath sounds overall with no wheezes, no use of any accessory muscles. Respirations regular, even and unlabored.  Tender to palpation on the right side  CV: The rhythm is irregular and the rate is normal . Normal S1/S2. No , gallops, or rubs noted.  Systolic murmur was audible  ABD:  Soft, nontender and nondistended. Normal bowel sounds. No guarding  EXT:  Moves all extremities well. No cyanosis. No redness. No edema.   Skin: Dry, intact, no bleeding    Results Review:        Results from last 7 days   Lab Units 05/07/23  0048 05/06/23  1200   SODIUM mmol/L 136 136   POTASSIUM mmol/L 4.1 4.3   CHLORIDE mmol/L 104 103   CO2 mmol/L 24.7 21.0*   BUN mg/dL 33* 31*   CREATININE mg/dL 1.38* 1.80*   CALCIUM mg/dL 7.7* 7.5*   AST (SGOT) U/L  --  19   ALT (SGPT) U/L  --  15   ANION GAP mmol/L 7.3 12.0   ALBUMIN g/dL  --  2.5*     Results from last 7 days   Lab Units 05/06/23  1200   HSTROP T ng/L 46*         Results from last 7 days   Lab Units 05/06/23  1200   PROBNP pg/mL 6,436.0*     Results from last 7 days   Lab Units 05/08/23  1029 05/07/23  0048 05/06/23  1200   WBC 10*3/mm3 15.08* 15.74* 15.14*   HEMOGLOBIN g/dL 9.8* 9.5* 7.3*   HEMATOCRIT % 30.2* 28.8* 22.5*   PLATELETS 10*3/mm3 235 170 232   MCV fL 91.5 88.1 94.9   NEUTROPHIL % %  --  72.3 83.0*   LYMPHOCYTE % %  --  12.3* 9.0*   MONOCYTES % %  --  13.9* 6.3   EOSINOPHIL % %  --  0.1* 0.0*   BASOPHIL % %  --  0.4 0.3   IMM GRAN % %  --  1.0* 1.4*         Results  from last 7 days   Lab Units 05/06/23  1200   MAGNESIUM mg/dL 2.0           Invalid input(s): LDLCALC          Glucose   Date/Time Value Ref Range Status   05/06/2023 1607 138 (H) 70 - 130 mg/dL Final     Comment:     Meter: [id number] : [id number] Rashard Hirsch RN                               Imaging:   Imaging Results (All)     Procedure Component Value             I reviewed the patient's new clinical results.  I personally viewed and interpreted the patient's imaging results:        Medication Review:   famotidine, 20 mg, Oral, Nightly  finasteride, 5 mg, Oral, Nightly  guaiFENesin, 600 mg, Oral, BID  ipratropium-albuterol, 3 mL, Nebulization, Q8H - RT  levothyroxine, 75 mcg, Oral, Q AM  lidocaine, 10 mL, Infiltration, Once  lidocaine 1% - EPINEPHrine 1:194070, 30 mL, Infiltration, Once  metoprolol tartrate, 12.5 mg, Oral, Q12H  senna-docusate sodium, 2 tablet, Oral, BID  sodium chloride, 10 mL, Intravenous, Q12H  tamsulosin, 0.4 mg, Oral, Nightly             ASSESSMENT:   1. Right chest wall hematoma with hemothorax, status post chest tube placement  2. Multiple displaced rib fractures with flail chest  3. Paroxysmal atrial fibrillation with RVR  4. Acute blood loss anemia, likely patient is not on any anticoagulation, responded to resuscitation  5. Chronic kidney disease stage IIIa with acute decompensation due to the anemia with hypotension, improving  6. Acute heart failure likely high-output  7. Elevated high-sensitivity troponin most likely demand ischemia/type II non-ST elevation MI  8. Leukocytosis, likely stress-induced      PLAN:  Repeat lab resulted, the hemoglobin is stable  Patient is afebrile  Patient is hemodynamically stable  Chest tube is still to suction but the output is less bloody and more serosanguineous  Further management of the chest tube per the thoracic surgery team  Pain is fairly well controlled on minimal supplemental pain medication   patient is doing well on room  air      Disposition: Home    Misa Burt MD  05/08/23  11:48 EDT             Dictated utilizing Dragon dictation

## 2023-05-08 NOTE — DISCHARGE PLACEMENT REQUEST
"Chapo Soliman (89 y.o. Male)     Date of Birth   05/04/1927    Social Security Number       Address   [place] [id number]    Home Phone   [phone]    MRN   [id number]       Denominational   Unknown    Marital Status                               Admission Date   5/6/23    Admission Type   Emergency    Admitting Provider   Trip Fulton Jr., MD    Attending Provider   Trip Fulton Jr., MD    Department, Room/Bed   [address], [address]557/1       Discharge Date       Discharge Disposition       Discharge Destination                               Attending Provider: Trip Fulton Jr., MD    Allergies: Latex    Isolation: None   Infection: None   Code Status: No CPR    Ht: 167.6 cm (66\")   Wt: 68 kg (149 lb 14.6 oz)    Admission Cmt: None   Principal Problem: Multiple rib fractures involving four or more ribs [S22.49XA]                 Active Insurance as of 5/6/2023     Primary Coverage     Payor Plan Insurance Group Employer/Plan Group    MEDICARE MEDICARE A & B      Payor Plan Address Payor Plan Phone Number Payor Plan Fax Number Effective Dates    [address] [phone]  5/1/1992 - None Entered    [facility] [id number]       Subscriber Name Subscriber Birth Date Member ID       CHAPO SOLIMAN 5/4/1927 [id number]           Secondary Coverage     Payor Plan Insurance Group Employer/Plan Group    [facility] SUPP [id number]     Payor Plan Address Payor Plan Phone Number Payor Plan Fax Number Effective Dates    [address]   12/1/2016 - None Entered    [facility] [id number]       Subscriber Name Subscriber Birth Date Member ID       CHAPO SOLIMAN 5/4/1927 [id number]                 Emergency Contacts      (Rel.) Home Phone Work Phone Mobile Phone    Little Soliman (Daughter) [phone] -- [phone]    Melany Jennings (Grandchild) -- -- [phone]            "

## 2023-05-09 ENCOUNTER — APPOINTMENT (OUTPATIENT)
Dept: CT IMAGING | Facility: HOSPITAL | Age: 88
End: 2023-05-09
Payer: MEDICARE

## 2023-05-09 ENCOUNTER — APPOINTMENT (OUTPATIENT)
Dept: GENERAL RADIOLOGY | Facility: HOSPITAL | Age: 88
End: 2023-05-09
Payer: MEDICARE

## 2023-05-09 LAB
ANION GAP SERPL CALCULATED.3IONS-SCNC: 4 MMOL/L (ref 5–15)
BUN SERPL-MCNC: 31 MG/DL (ref 8–23)
BUN/CREAT SERPL: 26.3 (ref 7–25)
CALCIUM SPEC-SCNC: 7.3 MG/DL (ref 8.2–9.6)
CHLORIDE SERPL-SCNC: 106 MMOL/L (ref 98–107)
CO2 SERPL-SCNC: 25 MMOL/L (ref 22–29)
CREAT SERPL-MCNC: 1.18 MG/DL (ref 0.76–1.27)
DEPRECATED RDW RBC AUTO: 51.2 FL (ref 37–54)
EGFRCR SERPLBLD CKD-EPI 2021: 56.5 ML/MIN/1.73
ERYTHROCYTE [DISTWIDTH] IN BLOOD BY AUTOMATED COUNT: 15.7 % (ref 12.3–15.4)
GLUCOSE SERPL-MCNC: 89 MG/DL (ref 65–99)
HCT VFR BLD AUTO: 26.7 % (ref 37.5–51)
HGB BLD-MCNC: 8.8 G/DL (ref 13–17.7)
MCH RBC QN AUTO: 29.5 PG (ref 26.6–33)
MCHC RBC AUTO-ENTMCNC: 33 G/DL (ref 31.5–35.7)
MCV RBC AUTO: 89.6 FL (ref 79–97)
PLATELET # BLD AUTO: 248 10*3/MM3 (ref 140–450)
PMV BLD AUTO: 10.7 FL (ref 6–12)
POTASSIUM SERPL-SCNC: 4.1 MMOL/L (ref 3.5–5.2)
RBC # BLD AUTO: 2.98 10*6/MM3 (ref 4.14–5.8)
SODIUM SERPL-SCNC: 135 MMOL/L (ref 136–145)
WBC NRBC COR # BLD: 12.96 10*3/MM3 (ref 3.4–10.8)

## 2023-05-09 PROCEDURE — 94664 DEMO&/EVAL PT USE INHALER: CPT

## 2023-05-09 PROCEDURE — 71045 X-RAY EXAM CHEST 1 VIEW: CPT

## 2023-05-09 PROCEDURE — 94799 UNLISTED PULMONARY SVC/PX: CPT

## 2023-05-09 PROCEDURE — 71250 CT THORAX DX C-: CPT

## 2023-05-09 PROCEDURE — 76376 3D RENDER W/INTRP POSTPROCES: CPT

## 2023-05-09 PROCEDURE — 85027 COMPLETE CBC AUTOMATED: CPT | Performed by: INTERNAL MEDICINE

## 2023-05-09 PROCEDURE — 94760 N-INVAS EAR/PLS OXIMETRY 1: CPT

## 2023-05-09 PROCEDURE — 25010000002 DIGOXIN PER 500 MCG: Performed by: NURSE PRACTITIONER

## 2023-05-09 PROCEDURE — 99232 SBSQ HOSP IP/OBS MODERATE 35: CPT | Performed by: NURSE PRACTITIONER

## 2023-05-09 PROCEDURE — 80048 BASIC METABOLIC PNL TOTAL CA: CPT | Performed by: INTERNAL MEDICINE

## 2023-05-09 PROCEDURE — 94761 N-INVAS EAR/PLS OXIMETRY MLT: CPT

## 2023-05-09 PROCEDURE — 97116 GAIT TRAINING THERAPY: CPT

## 2023-05-09 PROCEDURE — 99232 SBSQ HOSP IP/OBS MODERATE 35: CPT

## 2023-05-09 RX ORDER — LIDOCAINE 50 MG/G
1 PATCH TOPICAL
Status: DISCONTINUED | OUTPATIENT
Start: 2023-05-09 | End: 2023-05-11 | Stop reason: HOSPADM

## 2023-05-09 RX ORDER — ACETAMINOPHEN 500 MG
1000 TABLET ORAL 3 TIMES DAILY
Status: DISCONTINUED | OUTPATIENT
Start: 2023-05-09 | End: 2023-05-11 | Stop reason: HOSPADM

## 2023-05-09 RX ORDER — DIGOXIN 0.25 MG/ML
250 INJECTION INTRAMUSCULAR; INTRAVENOUS ONCE
Status: COMPLETED | OUTPATIENT
Start: 2023-05-09 | End: 2023-05-09

## 2023-05-09 RX ADMIN — IPRATROPIUM BROMIDE AND ALBUTEROL SULFATE 3 ML: 2.5; .5 SOLUTION RESPIRATORY (INHALATION) at 23:33

## 2023-05-09 RX ADMIN — METOPROLOL TARTRATE 12.5 MG: 25 TABLET, FILM COATED ORAL at 20:54

## 2023-05-09 RX ADMIN — DIGOXIN 250 MCG: 0.25 INJECTION INTRAMUSCULAR; INTRAVENOUS at 18:28

## 2023-05-09 RX ADMIN — IPRATROPIUM BROMIDE AND ALBUTEROL SULFATE 3 ML: 2.5; .5 SOLUTION RESPIRATORY (INHALATION) at 15:04

## 2023-05-09 RX ADMIN — LIDOCAINE 1 PATCH: 50 PATCH CUTANEOUS at 20:55

## 2023-05-09 RX ADMIN — FINASTERIDE 5 MG: 5 TABLET, FILM COATED ORAL at 20:55

## 2023-05-09 RX ADMIN — LEVOTHYROXINE SODIUM 75 MCG: 0.07 TABLET ORAL at 05:45

## 2023-05-09 RX ADMIN — FAMOTIDINE 20 MG: 20 TABLET, FILM COATED ORAL at 20:54

## 2023-05-09 RX ADMIN — ACETAMINOPHEN 1000 MG: 500 TABLET ORAL at 20:54

## 2023-05-09 RX ADMIN — IPRATROPIUM BROMIDE AND ALBUTEROL SULFATE 3 ML: 2.5; .5 SOLUTION RESPIRATORY (INHALATION) at 07:25

## 2023-05-09 RX ADMIN — DOCUSATE SODIUM 50MG AND SENNOSIDES 8.6MG 1 TABLET: 8.6; 5 TABLET, FILM COATED ORAL at 20:55

## 2023-05-09 RX ADMIN — Medication 10 ML: at 20:57

## 2023-05-09 RX ADMIN — TAMSULOSIN HYDROCHLORIDE 0.4 MG: 0.4 CAPSULE ORAL at 20:55

## 2023-05-09 RX ADMIN — GUAIFENESIN 600 MG: 600 TABLET, EXTENDED RELEASE ORAL at 20:55

## 2023-05-09 RX ADMIN — METOPROLOL TARTRATE 12.5 MG: 25 TABLET, FILM COATED ORAL at 08:22

## 2023-05-09 RX ADMIN — GUAIFENESIN 600 MG: 600 TABLET, EXTENDED RELEASE ORAL at 08:22

## 2023-05-09 NOTE — PLAN OF CARE
Goal Outcome Evaluation:  Plan of Care Reviewed With: patient        Progress: improving  Outcome Evaluation: Pt seen for PT treatment. Pt continues to progress with mobility. Pt required Adrianna with supine to sit, needing assist with upper trunk and CGA with Sit to supine. Pt performed 2 STS transfers from EOB with CGA. Pt was able to ambulate 70ft with rwx, CGA, with seated rest break then another 20ft to transport stretcher. Pt failry steady needing cues for upright posture.   Will continue to progress pt as able.

## 2023-05-09 NOTE — PROGRESS NOTES
"  PROGRESS NOTE  Patient Name: Lavelle oSliman  Age/Sex: 89 y.o. male  : 1927  MRN: [id number]    Date of Admission: 2023  Date of Encounter Visit: 23   LOS: 3 days   Patient Care Team:  Linda Taveras MD as PCP - General (Internal Medicine)    Chief Complaint: Hemothorax, rib fracture    Hospital course: Patient is continuing to improve, chest tube removed, he is denying any significant dyspnea.  Chest pain is controlled with minimal need for any pain medication  No nausea or vomiting  No fever      REVIEW OF SYSTEMS:   CONSTITUTIONAL: no fever or chills  CARDIOVASCULAR: Chest tube removed, chest pain improving  RESPIRATORY: No shortness of breath, cough or sputum.   GASTROINTESTINAL: No anorexia, nausea, vomiting or diarrhea. No abdominal pain or blood.   HEMATOLOGIC: No bleeding or bruising.     Ventilator/Non-Invasive Ventilation Settings (From admission, onward)    None            Vital Signs  Temp:  [97.3 °F (36.3 °C)-98.7 °F (37.1 °C)] 97.8 °F (36.6 °C)  Heart Rate:  [] 116  Resp:  [16-18] 18  BP: ()/(66-75) 105/71  SpO2:  [95 %-100 %] 100 %  on    Device (Oxygen Therapy): room air    Intake/Output Summary (Last 24 hours) at 2023 1421  Last data filed at 2023 0720  Gross per 24 hour   Intake 120 ml   Output 595 ml   Net -475 ml     Flowsheet Rows    Flowsheet Row First Filed Value   Admission Height 167.6 cm (66\") Documented at 2023 1600   Admission Weight 66.4 kg (146 lb 6.2 oz) Documented at 2023 1600        Body mass index is 24.2 kg/m².      23  1600 23  0500 23  0500   Weight: 66.4 kg (146 lb 6.2 oz) 65 kg (143 lb 4.8 oz) 68 kg (149 lb 14.6 oz)       Physical Exam:  GEN:  No acute distress, asleep arousable, well developed.  EYES:   Sclerae clear. No icterus. PERRL. Normal EOM  ENT:   External ears/nose normal, no oral lesions, no thrush, mucous membranes moist  NECK:  Supple, midline trachea, no JVD  LUNGS: Normal chest on " inspection, chest tube is out, slightly diminished on the right as compared to the left, no wheezes respirations regular, even and unlabored.  Tender to palpation on the right side  CV: The rhythm is irregular and the rate is normal . Normal S1/S2. No , gallops, or rubs noted.  Systolic murmur was audible  ABD:  Soft, nontender and nondistended. Normal bowel sounds. No guarding  EXT:  Moves all extremities well. No cyanosis. No redness. No edema.   Skin: Dry, intact, no bleeding    Results Review:        Results from last 7 days   Lab Units 05/09/23  0616 05/08/23  1139 05/07/23  0048 05/06/23  1200   SODIUM mmol/L 135* 137 136 136   POTASSIUM mmol/L 4.1 4.0 4.1 4.3   CHLORIDE mmol/L 106 105 104 103   CO2 mmol/L 25.0 25.0 24.7 21.0*   BUN mg/dL 31* 33* 33* 31*   CREATININE mg/dL 1.18 1.39* 1.38* 1.80*   CALCIUM mg/dL 7.3* 7.4* 7.7* 7.5*   AST (SGOT) U/L  --   --   --  19   ALT (SGPT) U/L  --   --   --  15   ANION GAP mmol/L 4.0* 7.0 7.3 12.0   ALBUMIN g/dL  --   --   --  2.5*     Results from last 7 days   Lab Units 05/06/23  1200   HSTROP T ng/L 46*         Results from last 7 days   Lab Units 05/06/23  1200   PROBNP pg/mL 6,436.0*     Results from last 7 days   Lab Units 05/09/23  0616 05/08/23  1029 05/07/23  0048 05/06/23  1200   WBC 10*3/mm3 12.96* 15.08* 15.74* 15.14*   HEMOGLOBIN g/dL 8.8* 9.8* 9.5* 7.3*   HEMATOCRIT % 26.7* 30.2* 28.8* 22.5*   PLATELETS 10*3/mm3 248 235 170 232   MCV fL 89.6 91.5 88.1 94.9   NEUTROPHIL % %  --   --  72.3 83.0*   LYMPHOCYTE % %  --   --  12.3* 9.0*   MONOCYTES % %  --   --  13.9* 6.3   EOSINOPHIL % %  --   --  0.1* 0.0*   BASOPHIL % %  --   --  0.4 0.3   IMM GRAN % %  --   --  1.0* 1.4*         Results from last 7 days   Lab Units 05/06/23  1200   MAGNESIUM mg/dL 2.0           Invalid input(s): LDLCALC          Glucose   Date/Time Value Ref Range Status   05/06/2023 1607 138 (H) 70 - 130 mg/dL Final     Comment:     Meter: [id number] : [id number] Rashard Hirsch RN                                Imaging:   Imaging Results (All)     Procedure Component Value             I reviewed the patient's new clinical results.  I personally viewed and interpreted the patient's imaging results:        Medication Review:   famotidine, 20 mg, Oral, Nightly  finasteride, 5 mg, Oral, Nightly  guaiFENesin, 600 mg, Oral, BID  ipratropium-albuterol, 3 mL, Nebulization, Q8H - RT  levothyroxine, 75 mcg, Oral, Q AM  lidocaine, 10 mL, Infiltration, Once  lidocaine 1% - EPINEPHrine 1:049607, 30 mL, Infiltration, Once  metoprolol tartrate, 12.5 mg, Oral, Q12H  senna-docusate sodium, 2 tablet, Oral, BID  sodium chloride, 10 mL, Intravenous, Q12H  tamsulosin, 0.4 mg, Oral, Nightly             ASSESSMENT:   1. Right chest wall hematoma with hemothorax, status post chest tube placement  2. Multiple displaced rib fractures with flail chest  3. Paroxysmal atrial fibrillation with RVR  4. Acute blood loss anemia, likely patient is not on any anticoagulation, responded to resuscitation  5. Chronic kidney disease stage IIIa with acute decompensation due to the anemia with hypotension, improving  6. Acute heart failure likely high-output  7. Elevated high-sensitivity troponin most likely demand ischemia/type II non-ST elevation MI  8. Leukocytosis, likely stress-induced      PLAN:  Repeat lab resulted, the hemoglobin is stable  Patient is afebrile  Patient is hemodynamically stable  Chest tube is out, appreciate the help from the thoracic surgery team  Heart rate is better controlled, patient is on metoprolol, he did have some digoxin earlier  White count is trending down, the patient is not requiring any systemic antibiotics   notes reviewed, patient is supposed to be discharged to [facility] for rehab    Plan to discharge tomorrow if he continues to improve without the chest tube    Disposition: Home    Misa Burt MD  05/09/23  14:21 EDT             Dictated utilizing Dragon dictation

## 2023-05-09 NOTE — PROGRESS NOTES
"    Chief Complaint: Multiple rib fractures, hemothorax, follow-up  S/P: Chest tube insertion  POD # 3    Subjective:  Symptoms:  Improved.  He reports chest pain and weakness.    Diet:  Adequate intake.  No nausea or vomiting.    Activity level: Impaired due to weakness.    Pain:  He complains of pain that is mild.  Pain is well controlled.        Vital Signs:  Temp:  [97.3 °F (36.3 °C)-98.7 °F (37.1 °C)] 97.8 °F (36.6 °C)  Heart Rate:  [103-121] 104  Resp:  [16-18] 18  BP: ()/(66-75) 105/71    Intake & Output (last day)       05/08 0701  05/09 0700 05/09 0701  05/10 0700    P.O. 840     Total Intake(mL/kg) 840 (12.4)     Urine (mL/kg/hr) 400 (0.2) 125 (0.2)    Stool 0     Chest Tube 70     Total Output 470 125    Net +370 -125          Urine Unmeasured Occurrence 4 x     Stool Unmeasured Occurrence 4 x           Objective:  General Appearance:  Comfortable, in no acute distress, ill-appearing and in pain.    Vital signs: (most recent): Blood pressure 105/71, pulse 104, temperature 97.8 °F (36.6 °C), temperature source Oral, resp. rate 18, height 167.6 cm (66\"), weight 68 kg (149 lb 14.6 oz), SpO2 99 %.  Vital signs are normal.  No fever.    HEENT: ([place])    Lungs:  Normal effort and normal respiratory rate.  There are decreased breath sounds.    Heart: Tachycardia.  Irregular rhythm.    Chest: Symmetric chest wall expansion. Chest wall tenderness present.    Abdomen: Abdomen is soft.    Neurological: Patient is alert and oriented to person, place and time.    Skin:  Warm and dry.            Chest tube:   Site: Right, Clean, Dry, Intact and Securement device intact  Suction: -20 cm  Air Leak: negative  24 Hour Total: 70mL    Results Review:     I reviewed the patient's new clinical results.  I reviewed the patient's new imaging results and agree with the interpretation.  I reviewed the patient's other test results and agree with the interpretation  Discussed with patient, family at bedside, RN and Dr. Borges. "     Imaging Results (Last 24 Hours)     Procedure Component Value Units Date/Time    CT Chest Without Contrast Diagnostic [[id number]] Collected: 05/09/23 1051     Updated: 05/09/23 1120    Narrative:      CT CHEST WITHOUT CONTRAST     HISTORY: Follow-up right pleural effusion, hemothorax and rib fractures     TECHNIQUE: Radiation dose reduction techniques were utilized, including  automated exposure control and exposure modulation based on body size.  Axial images were obtained through the chest without the administration  of IV contrast. Coronal and sagittal reformatted images obtained.     COMPARISON: 05/06/2023     FINDINGS: Since the prior chest CT there has been interval insertion of  right-sided chest tube. The right pleural effusion is significantly  decreased in size, and there is improved aeration of the right lung.  There is persistent hemothorax present at the right lung base measuring  roughly about 6 cm in thickness. There is also a small right  pneumothorax located anteriorly measuring roughly about 1 cm in  thickness. Near complete atelectasis of the right lower lobe is present.  A few bands of mild atelectasis are seen in the left lung. No  appreciable lymphadenopathy. Limited imaging of the upper abdomen  demonstrates cholelithiasis. Multiple liver cysts. Multiple displaced  right-sided rib fractures are again demonstrated. Nondisplaced  left-sided rib fractures are again demonstrated.       Impression:      1. Interval insertion of right-sided chest tube. Significant decrease in  size of right pleural effusion with improved aeration of the right lung  2. Persistent hemothorax of the right lung base. Small right anterior  pneumothorax.  3. Near complete atelectasis of the right lower lobe.           Radiation dose reduction techniques were utilized, including automated  exposure control and exposure modulation based on body size.     This report was finalized on 5/9/2023 11:17 AM by Dr. Tej PA  KEVIN Collins.       XR Chest 1 View [[id number]] Collected: 05/09/23 0800     Updated: 05/09/23 0806    Narrative:      XR CHEST 1 VW-     Clinical: Right-sided pleural effusion     COMPARISON examination 05/18/2023     FINDINGS: Right-sided chest tube in position as before. Opacity right  mid and lower lung represents combination of pleural fluid and  consolidation/atelectasis. Old left rib deformities again noted. No  acute airspace disease has developed within the left lung. No vascular  congestion seen. No pneumothorax. The remainder is unremarkable.     CONCLUSION: Stable chest     This report was finalized on 5/9/2023 8:03 AM by Dr. Van Izquierdo M.D.             Lab Results:     Lab Results (last 24 hours)     Procedure Component Value Units Date/Time    Basic Metabolic Panel [[id number]]  (Abnormal) Collected: 05/09/23 0616    Specimen: Blood Updated: 05/09/23 0738     Glucose 89 mg/dL      BUN 31 mg/dL      Creatinine 1.18 mg/dL      Sodium 135 mmol/L      Potassium 4.1 mmol/L      Chloride 106 mmol/L      CO2 25.0 mmol/L      Calcium 7.3 mg/dL      BUN/Creatinine Ratio 26.3     Anion Gap 4.0 mmol/L      eGFR 56.5 mL/min/1.73     Narrative:      GFR Normal >60  Chronic Kidney Disease <60  Kidney Failure <15    The GFR formula is only valid for adults with stable renal function between ages 18 and 70.    CBC (No Diff) [[id number]]  (Abnormal) Collected: 05/09/23 0616    Specimen: Blood Updated: 05/09/23 0724     WBC 12.96 10*3/mm3      RBC 2.98 10*6/mm3      Hemoglobin 8.8 g/dL      Hematocrit 26.7 %      MCV 89.6 fL      MCH 29.5 pg      MCHC 33.0 g/dL      RDW 15.7 %      RDW-SD 51.2 fl      MPV 10.7 fL      Platelets 248 10*3/mm3            Assessment & Plan       Multiple rib fractures involving four or more ribs       Assessment & Plan   Multiple right-sided rib fractures involving right ribs 5-11 with flail segments of the right ribs 7-11 and right hemothorax secondary to mechanical fall.    A.m. CT of  the chest reviewed with Dr. Jose Raul Borges. Significant  decrease in size of the right pleural effusion. There is improved aeration of the right lung though some right lower lobe atelectasis is noted. The right-sided chest tube is in position although does not appear to be in communication with the small residual right basilar hemothorax or the small right anterior pneumothorax. The multiple right-sided rib fractures are again visualized and without significant change compared to the previous CT imaging. The left lung appears clear.  Multiple subacute left-sided rib fractures, nondisplaced, calcified/healed.    The patient is awake and alert and is without complaints.  He does not endorse pain. He is on room air saturating 100%.     Multiple right-sided rib fractures: In spite of multiple nondisplaced fractures, patient's age and medical issues prohibit surgical fixation.  Additionally, patient and his family do not want him to undergo general anesthesia, given complications and prolonged hospitalization after recent right hip fracture repair.  Recommend scheduled analgesic medications to include Tylenol and lidocaine patches.  He does not really endorse pain. Unfortunately NSAID use is limited given his history of stage III CKD and recent bleed.    Hemothorax: Significantly improved on a.m. chest CT after chest tube insertion and intrapleural fibrinolytic therapy x1. Chest tube output has tapered off with only 70 mL of serosanguineous fluid drained overnight.  No air leak noted on exam.  Chest tube removed at bedside which the patient tolerated well. Plan for a.m. chest x-ray. Trend hemoglobin with a.m. labs.    Encourage good pulmonary hygiene including use of incentive spirometer, coughing, deep breathing.  Increase activity as able.    MARLENE Damon  [facility]  05/09/23  15:19 EDT

## 2023-05-09 NOTE — PROGRESS NOTES
"Nutrition Services    Patient Name:  Lavelle Soliman  YOB: 1927  MRN: [id number]  Admit Date:  5/6/2023    Assessment Date:  05/09/23    NUTRITION SCREENING      Reason for Encounter MST2   Diagnosis/Problem Hemothorax, Rib fx       PO Diet Diet: Regular/House Diet; Texture: Regular Texture (IDDSI 7); Fluid Consistency: Thin (IDDSI 0)   PO Supplements/Snacks n/a   PO Intake % %       Labs  Listed below, reviewed   Physical Findings Chest tube removed, A&O, room air   GI Function BM 5/8   Skin Status bruising       Height:  Weight:  BMI:   Category  Weight Trend Height: 167.6 cm (66\")  Weight: 68 kg (149 lb 14.6 oz) (05/08/23 0500)  Body mass index is 24.2 kg/m².  Normal/Healthy (18.4 - 24.9)  Stable       Intervention/Plan Nutrition screen. No significant nutrition concerns noted. Tolerating po. Poss DC tomorrow noted. Labs, meds, skin reviewed. Encouraged good nutrition. Follow as needed.         Results from last 7 days   Lab Units 05/09/23  0616 05/08/23  1139 05/07/23  0048 05/06/23  1200   SODIUM mmol/L 135* 137 136 136   POTASSIUM mmol/L 4.1 4.0 4.1 4.3   CHLORIDE mmol/L 106 105 104 103   CO2 mmol/L 25.0 25.0 24.7 21.0*   BUN mg/dL 31* 33* 33* 31*   CREATININE mg/dL 1.18 1.39* 1.38* 1.80*   CALCIUM mg/dL 7.3* 7.4* 7.7* 7.5*   BILIRUBIN mg/dL  --   --   --  0.4   ALK PHOS U/L  --   --   --  97   ALT (SGPT) U/L  --   --   --  15   AST (SGOT) U/L  --   --   --  19   GLUCOSE mg/dL 89 143* 116* 188*     Results from last 7 days   Lab Units 05/09/23  0616 05/07/23  0048 05/06/23  1200   MAGNESIUM mg/dL  --   --  2.0   HEMOGLOBIN g/dL 8.8*   < > 7.3*   HEMATOCRIT % 26.7*   < > 22.5*    < > = values in this interval not displayed.     No results found for: COVID19  Lab Results   Component Value Date    HGBA1C 5.4 03/22/2023       RD to follow up per protocol.    Electronically signed by:  Delicia Pettit RD  05/09/23 15:09 EDT   "

## 2023-05-09 NOTE — PROGRESS NOTES
Hospital Follow Up    LOS:  LOS: 3 days   Patient Name: Lavelle Soliman  Age/Sex: 89 y.o. male  : 1927  MRN: [id number]    Day of Service: 23   Length of Stay: 3  Encounter Provider: MARLENE Rojas  Place of Service: [facility] CARDIOLOGY  Patient Care Team:  Person, Linda Centeno MD as PCP - General (Internal Medicine)    Subjective:     Chief Complaint: follow up afib    Interval History:  Denies chest pain, shortness of breath or palpitations. Heart rate elevated, sustaining between 120-150    Objective:     Objective:  Temp:  [97 °F (36.1 °C)-97.6 °F (36.4 °C)] 97.6 °F (36.4 °C)  Heart Rate:  [] 110  Resp:  [16-18] 18  BP: ()/(66-75) 109/75     Intake/Output Summary (Last 24 hours) at 2023 0830  Last data filed at 2023 0547  Gross per 24 hour   Intake 840 ml   Output 470 ml   Net 370 ml     Body mass index is 24.2 kg/m².      23  1600 23  0500 23  0500   Weight: 66.4 kg (146 lb 6.2 oz) 65 kg (143 lb 4.8 oz) 68 kg (149 lb 14.6 oz)     Weight change:     Physical Exam:   General Appearance:    Awake alert and oriented in no acute distress, frail and elderly   Color:  Skin:  Neuro:  HEENT:    Lungs:     Pink  Warm and dry  No focal, motor or sensory deficits  Neck supple, pupils equal, round and reactive. No JVD, No Bruit  Clear to auscultation,respirations regular, even and                  Unlabored, right chest tube     Heart:   irr/irr, S1 and S2, 2/6 sys murmur, no gallop, no rub. No edema, DP/PT pulses are 2+   Chest Wall:    No abnormalities observed   Abdomen:     Normal bowel sounds, no masses, no organomegaly, soft        non-tender, non-distended, no guarding, no ascites noted   Extremities:   Moves all extremities well, no edema, no cyanosis, no redness       Lab Review:   Results from last 7 days   Lab Units 23  0616 23  1139 23  0048 23  1200   SODIUM mmol/L 135* 137   < > 136   POTASSIUM  mmol/L 4.1 4.0   < > 4.3   CHLORIDE mmol/L 106 105   < > 103   CO2 mmol/L 25.0 25.0   < > 21.0*   BUN mg/dL 31* 33*   < > 31*   CREATININE mg/dL 1.18 1.39*   < > 1.80*   GLUCOSE mg/dL 89 143*   < > 188*   CALCIUM mg/dL 7.3* 7.4*   < > 7.5*   AST (SGOT) U/L  --   --   --  19   ALT (SGPT) U/L  --   --   --  15    < > = values in this interval not displayed.     Results from last 7 days   Lab Units 05/06/23  1200   HSTROP T ng/L 46*     Results from last 7 days   Lab Units 05/09/23  0616 05/08/23  1029   WBC 10*3/mm3 12.96* 15.08*   HEMOGLOBIN g/dL 8.8* 9.8*   HEMATOCRIT % 26.7* 30.2*   PLATELETS 10*3/mm3 248 235         Results from last 7 days   Lab Units 05/06/23  1200   MAGNESIUM mg/dL 2.0           Invalid input(s): LDLCALC  Results from last 7 days   Lab Units 05/06/23  1200   PROBNP pg/mL 6,436.0*         I reviewed the patient's new clinical results.  I personally viewed and interpreted the patient's EKG  Current Medications:   Scheduled Meds:famotidine, 20 mg, Oral, Nightly  finasteride, 5 mg, Oral, Nightly  guaiFENesin, 600 mg, Oral, BID  ipratropium-albuterol, 3 mL, Nebulization, Q8H - RT  levothyroxine, 75 mcg, Oral, Q AM  lidocaine, 10 mL, Infiltration, Once  lidocaine 1% - EPINEPHrine 1:450855, 30 mL, Infiltration, Once  metoprolol tartrate, 12.5 mg, Oral, Q12H  senna-docusate sodium, 2 tablet, Oral, BID  sodium chloride, 10 mL, Intravenous, Q12H  tamsulosin, 0.4 mg, Oral, Nightly      Continuous Infusions:     Allergies:  Allergies   Allergen Reactions   • Latex Rash       Assessment:       Multiple rib fractures involving four or more ribs        Plan:     1.  Persistent atrial fibrillation: Rates remaining 120-150 this morning.Not on AC due to frailty and falls. BP too low to increase metoprolol. He just received his AM dose. Will monitor response and if rates are still sustaining above 130, will order a dose of dig  .2.  Right hemothorax:  Due to fall resulting in displaced rib fractures with flail  segment.  Now status post chest tube placement.  Followed by thoracic surgery. CT of the chest this morning.  3.  Acute blood loss anemia:  Hemoglobin has improved and stabilized following transfusion.  4.  Chronic kidney disease stage III: creatinine has normalized  5.  History of hypertension:  blood pressure is stable. He is back on lopressor and tolerating   6.  Hyperlipidemia.        MARLENE Rojas  05/09/23  08:30 EDT  Electronically signed by MARLENE Rojas, 05/08/23, 9:37 AM EDT.

## 2023-05-09 NOTE — CASE MANAGEMENT/SOCIAL WORK
Continued Stay Note  [facility]     Patient Name: Lavelle Soliman  MRN: [id number]  Today's Date: 5/9/2023    Admit Date: 5/6/2023    Plan: Return to [facility]   Discharge Plan     Row Name 05/09/23 1437       Plan    Plan Return to [facility]    Patient/Family in Agreement with Plan yes    Plan Comments Spoke with Ju who states pt lives in  at [facility]. Can return to  apt or SNF if needed.  CCP will continue to follow for needs.  CHARLINE Barnes RN               Discharge Codes    No documentation.               Expected Discharge Date and Time     Expected Discharge Date Expected Discharge Time    May 11, 2023             Sabine Barnes, RN

## 2023-05-09 NOTE — PLAN OF CARE
Goal Outcome Evaluation:  Plan of Care Reviewed With: patient        Progress: improving  Outcome Evaluation: Remains Afib on monitor. Right chest tube to -20cm suction, no air leak or fluctuation noted. Tylenol x 1 for pain. Right flank with extensive bruising.

## 2023-05-09 NOTE — PLAN OF CARE
Problem: Adult Inpatient Plan of Care  Goal: Plan of Care Review  Outcome: Ongoing, Progressing  Flowsheets (Taken 5/9/2023 1340)  Progress: improving  Plan of Care Reviewed With: patient   Goal Outcome Evaluation:  Plan of Care Reviewed With: patient        Progress: improving          Pt no longer has a right side chest tube. Pt was a little tachycardic this morning. Cardiology aware. VSS. Chest x-ray in the morning. Pain well controlled. Pt using flutter and IS.

## 2023-05-09 NOTE — THERAPY TREATMENT NOTE
Patient Name: Lavelle Soliman  : 1927    MRN: [id number]                              Today's Date: 2023       Admit Date: 2023    Visit Dx:     ICD-10-CM ICD-9-CM   1. Multiple rib fractures involving four or more ribs  S22.49XA 807.09   2. Hemothorax on right  J94.2 511.89   3. Atrial fibrillation with RVR  I48.91 427.31   4. Acute on chronic anemia  D64.9 285.9     Patient Active Problem List   Diagnosis   • Benign essential hypertension   • Stage 3a chronic kidney disease   • Hyperlipidemia   • Hypothyroidism   • Muscle cramps   • Anemia   • Hyperglycemia   • Urinary retention   • UTI (urinary tract infection), bacterial   • BPH with obstruction/lower urinary tract symptoms   • Paroxysmal atrial fibrillation   • Closed fracture of left hip, initial encounter   • Postoperative anemia due to acute blood loss   • Moderate malnutrition   • Multiple rib fractures involving four or more ribs     Past Medical History:   Diagnosis Date   • Benign essential hypertension    • Cataract    • Disease of thyroid gland    • HL (hearing loss)    • Hyperlipidemia    • Hypothyroidism    • Paroxysmal atrial fibrillation    • Visual impairment S     Past Surgical History:   Procedure Laterality Date   • EYE SURGERY     • HERNIA REPAIR     • HIP INTERTROCHANTERIC NAILING Left 2023    Procedure: LT. HIP INTERTROCHANTERIC NAILING;  Surgeon: Leland Hair MD;  Location: [facility];  Service: Orthopedics;  Laterality: Left;      General Information     Row Name 23 1037          Physical Therapy Time and Intention    Document Type therapy note (daily note)  -EB     Mode of Treatment individual therapy;physical therapy  -EB     Row Name 23 1037          General Information    Existing Precautions/Restrictions fall  -EB     Barriers to Rehab hearing deficit  [place]  -EB     Row Name 23 1037          Cognition    Orientation Status (Cognition) oriented x 3  -EB     Row Name 23 1037           Safety Issues, Functional Mobility    Impairments Affecting Function (Mobility) endurance/activity tolerance;strength;pain  -EB           User Key  (r) = Recorded By, (t) = Taken By, (c) = Cosigned By    Initials Name Provider Type    Estela Clarke PTA Physical Therapist Assistant               Mobility     Row Name 05/09/23 1037          Bed Mobility    Bed Mobility sit-supine  -EB     Supine-Sit [place] (Bed Mobility) minimum assist (75% patient effort);verbal cues  -EB     Sit-Supine [place] (Bed Mobility) contact guard;verbal cues  -EB     Assistive Device (Bed Mobility) bed rails;head of bed elevated  -EB     Comment, (Bed Mobility) needed assist with upper trunk from supine to sit. CGA with sit to supine of transport bed.  -EB     Row Name 05/09/23 1037          Sit-Stand Transfer    Sit-Stand [place] (Transfers) contact guard  -EB     Assistive Device (Sit-Stand Transfers) walker, front-wheeled  -EB     Comment, (Sit-Stand Transfer) 2 STS transfers from EOB.  -EB     Row Name 05/09/23 1037          Gait/Stairs (Locomotion)    [place] Level (Gait) contact guard  -EB     Assistive Device (Gait) walker, front-wheeled  -EB     Distance in Feet (Gait) 70ft then  20ft  -EB     Deviations/Abnormal Patterns (Gait) stride length decreased;gait speed decreased;zaid decreased  -EB     Bilateral Gait Deviations forward flexed posture  -EB     Comment, (Gait/Stairs) cues for upright posture. Pt sat on EOB after ambulating 70ft. seated rest break on EOB then ambulated 20ft to transport stretcher.  -EB           User Key  (r) = Recorded By, (t) = Taken By, (c) = Cosigned By    Initials Name Provider Type    Estela Clarke PTA Physical Therapist Assistant               Obj/Interventions    No documentation.                Goals/Plan    No documentation.                Clinical Impression     Row Name 05/09/23 1046          Plan of Care Review    Plan of Care Reviewed With patient  -EB      Progress improving  -EB     Outcome Evaluation Pt seen for PT treatment. Pt continues to progress with mobility. Pt required [place] with supine to sit, needing assist with upper trunk and CGA with Sit to supine. Pt performed 2 STS transfers from EOB with CGA. Pt was able to ambulate 70ft with rwx, CGA, with seated rest break then another 20ft to transport stretcher. Pt failry steady needing cues for upright posture.   Will continue to progress pt as able.  -EB     Row Name 05/09/23 1046          Therapy Assessment/Plan (PT)    Therapy Frequency (PT) 6 times/wk  -EB     Row Name 05/09/23 1046          Positioning and Restraints    Pre-Treatment Position in bed  -EB     Post Treatment Position bed  transport bed  -EB     In Bed supine;with other staff  -EB           User Key  (r) = Recorded By, (t) = Taken By, (c) = Cosigned By    Initials Name Provider Type    Estela Clarke PTA Physical Therapist Assistant               Outcome Measures     Row Name 05/09/23 1050          How much help from another person do you currently need...    Turning from your back to your side while in flat bed without using bedrails? 3  -EB     Moving from lying on back to sitting on the side of a flat bed without bedrails? 3  -EB     Moving to and from a bed to a chair (including a wheelchair)? 3  -EB     Standing up from a chair using your arms (e.g., wheelchair, bedside chair)? 3  -EB     Climbing 3-5 steps with a railing? 2  -EB     To walk in hospital room? 3  -EB     AM-PAC 6 Clicks Score (PT) 17  -EB     Highest level of mobility 5 --> Static standing  -EB           User Key  (r) = Recorded By, (t) = Taken By, (c) = Cosigned By    Initials Name Provider Type    Estela Clarke PTA Physical Therapist Assistant                             Physical Therapy Education     Title: PT OT SLP Therapies (In Progress)     Topic: Physical Therapy (In Progress)     Point: Mobility training (Done)     Learning Progress Summary           Patient  Acceptance, E,D, VU,NR by EB at 5/9/2023 1051    Acceptance, E, VU by EM at 5/7/2023 1153   Family Acceptance, E, VU by EM at 5/7/2023 1153                   Point: Home exercise program (Not Started)     Learner Progress:  Not documented in this visit.          Point: Body mechanics (Done)     Learning Progress Summary           Patient Acceptance, E,D, VU,NR by EB at 5/9/2023 1051                   Point: Precautions (Done)     Learning Progress Summary           Patient Acceptance, E,D, VU,NR by EB at 5/9/2023 1051                               User Key     Initials Effective Dates Name Provider Type Discipline    EM 06/16/21 -  Tresa Nagy PT Physical Therapist PT     02/14/23 -  Estela Still PTA Physical Therapist Assistant PT              PT Recommendation and Plan     Plan of Care Reviewed With: patient  Progress: improving  Outcome Evaluation: Pt seen for PT treatment. Pt continues to progress with mobility. Pt required Adrianna with supine to sit, needing assist with upper trunk and CGA with Sit to supine. Pt performed 2 STS transfers from EOB with CGA. Pt was able to ambulate 70ft with rwx, CGA, with seated rest break then another 20ft to transport stretcher. Pt failry steady needing cues for upright posture.   Will continue to progress pt as able.     Time Calculation:    PT Charges     Row Name 05/09/23 1036             Time Calculation    Start Time 0838  -EB      Stop Time 0854  -      Time Calculation (min) 16 min  -      PT Received On 05/09/23  -      PT - Next Appointment 05/10/23  -         Time Calculation- PT    Total Timed Code Minutes- PT 16 minute(s)  -            User Key  (r) = Recorded By, (t) = Taken By, (c) = Cosigned By    Initials Name Provider Type    EB Estela Still PTA Physical Therapist Assistant              Therapy Charges for Today     Code Description Service Date Service Provider Modifiers Qty    [id number] HC GAIT TRAINING EA 15 MIN 5/9/2023 [place]  Estela, PTA GP 1          PT G-Codes  Outcome Measure Options: AM-PAC 6 Clicks Basic Mobility (PT)  AM-PAC 6 Clicks Score (PT): 17       Estela Still, PTA  5/9/2023

## 2023-05-10 ENCOUNTER — APPOINTMENT (OUTPATIENT)
Dept: GENERAL RADIOLOGY | Facility: HOSPITAL | Age: 88
End: 2023-05-10
Payer: MEDICARE

## 2023-05-10 PROCEDURE — 71045 X-RAY EXAM CHEST 1 VIEW: CPT

## 2023-05-10 PROCEDURE — 99232 SBSQ HOSP IP/OBS MODERATE 35: CPT | Performed by: INTERNAL MEDICINE

## 2023-05-10 PROCEDURE — 97116 GAIT TRAINING THERAPY: CPT

## 2023-05-10 PROCEDURE — 94799 UNLISTED PULMONARY SVC/PX: CPT

## 2023-05-10 PROCEDURE — 99232 SBSQ HOSP IP/OBS MODERATE 35: CPT | Performed by: NURSE PRACTITIONER

## 2023-05-10 PROCEDURE — 94664 DEMO&/EVAL PT USE INHALER: CPT

## 2023-05-10 RX ORDER — DIGOXIN 125 MCG
125 TABLET ORAL
Status: DISCONTINUED | OUTPATIENT
Start: 2023-05-10 | End: 2023-05-11 | Stop reason: HOSPADM

## 2023-05-10 RX ADMIN — IPRATROPIUM BROMIDE AND ALBUTEROL SULFATE 3 ML: 2.5; .5 SOLUTION RESPIRATORY (INHALATION) at 08:22

## 2023-05-10 RX ADMIN — METOPROLOL TARTRATE 12.5 MG: 25 TABLET, FILM COATED ORAL at 21:43

## 2023-05-10 RX ADMIN — GUAIFENESIN 600 MG: 600 TABLET, EXTENDED RELEASE ORAL at 08:15

## 2023-05-10 RX ADMIN — ACETAMINOPHEN 1000 MG: 500 TABLET ORAL at 21:44

## 2023-05-10 RX ADMIN — DIGOXIN 125 MCG: 125 TABLET ORAL at 14:37

## 2023-05-10 RX ADMIN — FINASTERIDE 5 MG: 5 TABLET, FILM COATED ORAL at 21:44

## 2023-05-10 RX ADMIN — LIDOCAINE 1 PATCH: 50 PATCH CUTANEOUS at 08:16

## 2023-05-10 RX ADMIN — FAMOTIDINE 20 MG: 20 TABLET, FILM COATED ORAL at 21:43

## 2023-05-10 RX ADMIN — GUAIFENESIN 600 MG: 600 TABLET, EXTENDED RELEASE ORAL at 21:44

## 2023-05-10 RX ADMIN — METOPROLOL TARTRATE 12.5 MG: 25 TABLET, FILM COATED ORAL at 08:15

## 2023-05-10 RX ADMIN — ACETAMINOPHEN 1000 MG: 500 TABLET ORAL at 08:15

## 2023-05-10 RX ADMIN — LEVOTHYROXINE SODIUM 75 MCG: 0.07 TABLET ORAL at 06:33

## 2023-05-10 RX ADMIN — Medication 10 ML: at 21:44

## 2023-05-10 RX ADMIN — TAMSULOSIN HYDROCHLORIDE 0.4 MG: 0.4 CAPSULE ORAL at 21:44

## 2023-05-10 RX ADMIN — Medication 10 ML: at 08:15

## 2023-05-10 RX ADMIN — IPRATROPIUM BROMIDE AND ALBUTEROL SULFATE 3 ML: 2.5; .5 SOLUTION RESPIRATORY (INHALATION) at 23:11

## 2023-05-10 NOTE — PLAN OF CARE
Goal Outcome Evaluation:  Plan of Care Reviewed With: patient        Progress: improving  Outcome Evaluation: Pt seen for PT treatment today. Pt progressing with mobility needing less assistance and increasing activity tolerance. Pt is CGA with bed mobility and SBA with STS transfers. Pt able to increase gait distance to 100ft with rwx, CGA/SBA. No unsteadiness or LOB noted. Pt able to complete bilateral LE exercises without difficulty.  Will continue to progress pt as able.

## 2023-05-10 NOTE — PLAN OF CARE
Problem: Adult Inpatient Plan of Care  Goal: Plan of Care Review  Outcome: Ongoing, Progressing  Flowsheets (Taken 5/10/2023 1438)  Progress: improving  Plan of Care Reviewed With: patient   Goal Outcome Evaluation:  Plan of Care Reviewed With: patient        Progress: improving          Pt has been walking in the garsia today. He worked with therapy and sat up in the chair for a few hours. Pt isn't complaining of any pain or shortness of breath. VSS. Anticipate discharge back to assisted living tomorrow.

## 2023-05-10 NOTE — PROGRESS NOTES
[facility] Follow Up    Chief Complaint: Follow up atrial fibrillation    Interval History: He reports he is feeling well today.  He denies any chest pain or shortness of breath.  He indicates that he is ready to go back to the assisted living facility.  His heart rates were elevated yesterday and received a one-time dose of IV digoxin.    Objective:     Objective:  Temp:  [97.5 °F (36.4 °C)-98.7 °F (37.1 °C)] 98.7 °F (37.1 °C)  Heart Rate:  [] 107  Resp:  [16-20] 16  BP: (105-133)/(55-81) 117/67     Intake/Output Summary (Last 24 hours) at 5/10/2023 0748  Last data filed at 5/10/2023 0706  Gross per 24 hour   Intake 120 ml   Output 1300 ml   Net -1180 ml     Body mass index is 24.77 kg/m².      05/08/23  0500 05/10/23  0635 05/10/23  0648   Weight: 68 kg (149 lb 14.6 oz) 71.6 kg (157 lb 13.6 oz) 69.6 kg (153 lb 7 oz)     Weight change:       Physical Exam:   General : Alert, cooperative, in no acute distress.  Neuro: Alert,cooperative and oriented.  Lungs: CTAB. Normal respiratory effort and rate.  CV: Irregularly, irregular, normal S1 and S2, no murmurs, gallops or rubs.  ABD: Soft, nontender, nondistended. Positive bowel sounds.  Extr: No edema or cyanosis, moves all extremities.    Lab Review:   Results from last 7 days   Lab Units 05/09/23  0616 05/08/23  1139 05/07/23  0048 05/06/23  1200   SODIUM mmol/L 135* 137   < > 136   POTASSIUM mmol/L 4.1 4.0   < > 4.3   CHLORIDE mmol/L 106 105   < > 103   CO2 mmol/L 25.0 25.0   < > 21.0*   BUN mg/dL 31* 33*   < > 31*   CREATININE mg/dL 1.18 1.39*   < > 1.80*   GLUCOSE mg/dL 89 143*   < > 188*   CALCIUM mg/dL 7.3* 7.4*   < > 7.5*   AST (SGOT) U/L  --   --   --  19   ALT (SGPT) U/L  --   --   --  15    < > = values in this interval not displayed.     Results from last 7 days   Lab Units 05/06/23  1200   HSTROP T ng/L 46*     Results from last 7 days   Lab Units 05/09/23  0616 05/08/23  1029   WBC 10*3/mm3 12.96* 15.08*   HEMOGLOBIN g/dL 8.8*  9.8*   HEMATOCRIT % 26.7* 30.2*   PLATELETS 10*3/mm3 248 235         Results from last 7 days   Lab Units 05/06/23  1200   MAGNESIUM mg/dL 2.0           Invalid input(s): LDLCALC  Results from last 7 days   Lab Units 05/06/23  1200   PROBNP pg/mL 6,436.0*         I reviewed the patient's new clinical results.  I personally viewed and interpreted the patient's EKG  Current Medications:   Scheduled Meds:acetaminophen, 1,000 mg, Oral, TID  famotidine, 20 mg, Oral, Nightly  finasteride, 5 mg, Oral, Nightly  guaiFENesin, 600 mg, Oral, BID  ipratropium-albuterol, 3 mL, Nebulization, Q8H - RT  levothyroxine, 75 mcg, Oral, Q AM  lidocaine, 1 patch, Transdermal, Q24H  lidocaine, 10 mL, Infiltration, Once  lidocaine 1% - EPINEPHrine 1:639393, 30 mL, Infiltration, Once  metoprolol tartrate, 12.5 mg, Oral, Q12H  senna-docusate sodium, 2 tablet, Oral, BID  sodium chloride, 10 mL, Intravenous, Q12H  tamsulosin, 0.4 mg, Oral, Nightly      Continuous Infusions:     Allergies:  Allergies   Allergen Reactions   • Latex Rash       Assessment/Plan:       1.  Persistent atrial fibrillation.  Elevated heart rates yesterday.  Received a one-time dose of IV digoxin.  Blood pressures are little soft and I do not think will tolerate too much of an increase in his metoprolol.  Not a good candidate for anticoagulation.  2.  Right hemothorax.  Due to fall resulting in displaced rib fractures with flail segment.  Now status post chest tube placement.  Chest tube removed on 5/9.     3.  Acute blood loss anemia.mild decline this morning.  4.  Right apical pneumothorax.  Plan to follow with repeat chest x-ray in the morning.  5.  Chronic kidney disease stage III. creatinine has normalized  6.  History of hypertension.blood pressures are well controlled but remains soft.   7.  Hyperlipidemia.    -We will start oral digoxin at a low dose to see if this will help with rate control.  - Otherwise continue current dose of metoprolol.    Licha Osuna,  MD  05/10/23  07:48 EDT

## 2023-05-10 NOTE — THERAPY TREATMENT NOTE
Patient Name: Lavelle Soliman  : 1927    MRN: [id number]                              Today's Date: 5/10/2023       Admit Date: 2023    Visit Dx:     ICD-10-CM ICD-9-CM   1. Multiple rib fractures involving four or more ribs  S22.49XA 807.09   2. Hemothorax on right  J94.2 511.89   3. Atrial fibrillation with RVR  I48.91 427.31   4. Acute on chronic anemia  D64.9 285.9     Patient Active Problem List   Diagnosis   • Benign essential hypertension   • Stage 3a chronic kidney disease   • Hyperlipidemia   • Hypothyroidism   • Muscle cramps   • Anemia   • Hyperglycemia   • Urinary retention   • UTI (urinary tract infection), bacterial   • BPH with obstruction/lower urinary tract symptoms   • Paroxysmal atrial fibrillation   • Closed fracture of left hip, initial encounter   • Postoperative anemia due to acute blood loss   • Moderate malnutrition   • Multiple rib fractures involving four or more ribs     Past Medical History:   Diagnosis Date   • Benign essential hypertension    • Cataract    • Disease of thyroid gland    • HL (hearing loss)    • Hyperlipidemia    • Hypothyroidism    • Paroxysmal atrial fibrillation    • Visual impairment S     Past Surgical History:   Procedure Laterality Date   • EYE SURGERY     • HERNIA REPAIR     • HIP INTERTROCHANTERIC NAILING Left 2023    Procedure: LT. HIP INTERTROCHANTERIC NAILING;  Surgeon: Leland Hair MD;  Location: [facility];  Service: Orthopedics;  Laterality: Left;      General Information     Row Name 05/10/23 112          Physical Therapy Time and Intention    Document Type therapy note (daily note)  -EB     Mode of Treatment individual therapy;physical therapy  -EB     Row Name 05/10/23 1122          General Information    Existing Precautions/Restrictions fall  -EB     Row Name 05/10/23 1122          Cognition    Orientation Status (Cognition) oriented x 3  -EB     Row Name 05/10/23 1122          Safety Issues, Functional Mobility    Impairments  Affecting Function (Mobility) endurance/activity tolerance;strength  -EB           User Key  (r) = Recorded By, (t) = Taken By, (c) = Cosigned By    Initials Name Provider Type    Estela Clarke PTA Physical Therapist Assistant               Mobility     Row Name 05/10/23 1123          Bed Mobility    Supine-Sit [place] (Bed Mobility) verbal cues;contact guard  -EB     Sit-Supine [place] (Bed Mobility) standby assist;verbal cues  -EB     Assistive Device (Bed Mobility) bed rails;head of bed elevated  -EB     Row Name 05/10/23 1123          Sit-Stand Transfer    Sit-Stand [place] (Transfers) standby assist  -EB     Assistive Device (Sit-Stand Transfers) walker, front-wheeled  -EB     Row Name 05/10/23 1123          Gait/Stairs (Locomotion)    [place] Level (Gait) standby assist;contact guard  -EB     Assistive Device (Gait) walker, front-wheeled  -EB     Distance in Feet (Gait) 100ft  -EB     Deviations/Abnormal Patterns (Gait) stride length decreased;gait speed decreased;zaid decreased  -EB     Bilateral Gait Deviations forward flexed posture  -EB     Comment, (Gait/Stairs) cues for upright posture. No unsteadiness or LOB note. Progressing gait distance.  -EB           User Key  (r) = Recorded By, (t) = Taken By, (c) = Cosigned By    Initials Name Provider Type    Estela Clarke PTA Physical Therapist Assistant               Obj/Interventions     Row Name 05/10/23 1124          Motor Skills    Therapeutic Exercise --  BLE: LAQs and seated marches (X10)  -EB           User Key  (r) = Recorded By, (t) = Taken By, (c) = Cosigned By    Initials Name Provider Type    Estela Clarke PTA Physical Therapist Assistant               Goals/Plan    No documentation.                Clinical Impression     Row Name 05/10/23 1124          Plan of Care Review    Plan of Care Reviewed With patient  -EB     Progress improving  -EB     Outcome Evaluation Pt seen for PT treatment today. Pt progressing  with mobility needing less assistance and increasing activity tolerance. Pt is CGA with bed mobility and SBA with STS transfers. Pt able to increase gait distance to 100ft with rwx, CGA/SBA. No unsteadiness or LOB noted. Pt able to complete bilateral LE exercises without difficulty.  Will continue to progress pt as able.  -EB     Row Name 05/10/23 1124          Therapy Assessment/Plan (PT)    Therapy Frequency (PT) 6 times/wk  -     Row Name 05/10/23 1124          Positioning and Restraints    Pre-Treatment Position in bed  -EB     Post Treatment Position bed  -EB     In Bed supine;call light within reach;encouraged to call for assist;exit alarm on  -EB           User Key  (r) = Recorded By, (t) = Taken By, (c) = Cosigned By    Initials Name Provider Type    Estela Clarke PTA Physical Therapist Assistant               Outcome Measures     Row Name 05/10/23 1127          How much help from another person do you currently need...    Turning from your back to your side while in flat bed without using bedrails? 3  -EB     Moving from lying on back to sitting on the side of a flat bed without bedrails? 3  -EB     Moving to and from a bed to a chair (including a wheelchair)? 3  -EB     Standing up from a chair using your arms (e.g., wheelchair, bedside chair)? 4  -EB     Climbing 3-5 steps with a railing? 2  -EB     To walk in hospital room? 3  -EB     AM-PAC 6 Clicks Score (PT) 18  -EB     Highest level of mobility 6 --> Walked 10 steps or more  -           User Key  (r) = Recorded By, (t) = Taken By, (c) = Cosigned By    Initials Name Provider Type    Estela Clarke PTA Physical Therapist Assistant                             Physical Therapy Education     Title: PT OT SLP Therapies (Done)     Topic: Physical Therapy (Done)     Point: Mobility training (Done)     Learning Progress Summary           Patient Acceptance, E,D, VU,NR by YUE at 5/10/2023 1128    Acceptance, E,D, VU,NR by YUE at 5/9/2023 1051     Acceptance, E, VU by EM at 5/7/2023 1153   Family Acceptance, E, VU by EM at 5/7/2023 1153                   Point: Home exercise program (Done)     Learning Progress Summary           Patient Acceptance, E,D, VU,NR by EB at 5/10/2023 1128                   Point: Body mechanics (Done)     Learning Progress Summary           Patient Acceptance, E,D, VU,NR by EB at 5/10/2023 1128    Acceptance, E,D, VU,NR by EB at 5/9/2023 1051                   Point: Precautions (Done)     Learning Progress Summary           Patient Acceptance, E,D, VU,NR by EB at 5/9/2023 1051                               User Key     Initials Effective Dates Name Provider Type Discipline    EM 06/16/21 -  Tresa Nagy PT Physical Therapist PT     02/14/23 -  Estela Still PTA Physical Therapist Assistant PT              PT Recommendation and Plan     Plan of Care Reviewed With: patient  Progress: improving  Outcome Evaluation: Pt seen for PT treatment today. Pt progressing with mobility needing less assistance and increasing activity tolerance. Pt is CGA with bed mobility and SBA with STS transfers. Pt able to increase gait distance to 100ft with rwx, CGA/SBA. No unsteadiness or LOB noted. Pt able to complete bilateral LE exercises without difficulty.  Will continue to progress pt as able.     Time Calculation:    PT Charges     Row Name 05/10/23 1122             Time Calculation    Start Time 0842  -EB      Stop Time 0900  -      Time Calculation (min) 18 min  -      PT Received On 05/10/23  -      PT - Next Appointment 05/11/23  -         Time Calculation- PT    Total Timed Code Minutes- PT 18 minute(s)  -            User Key  (r) = Recorded By, (t) = Taken By, (c) = Cosigned By    Initials Name Provider Type    EB Estela Still PTA Physical Therapist Assistant              Therapy Charges for Today     Code Description Service Date Service Provider Modifiers Qty    84433829721 HC GAIT TRAINING EA 15 MIN 5/9/2023 [place]  JAMIE Palmer GP 1    16813882951 HC GAIT TRAINING EA 15 MIN 5/10/2023 Estela Still, JAMIE GP 1          PT G-Codes  Outcome Measure Options: AM-PAC 6 Clicks Basic Mobility (PT)  AM-PAC 6 Clicks Score (PT): 18       Estela Still PTA  5/10/2023

## 2023-05-10 NOTE — PROGRESS NOTES
"  PROGRESS NOTE  Patient Name: Lavelle Soliman  Age/Sex: 89 y.o. male  : 1927  MRN: [id number]    Date of Admission: 2023  Date of Encounter Visit: 05/10/23   LOS: 4 days   Patient Care Team:  Linda Taveras MD as PCP - General (Internal Medicine)    Chief Complaint: Hemothorax, rib fracture    Hospital course: Patient is continuing to improve, chest tube removed on 2023, he is denying any significant dyspnea.  Chest pain is relatively minimal given the extent of the rib injury  No nausea or vomiting  No fever  His chest x-ray showed small right apical pneumothorax  Heart rate is better controlled on the current regimen, patient is on room air      REVIEW OF SYSTEMS:   CONSTITUTIONAL: no fever or chills  CARDIOVASCULAR: Chest tube removed, chest pain improving  RESPIRATORY: No shortness of breath, cough or sputum.   GASTROINTESTINAL: No anorexia, nausea, vomiting or diarrhea. No abdominal pain or blood.   HEMATOLOGIC: No bleeding or bruising.     Ventilator/Non-Invasive Ventilation Settings (From admission, onward)    None            Vital Signs  Temp:  [97.5 °F (36.4 °C)-98.7 °F (37.1 °C)] 98.7 °F (37.1 °C)  Heart Rate:  [] 100  Resp:  [16-20] 16  BP: (105-133)/(55-81) 117/67  SpO2:  [90 %-100 %] 94 %  on    Device (Oxygen Therapy): room air    Intake/Output Summary (Last 24 hours) at 5/10/2023 1014  Last data filed at 5/10/2023 0706  Gross per 24 hour   Intake 120 ml   Output 1300 ml   Net -1180 ml     Flowsheet Rows    Flowsheet Row First Filed Value   Admission Height 167.6 cm (66\") Documented at 2023 1600   Admission Weight 66.4 kg (146 lb 6.2 oz) Documented at 2023 1600        Body mass index is 24.77 kg/m².      23  0500 05/10/23  0635 05/10/23  0648   Weight: 68 kg (149 lb 14.6 oz) 71.6 kg (157 lb 13.6 oz) 69.6 kg (153 lb 7 oz)       Physical Exam:  GEN:  No acute distress, asleep arousable, well developed.  EYES:   Sclerae clear. No icterus. PERRL. Normal " EOM  ENT:   External ears/nose normal, no oral lesions, no thrush, mucous membranes moist  NECK:  Supple, midline trachea, no JVD  LUNGS: Normal chest on inspection, chest tube is out, slightly diminished on the right as compared to the left, no wheezes respirations regular, even and unlabored.  Tender to palpation on the right side  CV: The rhythm is irregular and the rate is normal . Normal S1/S2. No , gallops, or rubs noted.  Systolic murmur was audible  ABD:  Soft, nontender and nondistended. Normal bowel sounds. No guarding  EXT:  Moves all extremities well. No cyanosis. No redness. No edema.   Skin: Dry, intact, no bleeding    Results Review:        Results from last 7 days   Lab Units 05/09/23  0616 05/08/23  1139 05/07/23  0048 05/06/23  1200   SODIUM mmol/L 135* 137 136 136   POTASSIUM mmol/L 4.1 4.0 4.1 4.3   CHLORIDE mmol/L 106 105 104 103   CO2 mmol/L 25.0 25.0 24.7 21.0*   BUN mg/dL 31* 33* 33* 31*   CREATININE mg/dL 1.18 1.39* 1.38* 1.80*   CALCIUM mg/dL 7.3* 7.4* 7.7* 7.5*   AST (SGOT) U/L  --   --   --  19   ALT (SGPT) U/L  --   --   --  15   ANION GAP mmol/L 4.0* 7.0 7.3 12.0   ALBUMIN g/dL  --   --   --  2.5*     Results from last 7 days   Lab Units 05/06/23  1200   HSTROP T ng/L 46*         Results from last 7 days   Lab Units 05/06/23  1200   PROBNP pg/mL 6,436.0*     Results from last 7 days   Lab Units 05/09/23  0616 05/08/23  1029 05/07/23  0048 05/06/23  1200   WBC 10*3/mm3 12.96* 15.08* 15.74* 15.14*   HEMOGLOBIN g/dL 8.8* 9.8* 9.5* 7.3*   HEMATOCRIT % 26.7* 30.2* 28.8* 22.5*   PLATELETS 10*3/mm3 248 235 170 232   MCV fL 89.6 91.5 88.1 94.9   NEUTROPHIL % %  --   --  72.3 83.0*   LYMPHOCYTE % %  --   --  12.3* 9.0*   MONOCYTES % %  --   --  13.9* 6.3   EOSINOPHIL % %  --   --  0.1* 0.0*   BASOPHIL % %  --   --  0.4 0.3   IMM GRAN % %  --   --  1.0* 1.4*         Results from last 7 days   Lab Units 05/06/23  1200   MAGNESIUM mg/dL 2.0           Invalid input(s): LDLCALC          No results  found for: POCGLU                            Imaging:   Imaging Results (All)     Procedure Component Value               I reviewed the patient's new clinical results.  I personally viewed and interpreted the patient's imaging results:        Medication Review:   acetaminophen, 1,000 mg, Oral, TID  famotidine, 20 mg, Oral, Nightly  finasteride, 5 mg, Oral, Nightly  guaiFENesin, 600 mg, Oral, BID  ipratropium-albuterol, 3 mL, Nebulization, Q8H - RT  levothyroxine, 75 mcg, Oral, Q AM  lidocaine, 1 patch, Transdermal, Q24H  lidocaine, 10 mL, Infiltration, Once  lidocaine 1% - EPINEPHrine 1:[id number], 30 mL, Infiltration, Once  metoprolol tartrate, 12.5 mg, Oral, Q12H  senna-docusate sodium, 2 tablet, Oral, BID  sodium chloride, 10 mL, Intravenous, Q12H  tamsulosin, 0.4 mg, Oral, Nightly             ASSESSMENT:   1. Right chest wall hematoma with hemothorax, status post chest tube placement  2. Right apical pneumothorax  3. Multiple displaced rib fractures with flail chest  4. Paroxysmal atrial fibrillation with RVR  5. Acute blood loss anemia, likely patient is not on any anticoagulation, responded to resuscitation  6. Chronic kidney disease stage IIIa with acute decompensation due to the anemia with hypotension, improving  7. Acute heart failure likely high-output  8. Elevated high-sensitivity troponin most likely demand ischemia/type II non-ST elevation MI  9. Leukocytosis, likely stress-induced      PLAN:  Repeat lab resulted, the hemoglobin is slightly lower but still within okay range and fluctuating in the stable range.  Afebrile  Heart rate is better controlled  Pain is fairly minimal given the multiple rib fractures  The apical pneumothorax is small, will monitor for another day and hopefully back to his facility tomorrow if okay with thoracic surgery  Needs to have a follow-up on his residual atelectasis as an outpatient  Continue to encourage deep breathing exercises  Cardiology note and thoracic surgery note  and  notes reviewed    Hold on discharge today, monitor and repeat chest x-ray in a.m. and hopefully discharge tomorrow if okay with the other consultants    Disposition: Skilled nursing facility    Misa Burt MD  05/10/23  10:14 EDT             Dictated utilizing Dragon dictation

## 2023-05-10 NOTE — PLAN OF CARE
Goal Outcome Evaluation:      VSS ex/ Afib. RA. R CT Duoderm CDI. Lidocaine patch on R lower chest side. Oral care, teeth brushed done. Urinal @ bedside. Plans to go back to assisted living facility today if AM CXR is okay.

## 2023-05-10 NOTE — PROGRESS NOTES
"    Chief Complaint: Multiple rib fractures, hemothorax, follow-up  S/P: Chest tube insertion  POD # 4    Subjective:  Symptoms:  Stable.  He reports chest pain and weakness.  No shortness of breath.    Diet:  Adequate intake.  No nausea or vomiting.    Activity level: Impaired due to weakness.    Pain:  He complains of pain that is mild.  Pain is well controlled.        Vital Signs:  Temp:  [97.5 °F (36.4 °C)-98.7 °F (37.1 °C)] 98.7 °F (37.1 °C)  Heart Rate:  [] 100  Resp:  [16-20] 16  BP: (105-133)/(55-81) 117/67    Intake & Output (last day)       05/09 0701  05/10 0700 05/10 0701  05/11 0700    P.O. 120     Total Intake(mL/kg) 120 (1.7)     Urine (mL/kg/hr) 1275 (0.8) 150 (0.7)    Stool      Chest Tube      Total Output 1275 150    Net -1155 -150          Urine Unmeasured Occurrence 3 x           Objective:  General Appearance:  Comfortable, in no acute distress, ill-appearing and in pain.    Vital signs: (most recent): Blood pressure 117/67, pulse 100, temperature 98.7 °F (37.1 °C), temperature source Oral, resp. rate 16, height 167.6 cm (66\"), weight 69.6 kg (153 lb 7 oz), SpO2 94 %.  Vital signs are normal.  No fever.    HEENT: ([place])    Lungs:  Normal effort and normal respiratory rate.  There are decreased breath sounds.    Heart: Tachycardia.  Irregular rhythm.    Chest: Symmetric chest wall expansion. Chest wall tenderness present.    Abdomen: Abdomen is soft.    Neurological: Patient is alert and oriented to person, place and time.    Skin:  Warm and dry.                Results Review:     I reviewed the patient's new clinical results.  I reviewed the patient's new imaging results and agree with the interpretation.  I reviewed the patient's other test results and agree with the interpretation  Discussed with patient, family at bedside, RN and Dr. Borges.     Imaging Results (Last 24 Hours)     Procedure Component Value Units Date/Time    XR Chest 1 View [[id number]] Collected: 05/10/23 0800     " Updated: 05/10/23 0806    Narrative:      XR CHEST 1 VW-     HISTORY: Male who is 89 years-old, right pneumothorax, right chest tube  removal     TECHNIQUE: Frontal view of the chest     COMPARISON: 5/9/2023     FINDINGS: Previous right chest tube has been removed. The heart size is  borderline. Aorta is tortuous. Pulmonary vasculature is unremarkable.  Opacity right mid to lower lung appears similar to prior exam. Minimal  likely atelectasis at the left base. Increased right apical  pneumothorax, 1.7 cm. No left pneumothorax. Otherwise stable.       Impression:      Right chest tube removal, with increased right apical  pneumothorax, 1.7 cm. Persistent opacity in the right mid to lower lung.     This report was finalized on 5/10/2023 8:03 AM by Dr. Rajiv Grider M.D.       CT Chest Without Contrast Diagnostic [[id number]] Collected: 05/09/23 1051     Updated: 05/09/23 1120    Narrative:      CT CHEST WITHOUT CONTRAST     HISTORY: Follow-up right pleural effusion, hemothorax and rib fractures     TECHNIQUE: Radiation dose reduction techniques were utilized, including  automated exposure control and exposure modulation based on body size.  Axial images were obtained through the chest without the administration  of IV contrast. Coronal and sagittal reformatted images obtained.     COMPARISON: 05/06/2023     FINDINGS: Since the prior chest CT there has been interval insertion of  right-sided chest tube. The right pleural effusion is significantly  decreased in size, and there is improved aeration of the right lung.  There is persistent hemothorax present at the right lung base measuring  roughly about 6 cm in thickness. There is also a small right  pneumothorax located anteriorly measuring roughly about 1 cm in  thickness. Near complete atelectasis of the right lower lobe is present.  A few bands of mild atelectasis are seen in the left lung. No  appreciable lymphadenopathy. Limited imaging of the upper  abdomen  demonstrates cholelithiasis. Multiple liver cysts. Multiple displaced  right-sided rib fractures are again demonstrated. Nondisplaced  left-sided rib fractures are again demonstrated.       Impression:      1. Interval insertion of right-sided chest tube. Significant decrease in  size of right pleural effusion with improved aeration of the right lung  2. Persistent hemothorax of the right lung base. Small right anterior  pneumothorax.  3. Near complete atelectasis of the right lower lobe.           Radiation dose reduction techniques were utilized, including automated  exposure control and exposure modulation based on body size.     This report was finalized on 5/9/2023 11:17 AM by Dr. Tej Collins M.D.             Lab Results:     Lab Results (last 24 hours)     ** No results found for the last 24 hours. **           Assessment & Plan       Multiple rib fractures involving four or more ribs       Assessment & Plan     Multiple right-sided rib fractures involving right ribs 5-11 with flail segments of the right ribs 7-11 and right hemothorax secondary to mechanical fall.  This morning chest x-ray was independently reviewed and demonstrates slight increase to right apical pleural separation.  He appears hemodynamically stable on exam.  Agree with plans to repeat chest x-ray tomorrow prior to discharge.    Multiple right-sided rib fractures: continue conservative management with multidomal pain regimen at discharge.    Hemothorax: Significantly improved status post chest tube placement with intrapleural lytic therapy x1.  Chest tube was removed at the bedside yesterday.  Hemoglobin is 8.8 today.    Nothing further to add from a thoracic standpoint.  We will arrange follow-up in our office.  May discharge when medically ready per primary service.    Paula Mccullough DNP, APRN  [facility]  05/10/23  10:17 EDT

## 2023-05-10 NOTE — CASE MANAGEMENT/SOCIAL WORK
Continued Stay Note  [facility]     Patient Name: Lavelle Soliman  MRN: [id number]  Today's Date: 5/10/2023    Admit Date: 5/6/2023    Plan: Return to [facility]   Discharge Plan     Row Name 05/10/23 1429       Plan    Plan Return to [facility]    Patient/Family in Agreement with Plan yes    Plan Comments Spoke with Ju/Nicholas who states pt ok to return to [facility] ([place], [place]) apt.  Discussed with granddaughter who is in agreement with plan.  Nicky keita/portia kwan arranged for tomorrow at 3pm.  CHARLINE Barnes RN               Discharge Codes    No documentation.               Expected Discharge Date and Time     Expected Discharge Date Expected Discharge Time    May 11, 2023             Sabine Barnes RN

## 2023-05-11 ENCOUNTER — APPOINTMENT (OUTPATIENT)
Dept: GENERAL RADIOLOGY | Facility: HOSPITAL | Age: 88
End: 2023-05-11
Payer: MEDICARE

## 2023-05-11 VITALS
BODY MASS INDEX: 24.66 KG/M2 | DIASTOLIC BLOOD PRESSURE: 56 MMHG | SYSTOLIC BLOOD PRESSURE: 97 MMHG | HEART RATE: 106 BPM | OXYGEN SATURATION: 97 % | HEIGHT: 66 IN | WEIGHT: 153.44 LBS | TEMPERATURE: 97.1 F | RESPIRATION RATE: 18 BRPM

## 2023-05-11 LAB
DEPRECATED RDW RBC AUTO: 50.7 FL (ref 37–54)
ERYTHROCYTE [DISTWIDTH] IN BLOOD BY AUTOMATED COUNT: 15.1 % (ref 12.3–15.4)
HCT VFR BLD AUTO: 26.3 % (ref 37.5–51)
HGB BLD-MCNC: 8.2 G/DL (ref 13–17.7)
MCH RBC QN AUTO: 28.7 PG (ref 26.6–33)
MCHC RBC AUTO-ENTMCNC: 31.2 G/DL (ref 31.5–35.7)
MCV RBC AUTO: 92 FL (ref 79–97)
PLATELET # BLD AUTO: 311 10*3/MM3 (ref 140–450)
PMV BLD AUTO: 9.8 FL (ref 6–12)
RBC # BLD AUTO: 2.86 10*6/MM3 (ref 4.14–5.8)
WBC NRBC COR # BLD: 13.05 10*3/MM3 (ref 3.4–10.8)

## 2023-05-11 PROCEDURE — 94799 UNLISTED PULMONARY SVC/PX: CPT

## 2023-05-11 PROCEDURE — 85027 COMPLETE CBC AUTOMATED: CPT | Performed by: INTERNAL MEDICINE

## 2023-05-11 PROCEDURE — 99232 SBSQ HOSP IP/OBS MODERATE 35: CPT | Performed by: INTERNAL MEDICINE

## 2023-05-11 PROCEDURE — 94664 DEMO&/EVAL PT USE INHALER: CPT

## 2023-05-11 PROCEDURE — 94761 N-INVAS EAR/PLS OXIMETRY MLT: CPT

## 2023-05-11 PROCEDURE — 71045 X-RAY EXAM CHEST 1 VIEW: CPT

## 2023-05-11 RX ORDER — DIGOXIN 125 MCG
125 TABLET ORAL
Qty: 30 TABLET | Refills: 0 | Status: SHIPPED | OUTPATIENT
Start: 2023-05-12

## 2023-05-11 RX ORDER — POLYETHYLENE GLYCOL 3350 17 G/17G
17 POWDER, FOR SOLUTION ORAL DAILY PRN
Start: 2023-05-11 | End: 2023-06-10

## 2023-05-11 RX ORDER — AMOXICILLIN 250 MG
2 CAPSULE ORAL 2 TIMES DAILY
Start: 2023-05-11

## 2023-05-11 RX ORDER — BISACODYL 10 MG
10 SUPPOSITORY, RECTAL RECTAL DAILY PRN
Start: 2023-05-11

## 2023-05-11 RX ORDER — BISACODYL 5 MG/1
5 TABLET, DELAYED RELEASE ORAL DAILY PRN
Start: 2023-05-11

## 2023-05-11 RX ADMIN — IPRATROPIUM BROMIDE AND ALBUTEROL SULFATE 3 ML: 2.5; .5 SOLUTION RESPIRATORY (INHALATION) at 14:33

## 2023-05-11 RX ADMIN — DIGOXIN 125 MCG: 125 TABLET ORAL at 12:49

## 2023-05-11 RX ADMIN — IPRATROPIUM BROMIDE AND ALBUTEROL SULFATE 3 ML: 2.5; .5 SOLUTION RESPIRATORY (INHALATION) at 06:39

## 2023-05-11 RX ADMIN — GUAIFENESIN 600 MG: 600 TABLET, EXTENDED RELEASE ORAL at 08:55

## 2023-05-11 RX ADMIN — LEVOTHYROXINE SODIUM 75 MCG: 0.07 TABLET ORAL at 06:29

## 2023-05-11 RX ADMIN — DOCUSATE SODIUM 50MG AND SENNOSIDES 8.6MG 2 TABLET: 8.6; 5 TABLET, FILM COATED ORAL at 08:55

## 2023-05-11 RX ADMIN — METOPROLOL TARTRATE 12.5 MG: 25 TABLET, FILM COATED ORAL at 08:55

## 2023-05-11 RX ADMIN — Medication 10 ML: at 08:55

## 2023-05-11 RX ADMIN — LIDOCAINE 1 PATCH: 50 PATCH CUTANEOUS at 08:55

## 2023-05-11 RX ADMIN — ACETAMINOPHEN 1000 MG: 500 TABLET ORAL at 08:57

## 2023-05-11 NOTE — PLAN OF CARE
Goal Outcome Evaluation:      VSS ex/ Afib. RA. R CT duoderm CDI, no drainage. R side bruising. Oral care, teeth brushed. Urinal @ bedside. Plans to go back to [facility] today.

## 2023-05-11 NOTE — DISCHARGE SUMMARY
DISCHARGE SUMMARY    Patient Name: Lavelle Soliman  Age/Sex: 89 y.o. male  : 1927  MRN: [id number]  Patient Care Team:  Person, Linda Centeno MD as PCP - General (Internal Medicine)       Date of Admit: 2023  Date of Discharge:  23  Discharge Condition: Fair    Discharge Diagnoses:  1. Right chest wall hematoma with hemothorax, status post chest tube placement  2. Right apical pneumothorax  3. Multiple displaced rib fractures with flail chest  4. Paroxysmal atrial fibrillation with RVR  5. Acute blood loss anemia, likely patient is not on any anticoagulation, responded to resuscitation  6. Chronic kidney disease stage IIIa with acute decompensation due to the anemia with hypotension, improving  7. Acute heart failure likely high-output  8. Elevated high-sensitivity troponin most likely demand ischemia/type II non-ST elevation MI  9. Leukocytosis, likely stress-induced  History of present illness from H&P from 2023:   Patient is a 89 y.o. male.  Asked to admit with flail chest with tension hemothorax.  I discussed with Dr. Rogers in the ER and Dr. Reilly at bedside.  Patient is going to get a chest tube.  Patient is pretty hard of hearing but he is normally up and functional in fact he fell for 5 days ago and has been up walking around and not really complaining of any pain he denies pain currently.  He does have a history of falls.  He has a history of paroxysmal A-fib he is not anticoagulated because of the history of falls he is usually maintained on metoprolol.  He has some hypothyroidism and hyperlipidemia and hypertension.  He is pretty with it mentally and does really well his granddaughter is at bedside she helps provide a lot of history and talk for him he is a [employer] .  He smoked a pipe but that was remotely.  He is residing at a nursing home now.  He is a DNR DNI and he would like treatment although he was in the hospital in January after a fall with a closed left hip  fracture had surgery and had a very prolonged hospital course with severe delirium and they decided that he would probably never undergo general anesthesia again at that time and they want to stick with that currently.  He has a history of some chronic kidney disease stage III but is never really had any problem although since he had the A-fib back in January he has had some fluid retention and they have had to give him some diuretics.    Hospital Course:   Lavelle Soliman presented to [facility] with complaints of chest pain from falling, patient was having multiple rib fracture and he developed hemothorax.  He did have a chest tube placed and was evacuation of the hemothorax and that was later removed, patient was doing a lot better than expected given the multiple rib fracture with minimal need for any pain medication.  He is currently on room air, ambulating with physical therapy, he was seen by cardiology for his chronic A-fib, and he is not a candidate for anticoagulation and was kept on his beta-blockers with the addition of digoxin on this admission which will be continued after discharge  He was cleared for discharge by both  thoracic surgery and by cardiology  He is doing well from my standpoint and cleared for discharge as well    Consults:   IP CONSULT TO GENERAL SURGERY  IP CONSULT TO PULMONOLOGY  IP CONSULT TO CARDIOLOGY    Significant Discharge Diagnostics   Procedures Performed:         Pertinent Lab Results:  Results from last 7 days   Lab Units 05/09/23  0616 05/08/23  1139 05/07/23  0048 05/06/23  1200   SODIUM mmol/L 135* 137 136 136   POTASSIUM mmol/L 4.1 4.0 4.1 4.3   CHLORIDE mmol/L 106 105 104 103   CO2 mmol/L 25.0 25.0 24.7 21.0*   BUN mg/dL 31* 33* 33* 31*   CREATININE mg/dL 1.18 1.39* 1.38* 1.80*   GLUCOSE mg/dL 89 143* 116* 188*   CALCIUM mg/dL 7.3* 7.4* 7.7* 7.5*   AST (SGOT) U/L  --   --   --  19   ALT (SGPT) U/L  --   --   --  15     Results from last 7 days    Lab Units 05/06/23  1200   HSTROP T ng/L 46*     Results from last 7 days   Lab Units 05/11/23  0445 05/09/23  0616 05/08/23  1029 05/07/23  0048 05/06/23  1200   WBC 10*3/mm3 13.05* 12.96* 15.08* 15.74* 15.14*   HEMOGLOBIN g/dL 8.2* 8.8* 9.8* 9.5* 7.3*   HEMATOCRIT % 26.3* 26.7* 30.2* 28.8* 22.5*   PLATELETS 10*3/mm3 311 248 235 170 232   MCV fL 92.0 89.6 91.5 88.1 94.9   MCH pg 28.7 29.5 29.7 29.1 30.8   MCHC g/dL 31.2* 33.0 32.5 33.0 32.4   RDW % 15.1 15.7* 15.4 15.6* 13.4   RDW-SD fl 50.7 51.2 51.2 49.8 45.6   MPV fL 9.8 10.7 11.1 12.0 11.8   NEUTROPHIL % %  --   --   --  72.3 83.0*   LYMPHOCYTE % %  --   --   --  12.3* 9.0*   MONOCYTES % %  --   --   --  13.9* 6.3   EOSINOPHIL % %  --   --   --  0.1* 0.0*   BASOPHIL % %  --   --   --  0.4 0.3   IMM GRAN % %  --   --   --  1.0* 1.4*   NEUTROS ABS 10*3/mm3  --   --   --  11.38* 12.56*   LYMPHS ABS 10*3/mm3  --   --   --  1.94 1.37   MONOS ABS 10*3/mm3  --   --   --  2.19* 0.96*   EOS ABS 10*3/mm3  --   --   --  0.01 0.00   BASOS ABS 10*3/mm3  --   --   --  0.07 0.04   IMMATURE GRANS (ABS) 10*3/mm3  --   --   --  0.15* 0.21*   NRBC /100 WBC  --   --   --  0.1 0.0         Results from last 7 days   Lab Units 05/06/23  1200   MAGNESIUM mg/dL 2.0         Results from last 7 days   Lab Units 05/06/23  1200   PROBNP pg/mL 6,436.0*                                           Imaging Results:  Imaging Results (All)     Procedure Component Value Units Date/Time    XR Chest 1 View [[id number]] Collected: 05/11/23 0718     Updated: 05/11/23 0724    Narrative:      XR CHEST 1 VW-  05/11/2023     HISTORY: Pneumothorax.     The heart size is mildly enlarged. There is moderate increased density  in the right lung base likely right pleural effusion with some  atelectasis and/or pneumonia. Again seen is a right apical pneumothorax  with approximately 2 cm pleural separation in the right apex similar to  yesterday's study. Left lung appears clear.       Impression:      1. Right  apical pneumothorax with increased density in the right base as  described.  2. Chest appears largely unchanged from yesterday's study.     This report was finalized on 5/11/2023 7:20 AM by Dr. Ivna Hastings M.D.       XR Chest 1 View [[id number]] Collected: 05/10/23 0800     Updated: 05/10/23 0806    Narrative:      XR CHEST 1 VW-     HISTORY: Male who is 89 years-old, right pneumothorax, right chest tube  removal     TECHNIQUE: Frontal view of the chest     COMPARISON: 5/9/2023     FINDINGS: Previous right chest tube has been removed. The heart size is  borderline. Aorta is tortuous. Pulmonary vasculature is unremarkable.  Opacity right mid to lower lung appears similar to prior exam. Minimal  likely atelectasis at the left base. Increased right apical  pneumothorax, 1.7 cm. No left pneumothorax. Otherwise stable.       Impression:      Right chest tube removal, with increased right apical  pneumothorax, 1.7 cm. Persistent opacity in the right mid to lower lung.     This report was finalized on 5/10/2023 8:03 AM by Dr. Rajiv Grider M.D.       CT Chest Without Contrast Diagnostic [[id number]] Collected: 05/09/23 1051     Updated: 05/09/23 1120    Narrative:      CT CHEST WITHOUT CONTRAST     HISTORY: Follow-up right pleural effusion, hemothorax and rib fractures     TECHNIQUE: Radiation dose reduction techniques were utilized, including  automated exposure control and exposure modulation based on body size.  Axial images were obtained through the chest without the administration  of IV contrast. Coronal and sagittal reformatted images obtained.     COMPARISON: 05/06/2023     FINDINGS: Since the prior chest CT there has been interval insertion of  right-sided chest tube. The right pleural effusion is significantly  decreased in size, and there is improved aeration of the right lung.  There is persistent hemothorax present at the right lung base measuring  roughly about 6 cm in thickness. There is also a  small right  pneumothorax located anteriorly measuring roughly about 1 cm in  thickness. Near complete atelectasis of the right lower lobe is present.  A few bands of mild atelectasis are seen in the left lung. No  appreciable lymphadenopathy. Limited imaging of the upper abdomen  demonstrates cholelithiasis. Multiple liver cysts. Multiple displaced  right-sided rib fractures are again demonstrated. Nondisplaced  left-sided rib fractures are again demonstrated.       Impression:      1. Interval insertion of right-sided chest tube. Significant decrease in  size of right pleural effusion with improved aeration of the right lung  2. Persistent hemothorax of the right lung base. Small right anterior  pneumothorax.  3. Near complete atelectasis of the right lower lobe.           Radiation dose reduction techniques were utilized, including automated  exposure control and exposure modulation based on body size.     This report was finalized on 5/9/2023 11:17 AM by Dr. Tej Collins M.D.       XR Chest 1 View [[id number]] Collected: 05/09/23 0800     Updated: 05/09/23 0806    Narrative:      XR CHEST 1 VW-     Clinical: Right-sided pleural effusion     COMPARISON examination 05/18/2023     FINDINGS: Right-sided chest tube in position as before. Opacity right  mid and lower lung represents combination of pleural fluid and  consolidation/atelectasis. Old left rib deformities again noted. No  acute airspace disease has developed within the left lung. No vascular  congestion seen. No pneumothorax. The remainder is unremarkable.     CONCLUSION: Stable chest     This report was finalized on 5/9/2023 8:03 AM by Dr. Van Izquierdo M.D.       XR Chest 1 View [[id number]] Collected: 05/08/23 0729     Updated: 05/08/23 0733    Narrative:      XR CHEST 1 VW-clinical: Assess for right-sided pleural effusion     COMPARISON 05/07/2023     FINDINGS: Right-sided chest tube in position. Opacity right mid and  lower lung zone likely  represents combination of airspace disease and  pleural effusion, the appearance quite similar to the previous  examination. The cardiomediastinal silhouette is stable. The left lung  is clear. No pneumothorax identified.     CONCLUSION: Stable chest     This report was finalized on 5/8/2023 7:30 AM by Dr. Van Izquierdo M.D.       XR Chest 1 View [[id number]] Collected: 05/07/23 0718     Updated: 05/07/23 0718    Narrative:        Patient: CHAPO RYAN  Time Out: 07:17  Exam(s): XR CXR 1 VIEW     EXAM:    XR Chest, 1 View    CLINICAL HISTORY:     Reason for exam: assess for right effusion.    TECHNIQUE:    Frontal view of the chest.    COMPARISON:    No relevant prior studies available.    FINDINGS:    Lungs: Medial right base consolidation.      Pleural space: Small right pleural effusion.  Right-sided chest tube in   place.  No pneumothorax.    Heart:  Unremarkable.  No cardiomegaly.    Mediastinum:  Unremarkable.    Bones joints:  Unremarkable.    IMPRESSION:       1. Right-sided chest tube in place.  No pneumothorax.  Small right   pleural effusion.    2. Medial right base consolidation.    Impression:          Electronically signed by Edward Saravia M.D. on 05-07-23 at 0717    XR Chest 1 View [[id number]] Collected: 05/06/23 1554     Updated: 05/06/23 1600    Narrative:      XR CHEST 1 VW-     HISTORY: Male who is 89 years-old,  chest tube placement     TECHNIQUE: Frontal views of the chest     COMPARISON: 01/24/2023     FINDINGS: On the second image, the right chest tube extends the right  mid hemithorax. The heart size is borderline. Aorta is tortuous.  Infiltrate is apparent at the right mid to lower lung with mild right  pleural effusion. Minimal left basilar atelectasis or infiltrate. No  pneumothorax. No acute osseous process.       Impression:      Chest tube placement. Infiltrate at the right mid to lower  lung. Mild right pleural effusion. Minimal left basilar atelectasis or  infiltrate.     This  report was finalized on 5/6/2023 3:57 PM by Dr. Rajiv Grider M.D.       CT Chest Without Contrast Diagnostic [[id number]] Collected: 05/06/23 1336     Updated: 05/06/23 1351    Narrative:      CHEST CT WITHOUT CONTRAST     HISTORY: Fall five days ago with chest wall hematoma and shortness of  breath.     TECHNIQUE: Noncontrast head CT is provided. Correlation with chest x-ray  01/24/2023.     Radiation dose reduction techniques were utilized, including automated  exposure control and exposure modulation based on body size.     FINDINGS: There is a large right hemothorax filling over 50% of the  volume of the right hemithorax. There is minimal midline shift toward  the left. There is no pneumothorax, but there is some soft tissue gas in  the chest wall posterolaterally on the right.     There are old healed posterior right rib fractures and multiple acute  right rib fractures. The highest 4 ribs are intact and there is normal  anatomic variant osseous bridging between the lateral right 4th and 5th  ribs.     At the right 5th rib, there is a nondisplaced lateral single part  fracture.     At rib six, there is a mildly displaced fracture laterally.     At the 7th rib, there is a nondisplaced fracture posteriorly, an old  healed, deformed fracture slightly more lateral along the posterior rib  with a bridge of ossification extending caudad to the 8th rib. A fully  displaced lateral fracture is associated with foreshortening at the  lateral fracture site.     At the 8th rib, there is a multipart displaced fracture with fracture  planes along the posterior mid clavicular line and directly laterally.  The rib fragment between those fracture planes is displaced anteriorly a  full bone width.     At the 9th rib, there is a similar multipart displaced fracture  configuration as at rib 8.     At the 10th rib, there is also a multipart displaced fracture  posteriorly and posterolaterally.     At the 11th rib, there is a  comminuted, displaced and angulated fracture  along the posterior midclavicular line. The rib is intact along its more  lateral extent.     The 12th rib is intact.     The scapula, clavicle, left thoracic cage, and the bones of the thoracic  spine appear intact although there is an old T10 inferior endplate  compression deformity, degenerative osseous ankylosis across much of the  thoracic spine, and the sagittal images show an old healed sternal  fracture.     There is no mediastinal hematoma. No thoracic lymphadenopathy.     Multiple hepatic cysts and a 2 cm diameter calcified gallstone. No  hemorrhage in the visualized upper abdomen.       Impression:      1. Old T10 vertebral body compression fracture and mid sternal fracture  are healed. There are also old healed rib fractures on the right.  2. Acute fractures of the right 5th through 11th ribs. Multipart  fractures are observed at the 7th through 11th ribs. There is a large  right hemarthrosis. No pneumothorax. No hemoperitoneum.  3. Cholelithiasis with extensive atheromatous vascular calcification.     This report was finalized on 5/6/2023 1:48 PM by Dr. Mitesh Kilgore M.D.                  Objective:   Temp:  [97.1 °F (36.2 °C)-98.4 °F (36.9 °C)] 97.1 °F (36.2 °C)  Heart Rate:  [] 106  Resp:  [16-18] 18  BP: ()/(56-80) 97/56   SpO2:  [94 %-99 %] 97 %  on    Device (Oxygen Therapy): room air    Intake/Output Summary (Last 24 hours) at 5/11/2023 1402  Last data filed at 5/11/2023 0900  Gross per 24 hour   Intake 720 ml   Output 2075 ml   Net -1355 ml     Body mass index is 24.77 kg/m².      05/10/23  0635 05/10/23  0648 05/11/23  0645   Weight: 71.6 kg (157 lb 13.6 oz) 69.6 kg (153 lb 7 oz) 69.6 kg (153 lb 7 oz)     Weight change: -2 kg (-4 lb 6.6 oz)    Physical Exam:  GEN:  No acute distress, asleep arousable, well developed.  EYES:   Sclerae clear. No icterus. PERRL. Normal EOM  ENT:   External ears/nose normal, no oral lesions, no thrush,  mucous membranes moist  NECK:  Supple, midline trachea, no JVD  LUNGS: Normal chest on inspection, chest tube is out, slightly diminished on the right as compared to the left, no wheezes respirations regular, even and unlabored.  Tender to palpation on the right side  CV: The rhythm is irregular and the rate is normal . Normal S1/S2. No , gallops, or rubs noted.  Systolic murmur was audible  ABD:  Soft, nontender and nondistended. Normal bowel sounds. No guarding  EXT:  Moves all extremities well. No cyanosis. No redness. No edema.   Skin: Dry, intact, no bleeding    Discharge Medications and Instructions:     Discharge Medications     Discharge Medications      New Medications      Instructions Start Date   bisacodyl 5 MG EC tablet  Commonly known as: DULCOLAX   5 mg, Oral, Daily PRN      bisacodyl 10 MG suppository  Commonly known as: DULCOLAX   10 mg, Rectal, Daily PRN      digoxin 125 MCG tablet  Commonly known as: LANOXIN   125 mcg, Oral, Daily Digoxin   Start Date: May 12, 2023     polyethylene glycol 17 g packet  Commonly known as: MIRALAX   17 g, Oral, Daily PRN      sennosides-docusate 8.6-50 MG per tablet  Commonly known as: PERICOLACE   2 tablets, Oral, 2 Times Daily         Changes to Medications      Instructions Start Date   famotidine 20 MG tablet  Commonly known as: PEPCID  What changed:   · how to take this  · when to take this   TAKE ONE TABLET BY MOUTH DAILY      finasteride 5 MG tablet  Commonly known as: PROSCAR  What changed: when to take this   5 mg, Oral, Daily         Continue These Medications      Instructions Start Date   aspirin 81 MG chewable tablet   81 mg, Oral, Daily      furosemide 20 MG tablet  Commonly known as: Lasix   20 mg, Oral, Daily      levothyroxine 75 MCG tablet  Commonly known as: SYNTHROID, LEVOTHROID   75 mcg, Oral, Every Early Morning      metoprolol tartrate 25 MG tablet  Commonly known as: LOPRESSOR   12.5 mg, Oral, 2 Times Daily      tamsulosin 0.4 MG capsule 24 hr  capsule  Commonly known as: FLOMAX   0.4 mg, Oral, Daily         Stop These Medications    methylPREDNISolone 8 MG tablet  Commonly known as: MEDROL            Discharge Diet:    Dietary Orders (From admission, onward)     Start     Ordered    05/06/23 1458  Diet: Regular/House Diet; Texture: Regular Texture (IDDSI 7); Fluid Consistency: Thin (IDDSI 0)  Diet Effective Now        References:    Diet Order [facility]   Question Answer Comment   Diets: Regular/House Diet    Texture: Regular Texture (IDDSI 7)    Fluid Consistency: Thin (IDDSI 0)        05/06/23 1502                Activity at Discharge:   Fall precaution    Discharge disposition: Skilled nursing facility    Discharge Instructions and Follow ups:     Additional Instructions for the Follow-ups that You Need to Schedule     XR Chest 2 View   May 24, 2023      Exam reason: right effusion            Follow-up Information     Cruzito Reilly MD Follow up.    Specialty: Thoracic Surgery  Why: PLEASE GO TO THE [facility] FOR A CHEST X-RAY PRIOR TO YOUR APPOINTMENT, For suture removal  Contact information:  [address]  [place] [id number]  [phone]             Person, Linda Centeno MD .    Specialty: Internal Medicine  Contact information:  [address]  [place] [id number]  [phone]                       Future Appointments   Date Time Provider Department Center   5/25/2023 10:15 AM Cruzito Reilly MD [facility]   6/13/2023 11:30 AM Courtney Chaudhry APRN [facility]   7/14/2023  9:00 AM [facility]   7/18/2023  1:15 PM Rafita Colunga MD [facility]        Medication Reconciliation: Please see electronically completed Med Rec.    Total time spent discharging patient including evaluation, medication reconciliation, arranging follow up, and post hospitalization instructions and education total time exceeds 30 minutes.     Misa Burt MD  05/11/23  14:02 EDT      Dictated utilizing Dragon  dictation

## 2023-05-11 NOTE — PROGRESS NOTES
[facility] Follow Up    Chief Complaint: Follow up atrial fibrillation    Interval History: He feels well.  Nuys any significant pain.  He denies any shortness of breath.  He is ready to return to his assisted living facility.  His heart rates have been remaining in the 90s to 100s.  No sustained rates above 120 noted.    Objective:     Objective:  Temp:  [97.6 °F (36.4 °C)-98.4 °F (36.9 °C)] 98.3 °F (36.8 °C)  Heart Rate:  [] 111  Resp:  [16-18] 18  BP: (101-118)/(73-80) 112/80     Intake/Output Summary (Last 24 hours) at 5/11/2023 0731  Last data filed at 5/11/2023 0645  Gross per 24 hour   Intake 640 ml   Output 1875 ml   Net -1235 ml     Body mass index is 24.77 kg/m².      05/10/23  0635 05/10/23  0648 05/11/23  0645   Weight: 71.6 kg (157 lb 13.6 oz) 69.6 kg (153 lb 7 oz) 69.6 kg (153 lb 7 oz)     Weight change: -2 kg (-4 lb 6.6 oz)      Physical Exam:   General : Alert, cooperative, in no acute distress.  Neuro: Alert,cooperative and oriented.  Lungs: CTAB. Normal respiratory effort and rate.  CV: Irregularly, irregular, normal S1 and S2, no murmurs, gallops or rubs.  ABD: Soft, nontender, nondistended. Positive bowel sounds.  Extr: No edema or cyanosis, moves all extremities.    Lab Review:   Results from last 7 days   Lab Units 05/09/23  0616 05/08/23  1139 05/07/23  0048 05/06/23  1200   SODIUM mmol/L 135* 137   < > 136   POTASSIUM mmol/L 4.1 4.0   < > 4.3   CHLORIDE mmol/L 106 105   < > 103   CO2 mmol/L 25.0 25.0   < > 21.0*   BUN mg/dL 31* 33*   < > 31*   CREATININE mg/dL 1.18 1.39*   < > 1.80*   GLUCOSE mg/dL 89 143*   < > 188*   CALCIUM mg/dL 7.3* 7.4*   < > 7.5*   AST (SGOT) U/L  --   --   --  19   ALT (SGPT) U/L  --   --   --  15    < > = values in this interval not displayed.     Results from last 7 days   Lab Units 05/06/23  1200   HSTROP T ng/L 46*     Results from last 7 days   Lab Units 05/11/23  0445 05/09/23  0616   WBC 10*3/mm3 13.05* 12.96*   HEMOGLOBIN g/dL 8.2*  8.8*   HEMATOCRIT % 26.3* 26.7*   PLATELETS 10*3/mm3 311 248         Results from last 7 days   Lab Units 05/06/23  1200   MAGNESIUM mg/dL 2.0           Invalid input(s): LDLCALC  Results from last 7 days   Lab Units 05/06/23  1200   PROBNP pg/mL 6,436.0*         I reviewed the patient's new clinical results.  I personally viewed and interpreted the patient's EKG  Current Medications:   Scheduled Meds:acetaminophen, 1,000 mg, Oral, TID  digoxin, 125 mcg, Oral, Daily  famotidine, 20 mg, Oral, Nightly  finasteride, 5 mg, Oral, Nightly  guaiFENesin, 600 mg, Oral, BID  ipratropium-albuterol, 3 mL, Nebulization, Q8H - RT  levothyroxine, 75 mcg, Oral, Q AM  lidocaine, 1 patch, Transdermal, Q24H  lidocaine, 10 mL, Infiltration, Once  lidocaine 1% - EPINEPHrine 1:[id number], 30 mL, Infiltration, Once  metoprolol tartrate, 12.5 mg, Oral, Q12H  senna-docusate sodium, 2 tablet, Oral, BID  sodium chloride, 10 mL, Intravenous, Q12H  tamsulosin, 0.4 mg, Oral, Nightly      Continuous Infusions:     Allergies:  Allergies   Allergen Reactions   • Latex Rash       Assessment/Plan:      1.  Persistent atrial fibrillation.    No episodes of elevated rates.  For the most part his rates of been running in the 90s to 100s.  He appears to be tolerating the addition of digoxin so far.  On low-dose metoprolol.  Not a good candidate for anticoagulation.  2.  Right hemothorax.  Due to fall resulting in displaced rib fractures with flail segment.  Now status post chest tube placement.  Chest tube removed on 5/9.     3.  Acute blood loss anemia. Stable.  4.  Right apical pneumothorax.  Plan to follow with repeat chest x-ray in the morning.  5.  Chronic kidney disease stage III. Creatinine has normalized.  6.  History of hypertension.  Blood pressures are well controlled but remains soft.   7.  Hyperlipidemia.    -Continue low-dose metoprolol and digoxin for now.  - Okay to be discharged from my standpoint.  - Would like to see him back in a month in  the office to see how he is doing on the digoxin.    Licha Osuna MD  05/11/23  07:31 EDT

## 2023-05-12 NOTE — CASE MANAGEMENT/SOCIAL WORK
Case Management Discharge Note      Final Note: Returned to [facility].  Transported by Nicky w/portia kwan         Selected Continued Care - Discharged on 5/11/2023 Admission date: 5/6/2023 - Discharge disposition: Skilled Nursing Facility (DC - External)    Destination    No services have been selected for the patient.              Durable Medical Equipment    No services have been selected for the patient.              Dialysis/Infusion    No services have been selected for the patient.              Home Medical Care    No services have been selected for the patient.              Therapy    No services have been selected for the patient.              Community Resources    No services have been selected for the patient.              Community & DME    No services have been selected for the patient.                Selected Continued Care - Prior Encounters Includes continued care and service providers with selected services from prior encounters from 2/5/2023 to 5/11/2023    Discharged on 2/8/2023 Admission date: 1/24/2023 - Discharge disposition: Skilled Nursing Facility (DC - External)    Destination     Service Provider Selected Services Address Phone Fax Patient Preferred    [facility] [address] [place] [phone] [phone] --                    Transportation Services  [facility]    Final Discharge Disposition Code: 01 - home or self-care

## 2023-05-19 ENCOUNTER — TELEPHONE (OUTPATIENT)
Dept: SURGERY | Facility: CLINIC | Age: 88
End: 2023-05-19
Payer: MEDICARE

## 2023-05-25 ENCOUNTER — OFFICE VISIT (OUTPATIENT)
Dept: SURGERY | Facility: CLINIC | Age: 88
End: 2023-05-25
Payer: MEDICARE

## 2023-05-25 ENCOUNTER — HOSPITAL ENCOUNTER (OUTPATIENT)
Dept: GENERAL RADIOLOGY | Facility: HOSPITAL | Age: 88
Discharge: HOME | End: 2023-05-25
Admitting: NURSE PRACTITIONER
Payer: MEDICARE

## 2023-05-25 VITALS
BODY MASS INDEX: 24.59 KG/M2 | HEIGHT: 66 IN | DIASTOLIC BLOOD PRESSURE: 60 MMHG | OXYGEN SATURATION: 96 % | WEIGHT: 153 LBS | HEART RATE: 87 BPM | SYSTOLIC BLOOD PRESSURE: 128 MMHG

## 2023-05-25 DIAGNOSIS — S22.41XG CLOSED FRACTURE OF MULTIPLE RIBS OF RIGHT SIDE WITH DELAYED HEALING, SUBSEQUENT ENCOUNTER: Primary | ICD-10-CM

## 2023-05-25 DIAGNOSIS — S22.49XA MULTIPLE RIB FRACTURES INVOLVING FOUR OR MORE RIBS: ICD-10-CM

## 2023-05-25 DIAGNOSIS — J94.2 HEMOTHORAX ON RIGHT: ICD-10-CM

## 2023-05-25 PROCEDURE — 71046 X-RAY EXAM CHEST 2 VIEWS: CPT

## 2023-05-25 NOTE — PROGRESS NOTES
THORACIC SURGERY CLINIC FOLLOW  UP NOTE    REASON FOR VISIT: Multiple fractured left-sided ribs, hemothorax status post chest tube insertion    Subjective   HISTORY OF PRESENTING ILLNESS:   Lavelle Soliman is a 89 y.o. male has significant medical problems as mentioned below.  He is nursing home resident and reportedly fell 5 days ago when shower.  He hit his right side of the chest on the edge of the shower.  He was evaluated by a nursing home physician but was not complaining of any pain so x-ray was not performed.  He developed a large bruise over the right lower chest and flank region.  Today he felt dizzy when he tried to stand up.  He was unable to walk and was brought to the ER.  He was in A-fib with heart rate in 120s.  On arrival, he was noted to be hypotensive with systolic blood pressure in the 70s.  CT scan of the chest was obtained which reported multiple right-sided significant displaced fracture and flail segment.  There was a large right-sided hemothorax filling over 50% of the volume of right hemithorax.  This was associated with midline shift towards the left.  Thoracic surgery was consulted for further management.    Based on his age, frailty and comorbidities, he was not a candidate for surgical stabilization of the rib.  I placed right-sided chest tube with evacuation of large hemothorax.  His lung reexpanded.  There was area of basilar opacification that was likely related to atelectasis versus residual effusion.  We managed this case very conservatively to avoid any potential complication from procedures.  He had remarkably well pain control.    5/25/2023  The patient presents for follow-up. He is accompanied by his daughter.    The patiend denies any severe pain. He has required his pain medication once at night. He is ambulating with his walker and his balance is stable. He is experiencing mild weakness in his bilateral knees. He is attending rehabilitation. He denies fever or cough. He  has a small amount of phlegm. He has not had a follow-up x-ray. He denies pain on palpation. He has atrial fibrillation, for which he is taking aspirin 81 mg.    Past Medical History:   Diagnosis Date    Benign essential hypertension     Cataract     Disease of thyroid gland     HL (hearing loss)     Hyperlipidemia     Hypothyroidism     Paroxysmal atrial fibrillation     Visual impairment S       Past Surgical History:   Procedure Laterality Date    EYE SURGERY      HERNIA REPAIR      HIP INTERTROCHANTERIC NAILING Left 2023    Procedure: LT. HIP INTERTROCHANTERIC NAILING;  Surgeon: Leladn Hair MD;  Location: [facility] OR;  Service: Orthopedics;  Laterality: Left;       Family History   Problem Relation Age of Onset    No Known Problems Mother     No Known Problems Father     Hyperlipidemia Brother     Hearing loss Paternal Aunt     Vision loss Paternal Aunt     No Known Problems Maternal Grandmother     No Known Problems Maternal Grandfather     No Known Problems Paternal Grandmother     No Known Problems Paternal Grandfather     Malig Hyperthermia Neg Hx        Social History     Socioeconomic History    Marital status:    Tobacco Use    Smoking status: Former     Types: Pipe     Start date: 1955     Quit date: 1970     Years since quittin.4    Smokeless tobacco: Never   Vaping Use    Vaping Use: Never used   Substance and Sexual Activity    Alcohol use: Yes     Alcohol/week: 1.0 standard drink     Types: 1 Shots of liquor per week     Comment: occ    Drug use: Never    Sexual activity: Not Currently     Partners: Female     Birth control/protection: None         Current Outpatient Medications:     aspirin 81 MG chewable tablet, Chew 1 tablet Daily., Disp: , Rfl:     bisacodyl (DULCOLAX) 10 MG suppository, Insert 1 suppository into the rectum Daily As Needed for Constipation (Use if bisacodyl oral is ineffective)., Disp: , Rfl:     bisacodyl (DULCOLAX) 5 MG EC tablet, Take 1 tablet by  "mouth Daily As Needed for Constipation (Use if polyethylene glycol is ineffective)., Disp: , Rfl:     digoxin (LANOXIN) 125 MCG tablet, Take 1 tablet by mouth Daily., Disp: 30 tablet, Rfl: 0    famotidine (PEPCID) 20 MG tablet, TAKE ONE TABLET BY MOUTH DAILY (Patient taking differently: 1 tablet Every Night.), Disp: 90 tablet, Rfl: 0    finasteride (PROSCAR) 5 MG tablet, Take 1 tablet by mouth Daily. (Patient taking differently: Take 1 tablet by mouth Every Night.), Disp: 30 tablet, Rfl: 0    furosemide (Lasix) 20 MG tablet, Take 1 tablet by mouth Daily., Disp: 30 tablet, Rfl: 5    levothyroxine (SYNTHROID, LEVOTHROID) 75 MCG tablet, Take 1 tablet by mouth Every Morning., Disp: 30 tablet, Rfl: 0    metoprolol tartrate (LOPRESSOR) 25 MG tablet, Take 0.5 tablets by mouth 2 (Two) Times a Day., Disp: 60 tablet, Rfl: 0    polyethylene glycol (MIRALAX) 17 g packet, Take 17 g by mouth Daily As Needed (Use if senna-docusate is ineffective) for up to 30 days., Disp: , Rfl:     sennosides-docusate (PERICOLACE) 8.6-50 MG per tablet, Take 2 tablets by mouth 2 (Two) Times a Day., Disp: , Rfl:     tamsulosin (FLOMAX) 0.4 MG capsule 24 hr capsule, Take 1 capsule by mouth Daily., Disp: 30 capsule, Rfl: 0     Allergies   Allergen Reactions    Latex Rash             Objective    OBJECTIVE:     VITAL SIGNS:  /60 (BP Location: Right arm, Patient Position: Sitting, Cuff Size: Adult)   Pulse 87   Ht 167.6 cm (66\")   Wt 69.4 kg (153 lb)   SpO2 96%   BMI 24.69 kg/m²     PHYSICAL EXAM:  Constitutional:       Appearance: Normal appearance.   HENT:      Head: Normocephalic.      Right Ear: External ear normal.      Left Ear: External ear normal.      Nose: Nose normal.      Mouth/Throat: No obvious deformity     Pharynx: Oropharynx is clear.   Eyes:      Conjunctiva/sclera: Conjunctivae normal.   Cardiovascular:      Rate and Rhythm: Normal rate.      Pulses: Normal pulses.   Pulmonary:      Effort: Pulmonary effort is normal. " Chest tube incision site suture was removed.  Abdominal:      Palpations: Abdomen is soft.   Musculoskeletal:         General: Normal range of motion.      Cervical back: Normal range of motion.   Skin:     General: Skin is warm.   Neurological:      General: No focal deficit present.      Mental Status: He is alert and oriented to person, place, and time.     LAB RESULTS:  I have reviewed all the available laboratory results in the chart.    RESULTS REVIEW:  I have reviewed the patient's all relevant laboratory and imaging findings.         ASSESSMENT & PLAN:  Lavelle Soliman is a 89 y.o. male with significant medical conditions as mentioned above presented to my clinic.    He is doing well. His pain is well controlled. He is not taking any medications. His breathing is better. He is ambulating with some assistance. I am going to get a chest x-ray, which was not done earlier, so if that looks fine, he can follow up with us as needed. He should see his primary care physician.    I discussed the patients findings and my recommendations with the patient. The patient was given adequate time to ask questions and all questions were answered to patient satisfaction. Thank you for this consult and allowing us to participate in the care of your patient.      Cruzito Reilly MD  Thoracic Surgeon  [facility]        Dictated utilizing Dragon dictation    I spent 40 minutes caring for Lavelle on this date of service. This time includes time spent by me in the following activities:preparing for the visit, reviewing tests, obtaining and/or reviewing a separately obtained history, performing a medically appropriate examination and/or evaluation , counseling and educating the patient/family/caregiver, ordering medications, tests, or procedures, referring and communicating with other health care professionals , documenting information in the medical record, independently interpreting results and communicating  that information with the patient/family/caregiver, and care coordination and more than half the time was spent in direct face to face evaluation and decision making.     Transcribed from ambient dictation for Cruzito Reilly MD by Eder Drummond, Quality .    Patient verbalized consent to the visit recording.  I have personally performed the services described in this document as transcribed by the above individual, and it is both accurate and complete. Cruzito Reilly MD  5/25/2023

## 2023-06-08 NOTE — PROGRESS NOTES
Enter Query Response Below      Query Response: Acute diastolic heart failure              If applicable, please update the problem list.   Patient: Lavelle Soliman        : 1927  Account: [id number]           Admit Date:         How to Respond to this query:       a. Click New Note     b. Answer query within the yellow box.                c. Update the Problem List, if applicable.      If you have any questions about this query contact me at: [email or web address]    Dr. Burt,    Patient is noted to have acute heart failure.  H&P states “Acute heart failure I suspect this is more of a high output stress type situation but we will have to follow maybe we get his hemoglobin up we can give him a little diuretic,” and noted an EF 53%.  proBNP 6436.  Treatment includes Lopressor and monitoring labs and vital signs.    Can you clarify the patient's diagnosis as:    Acute diastolic heart failure  Acute heart failure (type unknown)  Acute heart failure ruled out  Other (please specify): ______________  Unable to determine    By submitting this query, we are merely seeking further clarification of documentation to accurately reflect all conditions that you are monitoring, evaluating, treating or that extend the hospitalization or utilize additional resources of care. Please utilize your independent clinical judgment when addressing the question(s) above.     This query and your response, once completed, will be entered into the legal medical record.    Sincerely,  Maryanne Stubbs  Clinical Documentation Integrity Program

## 2023-06-08 NOTE — PROGRESS NOTES
Enter Query Response Below      Query Response: acute kidney injury on CKD3a              If applicable, please update the problem list.   Patient: Lavelle Soliman        : 1927  Account: [id number]           Admit Date:         How to Respond to this query:       a. Click New Note     b. Answer query within the yellow box.                c. Update the Problem List, if applicable.      If you have any questions about this query contact me at: [email or web address]    Dr. Burt,    Patient with CKD3a.  Creatinine 1.8 ()-1.18 ().  Treatment includes monitoring labs and IV fluids.    Can the change in creatinine be further specified as:    acute kidney injury on CKD3a  CKD3a only  other (please specify):  _______________  unable to determine    KDIGO criteria www.kdigo.org    By submitting this query, we are merely seeking further clarification of documentation to accurately reflect all conditions that you are monitoring, evaluating, treating or that extend the hospitalization or utilize additional resources of care. Please utilize your independent clinical judgment when addressing the question(s) above.     This query and your response, once completed, will be entered into the legal medical record.    Sincerely,  Maryanne Stubbs  Clinical Documentation Integrity Program

## 2023-06-13 ENCOUNTER — OFFICE VISIT (OUTPATIENT)
Dept: CARDIOLOGY | Facility: CLINIC | Age: 88
End: 2023-06-13
Payer: MEDICARE

## 2023-06-13 VITALS
HEART RATE: 73 BPM | DIASTOLIC BLOOD PRESSURE: 72 MMHG | HEIGHT: 66 IN | SYSTOLIC BLOOD PRESSURE: 110 MMHG | BODY MASS INDEX: 24.59 KG/M2 | WEIGHT: 153 LBS

## 2023-06-13 DIAGNOSIS — I48.0 PAROXYSMAL ATRIAL FIBRILLATION: ICD-10-CM

## 2023-06-13 DIAGNOSIS — E78.5 HYPERLIPIDEMIA, UNSPECIFIED HYPERLIPIDEMIA TYPE: ICD-10-CM

## 2023-06-13 DIAGNOSIS — I10 BENIGN ESSENTIAL HYPERTENSION: Primary | ICD-10-CM

## 2023-06-13 NOTE — PROGRESS NOTES
Subjective:     Encounter Date:06/13/2023      Patient ID: Lavelle Soliman is a 89 y.o. male.    Chief Complaint:follow up PAF, HTN  History of Present Illness  This is a 89 y/o man who follows with Dr. Osuna and is known to me. He has a pmhx of paroxysmal atrial fibrillation, hypertension, hyperlipidemia, chronic kidney disease, hypothyroidism, and BPH. Dr. Osuna began following the patient in 2018 when he was hospitalized with sepsis secondary to a UTI. During his hospitalization, he developed episodes of atrial fibrillation with RVR. It was felt his episodes were due to underlying infection. Given his age and frailty, anticoagulation was not started. He was placed on metoprolol which was later discontinued at a follow up in 2019.    He was lost to follow up until he was admitted to the hospital in May 2023. He had fallen in the shower five days prior to presenting to the ED. He began having issues with positional lightheadedness, fatigue and weakness. In the ED, he was found to be in atrial fibrillation with rates in the 120-130s. His hemoglobin was 7.3, BNP was 6000 and CT of the chest showed right sided displaced rib fractures and large right sided hemothorax. He underwent chest tube placement., received transfusion for his anemia and was started on digoxin for his elevated rates.    He is here today for a 1 month hospital follow up. He is back at his nursing facility at [facility] and is participating in physical therapy about 2-3 days a week. He reports that he is pretty fatigued but feels like the PT is helping. He denies any chest pain or shortness of breath. He feels his heart racing only a couple of times a week and it doesn't seem to last long. He denies any chest pain, shortness of breath, dizziness or syncope. He has not had any falls since his last hospitalization. He does have some swelling in his lower extremities that is stable. No reported orthopnea or PND.    I have reviewed and updated  as appropriate allergies, current medications, past family history, past medical history, past surgical history and problem list.    Review of Systems   Constitutional: Positive for malaise/fatigue. Negative for fever, weight gain and weight loss.   HENT:  Negative for congestion, hoarse voice and sore throat.    Eyes:  Negative for blurred vision and double vision.   Cardiovascular:  Positive for leg swelling. Negative for chest pain, dyspnea on exertion, orthopnea, palpitations and syncope.   Respiratory:  Negative for cough, shortness of breath and wheezing.    Gastrointestinal:  Negative for abdominal pain, hematemesis, hematochezia and melena.   Genitourinary:  Negative for dysuria and hematuria.   Neurological:  Negative for dizziness, headaches, light-headedness and numbness.   Psychiatric/Behavioral:  Negative for depression. The patient is not nervous/anxious.        Current Outpatient Medications:     aspirin 81 MG chewable tablet, Chew 1 tablet Daily., Disp: , Rfl:     bisacodyl (DULCOLAX) 10 MG suppository, Insert 1 suppository into the rectum Daily As Needed for Constipation (Use if bisacodyl oral is ineffective)., Disp: , Rfl:     bisacodyl (DULCOLAX) 5 MG EC tablet, Take 1 tablet by mouth Daily As Needed for Constipation (Use if polyethylene glycol is ineffective)., Disp: , Rfl:     digoxin (LANOXIN) 125 MCG tablet, Take 1 tablet by mouth Daily., Disp: 30 tablet, Rfl: 0    famotidine (PEPCID) 20 MG tablet, TAKE ONE TABLET BY MOUTH DAILY (Patient taking differently: 1 tablet Every Night.), Disp: 90 tablet, Rfl: 0    finasteride (PROSCAR) 5 MG tablet, Take 1 tablet by mouth Daily. (Patient taking differently: Take 1 tablet by mouth Every Night.), Disp: 30 tablet, Rfl: 0    furosemide (Lasix) 20 MG tablet, Take 1 tablet by mouth Daily., Disp: 30 tablet, Rfl: 5    levothyroxine (SYNTHROID, LEVOTHROID) 75 MCG tablet, Take 1 tablet by mouth Every Morning., Disp: 30 tablet, Rfl: 0    metoprolol tartrate  (LOPRESSOR) 25 MG tablet, Take 0.5 tablets by mouth 2 (Two) Times a Day., Disp: 60 tablet, Rfl: 0    sennosides-docusate (PERICOLACE) 8.6-50 MG per tablet, Take 2 tablets by mouth 2 (Two) Times a Day., Disp: , Rfl:     tamsulosin (FLOMAX) 0.4 MG capsule 24 hr capsule, Take 1 capsule by mouth Daily., Disp: 30 capsule, Rfl: 0    Past Medical History:   Diagnosis Date    Abnormal ECG     Anemia     Arrhythmia 2018    A fib    Benign essential hypertension     Cataract     Chronic kidney disease     Clotting disorder     Fx ribs    Coronary artery disease 2018    AFib    Disease of thyroid gland     GERD (gastroesophageal reflux disease) 2016    History of transfusion     Hip fx, fx ribs    HL (hearing loss)     Hyperlipidemia     Hypothyroidism     Paroxysmal atrial fibrillation     Visual impairment S       Past Surgical History:   Procedure Laterality Date    EYE SURGERY      HERNIA REPAIR      HIP INTERTROCHANTERIC NAILING Left 2023    Procedure: LT. HIP INTERTROCHANTERIC NAILING;  Surgeon: Leland Hair MD;  Location: [facility];  Service: Orthopedics;  Laterality: Left;       Family History   Problem Relation Age of Onset    No Known Problems Mother     No Known Problems Father     Hyperlipidemia Brother     Hearing loss Paternal Aunt     Vision loss Paternal Aunt     No Known Problems Maternal Grandmother     No Known Problems Maternal Grandfather     No Known Problems Paternal Grandmother     No Known Problems Paternal Grandfather     Malig Hyperthermia Neg Hx        Social History     Tobacco Use    Smoking status: Former     Types: Pipe     Start date: 1955     Quit date: 1970     Years since quittin.4    Smokeless tobacco: Never   Vaping Use    Vaping Use: Never used   Substance Use Topics    Alcohol use: Yes     Alcohol/week: 1.0 standard drink     Types: 1 Shots of liquor per week     Comment: occ    Drug use: Never         ECG 12 Lead    Date/Time: 2023 12:31  "PM  Performed by: Courtney Chaudhry APRN  Authorized by: Courtney Chaudhry APRN   Comparison: compared with previous ECG from 2023  Similar to previous ECG  Comparison to previous ECG: Rate has decreased by 94 bpm  Rhythm: atrial fibrillation  QRS axis: right           Objective:     Visit Vitals  /72   Pulse 73   Ht 167.6 cm (66\")   Wt 69.4 kg (153 lb)   BMI 24.69 kg/m²             Physical Exam  Constitutional:       Appearance: Normal appearance. He is normal weight.   HENT:      Head: Normocephalic.   Neck:      Vascular: No carotid bruit.   Cardiovascular:      Rate and Rhythm: Normal rate. Rhythm irregularly irregular.      Chest Wall: PMI is not displaced.      Pulses: Normal pulses.           Radial pulses are 2+ on the right side and 2+ on the left side.        Posterior tibial pulses are 2+ on the right side and 2+ on the left side.      Heart sounds: Normal heart sounds. No murmur heard.    No friction rub. No gallop.   Pulmonary:      Effort: Pulmonary effort is normal.      Breath sounds: Normal breath sounds.   Abdominal:      General: Bowel sounds are normal. There is no distension.      Palpations: Abdomen is soft.   Musculoskeletal:      Right lower le+ Edema present.      Left lower le+ Edema present.   Skin:     General: Skin is warm and dry.      Capillary Refill: Capillary refill takes less than 2 seconds.   Neurological:      Mental Status: He is alert and oriented to person, place, and time.   Psychiatric:         Mood and Affect: Mood normal.         Behavior: Behavior normal.         Thought Content: Thought content normal.        Lab Review:   Lipid Panel          2022    09:29 3/22/2023    15:14   Lipid Panel   Total Cholesterol 168  152    Triglycerides 123  94    HDL Cholesterol 45  46    VLDL Cholesterol 22  18    LDL Cholesterol  101  88    LDL/HDL Ratio 2.2  1.9          Cardiac Procedures:       Assessment:         Diagnoses and all orders for this visit:    1. " Benign essential hypertension (Primary)    2. Hyperlipidemia, unspecified hyperlipidemia type    3. Paroxysmal atrial fibrillation    Other orders  -     ECG 12 Lead            Plan:         PAF: remains in afib on EKG today with controlled rates. On metoprolol and digoxin. No anticoagulation given age, frailty and recent falls. Continue with current treatment.  HTN: blood pressure is stable on current regimen. No changes.  HLD  Fatigue: encouraged to continue PT and hopefully rebuild some stamina.  Lower extremity edema: appears to be dependent edema. Encouraged to elevate feet when sitting to help with swelling. Will monitor and may consider increasing furosemide or taking an extra dose on an as needed basis if the swelling gets worse.    Thank you for allowing me to participate in this patient's care. Please call with any questions or concerns. Mr. Soliman will follow up with Dr. Osuna in 3 months.          Your medication list            Accurate as of June 13, 2023 12:33 PM. If you have any questions, ask your nurse or doctor.                CHANGE how you take these medications        Instructions Last Dose Given Next Dose Due   famotidine 20 MG tablet  Commonly known as: PEPCID  What changed:   how to take this  when to take this      TAKE ONE TABLET BY MOUTH DAILY       finasteride 5 MG tablet  Commonly known as: PROSCAR  What changed: when to take this      Take 1 tablet by mouth Daily.              CONTINUE taking these medications        Instructions Last Dose Given Next Dose Due   aspirin 81 MG chewable tablet      Chew 1 tablet Daily.       bisacodyl 5 MG EC tablet  Commonly known as: DULCOLAX      Take 1 tablet by mouth Daily As Needed for Constipation (Use if polyethylene glycol is ineffective).       bisacodyl 10 MG suppository  Commonly known as: DULCOLAX      Insert 1 suppository into the rectum Daily As Needed for Constipation (Use if bisacodyl oral is ineffective).       digoxin 125 MCG  tablet  Commonly known as: LANOXIN      Take 1 tablet by mouth Daily.       furosemide 20 MG tablet  Commonly known as: Lasix      Take 1 tablet by mouth Daily.       levothyroxine 75 MCG tablet  Commonly known as: SYNTHROID, LEVOTHROID      Take 1 tablet by mouth Every Morning.       metoprolol tartrate 25 MG tablet  Commonly known as: LOPRESSOR      Take 0.5 tablets by mouth 2 (Two) Times a Day.       sennosides-docusate 8.6-50 MG per tablet  Commonly known as: PERICOLACE      Take 2 tablets by mouth 2 (Two) Times a Day.       tamsulosin 0.4 MG capsule 24 hr capsule  Commonly known as: FLOMAX      Take 1 capsule by mouth Daily.                  Courtney Chaudhry, MARLENE  06/13/23  8:32 AM EDT

## 2023-09-12 ENCOUNTER — OFFICE VISIT (OUTPATIENT)
Dept: CARDIOLOGY | Facility: CLINIC | Age: 88
End: 2023-09-12
Payer: MEDICARE

## 2023-09-12 ENCOUNTER — HOSPITAL ENCOUNTER (OUTPATIENT)
Dept: CARDIOLOGY | Facility: HOSPITAL | Age: 88
Discharge: HOME | End: 2023-09-12
Admitting: INTERNAL MEDICINE
Payer: MEDICARE

## 2023-09-12 VITALS
BODY MASS INDEX: 23.18 KG/M2 | HEIGHT: 66 IN | HEART RATE: 76 BPM | SYSTOLIC BLOOD PRESSURE: 120 MMHG | WEIGHT: 144.2 LBS | OXYGEN SATURATION: 95 % | DIASTOLIC BLOOD PRESSURE: 77 MMHG

## 2023-09-12 DIAGNOSIS — N18.31 STAGE 3A CHRONIC KIDNEY DISEASE: ICD-10-CM

## 2023-09-12 DIAGNOSIS — I48.0 PAROXYSMAL ATRIAL FIBRILLATION: ICD-10-CM

## 2023-09-12 DIAGNOSIS — E78.5 HYPERLIPIDEMIA, UNSPECIFIED HYPERLIPIDEMIA TYPE: ICD-10-CM

## 2023-09-12 DIAGNOSIS — I10 BENIGN ESSENTIAL HYPERTENSION: Primary | ICD-10-CM

## 2023-09-12 DIAGNOSIS — E03.9 ACQUIRED HYPOTHYROIDISM: ICD-10-CM

## 2023-09-12 LAB — DIGOXIN SERPL-MCNC: 1.3 NG/ML (ref 0.6–1.2)

## 2023-09-12 PROCEDURE — 36415 COLL VENOUS BLD VENIPUNCTURE: CPT

## 2023-09-12 PROCEDURE — 80162 ASSAY OF DIGOXIN TOTAL: CPT | Performed by: INTERNAL MEDICINE

## 2023-09-12 NOTE — PROGRESS NOTES
Subjective:     Encounter Date:09/12/2023      Patient ID: Lavelle Soliman is a 89 y.o. male.    Chief Complaint:  History of Present Illness    This is a 89-year-old with paroxysmal atrial fibrillation, hypertension, hyperlipidemia, chronic kidney disease, hypothyroidism, BPH, prior right hemothorax following a fall resulting in chest tube placement in 5/2023, who presents for follow-up.      Saw the patient initially when he was admitted to the hospital in 12/2018 with a urinary tract infection with sepsis.  He went to atrial fibrillation at that time.  Started on Toprol tartrate.  Anticoagulation was discussed with the patient and his family and was felt that he would not be too high risk because of his advanced age and overall frailty.  Echocardiogram during that admission showed normal left ventricular systolic function wall motion with EF 53%, normal left atrial size and no significant valvular disease.  Continue to follow-up with him in the office and he was last seen in 7/2019 at which time he appeared to be continued to do well.    I did not see the patient again until he was admitted to the hospital in 5/2023.  The patient had presented to hospital initially with complaints of positional lightheadedness and significant fatigue.  He had fallen in the shower 5 days prior hitting the right side of his chest.  He reported a bruise over his right lower chest and flank and had some pain but otherwise no other significant symptoms.  Due to the onset of the lightheadedness and fatigue he ended up in the emergency room where he was noted to be in atrial fibrillation with rates in the 120s to 130s.  He was hypotensive with systolic pressures in the 70s.  His hemoglobin is down to 7.3.  CT of the chest showed evidence of right-sided acute displaced rib fractures with a flail segment and a large right-sided hemothorax.  He had subsequently underwent chest tube placement.  He was transfused for his anemia.  He  remained in atrial fibrillation during the course of his admission.  Rates improved on just a low-dose of metoprolol and digoxin with 90s to 100s.  There were no plans to anticoagulate in light of his recent fall and hemothorax.    He returned to the office and was evaluated by MARLENE Rojas in 6/2023.  Appear to be doing well at that time.  He was still in atrial fibrillation at that time but with good rate control.  He was noted to have some lower extremity swelling which time was recommended that we just monitor.    He returns today for routine 3-month follow-up.  He is still residing at the nursing home.  The patient's daughter is with him today and reports that they are planning on bringing him back home to live with them in the next month or so.  He continues to have lower extremity swelling but they report it is better.  It does cause significant urinary frequency especially in the middle of the night.  He denies any further falls.  Denies any chest pain, shortness of breath of the ordinary, palpitations, near-syncope or syncope.  The patient reports that they checked his vitals on a daily basis and that his vital signs have always been within normal limits.    Review of Systems   Constitutional: Negative for malaise/fatigue.   HENT:  Negative for hearing loss, hoarse voice, nosebleeds and sore throat.    Eyes:  Negative for pain.   Cardiovascular:  Positive for leg swelling. Negative for chest pain, claudication, cyanosis, dyspnea on exertion, irregular heartbeat, near-syncope, orthopnea, palpitations, paroxysmal nocturnal dyspnea and syncope.   Respiratory:  Negative for shortness of breath and snoring.    Endocrine: Negative for cold intolerance, heat intolerance, polydipsia, polyphagia and polyuria.   Skin:  Negative for itching and rash.   Musculoskeletal:  Negative for arthritis, falls, joint pain, joint swelling, muscle cramps, muscle weakness and myalgias.   Gastrointestinal:  Negative for  constipation, diarrhea, dysphagia, heartburn, hematemesis, hematochezia, melena, nausea and vomiting.   Genitourinary:  Negative for frequency, hematuria and hesitancy.   Neurological:  Negative for excessive daytime sleepiness, dizziness, headaches, light-headedness, numbness and weakness.   Psychiatric/Behavioral:  Negative for depression. The patient is not nervous/anxious.        Current Outpatient Medications:     aspirin 81 MG chewable tablet, Chew 1 tablet Daily., Disp: , Rfl:     bisacodyl (DULCOLAX) 5 MG EC tablet, Take 1 tablet by mouth Daily As Needed for Constipation (Use if polyethylene glycol is ineffective)., Disp: , Rfl:     digoxin (LANOXIN) 125 MCG tablet, Take 1 tablet by mouth Daily., Disp: 30 tablet, Rfl: 0    famotidine (PEPCID) 20 MG tablet, TAKE ONE TABLET BY MOUTH DAILY (Patient taking differently: 1 tablet Every Night.), Disp: 90 tablet, Rfl: 0    finasteride (PROSCAR) 5 MG tablet, Take 1 tablet by mouth Daily. (Patient taking differently: Take 1 tablet by mouth Every Night.), Disp: 30 tablet, Rfl: 0    furosemide (Lasix) 20 MG tablet, Take 1 tablet by mouth Daily., Disp: 30 tablet, Rfl: 5    levothyroxine (SYNTHROID, LEVOTHROID) 75 MCG tablet, Take 1 tablet by mouth Every Morning., Disp: 30 tablet, Rfl: 0    metoprolol tartrate (LOPRESSOR) 25 MG tablet, Take 0.5 tablets by mouth 2 (Two) Times a Day., Disp: 60 tablet, Rfl: 0    metroNIDAZOLE (METROCREAM) 0.75 % cream, , Disp: , Rfl:     sennosides-docusate (PERICOLACE) 8.6-50 MG per tablet, Take 2 tablets by mouth 2 (Two) Times a Day., Disp: , Rfl:     tamsulosin (FLOMAX) 0.4 MG capsule 24 hr capsule, Take 1 capsule by mouth Daily., Disp: 30 capsule, Rfl: 0    Past Medical History:   Diagnosis Date    Abnormal ECG 2018    Anemia 2023    Arrhythmia 2018    A fib    Benign essential hypertension     Cataract     Chronic kidney disease     Clotting disorder 2023    Fx ribs    Coronary artery disease 2018    AFib    Disease of thyroid gland      "GERD (gastroesophageal reflux disease) 2016    History of transfusion     Hip fx, fx ribs    HL (hearing loss)     Hyperlipidemia     Hypothyroidism     Paroxysmal atrial fibrillation     Visual impairment S       Past Surgical History:   Procedure Laterality Date    EYE SURGERY      HERNIA REPAIR      HIP INTERTROCHANTERIC NAILING Left 2023    Procedure: LT. HIP INTERTROCHANTERIC NAILING;  Surgeon: Leland Hair MD;  Location: [facility];  Service: Orthopedics;  Laterality: Left;       Family History   Problem Relation Age of Onset    No Known Problems Mother     No Known Problems Father     Hyperlipidemia Brother     Hearing loss Paternal Aunt     Vision loss Paternal Aunt     No Known Problems Maternal Grandmother     No Known Problems Maternal Grandfather     No Known Problems Paternal Grandmother     No Known Problems Paternal Grandfather     Malig Hyperthermia Neg Hx        Social History     Tobacco Use    Smoking status: Former     Types: Pipe     Start date: 1955     Quit date: 1970     Years since quittin.7    Smokeless tobacco: Never   Vaping Use    Vaping Use: Never used   Substance Use Topics    Alcohol use: Yes     Alcohol/week: 1.0 standard drink     Types: 1 Shots of liquor per week     Comment: occ    Drug use: Never         ECG 12 Lead    Date/Time: 2023 2:38 PM  Performed by: Licha Osuna MD  Authorized by: Licha Osuna MD   Comparison: compared with previous ECG   Similar to previous ECG  Rhythm: atrial fibrillation  Comments: Borderline inferior repolarization abnormalities           Objective:     Visit Vitals  /77 (BP Location: Right arm, Patient Position: Sitting, Cuff Size: Adult)   Pulse 76   Ht 167.6 cm (65.98\")   Wt 65.4 kg (144 lb 3.2 oz)   SpO2 95%   BMI 23.29 kg/m²         Constitutional:       Appearance: Normal appearance. Well-developed.   HENT:      Head: Normocephalic and atraumatic.   Neck:      Vascular: No carotid bruit or JVD. "   Pulmonary:      Effort: Pulmonary effort is normal.      Breath sounds: Normal breath sounds.   Cardiovascular:      Normal rate. Irregularly irregular rhythm.      No gallop.    Pulses:     Radial: 2+ bilaterally.  Edema:     Peripheral edema present.     Ankle: bilateral 1+ edema of the ankle.  Abdominal:      Palpations: Abdomen is soft.   Skin:     General: Skin is warm and dry.   Neurological:      Mental Status: Alert and oriented to person, place, and time.           Assessment:          Diagnosis Plan   1. Benign essential hypertension        2. Hyperlipidemia, unspecified hyperlipidemia type        3. Paroxysmal atrial fibrillation        4. Acquired hypothyroidism        5. Stage 3a chronic kidney disease               Plan:       1.  Persistent atrial fibrillation.  Rate controlled with digoxin and low-dose metoprolol.  Not a good anticoagulation candidate due to overall frailty and history of falls.  We will check a digoxin level today.  2.  Hypertension.  Well-controlled on his current regimen medications.  3.  Hyperlipidemia.  4.  Chronic kidney disease stage III  5.  Hypothyroidism.    I will call and discuss results of the digoxin level.  Otherwise we will plan on seeing the patient back again in 6 months.

## 2023-09-12 NOTE — LETTER
September 12, 2023       No Recipients    Patient: Lavelle Soliman   YOB: 1927   Date of Visit: 9/12/2023       Dear Rafita Colunga MD    Lavelle Soliman was in my office today. Below is a copy of my note.    If you have questions, please do not hesitate to call me. I look forward to following Lavelle along with you.         Sincerely,        Licha Osuna MD        CC:   No Recipients        Subjective:     Encounter Date:09/12/2023      Patient ID: Lavelle Soliman is a 89 y.o. male.    Chief Complaint:  History of Present Illness    This is a 89-year-old with paroxysmal atrial fibrillation, hypertension, hyperlipidemia, chronic kidney disease, hypothyroidism, BPH, prior right hemothorax following a fall resulting in chest tube placement in 5/2023, who presents for follow-up.      Saw the patient initially when he was admitted to the hospital in 12/2018 with a urinary tract infection with sepsis.  He went to atrial fibrillation at that time.  Started on Toprol tartrate.  Anticoagulation was discussed with the patient and his family and was felt that he would not be too high risk because of his advanced age and overall frailty.  Echocardiogram during that admission showed normal left ventricular systolic function wall motion with EF 53%, normal left atrial size and no significant valvular disease.  Continue to follow-up with him in the office and he was last seen in 7/2019 at which time he appeared to be continued to do well.    I did not see the patient again until he was admitted to the hospital in 5/2023.  The patient had presented to hospital initially with complaints of positional lightheadedness and significant fatigue.  He had fallen in the shower 5 days prior hitting the right side of his chest.  He reported a bruise over his right lower chest and flank and had some pain but otherwise no other significant symptoms.  Due to the onset of the lightheadedness and fatigue he ended up in the  emergency room where he was noted to be in atrial fibrillation with rates in the 120s to 130s.  He was hypotensive with systolic pressures in the 70s.  His hemoglobin is down to 7.3.  CT of the chest showed evidence of right-sided acute displaced rib fractures with a flail segment and a large right-sided hemothorax.  He had subsequently underwent chest tube placement.  He was transfused for his anemia.  He remained in atrial fibrillation during the course of his admission.  Rates improved on just a low-dose of metoprolol and digoxin with 90s to 100s.  There were no plans to anticoagulate in light of his recent fall and hemothorax.    He returned to the office and was evaluated by MARLENE Rojas in 6/2023.  Appear to be doing well at that time.  He was still in atrial fibrillation at that time but with good rate control.  He was noted to have some lower extremity swelling which time was recommended that we just monitor.    ROS      Current Outpatient Medications:   •  aspirin 81 MG chewable tablet, Chew 1 tablet Daily., Disp: , Rfl:   •  bisacodyl (DULCOLAX) 5 MG EC tablet, Take 1 tablet by mouth Daily As Needed for Constipation (Use if polyethylene glycol is ineffective)., Disp: , Rfl:   •  digoxin (LANOXIN) 125 MCG tablet, Take 1 tablet by mouth Daily., Disp: 30 tablet, Rfl: 0  •  famotidine (PEPCID) 20 MG tablet, TAKE ONE TABLET BY MOUTH DAILY (Patient taking differently: 1 tablet Every Night.), Disp: 90 tablet, Rfl: 0  •  finasteride (PROSCAR) 5 MG tablet, Take 1 tablet by mouth Daily. (Patient taking differently: Take 1 tablet by mouth Every Night.), Disp: 30 tablet, Rfl: 0  •  furosemide (Lasix) 20 MG tablet, Take 1 tablet by mouth Daily., Disp: 30 tablet, Rfl: 5  •  levothyroxine (SYNTHROID, LEVOTHROID) 75 MCG tablet, Take 1 tablet by mouth Every Morning., Disp: 30 tablet, Rfl: 0  •  metoprolol tartrate (LOPRESSOR) 25 MG tablet, Take 0.5 tablets by mouth 2 (Two) Times a Day., Disp: 60 tablet, Rfl: 0  •   metroNIDAZOLE (METROCREAM) 0.75 % cream, , Disp: , Rfl:   •  sennosides-docusate (PERICOLACE) 8.6-50 MG per tablet, Take 2 tablets by mouth 2 (Two) Times a Day., Disp: , Rfl:   •  tamsulosin (FLOMAX) 0.4 MG capsule 24 hr capsule, Take 1 capsule by mouth Daily., Disp: 30 capsule, Rfl: 0    Past Medical History:   Diagnosis Date   • Abnormal ECG    • Anemia    • Arrhythmia 2018    A fib   • Benign essential hypertension    • Cataract    • Chronic kidney disease    • Clotting disorder     Fx ribs   • Coronary artery disease 2018    AFib   • Disease of thyroid gland    • GERD (gastroesophageal reflux disease) 2016   • History of transfusion     Hip fx, fx ribs   • HL (hearing loss)    • Hyperlipidemia    • Hypothyroidism    • Paroxysmal atrial fibrillation    • Visual impairment S       Past Surgical History:   Procedure Laterality Date   • EYE SURGERY     • HERNIA REPAIR     • HIP INTERTROCHANTERIC NAILING Left 2023    Procedure: LT. HIP INTERTROCHANTERIC NAILING;  Surgeon: Leland Hair MD;  Location: [facility];  Service: Orthopedics;  Laterality: Left;       Family History   Problem Relation Age of Onset   • No Known Problems Mother    • No Known Problems Father    • Hyperlipidemia Brother    • Hearing loss Paternal Aunt    • Vision loss Paternal Aunt    • No Known Problems Maternal Grandmother    • No Known Problems Maternal Grandfather    • No Known Problems Paternal Grandmother    • No Known Problems Paternal Grandfather    • Malig Hyperthermia Neg Hx        Social History     Tobacco Use   • Smoking status: Former     Types: Pipe     Start date: 1955     Quit date: 1970     Years since quittin.7   • Smokeless tobacco: Never   Vaping Use   • Vaping Use: Never used   Substance Use Topics   • Alcohol use: Yes     Alcohol/week: 1.0 standard drink     Types: 1 Shots of liquor per week     Comment: occ   • Drug use: Never       Procedures       Objective:     Visit Vitals  BP  "120/77 (BP Location: Right arm, Patient Position: Sitting, Cuff Size: Adult)   Pulse 76   Ht 167.6 cm (65.98\")   Wt 65.4 kg (144 lb 3.2 oz)   SpO2 95%   BMI 23.29 kg/m²         Physical Exam    Lab Review:       Assessment:          Diagnosis Plan   1. Benign essential hypertension        2. Hyperlipidemia, unspecified hyperlipidemia type        3. Paroxysmal atrial fibrillation        4. Acquired hypothyroidism        5. Stage 3a chronic kidney disease               Plan:              This is a 89-year-old with    "

## 2023-09-14 NOTE — PROGRESS NOTES
We will decrease digoxin to 3 times a week (Monday, Wednesdays, and Fridays).  Communicated this to the patient's daughter over epic messaging.  We will leave in written order for her to  and take to the patient's facility.

## 2023-10-23 RX ORDER — FUROSEMIDE 20 MG/1
20 TABLET ORAL DAILY
Qty: 30 TABLET | Refills: 5 | Status: SHIPPED | OUTPATIENT
Start: 2023-10-23 | End: 2023-10-24 | Stop reason: SDUPTHER

## 2023-10-24 ENCOUNTER — TELEPHONE (OUTPATIENT)
Dept: FAMILY MEDICINE CLINIC | Facility: CLINIC | Age: 88
End: 2023-10-24
Payer: MEDICARE

## 2023-10-24 RX ORDER — FUROSEMIDE 20 MG/1
20 TABLET ORAL DAILY
Qty: 30 TABLET | Refills: 5 | Status: SHIPPED | OUTPATIENT
Start: 2023-10-24

## 2023-10-24 RX ORDER — DIGOXIN 125 MCG
125 TABLET ORAL
Qty: 30 TABLET | Refills: 1 | Status: SHIPPED | OUTPATIENT
Start: 2023-10-24

## 2023-10-24 RX ORDER — DIGOXIN 125 MCG
125 TABLET ORAL
Qty: 30 TABLET | Refills: 1 | Status: SHIPPED | OUTPATIENT
Start: 2023-10-24 | End: 2023-10-24 | Stop reason: SDUPTHER

## 2023-10-24 RX ORDER — TAMSULOSIN HYDROCHLORIDE 0.4 MG/1
0.4 CAPSULE ORAL DAILY
Qty: 30 CAPSULE | Refills: 1 | Status: SHIPPED | OUTPATIENT
Start: 2023-10-24

## 2023-10-24 RX ORDER — FUROSEMIDE 20 MG/1
20 TABLET ORAL DAILY
Qty: 30 TABLET | Refills: 5 | Status: SHIPPED | OUTPATIENT
Start: 2023-10-24 | End: 2023-10-24 | Stop reason: SDUPTHER

## 2023-10-24 RX ORDER — TAMSULOSIN HYDROCHLORIDE 0.4 MG/1
0.4 CAPSULE ORAL DAILY
Qty: 30 CAPSULE | Refills: 1 | Status: SHIPPED | OUTPATIENT
Start: 2023-10-24 | End: 2023-10-24 | Stop reason: SDUPTHER

## 2023-10-24 NOTE — TELEPHONE ENCOUNTER
Pt's daughter called asking about the medications that were sent in by a different provider. Informed Little that we had to wait till Dr. Colunga gave the okay to send in the pt's medications due to them being from a different provider. Little verbally understood and would wait to hear from us when Dr. Colunga responds to the message that was sent to him.

## 2023-11-15 ENCOUNTER — HOME HEALTH ADMISSION (OUTPATIENT)
Dept: HOME HEALTH SERVICES | Facility: HOME HEALTHCARE | Age: 88
End: 2023-11-15
Payer: MEDICARE

## 2023-11-15 ENCOUNTER — OFFICE VISIT (OUTPATIENT)
Dept: FAMILY MEDICINE CLINIC | Facility: CLINIC | Age: 88
End: 2023-11-15
Payer: MEDICARE

## 2023-11-15 VITALS
DIASTOLIC BLOOD PRESSURE: 70 MMHG | HEIGHT: 66 IN | HEART RATE: 91 BPM | BODY MASS INDEX: 23.14 KG/M2 | OXYGEN SATURATION: 97 % | SYSTOLIC BLOOD PRESSURE: 122 MMHG | WEIGHT: 144 LBS | TEMPERATURE: 98.4 F | RESPIRATION RATE: 16 BRPM

## 2023-11-15 DIAGNOSIS — E78.5 HYPERLIPIDEMIA, UNSPECIFIED HYPERLIPIDEMIA TYPE: ICD-10-CM

## 2023-11-15 DIAGNOSIS — H61.21 IMPACTED CERUMEN OF RIGHT EAR: ICD-10-CM

## 2023-11-15 DIAGNOSIS — E03.9 ACQUIRED HYPOTHYROIDISM: ICD-10-CM

## 2023-11-15 DIAGNOSIS — Z74.09 MOBILITY IMPAIRED: ICD-10-CM

## 2023-11-15 DIAGNOSIS — I10 BENIGN ESSENTIAL HYPERTENSION: Primary | ICD-10-CM

## 2023-11-15 DIAGNOSIS — N18.31 STAGE 3A CHRONIC KIDNEY DISEASE: ICD-10-CM

## 2023-11-15 DIAGNOSIS — R26.9 ABNORMAL GAIT: ICD-10-CM

## 2023-11-15 DIAGNOSIS — I48.0 PAROXYSMAL ATRIAL FIBRILLATION: ICD-10-CM

## 2023-11-15 PROBLEM — H04.123 DRY EYE SYNDROME OF BILATERAL LACRIMAL GLANDS: Status: ACTIVE | Noted: 2021-06-24

## 2023-11-15 PROBLEM — H53.2 DIPLOPIA: Status: ACTIVE | Noted: 2023-06-30

## 2023-11-15 PROBLEM — H35.3112 NONEXUDATIVE AGE-RELATED MACULAR DEGENERATION, RIGHT EYE, INTERMEDIATE DRY STAGE: Status: ACTIVE | Noted: 2021-04-29

## 2023-11-15 PROBLEM — Z96.1 PRESENCE OF INTRAOCULAR LENS: Status: ACTIVE | Noted: 2021-04-29

## 2023-11-15 PROBLEM — H54.2X11 LOW VISION RIGHT EYE CATEGORY 1, LOW VISION LEFT EYE CATEGORY 1: Status: ACTIVE | Noted: 2023-06-30

## 2023-11-15 PROBLEM — H35.341 MACULAR CYST, HOLE, OR PSEUDOHOLE, RIGHT EYE: Status: ACTIVE | Noted: 2021-04-29

## 2023-11-15 PROBLEM — H52.4 PRESBYOPIA: Status: ACTIVE | Noted: 2021-04-29

## 2023-11-15 PROBLEM — H35.30 MACULAR DEGENERATION: Status: ACTIVE | Noted: 2023-06-30

## 2023-11-15 PROBLEM — H53.413: Status: ACTIVE | Noted: 2021-04-29

## 2023-11-15 RX ORDER — FUROSEMIDE 20 MG/1
10 TABLET ORAL DAILY
Qty: 30 TABLET | Refills: 1 | Status: SHIPPED | OUTPATIENT
Start: 2023-11-15

## 2023-11-15 RX ORDER — LEVOTHYROXINE SODIUM 0.07 MG/1
75 TABLET ORAL
Qty: 30 TABLET | Refills: 1 | Status: SHIPPED | OUTPATIENT
Start: 2023-11-15

## 2023-11-15 RX ORDER — IPRATROPIUM BROMIDE 21 UG/1
2 SPRAY, METERED NASAL EVERY 12 HOURS
COMMUNITY

## 2023-11-15 NOTE — PROGRESS NOTES
Subjective     Lavelle Soliman is a 89 y.o.. male.     Patient here today for follow up on hypertension, hyperlipidemia, Afib, hypothyroidism, BPH, CKD, and anemia.     Patient here today with his daughter, Kyra. Patient stating she is eating healthy, has a good appetite, drinks fluids through out day, and does chair exercises for exercises. Patient stating he drinks prune juice every morning to help with constipation. Patient stating he is urinating well. Patient's daughter stating Patient's ankles are doing better now and less swollen.     Patient was previously in an skilled nursing, then he broke a hip and then ribs after falls. Patient went to [facility] until mid October. Patient is now living with his daughter Kyra.    Patient c/o ear wax build up to right ear.      The following portions of the patient's history were reviewed and updated as appropriate: allergies, current medications, past family history, past medical history, past social history, past surgical history and problem list.    Past Medical History:   Diagnosis Date    Abnormal ECG 2018    Anemia 2023    Arrhythmia 2018    A fib    Benign essential hypertension     Cataract     Chronic kidney disease     Clotting disorder 2023    Fx ribs    Coronary artery disease 2018    AFib    Disease of thyroid gland     GERD (gastroesophageal reflux disease) 2016    History of transfusion 2023    Hip fx, fx ribs    HL (hearing loss)     Hyperlipidemia     Hyperlipidemia 02/11/2016    Hypothyroidism     Paroxysmal atrial fibrillation     Visual impairment S       Past Surgical History:   Procedure Laterality Date    EYE SURGERY      HERNIA REPAIR      HIP INTERTROCHANTERIC NAILING Left 01/25/2023    Procedure: LT. HIP INTERTROCHANTERIC NAILING;  Surgeon: Leland Hair MD;  Location: [facility];  Service: Orthopedics;  Laterality: Left;       Review of Systems   HENT:  Positive for ear pain (clogged) and rhinorrhea (all the time, history of, not new).   "  Respiratory: Negative.     Cardiovascular: Negative.    Gastrointestinal:  Positive for constipation.   Genitourinary: Negative.    Musculoskeletal:  Positive for gait problem.   Neurological:  Positive for weakness.   Psychiatric/Behavioral: Negative.         Allergies   Allergen Reactions    Latex Rash       Objective     Vitals:    11/15/23 1247 11/15/23 1312   BP: 136/74 122/70   Pulse: 91    Resp: 16    Temp: 98.4 °F (36.9 °C)    SpO2: 97%    Weight: 65.3 kg (144 lb)    Height: 167.6 cm (65.98\")      Body mass index is 23.25 kg/m².    Physical Exam  Vitals reviewed.   HENT:      Head: Normocephalic.      Right Ear: No middle ear effusion. There is impacted cerumen. Tympanic membrane is not erythematous.      Left Ear:  No middle ear effusion. Tympanic membrane is not erythematous.   Eyes:      Pupils: Pupils are equal, round, and reactive to light.   Cardiovascular:      Rate and Rhythm: Normal rate and regular rhythm.   Pulmonary:      Effort: Pulmonary effort is normal. No respiratory distress.      Breath sounds: Normal breath sounds. No stridor. No wheezing, rhonchi or rales.   Musculoskeletal:         General: Normal range of motion.      Right lower leg: Edema (trace) present.      Left lower leg: Edema (trace) present.      Comments: Uses rollator   Skin:     General: Skin is warm and dry.   Neurological:      Mental Status: He is alert and oriented to person, place, and time.      Comments: [employer] [place]   Psychiatric:         Behavior: Behavior normal.           Current Outpatient Medications:     aspirin 81 MG chewable tablet, Chew 1 tablet Daily., Disp: , Rfl:     digoxin (LANOXIN) 125 MCG tablet, Take 1 tablet by mouth Daily., Disp: 30 tablet, Rfl: 1    famotidine (PEPCID) 20 MG tablet, TAKE ONE TABLET BY MOUTH DAILY (Patient taking differently: 1 tablet Every Night.), Disp: 90 tablet, Rfl: 0    finasteride (PROSCAR) 5 MG tablet, Take 1 tablet by mouth Daily. (Patient taking differently: Take 1 tablet by " mouth Every Night.), Disp: 30 tablet, Rfl: 0    furosemide (Lasix) 20 MG tablet, Take 0.5 tablets by mouth Daily., Disp: 30 tablet, Rfl: 1    levothyroxine (SYNTHROID, LEVOTHROID) 75 MCG tablet, Take 1 tablet by mouth Every Morning., Disp: 30 tablet, Rfl: 1    metoprolol tartrate (LOPRESSOR) 25 MG tablet, Take 0.5 tablets by mouth 2 (Two) Times a Day., Disp: 60 tablet, Rfl: 1    tamsulosin (FLOMAX) 0.4 MG capsule 24 hr capsule, Take 1 capsule by mouth Daily., Disp: 30 capsule, Rfl: 1    ipratropium (ATROVENT) 0.03 % nasal spray, 2 sprays into the nostril(s) as directed by provider Every 12 (Twelve) Hours., Disp: , Rfl:     Recent Results (from the past 2016 hour(s))   Digoxin Level    Collection Time: 09/12/23  1:57 PM    Specimen: Blood   Result Value Ref Range    Digoxin 1.30 (H) 0.60 - 1.20 ng/mL           Diagnoses and all orders for this visit:    1. Benign essential hypertension (Primary)  -     CBC & Differential; Future    2. Paroxysmal atrial fibrillation    3. Hyperlipidemia, unspecified hyperlipidemia type    4. Acquired hypothyroidism  -     levothyroxine (SYNTHROID, LEVOTHROID) 75 MCG tablet; Take 1 tablet by mouth Every Morning.  Dispense: 30 tablet; Refill: 1  -     TSH Rfx On Abnormal To Free T4; Future    5. Stage 3a chronic kidney disease  -     furosemide (Lasix) 20 MG tablet; Take 0.5 tablets by mouth Daily.  Dispense: 30 tablet; Refill: 1  -     Comprehensive metabolic panel; Future    6. Impacted cerumen of right ear    7. Abnormal gait  -     Ambulatory Referral to Home Health    8. Mobility impaired  -     Ambulatory Referral to Home Health           Patient Instructions   Hypertension/Afib: stable. Continue on metoprolol 25 mg 1/2 tab twice a day, digoxin 125 mcg 1 tab daily. Continue to follow up with Dr. Osuna as directed.     Hyperlipidemia: last lipid panel done on 3/22/23 with results of total chol. 152, triglycerides 94, HDL 46 and LDL 88. Continue to encourage a heart healthy diet and  drink plenty of water throughout day.    Hypothyroidism: ordering labs for further evaluation, continue levothyroxine 75 mcg 1 tab daily at this time.    CKD: ordering labs for further evaluation; decreasing furosemide at this time, lower leg swelling minimal at this time.     Cerumen impaction: removal completed in office    Abnormal gait/mobility: referral for physical therapy through home health    Return for labs, [employer] [facility] in one month.

## 2023-11-16 NOTE — PATIENT INSTRUCTIONS
Hypertension/Afib: stable. Continue on metoprolol 25 mg 1/2 tab twice a day, digoxin 125 mcg 1 tab daily. Continue to follow up with Dr. Osuna as directed.     Hyperlipidemia: last lipid panel done on 3/22/23 with results of total chol. 152, triglycerides 94, HDL 46 and LDL 88. Continue to encourage a heart healthy diet and drink plenty of water throughout day.    Hypothyroidism: ordering labs for further evaluation, continue levothyroxine 75 mcg 1 tab daily at this time.    CKD: ordering labs for further evaluation; decreasing furosemide at this time, lower leg swelling minimal at this time.     Cerumen impaction: removal completed in office    Abnormal gait/mobility: referral for physical therapy through home health

## 2023-11-21 ENCOUNTER — TELEPHONE (OUTPATIENT)
Dept: FAMILY MEDICINE CLINIC | Facility: CLINIC | Age: 88
End: 2023-11-21

## 2023-11-21 NOTE — TELEPHONE ENCOUNTER
Caller: YADIRA    Relationship:     Best call back number: [phone]     What orders are you requesting (i.e. lab or imaging):      In what timeframe would the patient need to come in:      Where will you receive your lab/imaging services:      Additional notes: [facility] AND NEED VERBAL ORDERS PT ONE MORE TIME THIS WEEK AND THEN 2 DAYS WEEK FOR 3 WEEKS.  ALSO WOULD LIKE TO ADD ON OCCUPATIONAL THERAPY.

## 2023-12-13 ENCOUNTER — TELEPHONE (OUTPATIENT)
Dept: FAMILY MEDICINE CLINIC | Facility: CLINIC | Age: 88
End: 2023-12-13

## 2023-12-13 NOTE — TELEPHONE ENCOUNTER
Caller: YADIRA    Relationship:     Best call back number: [phone]     What is the best time to reach you: ANY    Who are you requesting to speak with (clinical staff, provider,  specific staff member): CLINICAL STAFF    Do you know the name of the person who called:  YADIRA WITH [facility]    What was the call regarding: DISCHARGING FROM [facility] HE HAS NOT MET HIS LONG TERM GOALS, BUT HE HAS MADE GOOD PROGRESS AND READY FOR DISCHARGE.  DISCHARGED AS OF 12/12/23    Is it okay if the provider responds through MyChart:

## 2023-12-19 ENCOUNTER — OFFICE VISIT (OUTPATIENT)
Dept: FAMILY MEDICINE CLINIC | Facility: CLINIC | Age: 88
End: 2023-12-19
Payer: MEDICARE

## 2023-12-19 VITALS
HEART RATE: 65 BPM | HEIGHT: 66 IN | RESPIRATION RATE: 14 BRPM | DIASTOLIC BLOOD PRESSURE: 62 MMHG | WEIGHT: 150.9 LBS | SYSTOLIC BLOOD PRESSURE: 132 MMHG | BODY MASS INDEX: 24.25 KG/M2 | OXYGEN SATURATION: 100 % | TEMPERATURE: 96.8 F

## 2023-12-19 DIAGNOSIS — Z00.00 ENCOUNTER FOR PREVENTIVE CARE: ICD-10-CM

## 2023-12-19 DIAGNOSIS — E78.5 HYPERLIPIDEMIA, UNSPECIFIED HYPERLIPIDEMIA TYPE: ICD-10-CM

## 2023-12-19 DIAGNOSIS — N18.31 STAGE 3A CHRONIC KIDNEY DISEASE: ICD-10-CM

## 2023-12-19 DIAGNOSIS — I10 BENIGN ESSENTIAL HYPERTENSION: ICD-10-CM

## 2023-12-19 DIAGNOSIS — Z00.00 ENCOUNTER FOR SUBSEQUENT ANNUAL WELLNESS VISIT (AWV) IN MEDICARE PATIENT: Primary | ICD-10-CM

## 2023-12-19 DIAGNOSIS — I48.0 PAROXYSMAL ATRIAL FIBRILLATION: ICD-10-CM

## 2023-12-19 DIAGNOSIS — E03.9 ACQUIRED HYPOTHYROIDISM: ICD-10-CM

## 2023-12-19 PROCEDURE — 1160F RVW MEDS BY RX/DR IN RCRD: CPT | Performed by: STUDENT IN AN ORGANIZED HEALTH CARE EDUCATION/TRAINING PROGRAM

## 2023-12-19 PROCEDURE — G0439 PPPS, SUBSEQ VISIT: HCPCS | Performed by: STUDENT IN AN ORGANIZED HEALTH CARE EDUCATION/TRAINING PROGRAM

## 2023-12-19 PROCEDURE — 1159F MED LIST DOCD IN RCRD: CPT | Performed by: STUDENT IN AN ORGANIZED HEALTH CARE EDUCATION/TRAINING PROGRAM

## 2023-12-19 NOTE — PROGRESS NOTES
The ABCs of the Annual Wellness Visit  Subsequent Medicare Wellness Visit    Subjective    Lavelle Soliman is a 89 y.o. male who presents for a Subsequent Medicare Wellness Visit.    The following portions of the patient's history were reviewed and   updated as appropriate: allergies, current medications, past family history, past medical history, past social history, past surgical history, and problem list.    Compared to one year ago, the patient feels his physical   health is the same.    Compared to one year ago, the patient feels his mental   health is the same.    Recent Hospitalizations:  This patient has had a [facility] admission record on file within the last 365 days.    Current Medical Providers:  Patient Care Team:  Anish Porras MD as PCP - General (Internal Medicine)    Outpatient Medications Prior to Visit   Medication Sig Dispense Refill    famotidine (PEPCID) 20 MG tablet TAKE ONE TABLET BY MOUTH DAILY (Patient taking differently: 1 tablet Every Night.) 90 tablet 0    furosemide (Lasix) 20 MG tablet Take 0.5 tablets by mouth Daily. 30 tablet 1    ipratropium (ATROVENT) 0.03 % nasal spray 2 sprays into the nostril(s) as directed by provider Every 12 (Twelve) Hours.      levothyroxine (SYNTHROID, LEVOTHROID) 75 MCG tablet Take 1 tablet by mouth Every Morning. 30 tablet 1    metoprolol tartrate (LOPRESSOR) 25 MG tablet Take 0.5 tablets by mouth 2 (Two) Times a Day. 60 tablet 1    aspirin 81 MG chewable tablet Chew 1 tablet Daily.      digoxin (LANOXIN) 125 MCG tablet Take 1 tablet by mouth Daily. 30 tablet 1    finasteride (PROSCAR) 5 MG tablet Take 1 tablet by mouth Daily. (Patient taking differently: Take 1 tablet by mouth Every Night.) 30 tablet 0    tamsulosin (FLOMAX) 0.4 MG capsule 24 hr capsule Take 1 capsule by mouth Daily. 30 capsule 1     No facility-administered medications prior to visit.       No opioid medication identified on active medication list. I have reviewed chart for  "other potential  high risk medication/s and harmful drug interactions in the elderly.        Aspirin is on active medication list. Aspirin use is indicated based on review of current medical condition/s. Pros and cons of this therapy have been discussed today. Benefits of this medication outweigh potential harm.  Patient has been encouraged to continue taking this medication.  .      Patient Active Problem List   Diagnosis    Benign essential hypertension    Stage 3a chronic kidney disease    Hyperlipidemia    Hypothyroidism    Muscle cramps    Anemia    Hyperglycemia    Urinary retention    UTI (urinary tract infection), bacterial    BPH with obstruction/lower urinary tract symptoms    Paroxysmal atrial fibrillation    Closed fracture of left hip, initial encounter    Postoperative anemia due to acute blood loss    Moderate malnutrition    Multiple rib fractures involving four or more ribs    Diplopia    Dry eye syndrome of bilateral lacrimal glands    Nonexudative age-related macular degeneration, right eye, intermediate dry stage    Hypertensive disorder    Low vision right eye category 1, low vision left eye category 1    Macular cyst, hole, or pseudohole, right eye    Macular degeneration    Presbyopia    Presence of intraocular lens    Scotoma involving central area, bilateral     Advance Care Planning   Advance Care Planning     Advance Directive is on file.  ACP discussion was held with the patient during this visit. Patient has an advance directive in EMR which is still valid.      Objective    Vitals:    12/19/23 1443   BP: 132/62   BP Location: Left arm   Patient Position: Sitting   Cuff Size: Adult   Pulse: 65   Resp: 14   Temp: 96.8 °F (36 °C)   TempSrc: Oral   SpO2: 100%   Weight: 68.4 kg (150 lb 14.4 oz)   Height: 167.6 cm (65.98\")     Estimated body mass index is 24.37 kg/m² as calculated from the following:    Height as of this encounter: 167.6 cm (65.98\").    Weight as of this encounter: 68.4 kg " (150 lb 14.4 oz).    BMI is within normal parameters. No other follow-up for BMI required.      Does the patient have evidence of cognitive impairment? No          HEALTH RISK ASSESSMENT    Smoking Status:  Social History     Tobacco Use   Smoking Status Former    Types: Pipe    Start date: 1955    Quit date: 1970    Years since quittin.0   Smokeless Tobacco Never     Alcohol Consumption:  Social History     Substance and Sexual Activity   Alcohol Use Yes    Alcohol/week: 1.0 standard drink of alcohol    Types: 1 Shots of liquor per week    Comment: occ     Fall Risk Screen:    STEADI Fall Risk Assessment has not been completed.    Depression Screenin/19/2023     2:49 PM   PHQ-2/PHQ-9 Depression Screening   Little Interest or Pleasure in Doing Things 0-->not at all   Feeling Down, Depressed or Hopeless 0-->not at all   PHQ-9: Brief Depression Severity Measure Score 0       Health Habits and Functional and Cognitive Screenin/19/2023     2:46 PM   Functional & Cognitive Status   Do you have difficulty preparing food and eating? Yes   Do you have difficulty bathing yourself, getting dressed or grooming yourself? Yes   Do you have difficulty using the toilet? No   Do you have difficulty moving around from place to place? Yes   Do you have trouble with steps or getting out of a bed or a chair? Yes   Current Diet Well Balanced Diet   Dental Exam Up to date   Eye Exam Up to date   Exercise (times per week) 7 times per week   Current Exercises Include Weightlifting;Walking   Do you need help using the phone?  Yes   Are you deaf or do you have serious difficulty hearing?  Yes   Do you need help to go to places out of walking distance? Yes   Do you need help shopping? Yes   Do you need help preparing meals?  Yes   Do you need help with housework?  Yes   Do you need help with laundry? Yes   Do you need help taking your medications? Yes   Do you need help managing money? Yes   Do you ever drive  or ride in a car without wearing a seat belt? No   Have you felt unusual stress, anger or loneliness in the last month? No   Who do you live with? Child   If you need help, do you have trouble finding someone available to you? No   Have you been bothered in the last four weeks by sexual problems? No   Do you have difficulty concentrating, remembering or making decisions? Yes       Age-appropriate Screening Schedule:  Refer to the list below for future screening recommendations based on patient's age, sex and/or medical conditions. Orders for these recommended tests are listed in the plan section. The patient has been provided with a written plan.    Health Maintenance   Topic Date Due    ANNUAL WELLNESS VISIT  12/08/2022    LIPID PANEL  03/22/2024    TDAP/TD VACCINES (3 - Td or Tdap) 07/18/2033    COVID-19 Vaccine  Completed    INFLUENZA VACCINE  Completed    Pneumococcal Vaccine 65+  Completed    ZOSTER VACCINE  Discontinued                  CMS Preventative Services Quick Reference  Risk Factors Identified During Encounter  Fall Risk-High or Moderate: Discussed Fall Prevention in the home  The above risks/problems have been discussed with the patient.  Pertinent information has been shared with the patient in the After Visit Summary.  An After Visit Summary and PPPS were made available to the patient.    Follow Up:   Next Medicare Wellness visit to be scheduled in 1 year.       Additional E&M Note during same encounter follows:  Patient has multiple medical problems which are significant and separately identifiable that require additional work above and beyond the Medicare Wellness Visit.      Chief Complaint  Annual Exam (MWV)    Subjective        HPI  Lavelle Soliman is also being seen today for wellness examination.  Accompanied with daughter Ms Little Soliman ( POA)  Review of system is negative for fever, headache, chest pain, shortness of breath, palpitation, nausea, vomiting, any recent change in bladder  "habits.           Objective   Vital Signs:  /62 (BP Location: Left arm, Patient Position: Sitting, Cuff Size: Adult)   Pulse 65   Temp 96.8 °F (36 °C) (Oral)   Resp 14   Ht 167.6 cm (65.98\")   Wt 68.4 kg (150 lb 14.4 oz)   SpO2 100%   BMI 24.37 kg/m²     Physical Exam  HENT:      Head: Normocephalic and atraumatic.      Mouth/Throat:      Mouth: Mucous membranes are moist.      Pharynx: Oropharynx is clear.   Eyes:      Extraocular Movements: Extraocular movements intact.      Conjunctiva/sclera: Conjunctivae normal.      Pupils: Pupils are equal, round, and reactive to light.   Cardiovascular:      Rate and Rhythm: Normal rate.   Pulmonary:      Effort: Pulmonary effort is normal.      Breath sounds: Normal breath sounds.   Abdominal:      General: Bowel sounds are normal.      Palpations: Abdomen is soft.   Musculoskeletal:      Cervical back: Neck supple.   Skin:     General: Skin is warm.      Capillary Refill: Capillary refill takes less than 2 seconds.   Neurological:      Mental Status: He is alert and oriented to person, place, and time.   Psychiatric:         Mood and Affect: Mood normal.          The following data was reviewed by: Anish Porras MD on 12/19/2023:  CMP          5/9/2023    06:16 7/14/2023    08:50 12/13/2023    10:03   CMP   Glucose 89  100  106    BUN 31  19  16    Creatinine 1.18  1.41  1.36    EGFR 56.5      Sodium 135  141  142    Potassium 4.1  4.3  4.8    Chloride 106  106  107    Calcium 7.3  9.0  9.3    Total Protein  7.0  7.3    Albumin  3.7  3.5    Globulin  3.3  3.8    Total Bilirubin  0.4  0.3    Alkaline Phosphatase  150  109    AST (SGOT)  14  15    ALT (SGPT)  19  13    BUN/Creatinine Ratio 26.3  13.5  11.8    Anion Gap 4.0        CBC          5/11/2023    04:45 7/14/2023    08:50 12/13/2023    10:03   CBC   WBC 13.05  10.65  10.02    RBC 2.86  3.89  4.13    Hemoglobin 8.2  11.6  13.2    Hematocrit 26.3  36.3  39.1    MCV 92.0  93.3  94.7    MCH 28.7  29.8  " 32.0    MCHC 31.2  32.0  33.8    RDW 15.1  15.5  13.0    Platelets 311  212  198      Lipid Panel          3/22/2023    15:14   Lipid Panel   Total Cholesterol 152    Triglycerides 94    HDL Cholesterol 46    VLDL Cholesterol 18    LDL Cholesterol  88    LDL/HDL Ratio 1.9      TSH          1/30/2023    04:26 3/22/2023    15:14 12/13/2023    10:03   TSH   TSH 1.600  3.430  2.300                 Assessment and Plan   Diagnoses and all orders for this visit:    1. Encounter for subsequent annual wellness visit (AWV) in Medicare patient (Primary)    2. Benign essential hypertension  -     CBC Auto Differential; Future  -     Comprehensive Metabolic Panel; Future  -     Lipid Panel With / Chol / HDL Ratio; Future  -     TSH Rfx On Abnormal To Free T4; Future    3. Stage 3a chronic kidney disease  -     CBC Auto Differential; Future  -     Comprehensive Metabolic Panel; Future  -     Lipid Panel With / Chol / HDL Ratio; Future  -     TSH Rfx On Abnormal To Free T4; Future    4. Acquired hypothyroidism  -     CBC Auto Differential; Future  -     Comprehensive Metabolic Panel; Future  -     Lipid Panel With / Chol / HDL Ratio; Future  -     TSH Rfx On Abnormal To Free T4; Future    5. Paroxysmal atrial fibrillation  -     CBC Auto Differential; Future  -     Comprehensive Metabolic Panel; Future  -     Lipid Panel With / Chol / HDL Ratio; Future  -     TSH Rfx On Abnormal To Free T4; Future    6. Hyperlipidemia, unspecified hyperlipidemia type  -     CBC Auto Differential; Future  -     Comprehensive Metabolic Panel; Future  -     Lipid Panel With / Chol / HDL Ratio; Future  -     TSH Rfx On Abnormal To Free T4; Future    7. Encounter for preventive care         # Wellness checkup  Labs reviewed  White cell count within normal limit, hemoglobin improved to 13 in comparison to the labs done 6 months ago at that time patient was anemic  Platelet count within normal limit  Fasting glucose around 106, creatinine/EGFR 1.36/47.6  stable in comparison to labs done 6 months ago, electrolytes within normal limit, LFTs within normal limit  TSH at goal around 2.3  # Hypothyroidism  TSH at goal  Continue same levothyroxine dose  Patient encouraged to partake of healthy diet rich in fresh fruits and vegetables as well as lean proteins.  Patient encouraged to participate in daily exercise with goal of 30 min sustained activity.  Wear seatbelt when driving  Flu shot annually      Follow Up   No follow-ups on file.  Patient was given instructions and counseling regarding his condition or for health maintenance advice. Please see specific information pulled into the AVS if appropriate.

## 2023-12-19 NOTE — TELEPHONE ENCOUNTER
Rx Refill Note  Requested Prescriptions     Pending Prescriptions Disp Refills    finasteride (PROSCAR) 5 MG tablet 30 tablet 1     Sig: Take 1 tablet by mouth Every Night.      Last office visit with prescribing clinician: 12/19/2023   Last telemedicine visit with prescribing clinician: Visit date not found   Next office visit with prescribing clinician: Visit date not found                         Would you like a call back once the refill request has been completed: [] Yes [] No    If the office needs to give you a call back, can they leave a voicemail: [] Yes [] No    Thomas Vanegas Rep  12/19/23, 16:06 EST

## 2023-12-20 RX ORDER — TAMSULOSIN HYDROCHLORIDE 0.4 MG/1
0.4 CAPSULE ORAL DAILY
Qty: 30 CAPSULE | Refills: 1 | Status: SHIPPED | OUTPATIENT
Start: 2023-12-20 | End: 2023-12-22

## 2023-12-21 RX ORDER — FINASTERIDE 5 MG/1
5 TABLET, FILM COATED ORAL NIGHTLY
Qty: 30 TABLET | Refills: 1 | Status: SHIPPED | OUTPATIENT
Start: 2023-12-21

## 2023-12-22 RX ORDER — DIGOXIN 125 MCG
125 TABLET ORAL
Qty: 30 TABLET | Refills: 1 | Status: SHIPPED | OUTPATIENT
Start: 2023-12-22

## 2023-12-22 RX ORDER — TAMSULOSIN HYDROCHLORIDE 0.4 MG/1
0.4 CAPSULE ORAL DAILY
Qty: 30 CAPSULE | Refills: 1 | Status: SHIPPED | OUTPATIENT
Start: 2023-12-22

## 2024-01-15 ENCOUNTER — TELEPHONE (OUTPATIENT)
Dept: FAMILY MEDICINE CLINIC | Facility: CLINIC | Age: 89
End: 2024-01-15
Payer: MEDICARE

## 2024-01-15 NOTE — TELEPHONE ENCOUNTER
Caller: Little Soliman    Relationship: Emergency Contact    Best call back number: [phone]    What orders are you requesting (i.e. lab or imaging): LABS    In what timeframe would the patient need to come in: WEEK BEFORE MEDICARE WELLNESS VISIT ON 12/23/2/4    Where will you receive your lab/imaging services: OFFICE    Additional notes: PATIENT'S DAUGHTER REQUESTING CALLBACK ONE ORDERS PLACED TO GET PATIENT SCHEDULED.

## 2024-01-19 DIAGNOSIS — E03.9 ACQUIRED HYPOTHYROIDISM: ICD-10-CM

## 2024-01-19 RX ORDER — LEVOTHYROXINE SODIUM 0.07 MG/1
75 TABLET ORAL EVERY MORNING
Qty: 30 TABLET | Refills: 1 | Status: SHIPPED | OUTPATIENT
Start: 2024-01-19

## 2024-02-19 RX ORDER — FINASTERIDE 5 MG/1
5 TABLET, FILM COATED ORAL NIGHTLY
Qty: 30 TABLET | Refills: 1 | Status: SHIPPED | OUTPATIENT
Start: 2024-02-19

## 2024-03-09 DIAGNOSIS — N18.31 STAGE 3A CHRONIC KIDNEY DISEASE: ICD-10-CM

## 2024-03-11 RX ORDER — FUROSEMIDE 20 MG/1
10 TABLET ORAL DAILY
Qty: 30 TABLET | Refills: 1 | Status: SHIPPED | OUTPATIENT
Start: 2024-03-11 | End: 2024-03-14

## 2024-03-13 DIAGNOSIS — N18.31 STAGE 3A CHRONIC KIDNEY DISEASE: ICD-10-CM

## 2024-03-14 RX ORDER — FUROSEMIDE 20 MG/1
10 TABLET ORAL DAILY
Qty: 30 TABLET | Refills: 1 | Status: SHIPPED | OUTPATIENT
Start: 2024-03-14

## 2024-04-16 ENCOUNTER — TELEPHONE (OUTPATIENT)
Dept: FAMILY MEDICINE CLINIC | Facility: CLINIC | Age: 89
End: 2024-04-16

## 2024-04-16 NOTE — TELEPHONE ENCOUNTER
Hub staff attempted to follow warm transfer process and was unsuccessful     Caller: Little Soliman    Relationship to patient: Emergency Contact    Best call back number: [phone]    Patient is needing: STATED THAT THEY ARE NEEDING TO CANCEL THE LABS SCHEDULED FOR 12/16/23 AT 9:30 AM. PLEASE CALL AND ADVISE IF NEEDED

## 2024-04-18 NOTE — NURSING NOTE
All charting done by Curry APLACIO RN has been verified.   Condition:: growth Please Describe Your Condition:: Patient has a complaint of a non-healing sore on the left ear that has been present for more than 3 months with no previous treatment.

## 2024-04-19 RX ORDER — FINASTERIDE 5 MG/1
5 TABLET, FILM COATED ORAL NIGHTLY
Qty: 30 TABLET | Refills: 1 | Status: SHIPPED | OUTPATIENT
Start: 2024-04-19

## 2024-04-26 ENCOUNTER — OFFICE VISIT (OUTPATIENT)
Age: 89
End: 2024-04-26
Payer: MEDICARE

## 2024-04-26 VITALS
HEART RATE: 66 BPM | HEIGHT: 66 IN | WEIGHT: 150.8 LBS | BODY MASS INDEX: 24.23 KG/M2 | SYSTOLIC BLOOD PRESSURE: 130 MMHG | OXYGEN SATURATION: 89 % | DIASTOLIC BLOOD PRESSURE: 80 MMHG

## 2024-04-26 DIAGNOSIS — E03.9 ACQUIRED HYPOTHYROIDISM: ICD-10-CM

## 2024-04-26 DIAGNOSIS — E78.5 HYPERLIPIDEMIA, UNSPECIFIED HYPERLIPIDEMIA TYPE: ICD-10-CM

## 2024-04-26 DIAGNOSIS — N18.31 STAGE 3A CHRONIC KIDNEY DISEASE: ICD-10-CM

## 2024-04-26 DIAGNOSIS — I48.21 PERMANENT ATRIAL FIBRILLATION: Primary | ICD-10-CM

## 2024-04-26 DIAGNOSIS — I10 BENIGN ESSENTIAL HYPERTENSION: ICD-10-CM

## 2024-04-26 RX ORDER — DIGOXIN 125 MCG
TABLET ORAL
Start: 2024-04-26

## 2024-04-26 NOTE — PROGRESS NOTES
Subjective:     Encounter Date:04/26/2024      Patient ID: Lavelle Soliman is a 89 y.o. male.    Chief Complaint:  History of Present Illness    This is a 89-year-old with permanent atrial fibrillation, hypertension, hyperlipidemia, chronic kidney disease, hypothyroidism, BPH, prior right hemothorax following a fall resulting in chest tube placement in 5/2023, who presents for follow-up.       Saw the patient initially when he was admitted to the hospital in 12/2018 with a urinary tract infection with sepsis.  He went to atrial fibrillation at that time.  Started on Toprol tartrate.  Anticoagulation was discussed with the patient and his family and was felt that he would not be too high risk because of his advanced age and overall frailty.  Echocardiogram during that admission showed normal left ventricular systolic function wall motion with EF 53%, normal left atrial size and no significant valvular disease.  Continue to follow-up with him in the office and he was last seen in 7/2019 at which time he appeared to be continued to do well.     I did not see the patient again until he was admitted to the hospital in 5/2023.  The patient had presented to hospital initially with complaints of positional lightheadedness and significant fatigue.  He had fallen in the shower 5 days prior hitting the right side of his chest.  He reported a bruise over his right lower chest and flank and had some pain but otherwise no other significant symptoms.  Due to the onset of the lightheadedness and fatigue he ended up in the emergency room where he was noted to be in atrial fibrillation with rates in the 120s to 130s.  He was hypotensive with systolic pressures in the 70s.  His hemoglobin is down to 7.3.  CT of the chest showed evidence of right-sided acute displaced rib fractures with a flail segment and a large right-sided hemothorax.  He had subsequently underwent chest tube placement.  He was transfused for his anemia.  He  remained in atrial fibrillation during the course of his admission.  Rates improved on just a low-dose of metoprolol and digoxin with 90s to 100s.  There were no plans to anticoagulate in light of his recent fall and hemothorax.     He returned to the office and was evaluated by MARLENE Rojas in 6/2023.  Appear to be doing well at that time.  He was still in atrial fibrillation at that time but with good rate control.  He was noted to have some lower extremity swelling at which time was recommended that we just monitor.     I saw the patient last in 9/2023 at which time he appeared to be doing well from a cardiac standpoint.  He was still residing in a nursing home but his family was planning on bringing him home soon.  No changes were made to his management.  I did recommend checking a digoxin level which appeared to be mildly elevated.  I recommended decreasing his digoxin to Mondays, Wednesdays, and Fridays.    The patient presents today for follow-up.  Since I saw him last he has moved in with his daughter and is lived there since October.  This has been going well.  He has been tolerating the lower dose of the digoxin.  He denies any chest pain, shortness of breath urinary, palpitations, orthopnea, near-syncope or syncope.  His lower extremity swelling has largely been controlled with a low-dose of furosemide.  His main complaint is trouble swallowing..  This has been worked up and they recommended diet modification      Review of Systems   Constitutional: Positive for malaise/fatigue.   HENT:  Negative for hearing loss, hoarse voice, nosebleeds and sore throat.    Eyes:  Negative for pain.   Cardiovascular:  Positive for leg swelling. Negative for chest pain, claudication, cyanosis, dyspnea on exertion, irregular heartbeat, near-syncope, orthopnea, palpitations, paroxysmal nocturnal dyspnea and syncope.   Respiratory:  Negative for shortness of breath and snoring.    Endocrine: Negative for cold  intolerance, heat intolerance, polydipsia, polyphagia and polyuria.   Skin:  Negative for itching and rash.   Musculoskeletal:  Negative for arthritis, falls, joint pain, joint swelling, muscle cramps, muscle weakness and myalgias.   Gastrointestinal:  Positive for dysphagia. Negative for constipation, diarrhea, heartburn, hematemesis, hematochezia, melena, nausea and vomiting.   Genitourinary:  Negative for frequency, hematuria and hesitancy.   Neurological:  Negative for excessive daytime sleepiness, dizziness, headaches, light-headedness, numbness and weakness.   Psychiatric/Behavioral:  Negative for depression. The patient is not nervous/anxious.          Current Outpatient Medications:     digoxin (LANOXIN) 125 MCG tablet, TAKE 1 TABLET BY MOUTH DAILY, Disp: 30 tablet, Rfl: 1    famotidine (PEPCID) 20 MG tablet, TAKE ONE TABLET BY MOUTH DAILY (Patient taking differently: 1 tablet Every Night.), Disp: 90 tablet, Rfl: 0    finasteride (PROSCAR) 5 MG tablet, TAKE ONE TABLET BY MOUTH ONCE NIGHTLY, Disp: 30 tablet, Rfl: 1    furosemide (LASIX) 20 MG tablet, TAKE 1/2 TABLET BY MOUTH DAILY, Disp: 30 tablet, Rfl: 1    ipratropium (ATROVENT) 0.03 % nasal spray, 2 sprays into the nostril(s) as directed by provider Every 12 (Twelve) Hours., Disp: , Rfl:     levothyroxine (SYNTHROID, LEVOTHROID) 75 MCG tablet, TAKE ONE TABLET BY MOUTH EVERY MORNING, Disp: 30 tablet, Rfl: 1    metoprolol tartrate (LOPRESSOR) 25 MG tablet, TAKE 1/2 TABLET TWO TIMES A DAY, Disp: 60 tablet, Rfl: 1    tamsulosin (FLOMAX) 0.4 MG capsule 24 hr capsule, TAKE 1 CAPSULE BY MOUTH DAILY, Disp: 30 capsule, Rfl: 1    Past Medical History:   Diagnosis Date    Abnormal ECG 2018    Anemia 2023    Arrhythmia 2018    A fib    Benign essential hypertension     Cataract     Chronic kidney disease     Clotting disorder 2023    Fx ribs    Coronary artery disease 2018    AFib    Disease of thyroid gland     GERD (gastroesophageal reflux disease) 2016    History  "of transfusion     Hip fx, fx ribs    HL (hearing loss)     Hyperlipidemia     Hyperlipidemia 2016    Hypothyroidism     Paroxysmal atrial fibrillation     Visual impairment S       Past Surgical History:   Procedure Laterality Date    EYE SURGERY      HERNIA REPAIR      HIP INTERTROCHANTERIC NAILING Left 2023    Procedure: LT. HIP INTERTROCHANTERIC NAILING;  Surgeon: Leland Hair MD;  Location: [facility] OR;  Service: Orthopedics;  Laterality: Left;       Family History   Problem Relation Age of Onset    No Known Problems Mother     No Known Problems Father     Hyperlipidemia Brother     Hearing loss Paternal Aunt     Vision loss Paternal Aunt     No Known Problems Maternal Grandmother     No Known Problems Maternal Grandfather     No Known Problems Paternal Grandmother     No Known Problems Paternal Grandfather     Malig Hyperthermia Neg Hx        Social History     Tobacco Use    Smoking status: Former     Types: Pipe     Start date: 1955     Quit date: 1970     Years since quittin.3    Smokeless tobacco: Never   Vaping Use    Vaping status: Never Used   Substance Use Topics    Alcohol use: Yes     Alcohol/week: 1.0 standard drink of alcohol     Types: 1 Shots of liquor per week     Comment: occ    Drug use: Never         ECG 12 Lead    Date/Time: 2024 2:33 PM  Performed by: Licha Osuna MD    Authorized by: Licha Osuna MD  Comparison: compared with previous ECG   Similar to previous ECG  Rhythm: atrial fibrillation             Objective:     Visit Vitals  /80 (BP Location: Left arm, Patient Position: Sitting, Cuff Size: Adult)   Pulse 66   Ht 167.6 cm (65.98\")   Wt 68.4 kg (150 lb 12.8 oz)   SpO2 (!) 89%   BMI 24.35 kg/m²         Constitutional:       Appearance: Normal appearance. Well-developed.   HENT:      Head: Normocephalic and atraumatic.   Neck:      Vascular: No carotid bruit or JVD.   Pulmonary:      Effort: Pulmonary effort is normal.      Breath " sounds: Normal breath sounds.   Cardiovascular:      Normal rate. Irregularly irregular rhythm.      No gallop.    Pulses:     Radial: 2+ bilaterally.  Edema:     Peripheral edema present.     Ankle: bilateral 1+ edema of the ankle.  Abdominal:      Palpations: Abdomen is soft.   Skin:     General: Skin is warm and dry.   Neurological:      Mental Status: Alert and oriented to person, place, and time.               Assessment:          Diagnosis Plan   1. Permanent atrial fibrillation        2. Benign essential hypertension        3. Hyperlipidemia, unspecified hyperlipidemia type        4. Acquired hypothyroidism        5. Stage 3a chronic kidney disease               Plan:         1.  Permanent atrial fibrillation.  Rate controlled on digoxin 3 days a week and low-dose metoprolol.  Not a good candidate for anticoagulation due to overall frailty and issues with falls.  Continue current management.  2.  Hypertension.  Well-controlled on his current regimen medications.  Continue the same.  3.  Hyperlipidemia  4.  Hypothyroidism  5.  Chronic kidney disease    Will plan on seeing the patient back again in 6 months.

## 2024-04-26 NOTE — LETTER
April 26, 2024       No Recipients    Patient: Lavelle Soliman   YOB: 1927   Date of Visit: 4/26/2024       Dear MARLENE Villanueva    Lavelle Soliman was in my office today. Below is a copy of my note.    If you have questions, please do not hesitate to call me. I look forward to following Lavelle along with you.         Sincerely,        Licha Osuna MD        CC:   No Recipients        Subjective:     Encounter Date:04/26/2024      Patient ID: Lavelle Soliman is a 89 y.o. male.    Chief Complaint:  History of Present Illness    This is a 89-year-old with permanent atrial fibrillation, hypertension, hyperlipidemia, chronic kidney disease, hypothyroidism, BPH, prior right hemothorax following a fall resulting in chest tube placement in 5/2023, who presents for follow-up.       Saw the patient initially when he was admitted to the hospital in 12/2018 with a urinary tract infection with sepsis.  He went to atrial fibrillation at that time.  Started on Toprol tartrate.  Anticoagulation was discussed with the patient and his family and was felt that he would not be too high risk because of his advanced age and overall frailty.  Echocardiogram during that admission showed normal left ventricular systolic function wall motion with EF 53%, normal left atrial size and no significant valvular disease.  Continue to follow-up with him in the office and he was last seen in 7/2019 at which time he appeared to be continued to do well.     I did not see the patient again until he was admitted to the hospital in 5/2023.  The patient had presented to hospital initially with complaints of positional lightheadedness and significant fatigue.  He had fallen in the shower 5 days prior hitting the right side of his chest.  He reported a bruise over his right lower chest and flank and had some pain but otherwise no other significant symptoms.  Due to the onset of the lightheadedness and fatigue he ended up in the  emergency room where he was noted to be in atrial fibrillation with rates in the 120s to 130s.  He was hypotensive with systolic pressures in the 70s.  His hemoglobin is down to 7.3.  CT of the chest showed evidence of right-sided acute displaced rib fractures with a flail segment and a large right-sided hemothorax.  He had subsequently underwent chest tube placement.  He was transfused for his anemia.  He remained in atrial fibrillation during the course of his admission.  Rates improved on just a low-dose of metoprolol and digoxin with 90s to 100s.  There were no plans to anticoagulate in light of his recent fall and hemothorax.     He returned to the office and was evaluated by MARLENE Rojas in 6/2023.  Appear to be doing well at that time.  He was still in atrial fibrillation at that time but with good rate control.  He was noted to have some lower extremity swelling at which time was recommended that we just monitor.     I saw the patient last in 9/2023 at which time he appeared to be doing well from a cardiac standpoint.  He was still residing in a nursing home but his family was planning on bringing him home soon.  No changes were made to his management.  I did recommend checking a digoxin level which appeared to be mildly elevated.  I recommended decreasing his digoxin to Mondays, Wednesdays, and Fridays.    ROS      Current Outpatient Medications:   •  digoxin (LANOXIN) 125 MCG tablet, TAKE 1 TABLET BY MOUTH DAILY, Disp: 30 tablet, Rfl: 1  •  famotidine (PEPCID) 20 MG tablet, TAKE ONE TABLET BY MOUTH DAILY (Patient taking differently: 1 tablet Every Night.), Disp: 90 tablet, Rfl: 0  •  finasteride (PROSCAR) 5 MG tablet, TAKE ONE TABLET BY MOUTH ONCE NIGHTLY, Disp: 30 tablet, Rfl: 1  •  furosemide (LASIX) 20 MG tablet, TAKE 1/2 TABLET BY MOUTH DAILY, Disp: 30 tablet, Rfl: 1  •  ipratropium (ATROVENT) 0.03 % nasal spray, 2 sprays into the nostril(s) as directed by provider Every 12 (Twelve) Hours.,  Disp: , Rfl:   •  levothyroxine (SYNTHROID, LEVOTHROID) 75 MCG tablet, TAKE ONE TABLET BY MOUTH EVERY MORNING, Disp: 30 tablet, Rfl: 1  •  metoprolol tartrate (LOPRESSOR) 25 MG tablet, TAKE 1/2 TABLET TWO TIMES A DAY, Disp: 60 tablet, Rfl: 1  •  tamsulosin (FLOMAX) 0.4 MG capsule 24 hr capsule, TAKE 1 CAPSULE BY MOUTH DAILY, Disp: 30 capsule, Rfl: 1    Past Medical History:   Diagnosis Date   • Abnormal ECG    • Anemia    • Arrhythmia 2018    A fib   • Benign essential hypertension    • Cataract    • Chronic kidney disease    • Clotting disorder     Fx ribs   • Coronary artery disease 2018    AFib   • Disease of thyroid gland    • GERD (gastroesophageal reflux disease)    • History of transfusion     Hip fx, fx ribs   • HL (hearing loss)    • Hyperlipidemia    • Hyperlipidemia 2016   • Hypothyroidism    • Paroxysmal atrial fibrillation    • Visual impairment S       Past Surgical History:   Procedure Laterality Date   • EYE SURGERY     • HERNIA REPAIR     • HIP INTERTROCHANTERIC NAILING Left 2023    Procedure: LT. HIP INTERTROCHANTERIC NAILING;  Surgeon: Leland Hair MD;  Location: [facility];  Service: Orthopedics;  Laterality: Left;       Family History   Problem Relation Age of Onset   • No Known Problems Mother    • No Known Problems Father    • Hyperlipidemia Brother    • Hearing loss Paternal Aunt    • Vision loss Paternal Aunt    • No Known Problems Maternal Grandmother    • No Known Problems Maternal Grandfather    • No Known Problems Paternal Grandmother    • No Known Problems Paternal Grandfather    • Malig Hyperthermia Neg Hx        Social History     Tobacco Use   • Smoking status: Former     Types: Pipe     Start date: 1955     Quit date: 1970     Years since quittin.3   • Smokeless tobacco: Never   Vaping Use   • Vaping status: Never Used   Substance Use Topics   • Alcohol use: Yes     Alcohol/week: 1.0 standard drink of alcohol     Types: 1 Shots of  "liquor per week     Comment: occ   • Drug use: Never       Procedures       Objective:     Visit Vitals  /80 (BP Location: Left arm, Patient Position: Sitting, Cuff Size: Adult)   Pulse 66   Ht 167.6 cm (65.98\")   Wt 68.4 kg (150 lb 12.8 oz)   SpO2 (!) 89%   BMI 24.35 kg/m²         Physical Exam    Lab Review:       Assessment:          Diagnosis Plan   1. Permanent atrial fibrillation        2. Benign essential hypertension        3. Hyperlipidemia, unspecified hyperlipidemia type        4. Acquired hypothyroidism        5. Stage 3a chronic kidney disease               Plan:                "

## 2024-07-08 DIAGNOSIS — N18.31 STAGE 3A CHRONIC KIDNEY DISEASE: ICD-10-CM

## 2024-07-09 ENCOUNTER — TELEPHONE (OUTPATIENT)
Dept: FAMILY MEDICINE CLINIC | Facility: CLINIC | Age: 89
End: 2024-07-09
Payer: MEDICARE

## 2024-07-09 RX ORDER — FUROSEMIDE 20 MG/1
10 TABLET ORAL DAILY
Qty: 30 TABLET | Refills: 1 | Status: SHIPPED | OUTPATIENT
Start: 2024-07-09

## 2024-07-09 NOTE — TELEPHONE ENCOUNTER
"Relay     \"LVM FOR PT TO RETURN CALL. [facility] IS GETTING REFILL REQUESTS FOR PT. APPEARS [facility] HAS [facility] WHO HER PRIMARY CARE PROVIDER IS. PATIENT WILL ALSO NEED TO UPDATE HER PHARMACY WITH HER NEW PCP INFORMATION\"                 "

## 2024-07-11 ENCOUNTER — TELEPHONE (OUTPATIENT)
Dept: FAMILY MEDICINE CLINIC | Facility: CLINIC | Age: 89
End: 2024-07-11
Payer: MEDICARE

## 2024-07-11 NOTE — TELEPHONE ENCOUNTER
Caller: Little Soliman    Relationship: Emergency Contact    Best call back number:[phone]     What was the call regarding: LITTLE IS CALLING TO LET THE OFFICE KNOW THAT THE PATIENT IS NOW LONGER A PATIENT OF DR BO AND THAT HE SEES AN [facility] PROVIDER. SHE STATES SHE MISSED A CALL BUT THINKS IT WAS IN REGARDS TO THE PHARMACY SENDING A MED REFILL

## 2024-09-11 RX ORDER — DIGOXIN 125 MCG
TABLET ORAL
Qty: 45 TABLET | Refills: 1 | Status: SHIPPED | OUTPATIENT
Start: 2024-09-11

## 2024-10-31 ENCOUNTER — APPOINTMENT (OUTPATIENT)
Dept: CT IMAGING | Facility: HOSPITAL | Age: 89
End: 2024-10-31
Payer: MEDICARE

## 2024-10-31 ENCOUNTER — HOSPITAL ENCOUNTER (EMERGENCY)
Facility: HOSPITAL | Age: 89
Discharge: HOME | End: 2024-10-31
Attending: EMERGENCY MEDICINE
Payer: MEDICARE

## 2024-10-31 VITALS
TEMPERATURE: 98.7 F | RESPIRATION RATE: 16 BRPM | WEIGHT: 145 LBS | HEIGHT: 61 IN | HEART RATE: 86 BPM | OXYGEN SATURATION: 96 % | SYSTOLIC BLOOD PRESSURE: 168 MMHG | BODY MASS INDEX: 27.38 KG/M2 | DIASTOLIC BLOOD PRESSURE: 84 MMHG
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
## 2024-10-31 DIAGNOSIS — S09.90XA INJURY OF HEAD, INITIAL ENCOUNTER: Primary | ICD-10-CM

## 2024-10-31 DIAGNOSIS — S01.01XA LACERATION OF SCALP, INITIAL ENCOUNTER: ICD-10-CM

## 2024-10-31 PROCEDURE — 72125 CT NECK SPINE W/O DYE: CPT

## 2024-10-31 PROCEDURE — 99284 EMERGENCY DEPT VISIT MOD MDM: CPT

## 2024-10-31 PROCEDURE — 25010000002 LIDOCAINE 1% - EPINEPHRINE 1:100000 1 %-1:100000 SOLUTION: Performed by: NURSE PRACTITIONER

## 2024-10-31 PROCEDURE — 70450 CT HEAD/BRAIN W/O DYE: CPT

## 2024-10-31 RX ORDER — LIDOCAINE HYDROCHLORIDE AND EPINEPHRINE 10; 10 MG/ML; UG/ML
10 INJECTION, SOLUTION INFILTRATION; PERINEURAL ONCE
Status: COMPLETED | OUTPATIENT
Start: 2024-10-31 | End: 2024-10-31

## 2024-10-31 RX ADMIN — LIDOCAINE HYDROCHLORIDE AND EPINEPHRINE 10 ML: 10; 10 INJECTION, SOLUTION INFILTRATION; PERINEURAL at 02:31

## 2024-10-31 NOTE — ED PROVIDER NOTES
" EMERGENCY DEPARTMENT ENCOUNTER  Room Number:  02/02  PCP: Lesly Kuhn APRN  Independent Historians: Patient      HPI:  Chief Complaint: had concerns including Fall and Head Injury.     A complete HPI/ROS/PMH/PSH/SH/FH are unobtainable due to: None    Chronic or social conditions impacting patient care (Social Determinants of Health): None      Context: Lavelle Soliman is a 89 y.o. male who presents to the emergency department with complaints of a head injury secondary to a fall.  Patient informs he was not sleeping very well therefore he got up to get a glass of milk.  He reports he got his feet \"tangled up\" causing him to fall.  He hit his head on the door frame.  No LOC.  Not currently taking any blood thinning medications.  Patient subsequently sustained a scalp laceration.  Patient denies other injuries, other arthralgias, headache, lightheadedness, dizziness, numbness, tingling, unilateral extremity weakness, syncope, shortness of breath, chest pain, abdominal pain, nausea, vomiting, diarrhea, dysuria, hematuria, or other complaints.    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    Permanent atrial fibrillation     Closed fracture of left hip, initial encounter 01/24/2023    Postoperative anemia due to acute blood loss 01/26/2023    Moderate malnutrition 02/02/2023    Multiple rib fractures involving four or more ribs 05/06/2023    Diplopia 06/30/2023    Dry eye syndrome of bilateral lacrimal glands 06/24/2021    Nonexudative age-related macular degeneration, right eye, intermediate dry stage 04/29/2021    " Hypertensive disorder 2018    Low vision right eye category 1, low vision left eye category 1 2023    Macular cyst, hole, or pseudohole, right eye 2021    Macular degeneration 2023    Presbyopia 2021    Presence of intraocular lens 2021    Scotoma involving central area, bilateral 2021     Resolved Ambulatory Problems     Diagnosis Date Noted    Sepsis 2018    Hypokalemia 2018     Past Medical History:   Diagnosis Date    Abnormal ECG 2018    Arrhythmia 2018    Cataract     Chronic kidney disease     Clotting disorder     Coronary artery disease 2018    Disease of thyroid gland     GERD (gastroesophageal reflux disease) 2016    History of transfusion     HL (hearing loss)     Paroxysmal atrial fibrillation     Visual impairment S         PAST SURGICAL HISTORY  Past Surgical History:   Procedure Laterality Date    EYE SURGERY      HERNIA REPAIR      HIP INTERTROCHANTERIC NAILING Left 2023    Procedure: LT. HIP INTERTROCHANTERIC NAILING;  Surgeon: Leland Hair MD;  Location: [facility];  Service: Orthopedics;  Laterality: Left;         FAMILY HISTORY  Family History   Problem Relation Age of Onset    No Known Problems Mother     No Known Problems Father     Hyperlipidemia Brother     Hearing loss Paternal Aunt     Vision loss Paternal Aunt     No Known Problems Maternal Grandmother     No Known Problems Maternal Grandfather     No Known Problems Paternal Grandmother     No Known Problems Paternal Grandfather     Malig Hyperthermia Neg Hx          SOCIAL HISTORY  Social History     Socioeconomic History    Marital status:    Tobacco Use    Smoking status: Former     Types: Pipe     Start date: 1955     Quit date: 1970     Years since quittin.8    Smokeless tobacco: Never   Vaping Use    Vaping status: Never Used   Substance and Sexual Activity    Alcohol use: Yes     Alcohol/week: 1.0 standard drink of alcohol     Types: 1  Shots of liquor per week     Comment: occ    Drug use: Never    Sexual activity: Not Currently     Partners: Female     Birth control/protection: None         ALLERGIES  Latex      REVIEW OF SYSTEMS  Included in HPI  All systems reviewed and negative except for those discussed in HPI.      PHYSICAL EXAM    I have reviewed the triage vital signs and nursing notes.    ED Triage Vitals [10/31/24 0053]   Temp Heart Rate Resp BP SpO2   98.7 °F (37.1 °C) 86 16 168/84 96 %      Temp src Heart Rate Source Patient Position BP Location FiO2 (%)   -- -- -- -- --       GENERAL: not distressed  HENT: nares patent  Head/neck/ face are symmetric without gross deformity or swelling  EYES: no scleral icterus  CV: regular rhythm, regular rate with intact distal pulses  RESPIRATORY: normal effort and no respiratory distress  ABDOMEN: soft and nontender  MUSCULOSKELETAL: no deformity  NEURO: alert and appropriate, moves all extremities, follows commands  SKIN: warm, dry    Vital signs and nursing notes reviewed.  ***    LAB RESULTS  No results found for this or any previous visit (from the past 24 hours).      RADIOLOGY  No Radiology Exams Resulted Within Past 24 Hours      MEDICATIONS GIVEN IN ER  Medications   lidocaine 1% - EPINEPHrine 1:218846 (XYLOCAINE W/EPI) 1 %-1:198983 injection 10 mL (has no administration in time range)           OUTPATIENT MEDICATION MANAGEMENT:  Current Facility-Administered Medications Ordered in Epic   Medication Dose Route Frequency Provider Last Rate Last Admin    lidocaine 1% - EPINEPHrine 1:308236 (XYLOCAINE W/EPI) 1 %-1:597235 injection 10 mL  10 mL Injection Once Vonnie Garcia APRN         Current Outpatient Medications Ordered in Epic   Medication Sig Dispense Refill    digoxin (LANOXIN) 125 MCG tablet Take one tablet Monday, Wednesday and Friday 45 tablet 1    famotidine (PEPCID) 20 MG tablet TAKE ONE TABLET BY MOUTH DAILY (Patient taking differently: 1 tablet Every Night.) 90 tablet 0     finasteride (PROSCAR) 5 MG tablet TAKE ONE TABLET BY MOUTH ONCE NIGHTLY 30 tablet 1    furosemide (LASIX) 20 MG tablet Take 0.5 tablets by mouth Daily. 30 tablet 1    ipratropium (ATROVENT) 0.03 % nasal spray 2 sprays into the nostril(s) as directed by provider Every 12 (Twelve) Hours.      levothyroxine (SYNTHROID, LEVOTHROID) 75 MCG tablet TAKE ONE TABLET BY MOUTH EVERY MORNING 30 tablet 1    metoprolol tartrate (LOPRESSOR) 25 MG tablet TAKE 1/2 TABLET TWO TIMES A DAY 60 tablet 1    tamsulosin (FLOMAX) 0.4 MG capsule 24 hr capsule TAKE 1 CAPSULE BY MOUTH DAILY 30 capsule 1         PROCEDURES  Procedures        PROGRESS, DATA ANALYSIS, CONSULTS, AND MEDICAL DECISION MAKING  ORDERS PLACED DURING THIS VISIT:  Orders Placed This Encounter   Procedures    CT Head Without Contrast    CT Cervical Spine Without Contrast       All labs have been independently interpreted by me.  All radiology studies have been reviewed by me. All EKG's have been independently viewed by me.  Discussion below represents my analysis of pertinent findings related to patient's condition, differential diagnosis, treatment plan and final disposition.    Differential diagnosis includes but is not limited to:   ***    ED Course/Progress Notes/MDM:  ED Course as of 10/31/24 0212   u Oct 31, 2024   0208 I discussed this case with Dr. Rogers. [AR]      ED Course User Index  [AR] Vonnie Garcia APRN           AS OF 02:12 EDT VITALS:    BP - 168/84  HR - 86  TEMP - 98.7 °F (37.1 °C)  O2 SATS - 96%    Clinical Scores:                  MDM:  ***      COMPLEXITY OF CARE  {Admission Statement:18522}    {EM_CC (Optional):00912}    DIAGNOSIS  Final diagnoses:   None         DISPOSITION  ED Disposition       None               {EM_PPE (Optional):35374}    Please note that portions of this document were completed with a voice recognition program.    Note Disclaimer: At [facility], we believe that sharing information builds trust and better  relationships. You are receiving this note because you recently visited [facility]. It is possible you will see health information before a provider has talked with you about it. This kind of information can be easy to misunderstand. To help you fully understand what it means for your health, we urge you to discuss this note with your provider.   Discussed limitation of the ED work-up and that this is to rule out life-threatening emergencies but that they could require further testing as determined by their primary care and or any referred specialist patient is agreeable and understands need for follow-up and repeat exam/testing.  Patient is aware that discharge does not mean there is nothing wrong, indicates no emergency is present, and that they must continue their care with their primary care physician and/or any referred specialist.  They were given appropriate follow-up with their primary care physician and/or specialist.  I had an extensive discussion on the expected clinical course and return precautions.  Patient understands to return to the emergency department for continuation, worsening, or new symptoms.  I answered any of the patient's questions. Patient was discharged home in a stable condition.     [AR]      ED Course User Index  [AR] Vonnie Garcia APRN           AS OF 02:12 EDT VITALS:    BP - 168/84  HR - 86  TEMP - 98.7 °F (37.1 °C)  O2 SATS - 96%    COMPLEXITY OF CARE  Admission was considered but after careful review of the patient's presentation, physical examination, diagnostic results, and response to treatment the patient may be safely discharged with outpatient follow-up.        DIAGNOSIS  Final diagnoses:   Injury of head, initial encounter   Laceration of scalp, initial encounter   Fall, initial encounter         DISPOSITION  ED Disposition       ED Disposition   Discharge    Condition   Stable    Comment   --                      Please note that portions of this document were completed with a voice recognition program.    Note Disclaimer: At [facility], we believe that sharing information builds trust and better relationships. You are receiving this note because you recently visited [facility]. It is possible you will see health information before a provider has talked with you about it. This kind of information can be  easy to misunderstand. To help you fully understand what it means for your health, we urge you to discuss this note with your provider.     Vonnie Garcia, MARLENE  11/09/24 2155       Vonnie Garcia APRN  11/09/24 8335

## 2024-10-31 NOTE — ED PROVIDER NOTES
MD ATTESTATION NOTE     SHARED VISIT: This visit was performed by BOTH a physician and an APC. The substantive portion of the medical decision making was performed by this attesting physician who made or approved the management plan and takes responsibility for patient management. All studies in the APC note (if performed) were independently interpreted by me.  The JOVANI and I have discussed this patient's history, physical exam, and treatment plan. I have reviewed the documentation and personally had a face to face interaction with the patient. I affirm the documentation and agree with the treatment and plan. I provided a substantive portion of the care of the patient.  I personally performed the physical exam in its entirety, and below are my findings.      Brief HPI: Patient presents to the ED with acute head injury after mechanical fall occurred just prior to ED arrival.  Patient was walking to the kitchen to get something to drink when he stumbled and fell.  He hit his head on the door frame.  Denies loss consciousness.  He is not on anticoagulants.  He sustained a scalp laceration.  Denies headache, neck pain, back pain, chest pain, nausea, vomiting, dizziness, or numbness/tingling/weakness in his extremities.    PHYSICAL EXAM    GENERAL: Awake, alert, oriented x 3.  Well-developed, well-nourished elderly male.  Resting comfortably in no acute distress  HENT: nares patent, there is a laceration on the right superior scalp  EYES: EOMI  CV: regular rhythm, normal rate  RESPIRATORY: normal effort, CTAB  ABDOMEN: soft  MUSCULOSKELETAL: C-spine is nontender.  Extremities are nontender with full range of motion  NEURO: alert, moves all extremities, follows commands, GCS 15  PSYCH:  calm, cooperative  SKIN: warm, dry    Vital signs and nursing notes reviewed.        Plan: Will obtain CT scans of the head and C-spine.  Laceration will be cleaned and repaired.    Head CT personally interpreted by me.  My personal  interpretation is: No intracranial hemorrhage.  No mass effect    ED Course as of 10/31/24 0304   Thu Oct 31, 2024   0208 I discussed this case with Dr. Rogers. [AR]   0247 The patient was reexamined.  They have had symptomatic improvement during their ED stay.  I discussed today's findings with the patient, explaining the pertinent positives and negatives from today's visit, and the plan of care.  Discussed plan for discharge as there is no emergent indication for admission.  Discussed limitation of the ED work-up and that this is to rule out life-threatening emergencies but that they could require further testing as determined by their primary care and or any referred specialist patient is agreeable and understands need for follow-up and repeat exam/testing.  Patient is aware that discharge does not mean there is nothing wrong, indicates no emergency is present, and that they must continue their care with their primary care physician and/or any referred specialist.  They were given appropriate follow-up with their primary care physician and/or specialist.  I had an extensive discussion on the expected clinical course and return precautions.  Patient understands to return to the emergency department for continuation, worsening, or new symptoms.  I answered any of the patient's questions. Patient was discharged home in a stable condition.     [AR]      ED Course User Index  [AR] Vonnie Garcia APRN Holland, Oni GHOSH MD  10/31/24 0303

## 2024-10-31 NOTE — DISCHARGE INSTRUCTIONS
You have been given emergency department evaluation.  This evaluation is intended to rule out life-threatening conditions.  Is not a complete evaluation.  You could require further testing as determined by your primary care physician or any referred specialist.  Please follow-up with all doctors that you are referred to.  Please be sure to take your prescribed medications and follow any specific instructions in the discharge instructions.  Please follow-up with your primary care physician within 48 hours.  Please have your primary care provider recheck your blood pressure.  Please return to the emergency department if you experience chest pain, shortness of breath, abdominal pain, fever greater than 102, intractable vomiting.  Please return to the emergency department if your symptoms continue or worsen, or if you begin to experience any other concerning symptom.  Rest, ice, elevate to reduce swelling.  STAPLES TO BE REMOVED IN 7-10 DAYS BY YOUR PRIMARY CARE PROVIDER OR URGENT CARE.  Keep dressing in place and keep area clean and dry for 24-48 hours (face wounds can be left uncovered). After this time, remove dressing, wash with antibacterial soap and water 2-3 times per day and apply vaseline or aquaphor..   Keep covered with gauze or bandaid.(face wounds can be left uncovered).  Follow up with primary care for further management and to have your blood pressure rechecked.   Return to ER for excessive pain, swelling, numbness/tingling, fever, chills, weakness, redness, red streaking, drainage, bleeding, decreased sensation, or other worsening/concerning symptoms.

## 2024-12-24 ENCOUNTER — HOSPITAL ENCOUNTER (INPATIENT)
Facility: HOSPITAL | Age: 89
LOS: 4 days | Discharge: NURSING, REHAB OR LONG-TERM CARE | End: 2024-12-28
Attending: EMERGENCY MEDICINE | Admitting: STUDENT IN AN ORGANIZED HEALTH CARE EDUCATION/TRAINING PROGRAM
Payer: MEDICARE

## 2024-12-24 ENCOUNTER — APPOINTMENT (OUTPATIENT)
Dept: GENERAL RADIOLOGY | Facility: HOSPITAL | Age: 89
End: 2024-12-24
Payer: MEDICARE

## 2024-12-24 ENCOUNTER — APPOINTMENT (OUTPATIENT)
Dept: CT IMAGING | Facility: HOSPITAL | Age: 89
End: 2024-12-24
Payer: MEDICARE

## 2024-12-24 DIAGNOSIS — R42 DIZZINESS: ICD-10-CM

## 2024-12-24 DIAGNOSIS — S27.321A CONTUSION OF LEFT LUNG, INITIAL ENCOUNTER: ICD-10-CM

## 2024-12-24 DIAGNOSIS — S22.42XA CLOSED FRACTURE OF MULTIPLE RIBS OF LEFT SIDE, INITIAL ENCOUNTER: ICD-10-CM

## 2024-12-24 DIAGNOSIS — S27.0XXA TRAUMATIC PNEUMOTHORAX, INITIAL ENCOUNTER: ICD-10-CM

## 2024-12-24 DIAGNOSIS — R00.0 TACHYCARDIA: ICD-10-CM
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
## 2024-12-24 PROBLEM — S27.329A PULMONARY CONTUSION: Status: ACTIVE | Noted: 2024-12-24

## 2024-12-24 PROBLEM — S42.112A: Status: ACTIVE | Noted: 2024-12-24

## 2024-12-24 PROBLEM — S22.49XA MULTIPLE RIB FRACTURES: Status: ACTIVE | Noted: 2024-12-24

## 2024-12-24 LAB
ALBUMIN SERPL-MCNC: 3.5 G/DL (ref 3.5–5.2)
ALBUMIN/GLOB SERPL: 1 G/DL
ALP SERPL-CCNC: 103 U/L (ref 39–117)
ALT SERPL W P-5'-P-CCNC: 19 U/L (ref 1–41)
ANION GAP SERPL CALCULATED.3IONS-SCNC: 6 MMOL/L (ref 5–15)
AST SERPL-CCNC: 20 U/L (ref 1–40)
BACTERIA UR QL AUTO: NORMAL /HPF
BASOPHILS # BLD AUTO: 0.06 10*3/MM3 (ref 0–0.2)
BASOPHILS NFR BLD AUTO: 0.5 % (ref 0–1.5)
BILIRUB SERPL-MCNC: 0.5 MG/DL (ref 0–1.2)
BILIRUB UR QL STRIP: NEGATIVE
BUN SERPL-MCNC: 20 MG/DL (ref 8–23)
BUN/CREAT SERPL: 13.4 (ref 7–25)
CALCIUM SPEC-SCNC: 8.6 MG/DL (ref 8.2–9.6)
CHLORIDE SERPL-SCNC: 105 MMOL/L (ref 98–107)
CLARITY UR: CLEAR
CO2 SERPL-SCNC: 25 MMOL/L (ref 22–29)
COLOR UR: YELLOW
CREAT SERPL-MCNC: 1.49 MG/DL (ref 0.76–1.27)
DEPRECATED RDW RBC AUTO: 47.1 FL (ref 37–54)
EGFRCR SERPLBLD CKD-EPI 2021: 42.4 ML/MIN/1.73
EOSINOPHIL # BLD AUTO: 0 10*3/MM3 (ref 0–0.4)
EOSINOPHIL NFR BLD AUTO: 0 % (ref 0.3–6.2)
ERYTHROCYTE [DISTWIDTH] IN BLOOD BY AUTOMATED COUNT: 13.3 % (ref 12.3–15.4)
GLOBULIN UR ELPH-MCNC: 3.6 GM/DL
GLUCOSE SERPL-MCNC: 137 MG/DL (ref 65–99)
GLUCOSE UR STRIP-MCNC: NEGATIVE MG/DL
HCT VFR BLD AUTO: 37.8 % (ref 37.5–51)
HGB BLD-MCNC: 12.7 G/DL (ref 13–17.7)
HGB UR QL STRIP.AUTO: NEGATIVE
HYALINE CASTS UR QL AUTO: NORMAL /LPF
IMM GRANULOCYTES # BLD AUTO: 0.09 10*3/MM3 (ref 0–0.05)
IMM GRANULOCYTES NFR BLD AUTO: 0.7 % (ref 0–0.5)
KETONES UR QL STRIP: NEGATIVE
LEUKOCYTE ESTERASE UR QL STRIP.AUTO: NEGATIVE
LYMPHOCYTES # BLD AUTO: 1.01 10*3/MM3 (ref 0.7–3.1)
LYMPHOCYTES NFR BLD AUTO: 8.1 % (ref 19.6–45.3)
MCH RBC QN AUTO: 32.6 PG (ref 26.6–33)
MCHC RBC AUTO-ENTMCNC: 33.6 G/DL (ref 31.5–35.7)
MCV RBC AUTO: 96.9 FL (ref 79–97)
MONOCYTES # BLD AUTO: 0.83 10*3/MM3 (ref 0.1–0.9)
MONOCYTES NFR BLD AUTO: 6.7 % (ref 5–12)
NEUTROPHILS NFR BLD AUTO: 10.46 10*3/MM3 (ref 1.7–7)
NEUTROPHILS NFR BLD AUTO: 84 % (ref 42.7–76)
NITRITE UR QL STRIP: NEGATIVE
NRBC BLD AUTO-RTO: 0 /100 WBC (ref 0–0.2)
PH UR STRIP.AUTO: 7.5 [PH] (ref 5–8)
PLATELET # BLD AUTO: 154 10*3/MM3 (ref 140–450)
PMV BLD AUTO: 11 FL (ref 6–12)
POTASSIUM SERPL-SCNC: 4.5 MMOL/L (ref 3.5–5.2)
PROT SERPL-MCNC: 7.1 G/DL (ref 6–8.5)
PROT UR QL STRIP: ABNORMAL
QT INTERVAL: 338 MS
QTC INTERVAL: 453 MS
RBC # BLD AUTO: 3.9 10*6/MM3 (ref 4.14–5.8)
RBC # UR STRIP: NORMAL /HPF
REF LAB TEST METHOD: NORMAL
SODIUM SERPL-SCNC: 136 MMOL/L (ref 136–145)
SP GR UR STRIP: 1.02 (ref 1–1.03)
SQUAMOUS #/AREA URNS HPF: NORMAL /HPF
UROBILINOGEN UR QL STRIP: ABNORMAL
WBC # UR STRIP: NORMAL /HPF
WBC NRBC COR # BLD AUTO: 12.45 10*3/MM3 (ref 3.4–10.8)

## 2024-12-24 PROCEDURE — 99222 1ST HOSP IP/OBS MODERATE 55: CPT | Performed by: SURGERY

## 2024-12-24 PROCEDURE — 93010 ELECTROCARDIOGRAM REPORT: CPT | Performed by: INTERNAL MEDICINE

## 2024-12-24 PROCEDURE — 80053 COMPREHEN METABOLIC PANEL: CPT | Performed by: EMERGENCY MEDICINE

## 2024-12-24 PROCEDURE — 81001 URINALYSIS AUTO W/SCOPE: CPT | Performed by: EMERGENCY MEDICINE

## 2024-12-24 PROCEDURE — 71250 CT THORAX DX C-: CPT

## 2024-12-24 PROCEDURE — 73070 X-RAY EXAM OF ELBOW: CPT

## 2024-12-24 PROCEDURE — 36415 COLL VENOUS BLD VENIPUNCTURE: CPT

## 2024-12-24 PROCEDURE — 70450 CT HEAD/BRAIN W/O DYE: CPT

## 2024-12-24 PROCEDURE — 93005 ELECTROCARDIOGRAM TRACING: CPT | Performed by: EMERGENCY MEDICINE

## 2024-12-24 PROCEDURE — 85025 COMPLETE CBC W/AUTO DIFF WBC: CPT | Performed by: EMERGENCY MEDICINE

## 2024-12-24 PROCEDURE — 99285 EMERGENCY DEPT VISIT HI MDM: CPT

## 2024-12-24 PROCEDURE — 73502 X-RAY EXAM HIP UNI 2-3 VIEWS: CPT

## 2024-12-24 PROCEDURE — 72125 CT NECK SPINE W/O DYE: CPT

## 2024-12-24 RX ORDER — LIDOCAINE 4 G/G
1 PATCH TOPICAL
Status: DISCONTINUED | OUTPATIENT
Start: 2024-12-24 | End: 2024-12-28 | Stop reason: HOSPADM

## 2024-12-24 RX ORDER — SODIUM CHLORIDE 0.9 % (FLUSH) 0.9 %
10 SYRINGE (ML) INJECTION AS NEEDED
Status: DISCONTINUED | OUTPATIENT
Start: 2024-12-24 | End: 2024-12-28 | Stop reason: HOSPADM

## 2024-12-24 RX ORDER — BISACODYL 5 MG/1
5 TABLET, DELAYED RELEASE ORAL DAILY PRN
Status: DISCONTINUED | OUTPATIENT
Start: 2024-12-24 | End: 2024-12-28 | Stop reason: HOSPADM

## 2024-12-24 RX ORDER — ONDANSETRON 2 MG/ML
4 INJECTION INTRAMUSCULAR; INTRAVENOUS EVERY 6 HOURS PRN
Status: DISCONTINUED | OUTPATIENT
Start: 2024-12-24 | End: 2024-12-28 | Stop reason: HOSPADM

## 2024-12-24 RX ORDER — FINASTERIDE 5 MG/1
5 TABLET, FILM COATED ORAL NIGHTLY
Status: DISCONTINUED | OUTPATIENT
Start: 2024-12-24 | End: 2024-12-28 | Stop reason: HOSPADM

## 2024-12-24 RX ORDER — DIGOXIN 125 MCG
125 TABLET ORAL 3 TIMES WEEKLY
Status: DISCONTINUED | OUTPATIENT
Start: 2024-12-25 | End: 2024-12-28 | Stop reason: HOSPADM

## 2024-12-24 RX ORDER — POLYETHYLENE GLYCOL 3350 17 G/17G
17 POWDER, FOR SOLUTION ORAL DAILY PRN
Status: DISCONTINUED | OUTPATIENT
Start: 2024-12-24 | End: 2024-12-28 | Stop reason: HOSPADM

## 2024-12-24 RX ORDER — ONDANSETRON 4 MG/1
4 TABLET, ORALLY DISINTEGRATING ORAL EVERY 6 HOURS PRN
Status: DISCONTINUED | OUTPATIENT
Start: 2024-12-24 | End: 2024-12-28 | Stop reason: HOSPADM

## 2024-12-24 RX ORDER — AMOXICILLIN 250 MG
2 CAPSULE ORAL 2 TIMES DAILY PRN
Status: DISCONTINUED | OUTPATIENT
Start: 2024-12-24 | End: 2024-12-28 | Stop reason: HOSPADM

## 2024-12-24 RX ORDER — OXYCODONE HYDROCHLORIDE 5 MG/1
5 TABLET ORAL EVERY 4 HOURS PRN
Status: DISCONTINUED | OUTPATIENT
Start: 2024-12-24 | End: 2024-12-28 | Stop reason: HOSPADM

## 2024-12-24 RX ORDER — METOPROLOL TARTRATE 25 MG/1
12.5 TABLET, FILM COATED ORAL EVERY 12 HOURS SCHEDULED
Status: DISCONTINUED | OUTPATIENT
Start: 2024-12-24 | End: 2024-12-28 | Stop reason: HOSPADM

## 2024-12-24 RX ORDER — BISACODYL 10 MG
10 SUPPOSITORY, RECTAL RECTAL DAILY PRN
Status: DISCONTINUED | OUTPATIENT
Start: 2024-12-24 | End: 2024-12-28 | Stop reason: HOSPADM

## 2024-12-24 RX ORDER — IPRATROPIUM BROMIDE 21 UG/1
2 SPRAY, METERED NASAL EVERY 12 HOURS
Status: DISCONTINUED | OUTPATIENT
Start: 2024-12-24 | End: 2024-12-28 | Stop reason: HOSPADM

## 2024-12-24 RX ORDER — FAMOTIDINE 20 MG/1
20 TABLET, FILM COATED ORAL DAILY
Status: DISCONTINUED | OUTPATIENT
Start: 2024-12-24 | End: 2024-12-28 | Stop reason: HOSPADM

## 2024-12-24 RX ORDER — LEVOTHYROXINE SODIUM 75 UG/1
75 TABLET ORAL EVERY MORNING
Status: DISCONTINUED | OUTPATIENT
Start: 2024-12-25 | End: 2024-12-28 | Stop reason: HOSPADM

## 2024-12-24 RX ORDER — SODIUM CHLORIDE 0.9 % (FLUSH) 0.9 %
10 SYRINGE (ML) INJECTION EVERY 12 HOURS SCHEDULED
Status: DISCONTINUED | OUTPATIENT
Start: 2024-12-24 | End: 2024-12-28 | Stop reason: HOSPADM

## 2024-12-24 RX ORDER — TAMSULOSIN HYDROCHLORIDE 0.4 MG/1
0.4 CAPSULE ORAL NIGHTLY
Status: DISCONTINUED | OUTPATIENT
Start: 2024-12-24 | End: 2024-12-28 | Stop reason: HOSPADM

## 2024-12-24 RX ORDER — SODIUM CHLORIDE 9 MG/ML
40 INJECTION, SOLUTION INTRAVENOUS AS NEEDED
Status: DISCONTINUED | OUTPATIENT
Start: 2024-12-24 | End: 2024-12-28 | Stop reason: HOSPADM

## 2024-12-24 RX ORDER — ACETAMINOPHEN 500 MG
1000 TABLET ORAL EVERY 8 HOURS
Status: DISCONTINUED | OUTPATIENT
Start: 2024-12-24 | End: 2024-12-28 | Stop reason: HOSPADM

## 2024-12-24 RX ADMIN — METOPROLOL TARTRATE 12.5 MG: 25 TABLET, FILM COATED ORAL at 22:23

## 2024-12-24 RX ADMIN — ACETAMINOPHEN 1000 MG: 500 TABLET, FILM COATED ORAL at 14:58

## 2024-12-24 RX ADMIN — Medication 10 ML: at 14:58

## 2024-12-24 RX ADMIN — TAMSULOSIN HYDROCHLORIDE 0.4 MG: 0.4 CAPSULE ORAL at 22:24

## 2024-12-24 RX ADMIN — IPRATROPIUM BROMIDE 2 SPRAY: 21 SPRAY, METERED NASAL at 17:52

## 2024-12-24 RX ADMIN — Medication 10 ML: at 22:24

## 2024-12-24 RX ADMIN — ACETAMINOPHEN 1000 MG: 500 TABLET, FILM COATED ORAL at 22:23

## 2024-12-24 RX ADMIN — FINASTERIDE 5 MG: 5 TABLET, FILM COATED ORAL at 22:23

## 2024-12-24 RX ADMIN — FAMOTIDINE 20 MG: 20 TABLET, FILM COATED ORAL at 14:57

## 2024-12-24 RX ADMIN — MUPIROCIN 1 APPLICATION: 20 OINTMENT TOPICAL at 14:58

## 2024-12-24 RX ADMIN — LIDOCAINE 1 PATCH: 4 PATCH TOPICAL at 11:40

## 2024-12-24 RX ADMIN — MUPIROCIN 1 APPLICATION: 20 OINTMENT TOPICAL at 22:24

## 2024-12-24 NOTE — H&P
"    Patient Name:  Lavelle Soliman  YOB: 1927  MRN:  [id number]  Admit Date:  12/24/2024  Patient Care Team:  Lesly Kuhn APRN as PCP - General (Nephrology)      Subjective   History Present Illness     Chief Complaint   Patient presents with    Fall    Dizziness    Back Pain    Shoulder Pain    Head Injury       Mr. Soliman is a 89 y.o. former smoker with a history of hypertension, AF, HLD, hypothyroidism, BPH  that presents to [facility] complaining of mechanical fall with left chest wall pain.    History of Present Illness  Very pleasant 89-year-old, accompanied by his daughter and granddaughter who help augment history.  Patient reports that he was getting ready to go to bed last night, when his feet got tangled up and he stumbled backwards.  Patient states he hit his chest wall into his side table as well as his left upper extremity and hip and finally and fell to the ground.  He also reports hitting his head.  He was able to get up by himself and went to bed, he slept through the night and when his daughter checked on him this morning he reported to her that he had fallen.  Family reports that the patient has been complaining of \"bad coordination\" lately causing him to feel unsteady.    Review of Systems   Constitutional:  Negative for fatigue and fever.   Respiratory:  Negative for shortness of breath.    Cardiovascular:  Negative for chest pain and leg swelling.   Gastrointestinal:  Negative for nausea and vomiting.   Neurological:  Positive for dizziness and weakness.        Personal History     Past Medical History:   Diagnosis Date    Abnormal ECG 2018    Anemia 2023    Arrhythmia 2018    A fib    Benign essential hypertension     Cataract     Chronic kidney disease     Clotting disorder 2023    Fx ribs    Coronary artery disease 2018    AFib    Disease of thyroid gland     GERD (gastroesophageal reflux disease) 2016    History of transfusion 2023    Hip fx, fx ribs    HL " (hearing loss)     Hyperlipidemia     Hyperlipidemia 2016    Hypothyroidism     Paroxysmal atrial fibrillation     Visual impairment S     Past Surgical History:   Procedure Laterality Date    EYE SURGERY      HERNIA REPAIR      HIP INTERTROCHANTERIC NAILING Left 2023    Procedure: LT. HIP INTERTROCHANTERIC NAILING;  Surgeon: Leland Hair MD;  Location: [facility] OR;  Service: Orthopedics;  Laterality: Left;     Family History   Problem Relation Age of Onset    No Known Problems Mother     No Known Problems Father     Hyperlipidemia Brother     Hearing loss Paternal Aunt     Vision loss Paternal Aunt     No Known Problems Maternal Grandmother     No Known Problems Maternal Grandfather     No Known Problems Paternal Grandmother     No Known Problems Paternal Grandfather     Malig Hyperthermia Neg Hx      Social History     Tobacco Use    Smoking status: Former     Types: Pipe     Start date: 1955     Quit date: 1970     Years since quittin.0    Smokeless tobacco: Never   Vaping Use    Vaping status: Never Used   Substance Use Topics    Alcohol use: Yes     Alcohol/week: 1.0 standard drink of alcohol     Types: 1 Shots of liquor per week     Comment: occ    Drug use: Never     No current facility-administered medications on file prior to encounter.     Current Outpatient Medications on File Prior to Encounter   Medication Sig Dispense Refill    digoxin (LANOXIN) 125 MCG tablet Take one tablet Monday, Wednesday and Friday 45 tablet 1    famotidine (PEPCID) 20 MG tablet TAKE ONE TABLET BY MOUTH DAILY (Patient taking differently: 1 tablet Every Night.) 90 tablet 0    finasteride (PROSCAR) 5 MG tablet TAKE ONE TABLET BY MOUTH ONCE NIGHTLY 30 tablet 1    ipratropium (ATROVENT) 0.03 % nasal spray Administer 2 sprays into the nostril(s) as directed by provider Every 12 (Twelve) Hours.      levothyroxine (SYNTHROID, LEVOTHROID) 75 MCG tablet TAKE ONE TABLET BY MOUTH EVERY MORNING 30 tablet 1     metoprolol tartrate (LOPRESSOR) 25 MG tablet TAKE 1/2 TABLET TWO TIMES A DAY 60 tablet 1    tamsulosin (FLOMAX) 0.4 MG capsule 24 hr capsule TAKE 1 CAPSULE BY MOUTH DAILY 30 capsule 1    furosemide (LASIX) 20 MG tablet Take 0.5 tablets by mouth Daily. 30 tablet 1     Allergies   Allergen Reactions    Latex Rash       Objective    Objective     Vital Signs  Temp:  [98.3 °F (36.8 °C)-98.9 °F (37.2 °C)] 98.3 °F (36.8 °C)  Heart Rate:  [] 96  Resp:  [18] 18  BP: (131-170)/() 148/85  SpO2:  [93 %-96 %] 95 %  on   ;   Device (Oxygen Therapy): room air  Body mass index is 27.99 kg/m².    Physical Exam  Constitutional:       General: He is not in acute distress.     Appearance: He is ill-appearing (Elderly/frail/chronically ill-appearing). He is not toxic-appearing.   Cardiovascular:      Rate and Rhythm: Normal rate. Rhythm irregular.   Pulmonary:      Effort: Pulmonary effort is normal. No respiratory distress.      Breath sounds: Normal breath sounds. No wheezing.   Abdominal:      General: Abdomen is flat. Bowel sounds are normal.      Palpations: Abdomen is soft.      Tenderness: There is no abdominal tenderness.   Musculoskeletal:         General: Tenderness (Left chest wall) present.      Right lower leg: No edema.      Left lower leg: No edema.   Skin:     General: Skin is warm and dry.      Coloration: Skin is pale.      Comments: Left forearm scab   Neurological:      General: No focal deficit present.      Mental Status: He is alert and oriented to person, place, and time. Mental status is at baseline.      Motor: Weakness (Generalized) present.         Results Review:  I reviewed the patient's new clinical results.  I reviewed the patient's new imaging results and agree with the interpretation.  I reviewed the patient's other test results and agree with the interpretation  I personally viewed and interpreted the patient's EKG/Telemetry data  Discussed with ED provider.    Lab Results (last 24 hours)        Procedure Component Value Units Date/Time    CBC & Differential [[id number]]  (Abnormal) Collected: 12/24/24 0917    Specimen: Blood Updated: 12/24/24 0929    Narrative:      The following orders were created for panel order CBC & Differential.  Procedure                               Abnormality         Status                     ---------                               -----------         ------                     CBC Auto Differential[[id number]]        Abnormal            Final result                 Please view results for these tests on the individual orders.    Comprehensive Metabolic Panel [[id number]]  (Abnormal) Collected: 12/24/24 0917    Specimen: Blood Updated: 12/24/24 0954     Glucose 137 mg/dL      BUN 20 mg/dL      Creatinine 1.49 mg/dL      Sodium 136 mmol/L      Potassium 4.5 mmol/L      Chloride 105 mmol/L      CO2 25.0 mmol/L      Calcium 8.6 mg/dL      Total Protein 7.1 g/dL      Albumin 3.5 g/dL      ALT (SGPT) 19 U/L      AST (SGOT) 20 U/L      Alkaline Phosphatase 103 U/L      Total Bilirubin 0.5 mg/dL      Globulin 3.6 gm/dL      A/G Ratio 1.0 g/dL      BUN/Creatinine Ratio 13.4     Anion Gap 6.0 mmol/L      eGFR 42.4 mL/min/1.73     Narrative:      GFR Categories in Chronic Kidney Disease (CKD)      GFR Category          GFR (mL/min/1.73)    Interpretation  G1                     90 or greater         Normal or high (1)  G2                      60-89                Mild decrease (1)  G3a                   45-59                Mild to moderate decrease  G3b                   30-44                Moderate to severe decrease  G4                    15-29                Severe decrease  G5                    14 or less           Kidney failure          (1)In the absence of evidence of kidney disease, neither GFR category G1 or G2 fulfill the criteria for CKD.    eGFR calculation 2021 CKD-EPI creatinine equation, which does not include race as a factor    CBC Auto Differential [[id number]]   (Abnormal) Collected: 12/24/24 0917    Specimen: Blood Updated: 12/24/24 0929     WBC 12.45 10*3/mm3      RBC 3.90 10*6/mm3      Hemoglobin 12.7 g/dL      Hematocrit 37.8 %      MCV 96.9 fL      MCH 32.6 pg      MCHC 33.6 g/dL      RDW 13.3 %      RDW-SD 47.1 fl      MPV 11.0 fL      Platelets 154 10*3/mm3      Neutrophil % 84.0 %      Lymphocyte % 8.1 %      Monocyte % 6.7 %      Eosinophil % 0.0 %      Basophil % 0.5 %      Immature Grans % 0.7 %      Neutrophils, Absolute 10.46 10*3/mm3      Lymphocytes, Absolute 1.01 10*3/mm3      Monocytes, Absolute 0.83 10*3/mm3      Eosinophils, Absolute 0.00 10*3/mm3      Basophils, Absolute 0.06 10*3/mm3      Immature Grans, Absolute 0.09 10*3/mm3      nRBC 0.0 /100 WBC     Urinalysis With Microscopic If Indicated (No Culture) - Urine, Clean Catch [[id number]]  (Abnormal) Collected: 12/24/24 1040    Specimen: Urine, Clean Catch Updated: 12/24/24 1107     Color, UA Yellow     Appearance, UA Clear     pH, UA 7.5     Specific Gravity, UA 1.017     Glucose, UA Negative     Ketones, UA Negative     Bilirubin, UA Negative     Blood, UA Negative     Protein, UA 30 mg/dL (1+)     Leuk Esterase, UA Negative     Nitrite, UA Negative     Urobilinogen, UA 0.2 E.U./dL    Urinalysis, Microscopic Only - Urine, Clean Catch [[id number]] Collected: 12/24/24 1040    Specimen: Urine, Clean Catch Updated: 12/24/24 1107     RBC, UA 0-2 /HPF      WBC, UA 0-2 /HPF      Bacteria, UA None Seen /HPF      Squamous Epithelial Cells, UA 0-2 /HPF      Hyaline Casts, UA None Seen /LPF      Methodology Automated Microscopy            Imaging Results (Last 24 Hours)       Procedure Component Value Units Date/Time    CT Chest Without Contrast Diagnostic [[id number]] Collected: 12/24/24 1121     Updated: 12/24/24 1242    Narrative:      CT CHEST WITHOUT CONTRAST DIAGNOSTIC     DATE OF EXAM: 12/24/2024 9:32 AM     INDICATION: Trauma. Back pain and shoulder pain status post fall.     COMPARISON: CT cervical spine  10/31/2024. Chest radiographs 05/25/2023,  05/11/2023, and 05/10/2023. CT chest 05/09/2023 and 05/06/2023.     TECHNIQUE: Multiple contiguous axial images were acquired through the  chest without the intravenous administration of contrast.  Reformatted  coronal and sagittal sequences and 3D reconstruction images were also  reviewed. Radiation de reduction techniques were utilized, including  automated exposure control and exposure modulation based on body size.     FINDINGS:  Evaluation for intrathoracic trauma is mildly limited by the lack of  intravenous contrast.     No noncontrast CT evidence of a mediastinal hematoma or injury of the  great vessels of the chest. Stable cardiomegaly with moderate calcified  coronary artery disease.  Trace pericardial fluid. Aortic valve  calcifications and calcified atherosclerotic disease in the thoracic  aorta with stable 3.8 cm fusiform dilatation of the ascending thoracic  aorta. No pathologically enlarged intrathoracic lymph nodes are  identified.     Mildly displaced fracture of the posterior left 8th rib and nondisplaced  fractures of the posterior left 7th, 9th, and 10th ribs. Questionable  subtle nondisplaced fracture of the distal infraspinous left scapular  body. Multiple old right rib fracture deformities and old healed  fracture deformity of the sternum. Mild lower thoracic levoscoliosis.  Multilevel flowing mid and lower thoracic anterior osteophyte formation.     Tiny apical and anteromedial left pneumothorax, tiny focus of air deep  to the left 7th rib fracture, and small left pleural effusion. Small  likely partially loculated posterior right basilar hydropneumothorax  with adjacent pleural thickening. Low lung volumes with multifocal  bilateral perihilar and bibasilar bandlike regions of subsegmental  atelectasis and/or scarring. Patchy ground glass opacities in the base  of the left upper lobe and left lower lobe, likely pulmonary contusion.  The central  airways are widely patent.     Limited noncontrast CT imaging of the upper abdomen partially includes  incompletely evaluated similar-appearing low-density lesions in the  liver, statistically representing hepatic cysts and/or hemangiomas.  Large gallstone within the otherwise unremarkable appearing gallbladder.       Impression:         1. Mildly displaced fracture of the posterior left 8th rib and fractures  of the left 7th, 9th, and 10th ribs with a small left hydropneumothorax.  2. Patchy ground glass opacities in the base of the left upper lobe and  left lower lobe, likely pulmonary contusions, superimposed on multifocal  bilateral perihilar and bibasilar bandlike subsegmental atelectasis  and/or scarring.  3. Questionable nondisplaced fracture of the distal infraspinatus  scapular body.  4. Small likely partially loculated posterior right basilar  hydropneumothorax with adjacent pleural thickening.     Results were discussed over the phone with the ordering ER physician,  Dr. Brody Gregory, at 11:08 a.m. on 12/24/2024.     This report was finalized on 12/24/2024 12:39 PM by See Winn MD on  Workstation: [id number]       CT Head Without Contrast [[id number]] Collected: 12/24/24 1018     Updated: 12/24/24 1047    Narrative:      CT HEAD AND CERVICAL SPINE WITHOUT CONTRAST     CLINICAL HISTORY: trauma     TECHNIQUE: CT scan of the head was obtained with 3 mm axial soft tissue  and 2 mm bone algorithm images. No intravenous contrast was  administered. Sagittal and coronal reconstructions were obtained.     COMPARISON: CT head dated 10/31/2024.     FINDINGS:       There is no evidence for an acute extra-axial hemorrhage or a calvarial  fracture.     The ventricles, sulci, and cisterns are age-appropriate. The gray-white  matter differentiation is within normal limits. The basal ganglia and  thalami are unremarkable in appearance. Incidental small chronic  infarcts are identified within the left cerebellar  hemisphere. These are  within the left PICA distribution and the largest of these measures up  to approximately 10 x 4 mm in greatest axial dimensions. Mild changes of  chronic small vessel ischemic phenomena are noted. No significant  interval change is seen since the prior exam.       Impression:         No evidence for acute traumatic intracranial pathology.        TECHNIQUE: CT scan of the cervical spine was obtained with 1 mm axial  bone algorithm and 2 mm axial soft tissue algorithm images. Sagittal and  coronal reconstructed images were obtained.     FINDINGS:     There is no evidence for acute fracture or bony malalignment involving  the cervical spine.     Incidental degenerative phenomena are appreciated within the cervical  spine with disc osteophyte complexes at C3-4, C5-6, and C6-7 resulting  in up to mild degrees of stenoses. Multilevel foraminal stenotic changes  are also noted from C3-4 down to C5-6 and are predominantly secondary to  uncovertebral joint hypertrophy.     There is a small left apical pneumothorax.     IMPRESSION:     Small left apical pneumothorax.     No evidence for acute fracture or bony malalignment involving the  cervical spine.     Incidental degenerative phenomena as discussed above.     These findings were discussed with Dr. Brody Gregory on 12/24/2024 at  approximately 10:40 a.m.     Radiation dose reduction techniques were utilized, including automated  exposure control and exposure modulation based on body size.     This report was finalized on 12/24/2024 10:44 AM by Dr. Luis Enrique Atkins M.D on Workstation: [id number]       CT Cervical Spine Without Contrast [[id number]] Collected: 12/24/24 1018     Updated: 12/24/24 1047    Narrative:      CT HEAD AND CERVICAL SPINE WITHOUT CONTRAST     CLINICAL HISTORY: trauma     TECHNIQUE: CT scan of the head was obtained with 3 mm axial soft tissue  and 2 mm bone algorithm images. No intravenous contrast was  administered. Sagittal and  coronal reconstructions were obtained.     COMPARISON: CT head dated 10/31/2024.     FINDINGS:       There is no evidence for an acute extra-axial hemorrhage or a calvarial  fracture.     The ventricles, sulci, and cisterns are age-appropriate. The gray-white  matter differentiation is within normal limits. The basal ganglia and  thalami are unremarkable in appearance. Incidental small chronic  infarcts are identified within the left cerebellar hemisphere. These are  within the left PICA distribution and the largest of these measures up  to approximately 10 x 4 mm in greatest axial dimensions. Mild changes of  chronic small vessel ischemic phenomena are noted. No significant  interval change is seen since the prior exam.       Impression:         No evidence for acute traumatic intracranial pathology.        TECHNIQUE: CT scan of the cervical spine was obtained with 1 mm axial  bone algorithm and 2 mm axial soft tissue algorithm images. Sagittal and  coronal reconstructed images were obtained.     FINDINGS:     There is no evidence for acute fracture or bony malalignment involving  the cervical spine.     Incidental degenerative phenomena are appreciated within the cervical  spine with disc osteophyte complexes at C3-4, C5-6, and C6-7 resulting  in up to mild degrees of stenoses. Multilevel foraminal stenotic changes  are also noted from C3-4 down to C5-6 and are predominantly secondary to  uncovertebral joint hypertrophy.     There is a small left apical pneumothorax.     IMPRESSION:     Small left apical pneumothorax.     No evidence for acute fracture or bony malalignment involving the  cervical spine.     Incidental degenerative phenomena as discussed above.     These findings were discussed with Dr. Brody Gregory on 12/24/2024 at  approximately 10:40 a.m.     Radiation dose reduction techniques were utilized, including automated  exposure control and exposure modulation based on body size.     This report was  finalized on 12/24/2024 10:44 AM by Dr. Luis Enrique Atkins M.D on Workstation: [id number]       XR Hip With or Without Pelvis 2 - 3 View Left [[id number]] Collected: 12/24/24 0950     Updated: 12/24/24 0955    Narrative:      XR HIP W OR WO PELVIS 2-3 VIEW LEFT-, XR ELBOW 2 VW LEFT-     Clinical: Fell, left hip and elbow pain     Left hip findings: Medullary shelbi and gamma nail in position. No  loosening or malalignment, the appearance is similar to 1/28/2023. No  acute osseous abnormality of the left hip or the left hemipelvis. Healed  fracture. Soft tissues within normal limits. Remainder is unremarkable.     This report was finalized on 12/24/2024 9:52 AM by Dr. Van Izquierdo M.D on Workstation: [id number]       XR ELBOW 2 VIEW LEFT [[id number]] Collected: 12/24/24 0950     Updated: 12/24/24 0955    Narrative:      XR HIP W OR WO PELVIS 2-3 VIEW LEFT-, XR ELBOW 2 VW LEFT-     Clinical: Fell, left hip and elbow pain     Left hip findings: Medullary shelbi and gamma nail in position. No  loosening or malalignment, the appearance is similar to 1/28/2023. No  acute osseous abnormality of the left hip or the left hemipelvis. Healed  fracture. Soft tissues within normal limits. Remainder is unremarkable.     This report was finalized on 12/24/2024 9:52 AM by Dr. Van Izquierdo M.D on Workstation: [id number]               Results for orders placed during the hospital encounter of 12/03/18    Adult Transthoracic Echo Complete W/ Cont if Necessary Per Protocol    Interpretation Summary  · Left ventricular systolic function is normal. Estimated EF = 53%.    There is no significant valvular heart disease.      ECG 12 Lead Other; dizzy   Final Result   HEART XBXV=684  bpm   RR Zcjabgdh=546  ms   NJ Interval=  ms   P Horizontal Axis=  deg   P Front Axis=  deg   QRSD Interval=84  ms   QT Boiqevoy=669  ms   MDrH=308  ms   QRS Axis=6  deg   T Wave Axis=32  deg   - ABNORMAL ECG -   Atrial fibrillation   Low voltage, extremity  leads   Repolarization abnormalities and rate has improved   Electronically Signed By: Licha Osuna ([facility]) 2024-12-24 14:52:25   Date and Time of Study:2024-12-24 09:12:52      Telemetry Scan   Final Result           Assessment/Plan     Active Hospital Problems    Diagnosis  POA    **Multiple rib fractures [S22.49XA]  Yes    Closed traumatic fracture of ribs of left side with pneumothorax [S22.42XA, S27.0XXA]  Yes    Fracture of left scapular body [S42.112A]  Yes    Pulmonary contusion [S27.329A]  Unknown    Multiple rib fractures involving four or more ribs [S22.49XA]  Yes    Permanent atrial fibrillation [I48.21]  Yes    Benign essential hypertension [I10]  Yes    Hyperlipidemia [E78.5]  Yes    Hypothyroidism [E03.9]  Yes      Resolved Hospital Problems   No resolved problems to display.       Mr. Soliman is a 89 y.o. former smoker with a history of hypertension, AF, HLD, hypothyroidism, BPH who presents after mechanical fall, found to have left-sided rib fractures and small left-sided pneumothorax.    Left-sided rib fractures  Small left-sided pneumothorax  -CT with small left apical pneumothorax as well as mildly displaced fracture of the posterior left eighth rib and fractures of left seventh, ninth and 10th ribs with evidence of pulmonary contusion  - d/w Paula Mccullough NP- ok for diet today; they will see him tomorrow, repeat CXR in am ordered  -Patient not complaining of any significant pain, discussed with patient and family plan for scheduled Tylenol, as needed oxycodone, continue lidocaine patch; incentive spirometer/pulmonary hygiene    Scapular fracture  - d/w Dr. Wang- on call for ortho- reviewed CT  - ok for sling for comfort (not needed if patient does not have any benefit)  - can follow up outpatient if patient desires (already sees Dr. Hair in ortho clinic)    Mechanical fall with head trauma  -CT head and cervical spine negative for acute injury  -PT/OT/CCP    Hypertension  Permanent  AF  HLD  -Continue home metoprolol and digoxin, patient not on AC at home due to previous history of frequent falls  - no longer on lasix, no e/o overload    CKD3b  - Cr 1.5 on admit, prior baseline 1.3-1.4  - plan daily BMP    Hypothyroidism  -Continue home Synthroid    BPH  -Continue finasteride and tamsulosin    History of dysphagia-patient and family acknowledge risk of aspiration, they would like patient to eat a regular diet with thin liquids.  They have declined speech evaluation.    History of right sided pneumothorax with chest tube placement 5/23    I discussed the patient's findings and my recommendations with patient, family, nursing staff, consulting provider, and ED provider.    VTE Prophylaxis - SCDs.  Code Status - DNR.       Tracy Chinchilla MD  [facility]  12/24/24  15:01 EST

## 2024-12-24 NOTE — ED PROVIDER NOTES
EMERGENCY DEPARTMENT ENCOUNTER    Room Number:  30/30  PCP: Lesly Kuhn APRN  Historian: Patient      HPI:  Chief Complaint: Fall  A complete HPI/ROS/PMH/PSH/SH/FH are unobtainable due to: None  Context: Lavelle Soliman is a 89 y.o. male who presents to the ED c/o sudden onset of head and chest wall injury that occurred status post fall this morning.  His family states that he got up to go to the bathroom around 230 this morning with reported dizziness causing the fall.  He states that there was a spinning sensation at the time that caused the fall to the ground.  He did strike his head as well as chest wall and left upper extremity on the way down.  He denies loss of consciousness and has full recollection of the entirety of the event.  He currently denies abdominal pain, headache, blurry vision, fever/chills, or nausea/vomiting.  He does report that the symptoms were moderate at the time and he still feels somewhat unsteady on his feet currently.            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    Permanent atrial fibrillation     Closed fracture of left hip, initial encounter 01/24/2023    Postoperative anemia due to acute blood loss 01/26/2023    Moderate malnutrition 02/02/2023    Multiple rib fractures involving four or more ribs 05/06/2023    Diplopia 06/30/2023    Dry eye syndrome of bilateral lacrimal glands 06/24/2021    Nonexudative age-related macular degeneration, right eye, intermediate dry stage 04/29/2021    Hypertensive disorder 12/03/2018    Low vision right eye category 1, low vision  left eye category 1 2023    Macular cyst, hole, or pseudohole, right eye 2021    Macular degeneration 2023    Presbyopia 2021    Presence of intraocular lens 2021    Scotoma involving central area, bilateral 2021     Resolved Ambulatory Problems     Diagnosis Date Noted    Sepsis 2018    Hypokalemia 2018     Past Medical History:   Diagnosis Date    Abnormal ECG     Arrhythmia     Cataract     Chronic kidney disease     Clotting disorder     Coronary artery disease 2018    Disease of thyroid gland     GERD (gastroesophageal reflux disease) 2016    History of transfusion     HL (hearing loss)     Paroxysmal atrial fibrillation     Visual impairment S         PAST SURGICAL HISTORY  Past Surgical History:   Procedure Laterality Date    EYE SURGERY      HERNIA REPAIR      HIP INTERTROCHANTERIC NAILING Left 2023    Procedure: LT. HIP INTERTROCHANTERIC NAILING;  Surgeon: Leland Hair MD;  Location: [facility];  Service: Orthopedics;  Laterality: Left;         FAMILY HISTORY  Family History   Problem Relation Age of Onset    No Known Problems Mother     No Known Problems Father     Hyperlipidemia Brother     Hearing loss Paternal Aunt     Vision loss Paternal Aunt     No Known Problems Maternal Grandmother     No Known Problems Maternal Grandfather     No Known Problems Paternal Grandmother     No Known Problems Paternal Grandfather     Malig Hyperthermia Neg Hx          SOCIAL HISTORY  Social History     Socioeconomic History    Marital status:    Tobacco Use    Smoking status: Former     Types: Pipe     Start date: 1955     Quit date: 1970     Years since quittin.0    Smokeless tobacco: Never   Vaping Use    Vaping status: Never Used   Substance and Sexual Activity    Alcohol use: Yes     Alcohol/week: 1.0 standard drink of alcohol     Types: 1 Shots of liquor per week     Comment: occ    Drug use: Never    Sexual activity:  Not Currently     Partners: Female     Birth control/protection: None         ALLERGIES  Latex        REVIEW OF SYSTEMS  Review of Systems   Constitutional:  Negative for activity change, appetite change and fever.   HENT:  Negative for congestion and sore throat.    Eyes: Negative.    Respiratory:  Negative for cough and shortness of breath.    Cardiovascular:  Negative for chest pain and leg swelling.   Gastrointestinal:  Negative for abdominal pain, diarrhea and vomiting.   Endocrine: Negative.    Genitourinary:  Negative for decreased urine volume and dysuria.   Musculoskeletal:  Negative for neck pain.        Chest wall, scapula, left elbow, as well as left hip discomfort   Skin:  Negative for rash and wound.   Allergic/Immunologic: Negative.    Neurological:  Positive for dizziness. Negative for weakness, numbness and headaches.   Hematological: Negative.    Psychiatric/Behavioral: Negative.     All other systems reviewed and are negative.         PHYSICAL EXAM  ED Triage Vitals [12/24/24 0848]   Temp Heart Rate Resp BP SpO2   98.9 °F (37.2 °C) 120 18 -- 93 %      Temp src Heart Rate Source Patient Position BP Location FiO2 (%)   Tympanic -- -- -- --       Physical Exam  Constitutional:       General: He is not in acute distress.     Appearance: Normal appearance. He is not ill-appearing or toxic-appearing.   HENT:      Head: Normocephalic.      Comments: Scalp contusion without laceration  Eyes:      Extraocular Movements: Extraocular movements intact.      Pupils: Pupils are equal, round, and reactive to light.   Cardiovascular:      Rate and Rhythm: Normal rate and regular rhythm.      Heart sounds: No murmur heard.     No friction rub. No gallop.   Pulmonary:      Effort: Pulmonary effort is normal.      Breath sounds: Normal breath sounds.   Abdominal:      General: Abdomen is flat. There is no distension.      Palpations: Abdomen is soft.      Tenderness: There is no abdominal tenderness.    Musculoskeletal:         General: No swelling or tenderness. Normal range of motion.      Cervical back: Normal range of motion and neck supple.      Comments: Mild tenderness to palpation to the left chest wall as well as mid axillary region without palpable fracture or crepitus   Skin:     General: Skin is warm and dry.      Comments: Abrasion to the left forearm and elbow   Neurological:      General: No focal deficit present.      Mental Status: He is alert and oriented to person, place, and time.      Sensory: No sensory deficit.      Motor: No weakness.   Psychiatric:         Mood and Affect: Mood normal.         Behavior: Behavior normal.           Vital signs and nursing notes reviewed.          LAB RESULTS  Recent Results (from the past 24 hours)   ECG 12 Lead Other; dizzy    Collection Time: 12/24/24  9:12 AM   Result Value Ref Range    QT Interval 338 ms    QTC Interval 453 ms   Comprehensive Metabolic Panel    Collection Time: 12/24/24  9:17 AM    Specimen: Blood   Result Value Ref Range    Glucose 137 (H) 65 - 99 mg/dL    BUN 20 8 - 23 mg/dL    Creatinine 1.49 (H) 0.76 - 1.27 mg/dL    Sodium 136 136 - 145 mmol/L    Potassium 4.5 3.5 - 5.2 mmol/L    Chloride 105 98 - 107 mmol/L    CO2 25.0 22.0 - 29.0 mmol/L    Calcium 8.6 8.2 - 9.6 mg/dL    Total Protein 7.1 6.0 - 8.5 g/dL    Albumin 3.5 3.5 - 5.2 g/dL    ALT (SGPT) 19 1 - 41 U/L    AST (SGOT) 20 1 - 40 U/L    Alkaline Phosphatase 103 39 - 117 U/L    Total Bilirubin 0.5 0.0 - 1.2 mg/dL    Globulin 3.6 gm/dL    A/G Ratio 1.0 g/dL    BUN/Creatinine Ratio 13.4 7.0 - 25.0    Anion Gap 6.0 5.0 - 15.0 mmol/L    eGFR 42.4 (L) >60.0 mL/min/1.73   CBC Auto Differential    Collection Time: 12/24/24  9:17 AM    Specimen: Blood   Result Value Ref Range    WBC 12.45 (H) 3.40 - 10.80 10*3/mm3    RBC 3.90 (L) 4.14 - 5.80 10*6/mm3    Hemoglobin 12.7 (L) 13.0 - 17.7 g/dL    Hematocrit 37.8 37.5 - 51.0 %    MCV 96.9 79.0 - 97.0 fL    MCH 32.6 26.6 - 33.0 pg    MCHC  33.6 31.5 - 35.7 g/dL    RDW 13.3 12.3 - 15.4 %    RDW-SD 47.1 37.0 - 54.0 fl    MPV 11.0 6.0 - 12.0 fL    Platelets 154 140 - 450 10*3/mm3    Neutrophil % 84.0 (H) 42.7 - 76.0 %    Lymphocyte % 8.1 (L) 19.6 - 45.3 %    Monocyte % 6.7 5.0 - 12.0 %    Eosinophil % 0.0 (L) 0.3 - 6.2 %    Basophil % 0.5 0.0 - 1.5 %    Immature Grans % 0.7 (H) 0.0 - 0.5 %    Neutrophils, Absolute 10.46 (H) 1.70 - 7.00 10*3/mm3    Lymphocytes, Absolute 1.01 0.70 - 3.10 10*3/mm3    Monocytes, Absolute 0.83 0.10 - 0.90 10*3/mm3    Eosinophils, Absolute 0.00 0.00 - 0.40 10*3/mm3    Basophils, Absolute 0.06 0.00 - 0.20 10*3/mm3    Immature Grans, Absolute 0.09 (H) 0.00 - 0.05 10*3/mm3    nRBC 0.0 0.0 - 0.2 /100 WBC   Urinalysis With Microscopic If Indicated (No Culture) - Urine, Clean Catch    Collection Time: 12/24/24 10:40 AM    Specimen: Urine, Clean Catch   Result Value Ref Range    Color, UA Yellow Yellow, Straw    Appearance, UA Clear Clear    pH, UA 7.5 5.0 - 8.0    Specific Gravity, UA 1.017 1.005 - 1.030    Glucose, UA Negative Negative    Ketones, UA Negative Negative    Bilirubin, UA Negative Negative    Blood, UA Negative Negative    Protein, UA 30 mg/dL (1+) (A) Negative    Leuk Esterase, UA Negative Negative    Nitrite, UA Negative Negative    Urobilinogen, UA 0.2 E.U./dL 0.2 - 1.0 E.U./dL   Urinalysis, Microscopic Only - Urine, Clean Catch    Collection Time: 12/24/24 10:40 AM    Specimen: Urine, Clean Catch   Result Value Ref Range    RBC, UA 0-2 None Seen, 0-2 /HPF    WBC, UA 0-2 None Seen, 0-2 /HPF    Bacteria, UA None Seen None Seen /HPF    Squamous Epithelial Cells, UA 0-2 None Seen, 0-2 /HPF    Hyaline Casts, UA None Seen None Seen /LPF    Methodology Automated Microscopy        Ordered the above labs and reviewed the results.        RADIOLOGY  CT Chest Without Contrast Diagnostic    Result Date: 12/24/2024  CT CHEST WITHOUT CONTRAST DIAGNOSTIC  DATE OF EXAM: 12/24/2024 9:32 AM  INDICATION: Trauma. Back pain and shoulder  pain status post fall.  COMPARISON: CT cervical spine 10/31/2024. Chest radiographs 05/25/2023, 05/11/2023, and 05/10/2023. CT chest 05/09/2023 and 05/06/2023.  TECHNIQUE: Multiple contiguous axial images were acquired through the chest without the intravenous administration of contrast.  Reformatted coronal and sagittal sequences and 3D reconstruction images were also reviewed. Radiation de reduction techniques were utilized, including automated exposure control and exposure modulation based on body size.  FINDINGS: Evaluation for intrathoracic trauma is mildly limited by the lack of intravenous contrast.  No noncontrast CT evidence of a mediastinal hematoma or injury of the great vessels of the chest. Stable cardiomegaly with moderate calcified coronary artery disease.  Trace pericardial fluid. Aortic valve calcifications and calcified atherosclerotic disease in the thoracic aorta with stable 3.8 cm fusiform dilatation of the ascending thoracic aorta. No pathologically enlarged intrathoracic lymph nodes are identified.  Mildly displaced fracture of the posterior left 8th rib and nondisplaced fractures of the posterior left 7th, 9th, and 10th ribs. Questionable subtle nondisplaced fracture of the distal infraspinous left scapular body. Multiple old right rib fracture deformities and old healed fracture deformity of the sternum. Mild lower thoracic levoscoliosis. Multilevel flowing mid and lower thoracic anterior osteophyte formation.  Tiny apical and anteromedial left pneumothorax, tiny focus of air deep to the left 7th rib fracture, and small left pleural effusion. Small likely partially loculated posterior right basilar hydropneumothorax with adjacent pleural thickening. Low lung volumes with multifocal bilateral perihilar and bibasilar bandlike regions of subsegmental atelectasis and/or scarring. Patchy ground glass opacities in the base of the left upper lobe and left lower lobe, likely pulmonary contusion. The  central airways are widely patent.  Limited noncontrast CT imaging of the upper abdomen partially includes incompletely evaluated similar-appearing low-density lesions in the liver, statistically representing hepatic cysts and/or hemangiomas. Large gallstone within the otherwise unremarkable appearing gallbladder.       1. Mildly displaced fracture of the posterior left 8th rib and fractures of the left 7th, 9th, and 10th ribs with a small left hydropneumothorax. 2. Patchy ground glass opacities in the base of the left upper lobe and left lower lobe, likely pulmonary contusions, superimposed on multifocal bilateral perihilar and bibasilar bandlike subsegmental atelectasis and/or scarring. 3. Questionable nondisplaced fracture of the distal infraspinatus scapular body. 4. Small likely partially loculated posterior right basilar hydropneumothorax with adjacent pleural thickening.  Results were discussed over the phone with the ordering ER physician, Dr. Brody Gregory, at 11:08 a.m. on 12/24/2024.      CT Head Without Contrast, CT Cervical Spine Without Contrast    Result Date: 12/24/2024  CT HEAD AND CERVICAL SPINE WITHOUT CONTRAST  CLINICAL HISTORY: trauma  TECHNIQUE: CT scan of the head was obtained with 3 mm axial soft tissue and 2 mm bone algorithm images. No intravenous contrast was administered. Sagittal and coronal reconstructions were obtained.  COMPARISON: CT head dated 10/31/2024.  FINDINGS:   There is no evidence for an acute extra-axial hemorrhage or a calvarial fracture.  The ventricles, sulci, and cisterns are age-appropriate. The gray-white matter differentiation is within normal limits. The basal ganglia and thalami are unremarkable in appearance. 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 These are within the left PICA distribution and the largest of these measures up to approximately 10 x 4 mm in greatest axial dimensions. Mild changes of chronic small  vessel ischemic phenomena are noted. No significant interval change is seen since the prior exam.       No evidence for acute traumatic intracranial pathology.   TECHNIQUE: CT scan of the cervical spine was obtained with 1 mm axial bone algorithm and 2 mm axial soft tissue algorithm images. Sagittal and coronal reconstructed images were obtained.  FINDINGS:  There is no evidence for acute fracture or bony malalignment involving the cervical spine.  Incidental degenerative phenomena are appreciated within the cervical spine with disc osteophyte complexes at C3-4, C5-6, and C6-7 resulting in up to mild degrees of stenoses. Multilevel foraminal stenotic changes are also noted from C3-4 down to C5-6 and are predominantly secondary to uncovertebral joint hypertrophy.  There is a small left apical pneumothorax.  IMPRESSION:  Small left apical pneumothorax.  No evidence for acute fracture or bony malalignment involving the cervical spine.  Incidental degenerative phenomena as discussed above.  These findings were discussed with Dr. Brody Gregory on 12/24/2024 at approximately 10:40 a.m.  Radiation dose reduction techniques were utilized, including automated exposure control and exposure modulation based on body size.  This report was finalized on 12/24/2024 10:44 AM by Dr. Luis Enrique Atkins M.D on Workstation: [id number]      XR Hip With or Without Pelvis 2 - 3 View Left, XR ELBOW 2 VIEW LEFT    Result Date: 12/24/2024  XR HIP W OR WO PELVIS 2-3 VIEW LEFT-, XR ELBOW 2 VW LEFT-  Clinical: Fell, left hip and elbow pain  Left hip findings: Medullary shelbi and gamma nail in position. No loosening or malalignment, the appearance is similar to 1/28/2023. No acute osseous abnormality of the left hip or the left hemipelvis. Healed fracture. Soft tissues within normal limits. Remainder is unremarkable.  This report was finalized on 12/24/2024 9:52 AM by Dr. Van Izquierdo M.D on Workstation: [id number]       Ordered the above noted  radiological studies. Reviewed by me in PACS.            PROCEDURES  Procedures    EKG independently interpreted by myself as follows:    EKG          EKG time: 0912  Rhythm/Rate: atrial fibrillation, 108  P waves and DC: absent  QRS, axis: nml, nml   ST and T waves: nml     Interpreted Contemporaneously by me, independently viewed  changed compared to prior 5/6/23            MEDICATIONS GIVEN IN ER  Medications   sodium chloride 0.9 % flush 10 mL (has no administration in time range)   Lidocaine 4 % 1 patch (1 patch Transdermal Medication Applied 12/24/24 1140)                   MEDICAL DECISION MAKING, PROGRESS, and CONSULTS    All labs have been independently reviewed by me.  All radiology studies have been reviewed by me and I have also reviewed the radiology report.   EKG's independently viewed and interpreted by me.  Discussion below represents my analysis of pertinent findings related to patient's condition, differential diagnosis, treatment plan and final disposition.      Additional sources:  - Discussed/ obtained information from independent historians: History obtained from both the patient as well as the patient's family at bedside.    - External (non-ED) record review: Upon medical records review, the patient was last seen and evaluated in the outpatient office of primary care on 11/8/2024 for evaluation of a scalp laceration as well as chronic hypertension.    - Chronic or social conditions impacting care: Macular degeneration with a previous history of falls    - Shared decision making: Admission decision based on shared conversations have between myself, the patient and family at bedside, as well as Dr. Chinchilla with [facility].      Orders placed during this visit:  Orders Placed This Encounter   Procedures    CT Head Without Contrast    CT Cervical Spine Without Contrast    CT Chest Without Contrast Diagnostic    XR Hip With or Without Pelvis 2 - 3 View Left    XR ELBOW 2 VIEW LEFT    Comprehensive  Metabolic Panel    CBC Auto Differential    Urinalysis With Microscopic If Indicated (No Culture) - Urine, Clean Catch    Urinalysis, Microscopic Only - Urine, Clean Catch    [facility] (on-call MD unless specified) Details    ECG 12 Lead Other; dizzy    Insert Peripheral IV    Inpatient Admission    CBC & Differential           Differential diagnosis includes but is not limited to:    Closed head injury, skull fracture, intracranial hemorrhage, intracranial mass effect, rib fracture, chest wall contusion, pneumothorax, hemothorax, acute anemia, acute coronary syndrome, or severe electrolyte disturbance      Independent interpretation of labs, radiology studies, and discussions with consultants:    Head CT independently interpreted by myself with my interpretation showing no skull fracture nor area of hemorrhage or mass effect.        ED Course as of 12/24/24 1147   Tue Dec 24, 2024   1136 On reevaluation, the patient is resting comfortably and without any further complaints at the moment.  I informed he and the family about the multiple rib fractures, tiny apical pneumothorax, as well as pulmonary contusion seen on CT scan.  He reiterates that he really is not in very much pain right now though his heart rate remains elevated at roughly 110.  We will place a Lidoderm patch and admit him to the hospital for further management and treatment.  He agrees with the plan and all questions answered. [BM]   1146 The patient's presentation, workup, as well as diagnosis and treatment plan was discussed at length with Dr. Chinchilla of [facility].  She agrees to admit the patient to the hospital today for further management and treatment. [BM]      ED Course User Index  [BM] Brody Gregory MD         DIAGNOSIS  Final diagnoses:   Fall from standing, initial encounter   Dizziness   Closed fracture of multiple ribs of left side, initial encounter   Traumatic pneumothorax, initial encounter   Contusion of left lung, initial encounter    Tachycardia         DISPOSITION  ADMISSION    Discussed treatment plan and reason for admission with pt/family and admitting physician.  Pt/family voiced understanding of the plan for admission for further testing/treatment as needed.               Latest Documented Vital Signs:  As of 11:47 EST  BP- (!) 137/103 HR- 101 Temp- 98.9 °F (37.2 °C) (Tympanic) O2 sat- 94%              --    Please note that portions of this were completed with a voice recognition program.       Note Disclaimer: At [facility], we believe that sharing information builds trust and better relationships. You are receiving this note because you are receiving care at [facility] or recently visited. It is possible you will see health information before a provider has talked with you about it. This kind of information can be easy to misunderstand. To help you fully understand what it means for your health, we urge you to discuss this note with your provider.             Brody Gregory MD  12/24/24 [id number]

## 2024-12-24 NOTE — PLAN OF CARE
Goal Outcome Evaluation:  Plan of Care Reviewed With: patient        Progress: no change  Outcome Evaluation: Afib, RA, A/Ox3, disoriented to time. Admitted r/t fall at home. ThorSx consulted during shift. Discharge planning to be evaluated pending medical clearance.

## 2024-12-24 NOTE — ED NOTES
Nursing report ED to floor  Lavelle Soliman  89 y.o.  male    HPI :  HPI  Stated Reason for Visit: fall    Chief Complaint  Chief Complaint   Patient presents with    Fall    Dizziness    Back Pain    Shoulder Pain    Head Injury       Admitting doctor:   Tracy Chinchilla MD    Admitting diagnosis:   The primary encounter diagnosis was Fall from standing, initial encounter. Diagnoses of Dizziness, Closed fracture of multiple ribs of left side, initial encounter, Traumatic pneumothorax, initial encounter, Contusion of left lung, initial encounter, and Tachycardia were also pertinent to this visit.    Code status:   Current Code Status       Date Active Code Status Order ID Comments User Context       Prior            Allergies:   Latex    Isolation:   No active isolations    Intake and Output  No intake or output data in the 24 hours ending 12/24/24 1155    Weight:   There were no vitals filed for this visit.    Most recent vitals:   Vitals:    12/24/24 0901 12/24/24 1002 12/24/24 1031 12/24/24 1101   BP: (!) 170/107 131/88 148/87 (!) 137/103   Pulse: 114 109 114 101   Resp:       Temp:       TempSrc:       SpO2:  94% 96% 94%       Active LDAs/IV Access:   Lines, Drains & Airways       Active LDAs       Name Placement date Placement time Site Days    Peripheral IV 12/24/24 0920 Right Antecubital 12/24/24  0920  Antecubital  less than 1                    Labs (abnormal labs have a star):   Labs Reviewed   COMPREHENSIVE METABOLIC PANEL - Abnormal; Notable for the following components:       Result Value    Glucose 137 (*)     Creatinine 1.49 (*)     eGFR 42.4 (*)     All other components within normal limits    Narrative:     GFR Categories in Chronic Kidney Disease (CKD)      GFR Category          GFR (mL/min/1.73)    Interpretation  G1                     90 or greater         Normal or high (1)  G2                      60-89                Mild decrease (1)  G3a                   45-59                Mild to  moderate decrease  G3b                   30-44                Moderate to severe decrease  G4                    15-29                Severe decrease  G5                    14 or less           Kidney failure          (1)In the absence of evidence of kidney disease, neither GFR category G1 or G2 fulfill the criteria for CKD.    eGFR calculation 2021 CKD-EPI creatinine equation, which does not include race as a factor   CBC WITH AUTO DIFFERENTIAL - Abnormal; Notable for the following components:    WBC 12.45 (*)     RBC 3.90 (*)     Hemoglobin 12.7 (*)     Neutrophil % 84.0 (*)     Lymphocyte % 8.1 (*)     Eosinophil % 0.0 (*)     Immature Grans % 0.7 (*)     Neutrophils, Absolute 10.46 (*)     Immature Grans, Absolute 0.09 (*)     All other components within normal limits   URINALYSIS W/ MICROSCOPIC IF INDICATED (NO CULTURE) - Abnormal; Notable for the following components:    Protein, UA 30 mg/dL (1+) (*)     All other components within normal limits   URINALYSIS, MICROSCOPIC ONLY   CBC AND DIFFERENTIAL    Narrative:     The following orders were created for panel order CBC & Differential.  Procedure                               Abnormality         Status                     ---------                               -----------         ------                     CBC Auto Differential[[id number]]        Abnormal            Final result                 Please view results for these tests on the individual orders.       EKG:   ECG 12 Lead Other; dizzy   Preliminary Result   HEART NZXH=626  bpm   RR Jkbnlmks=498  ms   MO Interval=  ms   P Horizontal Axis=  deg   P Front Axis=  deg   QRSD Interval=84  ms   QT Psyldftw=750  ms   JZdC=578  ms   QRS Axis=6  deg   T Wave Axis=32  deg   - ABNORMAL ECG -   Atrial fibrillation   Low voltage, extremity leads   Date and Time of Study:2024-12-24 09:12:52          Meds given in ED:   Medications   sodium chloride 0.9 % flush 10 mL (has no administration in time range)   Lidocaine 4  % 1 patch (1 patch Transdermal Medication Applied 12/24/24 1140)       Imaging results:  CT Chest Without Contrast Diagnostic    Result Date: 12/24/2024   1. Mildly displaced fracture of the posterior left 8th rib and fractures of the left 7th, 9th, and 10th ribs with a small left hydropneumothorax. 2. Patchy ground glass opacities in the base of the left upper lobe and left lower lobe, likely pulmonary contusions, superimposed on multifocal bilateral perihilar and bibasilar bandlike subsegmental atelectasis and/or scarring. 3. Questionable nondisplaced fracture of the distal infraspinatus scapular body. 4. Small likely partially loculated posterior right basilar hydropneumothorax with adjacent pleural thickening.  Results were discussed over the phone with the ordering ER physician, Dr. Brody Gregory, at 11:08 a.m. on 12/24/2024.      CT Head Without Contrast    Result Date: 12/24/2024   No evidence for acute traumatic intracranial pathology.   TECHNIQUE: CT scan of the cervical spine was obtained with 1 mm axial bone algorithm and 2 mm axial soft tissue algorithm images. Sagittal and coronal reconstructed images were obtained.  FINDINGS:  There is no evidence for acute fracture or bony malalignment involving the cervical spine.  Incidental degenerative phenomena are appreciated within the cervical spine with disc osteophyte complexes at C3-4, C5-6, and C6-7 resulting in up to mild degrees of stenoses. Multilevel foraminal stenotic changes are also noted from C3-4 down to C5-6 and are predominantly secondary to uncovertebral joint hypertrophy.  There is a small left apical pneumothorax.  IMPRESSION:  Small left apical pneumothorax.  No evidence for acute fracture or bony malalignment involving the cervical spine.  Incidental degenerative phenomena as discussed above.  These findings were discussed with Dr. Brody Gregory on 12/24/2024 at approximately 10:40 a.m.  Radiation dose reduction techniques were utilized,  including automated exposure control and exposure modulation based on body size.  This report was finalized on 2024 10:44 AM by Dr. Luis Enrique Atkins M.D on Workstation: [id number]      CT Cervical Spine Without Contrast    Result Date: 2024   No evidence for acute traumatic intracranial pathology.   TECHNIQUE: CT scan of the cervical spine was obtained with 1 mm axial bone algorithm and 2 mm axial soft tissue algorithm images. Sagittal and coronal reconstructed images were obtained.  FINDINGS:  There is no evidence for acute fracture or bony malalignment involving the cervical spine.  Incidental degenerative phenomena are appreciated within the cervical spine with disc osteophyte complexes at C3-4, C5-6, and C6-7 resulting in up to mild degrees of stenoses. Multilevel foraminal stenotic changes are also noted from C3-4 down to C5-6 and are predominantly secondary to uncovertebral joint hypertrophy.  There is a small left apical pneumothorax.  IMPRESSION:  Small left apical pneumothorax.  No evidence for acute fracture or bony malalignment involving the cervical spine.  Incidental degenerative phenomena as discussed above.  These findings were discussed with Dr. Brody Gregory on 2024 at approximately 10:40 a.m.  Radiation dose reduction techniques were utilized, including automated exposure control and exposure modulation based on body size.  This report was finalized on 2024 10:44 AM by Dr. Luis Enrique Atkins M.D on Workstation: [id number]       Ambulatory status:   - assist x1    Social issues:   Social History     Socioeconomic History    Marital status:    Tobacco Use    Smoking status: Former     Types: Pipe     Start date: 1955     Quit date: 1970     Years since quittin.0    Smokeless tobacco: Never   Vaping Use    Vaping status: Never Used   Substance and Sexual Activity    Alcohol use: Yes     Alcohol/week: 1.0 standard drink of alcohol     Types: 1 Shots of liquor  per week     Comment: occ    Drug use: Never    Sexual activity: Not Currently     Partners: Female     Birth control/protection: None       Peripheral Neurovascular  Peripheral Neurovascular (Adult)  Peripheral Neurovascular WDL: WDL    Neuro Cognitive  Neuro Cognitive (Adult)  Cognitive/Neuro/Behavioral WDL: WDL    Learning  Learning Assessment  Learning Readiness and Ability: no barriers identified  Education Provided  Person Taught: patient, family member/friend    Respiratory  Respiratory  Airway WDL: WDL  Respiratory WDL  Respiratory WDL: WDL    Abdominal Pain       Pain Assessments  Pain (Adult)  (0-10) Pain Rating: Rest: 5  (0-10) Pain Rating: Activity: 5  Pain Location: head, other (see comments) (L rib)  Pain Side/Orientation: left    NIH Stroke Scale       Katia Arciniega RN  12/24/24 11:55 EST

## 2024-12-24 NOTE — ED TRIAGE NOTES
Patient to er from home with family at side. Reported he got up out of bed this morning around 2 and got dizzy and fell. Patient did hit his head/ no loc reported/ no blood thinners reported by daughter. Patient c/o shoulder pain/ back pain.

## 2024-12-25 ENCOUNTER — APPOINTMENT (OUTPATIENT)
Dept: GENERAL RADIOLOGY | Facility: HOSPITAL | Age: 89
End: 2024-12-25
Payer: MEDICARE

## 2024-12-25 LAB
ANION GAP SERPL CALCULATED.3IONS-SCNC: 9.4 MMOL/L (ref 5–15)
BASOPHILS # BLD AUTO: 0.07 10*3/MM3 (ref 0–0.2)
BASOPHILS NFR BLD AUTO: 0.6 % (ref 0–1.5)
BUN SERPL-MCNC: 24 MG/DL (ref 8–23)
BUN/CREAT SERPL: 17.5 (ref 7–25)
CALCIUM SPEC-SCNC: 8.2 MG/DL (ref 8.2–9.6)
CHLORIDE SERPL-SCNC: 110 MMOL/L (ref 98–107)
CO2 SERPL-SCNC: 23.6 MMOL/L (ref 22–29)
CREAT SERPL-MCNC: 1.37 MG/DL (ref 0.76–1.27)
DEPRECATED RDW RBC AUTO: 47.4 FL (ref 37–54)
EGFRCR SERPLBLD CKD-EPI 2021: 46.9 ML/MIN/1.73
EOSINOPHIL # BLD AUTO: 0.32 10*3/MM3 (ref 0–0.4)
EOSINOPHIL NFR BLD AUTO: 2.6 % (ref 0.3–6.2)
ERYTHROCYTE [DISTWIDTH] IN BLOOD BY AUTOMATED COUNT: 13.2 % (ref 12.3–15.4)
GLUCOSE SERPL-MCNC: 125 MG/DL (ref 65–99)
HCT VFR BLD AUTO: 35.8 % (ref 37.5–51)
HGB BLD-MCNC: 11.9 G/DL (ref 13–17.7)
IMM GRANULOCYTES # BLD AUTO: 0.07 10*3/MM3 (ref 0–0.05)
IMM GRANULOCYTES NFR BLD AUTO: 0.6 % (ref 0–0.5)
LYMPHOCYTES # BLD AUTO: 1.47 10*3/MM3 (ref 0.7–3.1)
LYMPHOCYTES NFR BLD AUTO: 11.8 % (ref 19.6–45.3)
MAGNESIUM SERPL-MCNC: 2.1 MG/DL (ref 1.7–2.3)
MCH RBC QN AUTO: 32.4 PG (ref 26.6–33)
MCHC RBC AUTO-ENTMCNC: 33.2 G/DL (ref 31.5–35.7)
MCV RBC AUTO: 97.5 FL (ref 79–97)
MONOCYTES # BLD AUTO: 1.65 10*3/MM3 (ref 0.1–0.9)
MONOCYTES NFR BLD AUTO: 13.2 % (ref 5–12)
NEUTROPHILS NFR BLD AUTO: 71.2 % (ref 42.7–76)
NEUTROPHILS NFR BLD AUTO: 8.91 10*3/MM3 (ref 1.7–7)
NRBC BLD AUTO-RTO: 0 /100 WBC (ref 0–0.2)
PHOSPHATE SERPL-MCNC: 2.5 MG/DL (ref 2.5–4.5)
PLATELET # BLD AUTO: 134 10*3/MM3 (ref 140–450)
PMV BLD AUTO: 11.5 FL (ref 6–12)
POTASSIUM SERPL-SCNC: 4.3 MMOL/L (ref 3.5–5.2)
RBC # BLD AUTO: 3.67 10*6/MM3 (ref 4.14–5.8)
SODIUM SERPL-SCNC: 143 MMOL/L (ref 136–145)
WBC NRBC COR # BLD AUTO: 12.49 10*3/MM3 (ref 3.4–10.8)

## 2024-12-25 PROCEDURE — 83735 ASSAY OF MAGNESIUM: CPT | Performed by: STUDENT IN AN ORGANIZED HEALTH CARE EDUCATION/TRAINING PROGRAM

## 2024-12-25 PROCEDURE — 94664 DEMO&/EVAL PT USE INHALER: CPT

## 2024-12-25 PROCEDURE — 94799 UNLISTED PULMONARY SVC/PX: CPT

## 2024-12-25 PROCEDURE — 71045 X-RAY EXAM CHEST 1 VIEW: CPT

## 2024-12-25 PROCEDURE — 94760 N-INVAS EAR/PLS OXIMETRY 1: CPT

## 2024-12-25 PROCEDURE — 99232 SBSQ HOSP IP/OBS MODERATE 35: CPT | Performed by: SURGERY

## 2024-12-25 PROCEDURE — 84100 ASSAY OF PHOSPHORUS: CPT | Performed by: STUDENT IN AN ORGANIZED HEALTH CARE EDUCATION/TRAINING PROGRAM

## 2024-12-25 PROCEDURE — 80048 BASIC METABOLIC PNL TOTAL CA: CPT | Performed by: STUDENT IN AN ORGANIZED HEALTH CARE EDUCATION/TRAINING PROGRAM

## 2024-12-25 PROCEDURE — 85025 COMPLETE CBC W/AUTO DIFF WBC: CPT | Performed by: STUDENT IN AN ORGANIZED HEALTH CARE EDUCATION/TRAINING PROGRAM

## 2024-12-25 PROCEDURE — 94761 N-INVAS EAR/PLS OXIMETRY MLT: CPT

## 2024-12-25 RX ADMIN — Medication 10 ML: at 21:53

## 2024-12-25 RX ADMIN — ACETAMINOPHEN 1000 MG: 500 TABLET, FILM COATED ORAL at 21:53

## 2024-12-25 RX ADMIN — METOPROLOL TARTRATE 12.5 MG: 25 TABLET, FILM COATED ORAL at 08:29

## 2024-12-25 RX ADMIN — DIGOXIN 125 MCG: 125 TABLET ORAL at 08:29

## 2024-12-25 RX ADMIN — METOPROLOL TARTRATE 12.5 MG: 25 TABLET, FILM COATED ORAL at 21:53

## 2024-12-25 RX ADMIN — TAMSULOSIN HYDROCHLORIDE 0.4 MG: 0.4 CAPSULE ORAL at 21:53

## 2024-12-25 RX ADMIN — LIDOCAINE 1 PATCH: 4 PATCH TOPICAL at 08:29

## 2024-12-25 RX ADMIN — MUPIROCIN 1 APPLICATION: 20 OINTMENT TOPICAL at 21:54

## 2024-12-25 RX ADMIN — Medication 10 ML: at 08:29

## 2024-12-25 RX ADMIN — LEVOTHYROXINE SODIUM 75 MCG: 75 TABLET ORAL at 06:07

## 2024-12-25 RX ADMIN — ACETAMINOPHEN 1000 MG: 500 TABLET, FILM COATED ORAL at 06:07

## 2024-12-25 RX ADMIN — ACETAMINOPHEN 1000 MG: 500 TABLET, FILM COATED ORAL at 14:12

## 2024-12-25 RX ADMIN — FAMOTIDINE 20 MG: 20 TABLET, FILM COATED ORAL at 08:29

## 2024-12-25 RX ADMIN — MUPIROCIN 1 APPLICATION: 20 OINTMENT TOPICAL at 08:29

## 2024-12-25 RX ADMIN — FINASTERIDE 5 MG: 5 TABLET, FILM COATED ORAL at 21:53

## 2024-12-25 RX ADMIN — IPRATROPIUM BROMIDE 2 SPRAY: 21 SPRAY, METERED NASAL at 14:12

## 2024-12-25 NOTE — PROGRESS NOTES
Name: Lavelle Soliman ADMIT: 2024   : 1927  PCP: Lesly Kuhn APRN    MRN: [id number] LOS: 1 days   AGE/SEX: 89 y.o. male  ROOM: [facility]     Subjective   Subjective   Patient sitting in chair. Pain control acceptable. Daughter at bedside. No cough, dysuria, shortness of breath.     Objective   Objective   Vital Signs  Temp:  [97.4 °F (36.3 °C)-98.3 °F (36.8 °C)] 97.4 °F (36.3 °C)  Heart Rate:  [] 79  Resp:  [16-18] 16  BP: (118-148)/(69-92) 138/74  SpO2:  [93 %-95 %] 95 %  on   ;   Device (Oxygen Therapy): room air  Body mass index is 27.99 kg/m².    Physical Exam  Constitutional:       General: He is not in acute distress.     Appearance: He is not toxic-appearing.   HENT:      Head: Normocephalic and atraumatic.   Cardiovascular:      Rate and Rhythm: Normal rate and regular rhythm.   Pulmonary:      Effort: Pulmonary effort is normal. No respiratory distress.      Breath sounds: No wheezing.   Abdominal:      General: Bowel sounds are normal.      Palpations: Abdomen is soft.      Tenderness: There is no abdominal tenderness.   Musculoskeletal:         General: No swelling.   Skin:     General: Skin is warm and dry.   Neurological:      General: No focal deficit present.      Mental Status: He is alert.   Psychiatric:         Mood and Affect: Mood normal.         Behavior: Behavior normal.     Results Review  I reviewed the patient's new clinical results.  Results from last 7 days   Lab Units 24  0406 24  0917   WBC 10*3/mm3 12.49* 12.45*   HEMOGLOBIN g/dL 11.9* 12.7*   PLATELETS 10*3/mm3 134* 154     Results from last 7 days   Lab Units 24  0406 24  0917   SODIUM mmol/L 143 136   POTASSIUM mmol/L 4.3 4.5   CHLORIDE mmol/L 110* 105   CO2 mmol/L 23.6 25.0   BUN mg/dL 24* 20   CREATININE mg/dL 1.37* 1.49*   GLUCOSE mg/dL 125* 137*     Lab Results   Component Value Date    ANIONGAP 9.4 2024     Estimated Creatinine Clearance: 25.4 mL/min (A) (by C-G formula based  "on SCr of 1.37 mg/dL (H)).   Lab Results   Component Value Date    EGFR 46.9 (L) 12/25/2024     Results from last 7 days   Lab Units 12/24/24  0917   ALBUMIN g/dL 3.5   BILIRUBIN mg/dL 0.5   ALK PHOS U/L 103   AST (SGOT) U/L 20   ALT (SGPT) U/L 19     Results from last 7 days   Lab Units 12/25/24  0406 12/24/24  0917   CALCIUM mg/dL 8.2 8.6   ALBUMIN g/dL  --  3.5   MAGNESIUM mg/dL 2.1  --    PHOSPHORUS mg/dL 2.5  --        No results found for: \"HGBA1C\", \"POCGLU\"    XR Chest 1 View    Result Date: 12/25/2024  As described.  This report was finalized on 12/25/2024 6:02 AM by Dr. Rajiv Grider M.D on Workstation: [id number]      CT Chest Without Contrast Diagnostic    Result Date: 12/24/2024   1. Mildly displaced fracture of the posterior left 8th rib and fractures of the left 7th, 9th, and 10th ribs with a small left hydropneumothorax. 2. Patchy ground glass opacities in the base of the left upper lobe and left lower lobe, likely pulmonary contusions, superimposed on multifocal bilateral perihilar and bibasilar bandlike subsegmental atelectasis and/or scarring. 3. Questionable nondisplaced fracture of the distal infraspinatus scapular body. 4. Small likely partially loculated posterior right basilar hydropneumothorax with adjacent pleural thickening.  Results were discussed over the phone with the ordering ER physician, Dr. Brody Gregory, at 11:08 a.m. on 12/24/2024.  This report was finalized on 12/24/2024 12:39 PM by See Winn MD on Workstation: [id number]      CT Head Without Contrast    Result Date: 12/24/2024   No evidence for acute traumatic intracranial pathology.   TECHNIQUE: CT scan of the cervical spine was obtained with 1 mm axial bone algorithm and 2 mm axial soft tissue algorithm images. Sagittal and coronal reconstructed images were obtained.  FINDINGS:  There is no evidence for acute fracture or bony malalignment involving the cervical spine.  Incidental degenerative phenomena are appreciated " within the cervical spine with disc osteophyte complexes at C3-4, C5-6, and C6-7 resulting in up to mild degrees of stenoses. Multilevel foraminal stenotic changes are also noted from C3-4 down to C5-6 and are predominantly secondary to uncovertebral joint hypertrophy.  There is a small left apical pneumothorax.  IMPRESSION:  Small left apical pneumothorax.  No evidence for acute fracture or bony malalignment involving the cervical spine.  Incidental degenerative phenomena as discussed above.  These findings were discussed with Dr. Brody Gregory on 12/24/2024 at approximately 10:40 a.m.  Radiation dose reduction techniques were utilized, including automated exposure control and exposure modulation based on body size.  This report was finalized on 12/24/2024 10:44 AM by Dr. Luis Enrique Atkins M.D on Workstation: [id number]      CT Cervical Spine Without Contrast    Result Date: 12/24/2024   No evidence for acute traumatic intracranial pathology.   TECHNIQUE: CT scan of the cervical spine was obtained with 1 mm axial bone algorithm and 2 mm axial soft tissue algorithm images. Sagittal and coronal reconstructed images were obtained.  FINDINGS:  There is no evidence for acute fracture or bony malalignment involving the cervical spine.  Incidental degenerative phenomena are appreciated within the cervical spine with disc osteophyte complexes at C3-4, C5-6, and C6-7 resulting in up to mild degrees of stenoses. Multilevel foraminal stenotic changes are also noted from C3-4 down to C5-6 and are predominantly secondary to uncovertebral joint hypertrophy.  There is a small left apical pneumothorax.  IMPRESSION:  Small left apical pneumothorax.  No evidence for acute fracture or bony malalignment involving the cervical spine.  Incidental degenerative phenomena as discussed above.  These findings were discussed with Dr. Brody Gregory on 12/24/2024 at approximately 10:40 a.m.  Radiation dose reduction techniques were utilized,  including automated exposure control and exposure modulation based on body size.  This report was finalized on 12/24/2024 10:44 AM by Dr. Luis Enrique Atkins M.D on Workstation: [id number]       Scheduled Meds  acetaminophen, 1,000 mg, Oral, Q8H  digoxin, 125 mcg, Oral, Once per day on Monday Wednesday Friday  famotidine, 20 mg, Oral, Daily  finasteride, 5 mg, Oral, Nightly  ipratropium, 2 spray, Each Nare, Q12H  levothyroxine, 75 mcg, Oral, QAM  Lidocaine, 1 patch, Transdermal, Q24H  metoprolol tartrate, 12.5 mg, Oral, Q12H  mupirocin, 1 Application, Each Nare, BID  sodium chloride, 10 mL, Intravenous, Q12H  tamsulosin, 0.4 mg, Oral, Nightly    Continuous Infusions   PRN Meds    senna-docusate sodium **AND** polyethylene glycol **AND** bisacodyl **AND** bisacodyl    melatonin    ondansetron ODT **OR** ondansetron    oxyCODONE    [COMPLETED] Insert Peripheral IV **AND** sodium chloride    sodium chloride    sodium chloride     Diet  Diet: Regular/House; Fluid Consistency: Thin (IDDSI 0)       Assessment/Plan     Active Hospital Problems    Diagnosis  POA    **Multiple rib fractures [S22.49XA]  Yes    Closed traumatic fracture of ribs of left side with pneumothorax [S22.42XA, S27.0XXA]  Yes    Fracture of left scapular body [S42.112A]  Yes    Pulmonary contusion [S27.329A]  Unknown    Multiple rib fractures involving four or more ribs [S22.49XA]  Yes    Permanent atrial fibrillation [I48.21]  Yes    Benign essential hypertension [I10]  Yes    Hyperlipidemia [E78.5]  Yes    Hypothyroidism [E03.9]  Yes      Resolved Hospital Problems   No resolved problems to display.     Patient is a 89 y.o. male former smoker with a history of hypertension, AF, HLD, hypothyroidism, BPH who presents after mechanical fall, found to have left-sided rib fractures and small left-sided pneumothorax.     Left-sided rib fractures  Small left-sided pneumothorax  -CT with small left apical pneumothorax as well as mildly displaced fracture of the  posterior left eighth rib and fractures of left seventh, ninth and 10th ribs with evidence of pulmonary contusion  -Thoracic surgery following  -Pain control: scheduled Tylenol, as needed oxycodone, continue lidocaine patch; incentive spirometer/pulmonary hygiene  -Leukocytosis reactive. No fever or infectious complaints continue to monitor     Scapular fracture  - on call for ortho reviewed CT  - ok for sling for comfort (not needed if patient does not have any benefit)  - can follow up outpatient if patient desires (already sees Dr. Hair in ortho clinic)     Mechanical fall with head trauma  -CT head and cervical spine negative for acute injury  -PT/OT/CCP  -today daughter reported he had some dizziness- he states occasional dizziness when up. he eats OK but does not drink much per daughter. check orthostatics     Hypertension  Permanent AF  HLD  -Continue home metoprolol and digoxin, patient not on AC at home due to previous history of frequent falls  - no longer on lasix, no e/o overload     CKD3b  - Cr 1.5 on admit, prior baseline 1.3-1.4  - plan daily BMP     Hypothyroidism  -Continue home Synthroid     BPH  -Continue finasteride and tamsulosin     History of dysphagia-patient and family acknowledge risk of aspiration, they would like patient to eat a regular diet with thin liquids. They have declined speech evaluation.     History of right sided pneumothorax with chest tube placement 5/23    DVT prophylaxis  SCDs    Discharge  TBD. See how he does with PT. Home with home health versus SNF.  Expected Discharge Date: 12/27/2024; Expected Discharge Time:     Discussed with patient, family, and nursing staff    Tip Moon MD  [facility]  12/25/24 11:42 EST

## 2024-12-25 NOTE — PLAN OF CARE
Goal Outcome Evaluation:  Plan of Care Reviewed With: patient        Progress: no change  Outcome Evaluation: vss, tylenl given for pain, purewick placed on pt while sleeping. pt rested well during shift. labs in am.

## 2024-12-25 NOTE — CONSULTS
THORACIC SURGERY INPATIENT CONSULT NOTE    REASON FOR CONSULT: Multiple left-sided rib fracture    REFERRING PROVIDER: Tracy Chinchilla MD    Subjective   HISTORY OF PRESENTING ILLNESS:   Lavelle Soliman is a 89 y.o. male who has significant medical problems as mentioned in the medical chart.     Patient is known to thoracic surgery service for right-sided rib fracture which was managed conservatively.  He presented again to the hospital after a ground-level fall.  CT scan of the chest revealed multiple left-sided rib fracture with a small loculated hydropneumothorax.  At the time of evaluation, he was hemodynamically stable, pain well-controlled and was not in respiratory distress.    Past Medical History:   Diagnosis Date    Abnormal ECG 2018    Anemia 2023    Arrhythmia 2018    A fib    Benign essential hypertension     Cataract     Chronic kidney disease     Clotting disorder 2023    Fx ribs    Coronary artery disease 2018    AFib    Disease of thyroid gland     GERD (gastroesophageal reflux disease) 2016    History of transfusion 2023    Hip fx, fx ribs    HL (hearing loss)     Hyperlipidemia     Hyperlipidemia 02/11/2016    Hypothyroidism     Paroxysmal atrial fibrillation     Visual impairment S       Past Surgical History:   Procedure Laterality Date    EYE SURGERY      HERNIA REPAIR      HIP INTERTROCHANTERIC NAILING Left 01/25/2023    Procedure: LT. HIP INTERTROCHANTERIC NAILING;  Surgeon: Leland Hair MD;  Location: [facility];  Service: Orthopedics;  Laterality: Left;       Family History   Problem Relation Age of Onset    No Known Problems Mother     No Known Problems Father     Hyperlipidemia Brother     Hearing loss Paternal Aunt     Vision loss Paternal Aunt     No Known Problems Maternal Grandmother     No Known Problems Maternal Grandfather     No Known Problems Paternal Grandmother     No Known Problems Paternal Grandfather     Malig Hyperthermia Neg Hx        Social History      Socioeconomic History    Marital status:    Tobacco Use    Smoking status: Former     Types: Pipe     Start date: 1955     Quit date: 1970     Years since quittin.0    Smokeless tobacco: Never   Vaping Use    Vaping status: Never Used   Substance and Sexual Activity    Alcohol use: Yes     Alcohol/week: 1.0 standard drink of alcohol     Types: 1 Shots of liquor per week     Comment: occ    Drug use: Never    Sexual activity: Not Currently     Partners: Female     Birth control/protection: None         Current Facility-Administered Medications:     acetaminophen (TYLENOL) tablet 1,000 mg, 1,000 mg, Oral, Q8H, Tracy Chinchilla MD, 1,000 mg at 24 0607    sennosides-docusate (PERICOLACE) 8.6-50 MG per tablet 2 tablet, 2 tablet, Oral, BID PRN **AND** polyethylene glycol (MIRALAX) packet 17 g, 17 g, Oral, Daily PRN **AND** bisacodyl (DULCOLAX) EC tablet 5 mg, 5 mg, Oral, Daily PRN **AND** bisacodyl (DULCOLAX) suppository 10 mg, 10 mg, Rectal, Daily PRN, Tracy Chinchilla MD    digoxin (LANOXIN) tablet 125 mcg, 125 mcg, Oral, Once per day on , Tracy Chinchilla MD, 125 mcg at 24 08    famotidine (PEPCID) tablet 20 mg, 20 mg, Oral, Daily, Tracy Chinchilla MD, 20 mg at 24    finasteride (PROSCAR) tablet 5 mg, 5 mg, Oral, Nightly, Tracy Chinchilla MD, 5 mg at 243    ipratropium (ATROVENT) nasal spray 0.03%, 2 spray, Each Nare, Q12H, Tracy Chinchilla MD, 2 spray at 24 175    levothyroxine (SYNTHROID, LEVOTHROID) tablet 75 mcg, 75 mcg, Oral, QAM, Tracy Chinchilla MD, 75 mcg at 24 0607    Lidocaine 4 % 1 patch, 1 patch, Transdermal, Q24H, Brody Gregory MD, 1 patch at 24 0829    melatonin tablet 5 mg, 5 mg, Oral, Nightly PRN, Tracy Chinchilla MD    metoprolol tartrate (LOPRESSOR) tablet 12.5 mg, 12.5 mg, Oral, Q12H, Tracy Chinchilla MD, 12.5 mg at 24 0829    mupirocin (BACTROBAN) 2 % nasal  "ointment 1 Application, 1 Application, Each Nare, BID, Tracy Chinchilla MD, 1 Application at 12/25/24 0829    ondansetron ODT (ZOFRAN-ODT) disintegrating tablet 4 mg, 4 mg, Oral, Q6H PRN **OR** ondansetron (ZOFRAN) injection 4 mg, 4 mg, Intravenous, Q6H PRN, Tracy Chinchilla MD    oxyCODONE (ROXICODONE) immediate release tablet 5 mg, 5 mg, Oral, Q4H PRN, Tracy Chinchilla MD    [COMPLETED] Insert Peripheral IV, , , Once **AND** sodium chloride 0.9 % flush 10 mL, 10 mL, Intravenous, PRN, Brody Gregory MD    sodium chloride 0.9 % flush 10 mL, 10 mL, Intravenous, Q12H, Tracy Chinchilla MD, 10 mL at 12/25/24 0829    sodium chloride 0.9 % flush 10 mL, 10 mL, Intravenous, PRN, Tracy Chinchilla MD    sodium chloride 0.9 % infusion 40 mL, 40 mL, Intravenous, PRN, Tracy Chinchilla MD    tamsulosin (FLOMAX) 24 hr capsule 0.4 mg, 0.4 mg, Oral, Nightly, Tracy Chinchilla MD, 0.4 mg at 12/24/24 2224     Allergies   Allergen Reactions    Latex Rash             Objective    OBJECTIVE:     VITAL SIGNS:  /82 (BP Location: Left arm, Patient Position: Lying)   Pulse 72   Temp 97.5 °F (36.4 °C) (Oral)   Resp 16   Ht 154.9 cm (61\")   Wt 67.2 kg (148 lb 2.4 oz)   SpO2 94%   BMI 27.99 kg/m²     PHYSICAL EXAM:  Frail, fragile.  Head is normocephalic.   Nose appears normal.   No obvious deformity of the mouth and throat.  Conjunctivae normal.   Heart rate and rhythm is normal.  Pulmonary effort is normal.  Left chest wall tenderness.  Moving all 4 extremities.  Extremities warm.  No focal deficit present.   He is alert and oriented to person, place, and time.     LAB RESULTS:  I have reviewed all the available laboratory results in the chart.    RESULTS REVIEW:  I have reviewed the patient's all relevant laboratory and imaging findings.           ASSESSMENT & PLAN:  Lavelle Soliman is a 89 y.o. male with significant medical conditions as mentioned above presented to the hospital with problem mentioned " in history of presenting illness.    Multiple left-sided rib fracture.  He has multiple left-sided rib fractures.  He does not meet the criteria for rib stabilization.  His pain is adequately controlled and I do not think he needs TALIA block.  I recommend ensuring adequate pain control and repeat x-ray in the morning.    I discussed the patient's findings and my recommendations with the patient. The patient was given adequate time to ask questions and all questions were answered to patient satisfaction.     Cruzito Reilly MD  Thoracic Surgeon  [facility]        Dictated utilizing Dragon dictation    I spent 45 minutes caring for Lavelle on this date of service. This time includes time spent by me in the following activities: reviewing tests, obtaining and/or reviewing a separately obtained history, performing a medically appropriate examination and/or evaluation , counseling and educating the patient/family/caregiver, ordering medications, tests, or procedures, referring and communicating with other health care professionals , documenting information in the medical record, independently interpreting results and communicating that information with the patient/family/caregiver, and care coordination and more than half the time was spent in direct face to face evaluation and decision making.

## 2024-12-25 NOTE — PLAN OF CARE
Goal Outcome Evaluation:  Plan of Care Reviewed With: patient        Progress: improving  Outcome Evaluation: VSS, no complaints, lidocaine patch and tylenol for pain.

## 2024-12-25 NOTE — PROGRESS NOTES
Progress note    Reason for consultation: Multiple left-sided rib fractures.    No acute issues overnight.  Pain well-controlled.  Sitting comfortably in recliner.    No acute distress.  Alert, oriented x4.  Rate rhythm regular.  Non-labored breathing.  Left chest tenderness.  With all 4 extremities, no focal deficit.  Extremities warm.    Labs reviewed.  Imaging reviewed.    Patient presented with ground-level fall and left sided rib fractures.  He does not require surgical intervention.  His pain is adequately controlled.    Ensure adequate pain control.  Encourage incentive spirometer.  Due to high risk of bleeding and multiple falls, hold off on anticoagulation.  PT/OT.  Patient can be discharged from hospital from thoracic surgery standpoint.  He can follow-up with thoracic surgery as needed.    Please call thoracic surgery with questions/concerns.    Cruzito Reilly MD  Thoracic surgeon

## 2024-12-26 LAB
ANION GAP SERPL CALCULATED.3IONS-SCNC: 9.9 MMOL/L (ref 5–15)
BUN SERPL-MCNC: 24 MG/DL (ref 8–23)
BUN/CREAT SERPL: 20 (ref 7–25)
CALCIUM SPEC-SCNC: 8.1 MG/DL (ref 8.2–9.6)
CHLORIDE SERPL-SCNC: 106 MMOL/L (ref 98–107)
CO2 SERPL-SCNC: 22.1 MMOL/L (ref 22–29)
CREAT SERPL-MCNC: 1.2 MG/DL (ref 0.76–1.27)
DEPRECATED RDW RBC AUTO: 44.8 FL (ref 37–54)
EGFRCR SERPLBLD CKD-EPI 2021: 55 ML/MIN/1.73
ERYTHROCYTE [DISTWIDTH] IN BLOOD BY AUTOMATED COUNT: 13.1 % (ref 12.3–15.4)
GLUCOSE SERPL-MCNC: 114 MG/DL (ref 65–99)
HCT VFR BLD AUTO: 36.3 % (ref 37.5–51)
HGB BLD-MCNC: 12.5 G/DL (ref 13–17.7)
MAGNESIUM SERPL-MCNC: 2.3 MG/DL (ref 1.7–2.3)
MCH RBC QN AUTO: 32.5 PG (ref 26.6–33)
MCHC RBC AUTO-ENTMCNC: 34.4 G/DL (ref 31.5–35.7)
MCV RBC AUTO: 94.3 FL (ref 79–97)
PHOSPHATE SERPL-MCNC: 2.7 MG/DL (ref 2.5–4.5)
PLATELET # BLD AUTO: 129 10*3/MM3 (ref 140–450)
PMV BLD AUTO: 11.6 FL (ref 6–12)
POTASSIUM SERPL-SCNC: 4.1 MMOL/L (ref 3.5–5.2)
RBC # BLD AUTO: 3.85 10*6/MM3 (ref 4.14–5.8)
SODIUM SERPL-SCNC: 138 MMOL/L (ref 136–145)
WBC NRBC COR # BLD AUTO: 15.53 10*3/MM3 (ref 3.4–10.8)

## 2024-12-26 PROCEDURE — 97110 THERAPEUTIC EXERCISES: CPT

## 2024-12-26 PROCEDURE — 97162 PT EVAL MOD COMPLEX 30 MIN: CPT

## 2024-12-26 PROCEDURE — 99232 SBSQ HOSP IP/OBS MODERATE 35: CPT

## 2024-12-26 PROCEDURE — 80048 BASIC METABOLIC PNL TOTAL CA: CPT | Performed by: HOSPITALIST

## 2024-12-26 PROCEDURE — 97165 OT EVAL LOW COMPLEX 30 MIN: CPT

## 2024-12-26 PROCEDURE — 85027 COMPLETE CBC AUTOMATED: CPT | Performed by: HOSPITALIST

## 2024-12-26 PROCEDURE — 84100 ASSAY OF PHOSPHORUS: CPT | Performed by: HOSPITALIST

## 2024-12-26 PROCEDURE — 97530 THERAPEUTIC ACTIVITIES: CPT

## 2024-12-26 PROCEDURE — 83735 ASSAY OF MAGNESIUM: CPT | Performed by: HOSPITALIST

## 2024-12-26 RX ORDER — TRAZODONE HYDROCHLORIDE 50 MG/1
25 TABLET, FILM COATED ORAL NIGHTLY
Status: DISCONTINUED | OUTPATIENT
Start: 2024-12-26 | End: 2024-12-28 | Stop reason: HOSPADM

## 2024-12-26 RX ADMIN — METOPROLOL TARTRATE 12.5 MG: 25 TABLET, FILM COATED ORAL at 08:35

## 2024-12-26 RX ADMIN — METOPROLOL TARTRATE 12.5 MG: 25 TABLET, FILM COATED ORAL at 21:10

## 2024-12-26 RX ADMIN — ACETAMINOPHEN 1000 MG: 500 TABLET, FILM COATED ORAL at 13:54

## 2024-12-26 RX ADMIN — Medication 10 ML: at 21:11

## 2024-12-26 RX ADMIN — Medication 10 ML: at 08:54

## 2024-12-26 RX ADMIN — FAMOTIDINE 20 MG: 20 TABLET, FILM COATED ORAL at 08:35

## 2024-12-26 RX ADMIN — ACETAMINOPHEN 1000 MG: 500 TABLET, FILM COATED ORAL at 06:24

## 2024-12-26 RX ADMIN — MUPIROCIN 1 APPLICATION: 20 OINTMENT TOPICAL at 21:10

## 2024-12-26 RX ADMIN — ACETAMINOPHEN 1000 MG: 500 TABLET, FILM COATED ORAL at 21:10

## 2024-12-26 RX ADMIN — IPRATROPIUM BROMIDE 2 SPRAY: 21 SPRAY, METERED NASAL at 08:36

## 2024-12-26 RX ADMIN — LEVOTHYROXINE SODIUM 75 MCG: 75 TABLET ORAL at 06:24

## 2024-12-26 RX ADMIN — LIDOCAINE 1 PATCH: 4 PATCH TOPICAL at 08:35

## 2024-12-26 RX ADMIN — SENNOSIDES AND DOCUSATE SODIUM 2 TABLET: 50; 8.6 TABLET ORAL at 12:26

## 2024-12-26 RX ADMIN — TAMSULOSIN HYDROCHLORIDE 0.4 MG: 0.4 CAPSULE ORAL at 21:10

## 2024-12-26 RX ADMIN — TRAZODONE HYDROCHLORIDE 25 MG: 50 TABLET ORAL at 21:10

## 2024-12-26 RX ADMIN — FINASTERIDE 5 MG: 5 TABLET, FILM COATED ORAL at 21:10

## 2024-12-26 NOTE — PLAN OF CARE
Goal Outcome Evaluation:  Plan of Care Reviewed With: patient        Progress: no change  Outcome Evaluation: VSS, no complaints. Lidocaine patch to L side.

## 2024-12-26 NOTE — PLAN OF CARE
Problem: Adult Inpatient Plan of Care  Goal: Plan of Care Review  Flowsheets (Taken 12/26/2024 0246)  Progress: no change  Outcome Evaluation: no complaints of pain alert and oriented to self and place knows is 2024 keeps trying to get oob returned to bed and reoriented sats good when he is still and not trying to get up cough effort good  Plan of Care Reviewed With: patient   Goal Outcome Evaluation:  Plan of Care Reviewed With: patient        Progress: no change  Outcome Evaluation: no complaints of pain alert and oriented to self and place knows is 2024 keeps trying to get oob returned to bed and reoriented sats good when he is still and not trying to get up cough effort good

## 2024-12-26 NOTE — PROGRESS NOTES
Name: Lavelle Soliman ADMIT: 2024   : 1927  PCP: Lesly Kuhn APRN    MRN: [id number] LOS: 2 days   AGE/SEX: 89 y.o. male  ROOM: [facility]     Subjective   Subjective   Daughter at bedside. She reports some expected mild confusion and hallucinations. Apparently didn't sleep well- kept trying to get OOB. Patient without any new complaints.     Objective   Objective   Vital Signs  Temp:  [97.3 °F (36.3 °C)-99.5 °F (37.5 °C)] 97.3 °F (36.3 °C)  Heart Rate:  [] 77  Resp:  [16] 16  BP: (117-161)/() 117/84  SpO2:  [92 %-94 %] 93 %  on   ;   Device (Oxygen Therapy): room air  Body mass index is 27.99 kg/m².    Physical Exam  Constitutional:       General: He is not in acute distress.     Appearance: He is not toxic-appearing.   HENT:      Head: Normocephalic and atraumatic.   Cardiovascular:      Rate and Rhythm: Normal rate and regular rhythm.   Pulmonary:      Effort: Pulmonary effort is normal. No respiratory distress.      Breath sounds: No wheezing.   Abdominal:      General: Bowel sounds are normal.      Palpations: Abdomen is soft.      Tenderness: There is no abdominal tenderness.   Musculoskeletal:         General: No swelling.   Skin:     General: Skin is warm and dry.   Neurological:      Mental Status: He is alert.     Results Review  I reviewed the patient's new clinical results.  Results from last 7 days   Lab Units 24  0656 24  0406 24  0917   WBC 10*3/mm3 15.53* 12.49* 12.45*   HEMOGLOBIN g/dL 12.5* 11.9* 12.7*   PLATELETS 10*3/mm3 129* 134* 154     Results from last 7 days   Lab Units 24  0656 24  0406 24  0917   SODIUM mmol/L 138 143 136   POTASSIUM mmol/L 4.1 4.3 4.5   CHLORIDE mmol/L 106 110* 105   CO2 mmol/L 22.1 23.6 25.0   BUN mg/dL 24* 24* 20   CREATININE mg/dL 1.20 1.37* 1.49*   GLUCOSE mg/dL 114* 125* 137*     Lab Results   Component Value Date    ANIONGAP 9.9 2024     Estimated Creatinine Clearance: 29 mL/min (by C-G formula  "based on SCr of 1.2 mg/dL).   Lab Results   Component Value Date    EGFR 55.0 (L) 12/26/2024     Results from last 7 days   Lab Units 12/24/24  0917   ALBUMIN g/dL 3.5   BILIRUBIN mg/dL 0.5   ALK PHOS U/L 103   AST (SGOT) U/L 20   ALT (SGPT) U/L 19     Results from last 7 days   Lab Units 12/26/24  0656 12/25/24  0406 12/24/24  0917   CALCIUM mg/dL 8.1* 8.2 8.6   ALBUMIN g/dL  --   --  3.5   MAGNESIUM mg/dL 2.3 2.1  --    PHOSPHORUS mg/dL 2.7 2.5  --        No results found for: \"HGBA1C\", \"POCGLU\"    XR Chest 1 View    Result Date: 12/25/2024  As described.  This report was finalized on 12/25/2024 6:02 AM by Dr. Rajiv Grider M.D on Workstation: [id number]       Scheduled Meds  acetaminophen, 1,000 mg, Oral, Q8H  digoxin, 125 mcg, Oral, Once per day on Monday Wednesday Friday  famotidine, 20 mg, Oral, Daily  finasteride, 5 mg, Oral, Nightly  ipratropium, 2 spray, Each Nare, Q12H  levothyroxine, 75 mcg, Oral, QAM  Lidocaine, 1 patch, Transdermal, Q24H  metoprolol tartrate, 12.5 mg, Oral, Q12H  mupirocin, 1 Application, Each Nare, BID  sodium chloride, 10 mL, Intravenous, Q12H  tamsulosin, 0.4 mg, Oral, Nightly    Continuous Infusions   PRN Meds    senna-docusate sodium **AND** polyethylene glycol **AND** bisacodyl **AND** bisacodyl    melatonin    ondansetron ODT **OR** ondansetron    oxyCODONE    [COMPLETED] Insert Peripheral IV **AND** sodium chloride    sodium chloride    sodium chloride     Diet  Diet: Regular/House; Fluid Consistency: Thin (IDDSI 0)       Assessment/Plan     Active Hospital Problems    Diagnosis  POA    **Multiple rib fractures [S22.49XA]  Yes    Closed traumatic fracture of ribs of left side with pneumothorax [S22.42XA, S27.0XXA]  Yes    Fracture of left scapular body [S42.112A]  Yes    Pulmonary contusion [S27.329A]  Unknown    Multiple rib fractures involving four or more ribs [S22.49XA]  Yes    Permanent atrial fibrillation [I48.21]  Yes    Benign essential hypertension [I10]  Yes    " Hyperlipidemia [E78.5]  Yes    Hypothyroidism [E03.9]  Yes      Resolved Hospital Problems   No resolved problems to display.     Patient is a 89 y.o. male former smoker with a history of hypertension, AF, HLD, hypothyroidism, BPH who presents after mechanical fall, found to have left-sided rib fractures and small left-sided pneumothorax.     Left-sided rib fractures  Small left-sided pneumothorax  -CT with small left apical pneumothorax as well as mildly displaced fracture of the posterior left eighth rib and fractures of left seventh, ninth and 10th ribs with evidence of pulmonary contusion  -Thoracic surgery following  -Pain control: scheduled Tylenol, as needed oxycodone, continue lidocaine patch; incentive spirometer/pulmonary hygiene  -Monitoring leukocytosis    Delirium  -Delirium precautions  -No new infectious complaints. No fever. WBC a little higher today continue to monitor closely for potential infection  -Add a small dose of trazodone to help sleep     Scapular fracture  -Ortho evaluated: Ok for sling for comfort (not needed if patient does not have any benefit)  - can follow up outpatient if patient desires (already sees Dr. Hair in ortho clinic)     Mechanical fall with head trauma  -CT head and cervical spine negative for acute injury  -PT/OT/CCP     Hypertension  Permanent AF  HLD  -Continue home metoprolol and digoxin, patient not on AC at home due to previous history of frequent falls  - no longer on lasix, no e/o overload     CKD3b  - Cr 1.5 on admit, prior baseline 1.3-1.4  -Monitor daily     Hypothyroidism  -Continue home Synthroid     BPH  -Continue finasteride and tamsulosin     History of dysphagia-patient and family acknowledge risk of aspiration, they would like patient to eat a regular diet with thin liquids. They have declined speech evaluation.     History of right sided pneumothorax with chest tube placement 5/23    DVT prophylaxis  SCDs    Discharge  Planning for SNF.  Expected  Discharge Date: 12/27/2024; Expected Discharge Time:     Discussed with patient, family, and nursing staff    Tip Moon MD  [facility]  12/26/24 13:55 EST

## 2024-12-26 NOTE — THERAPY EVALUATION
Patient Name: Lavelle Soliman  : 1927    MRN: [id number]                              Today's Date: 2024       Admit Date: 2024    Visit Dx:     ICD-10-CM ICD-9-CM   1. Fall from standing, initial encounter  W19.XXXA E888.9   2. Dizziness  R42 780.4   3. Closed fracture of multiple ribs of left side, initial encounter  S22.42XA 807.09   4. Traumatic pneumothorax, initial encounter  S27.0XXA 860.0   5. Contusion of left lung, initial encounter  S27.321A 861.21   6. Tachycardia  R00.0 785.0     Patient Active Problem List   Diagnosis    Benign essential hypertension    Stage 3a chronic kidney disease    Hyperlipidemia    Hypothyroidism    Muscle cramps    Anemia    Hyperglycemia    Urinary retention    UTI (urinary tract infection), bacterial    BPH with obstruction/lower urinary tract symptoms    Permanent atrial fibrillation    Closed fracture of left hip, initial encounter    Postoperative anemia due to acute blood loss    Moderate malnutrition    Multiple rib fractures involving four or more ribs    Diplopia    Dry eye syndrome of bilateral lacrimal glands    Nonexudative age-related macular degeneration, right eye, intermediate dry stage    Hypertensive disorder    Low vision right eye category 1, low vision left eye category 1    Macular cyst, hole, or pseudohole, right eye    Macular degeneration    Presbyopia    Presence of intraocular lens    Scotoma involving central area, bilateral    Multiple rib fractures    Closed traumatic fracture of ribs of left side with pneumothorax    Fracture of left scapular body    Pulmonary contusion     Past Medical History:   Diagnosis Date    Abnormal ECG 2018    Anemia     Arrhythmia 2018    A fib    Benign essential hypertension     Cataract     Chronic kidney disease     Clotting disorder     Fx ribs    Coronary artery disease 2018    AFib    Disease of thyroid gland     GERD (gastroesophageal reflux disease) 2016    History of transfusion      Hip fx, fx ribs    HL (hearing loss)     Hyperlipidemia     Hyperlipidemia 02/11/2016    Hypothyroidism     Paroxysmal atrial fibrillation     Visual impairment S     Past Surgical History:   Procedure Laterality Date    EYE SURGERY      HERNIA REPAIR      HIP INTERTROCHANTERIC NAILING Left 01/25/2023    Procedure: LT. HIP INTERTROCHANTERIC NAILING;  Surgeon: Leland Hair MD;  Location: [facility] OR;  Service: Orthopedics;  Laterality: Left;      General Information       Row Name 12/26/24 1142          Physical Therapy Time and Intention    Document Type evaluation  -MS     Mode of Treatment physical therapy  -MS       Row Name 12/26/24 1142          General Information    Patient Profile Reviewed yes  -MS     Prior Level of Function independent:;all household mobility  Ambulatory with a cane sometimes, lives with daughter, + fall hx  -MS     Existing Precautions/Restrictions fall  -MS     Barriers to Rehab medically complex;previous functional deficit  -MS       Row Name 12/26/24 1142          Living Environment    People in Home child(angela), adult  -MS       Row Name 12/26/24 1142          Home Main Entrance    Number of Stairs, Main Entrance one  -MS       Row Name 12/26/24 1142          Stairs Within Home, Primary    Number of Stairs, Within Home, Primary none  -MS       Row Name 12/26/24 1142          Cognition    Orientation Status (Cognition) oriented to;person;situation  -MS       Row Name 12/26/24 1142          Safety Issues/Impairments Affecting Functional Mobility    Safety Issues Affecting Function (Mobility) insight into deficits/self-awareness;judgment;positioning of assistive device;sequencing abilities  -MS     Impairments Affecting Function (Mobility) balance;endurance/activity tolerance;postural/trunk control;strength  -MS     Comment, Safety Issues/Impairments (Mobility) Gait belt and non skid socks donned.  -MS               User Key  (r) = Recorded By, (t) = Taken By, (c) = Cosigned By       Initials Name Provider Type    Altagracia Mcdonnell, PT Physical Therapist                   Mobility       Row Name 12/26/24 1148          Bed Mobility    Comment, (Bed Mobility) NT- sitting UIC upon PT arrival.  -MS       Row Name 12/26/24 1148          Transfers    Comment, (Transfers) Sequencing and hand placement cues.  -MS       Row Name 12/26/24 1148          Sit-Stand Transfer    Sit-Stand [place] (Transfers) contact guard;verbal cues  -MS     Assistive Device (Sit-Stand Transfers) --  HHA  -MS       Row Name 12/26/24 1148          Gait/Stairs (Locomotion)    [place] Level (Gait) minimum assist (75% patient effort);verbal cues  -MS     Assistive Device (Gait) --  HHAx1  -MS     Distance in Feet (Gait) 40  -MS     Deviations/Abnormal Patterns (Gait) zaid decreased;gait speed decreased  -MS     Bilateral Gait Deviations forward flexed posture  -MS     Comment, (Gait/Stairs) Minimally unsteady, no overt LOB or veering noted. Fatigue limiting.  -MS               User Key  (r) = Recorded By, (t) = Taken By, (c) = Cosigned By      Initials Name Provider Type    Altagracia Mcdonnell, PT Physical Therapist                   Obj/Interventions       Row Name 12/26/24 1149          Range of Motion Comprehensive    Comment, General Range of Motion B LEs grossly WFL  -MS       Row Name 12/26/24 1149          Strength Comprehensive (MMT)    Comment, General Manual Muscle Testing (MMT) Assessment B LEs grossly 3+/5, generalized weakness noted.  -MS       Row Name 12/26/24 1149          Balance    Static Sitting Balance supervision  -MS     Dynamic Sitting Balance supervision  -MS     Position, Sitting Balance unsupported;sitting edge of bed  -MS     Static Standing Balance contact guard  -MS     Dynamic Standing Balance contact guard;minimal assist  -MS     Position/Device Used, Standing Balance supported;walker, front-wheeled  -MS               User Key  (r) = Recorded By, (t) = Taken By, (c) =  Cosigned By      Initials Name Provider Type    Altagracia Mcdonnell CARROLL, PT Physical Therapist                   Goals/Plan       Row Name 12/26/24 1151          Bed Mobility Goal 1 (PT)    Activity/Assistive Device (Bed Mobility Goal 1, PT) bed mobility activities, all  -MS     [place] Level/Cues Needed (Bed Mobility Goal 1, PT) supervision required  -MS     Time Frame (Bed Mobility Goal 1, PT) 1 week  -MS       Row Name 12/26/24 1151          Transfer Goal 1 (PT)    Activity/Assistive Device (Transfer Goal 1, PT) transfers, all  -MS     [place] Level/Cues Needed (Transfer Goal 1, PT) supervision required  -MS     Time Frame (Transfer Goal 1, PT) 1 week  -MS       Row Name 12/26/24 1151          Gait Training Goal 1 (PT)    Activity/Assistive Device (Gait Training Goal 1, PT) gait (walking locomotion)  -MS     [place] Level (Gait Training Goal 1, PT) supervision required  -MS     Distance (Gait Training Goal 1, PT) 150  -MS     Time Frame (Gait Training Goal 1, PT) 1 week  -MS       Row Name 12/26/24 1151          Therapy Assessment/Plan (PT)    Planned Therapy Interventions (PT) balance training;bed mobility training;gait training;home exercise program;postural re-education;patient/family education;ROM (range of motion);stair training;strengthening;transfer training  -MS               User Key  (r) = Recorded By, (t) = Taken By, (c) = Cosigned By      Initials Name Provider Type    Altagracia Mcdonnell CARROLL, PT Physical Therapist                   Clinical Impression       Row Name 12/26/24 1150          Pain    Pretreatment Pain Rating 0/10 - no pain  -MS     Posttreatment Pain Rating 0/10 - no pain  -MS       Row Name 12/26/24 1150          Therapy Assessment/Plan (PT)    Rehab Potential (PT) good  -MS     Criteria for Skilled Interventions Met (PT) yes;meets criteria  -MS     Therapy Frequency (PT) 5 times/wk  -MS       Row Name 12/26/24 1150          Vital Signs    O2 Delivery Pre Treatment room air   -MS     O2 Delivery Intra Treatment room air  -MS     O2 Delivery Post Treatment room air  -MS       Row Name 12/26/24 1150          Positioning and Restraints    Pre-Treatment Position sitting in chair/recliner  -MS     Post Treatment Position chair  -MS     In Chair notified nsg;reclined;call light within reach;encouraged to call for assist;exit alarm on;with family/caregiver  -MS               User Key  (r) = Recorded By, (t) = Taken By, (c) = Cosigned By      Initials Name Provider Type    MS AndradeAltagracia, PT Physical Therapist                   Outcome Measures       Row Name 12/26/24 1151 12/26/24 0800       How much help from another person do you currently need...    Turning from your back to your side while in flat bed without using bedrails? 4  -MS 3  -HW    Moving from lying on back to sitting on the side of a flat bed without bedrails? 3  -MS 3  -HW    Moving to and from a bed to a chair (including a wheelchair)? 3  -MS 3  -HW    Standing up from a chair using your arms (e.g., wheelchair, bedside chair)? 3  -MS 3  -HW    Climbing 3-5 steps with a railing? 2  -MS 2  -HW    To walk in hospital room? 3  -MS 2  -HW    AM-PAC 6 Clicks Score (PT) 18  -MS 16  -HW      Row Name 12/26/24 1117          Modified Ejy Scale    Modified [place] Scale 1 - No significant disability despite symptoms.  Able to carry out all usual duties and activities.  -BC       Row Name 12/26/24 1117          Functional Assessment    Outcome Measure Options AM-PAC 6 Clicks Daily Activity (OT);Modified [place]rier  -BC               User Key  (r) = Recorded By, (t) = Taken By, (c) = Cosigned By      Initials Name Provider Type    MS EsparzasAltagracia, PT Physical Therapist    HW Suzanna Ingram, RN Registered Nurse    [place], OT Occupational Therapist                                 Physical Therapy Education       Title: PT OT SLP Therapies (In Progress)       Topic: Physical Therapy (Done)       Point: Mobility training  (Done)       Learning Progress Summary            Patient Acceptance, E,TB, VU by MS at 12/26/2024 1152                      Point: Home exercise program (Done)       Learning Progress Summary            Patient Acceptance, E,TB, VU by MS at 12/26/2024 1152                      Point: Body mechanics (Done)       Learning Progress Summary            Patient Acceptance, E,TB, VU by MS at 12/26/2024 1152                      Point: Precautions (Done)       Learning Progress Summary            Patient Acceptance, E,TB, VU by MS at 12/26/2024 1152                                      User Key       Initials Effective Dates Name Provider Type Discipline    MS 06/16/21 -  Altagracia Andrade PT Physical Therapist PT                  PT Recommendation and Plan  Planned Therapy Interventions (PT): balance training, bed mobility training, gait training, home exercise program, postural re-education, patient/family education, ROM (range of motion), stair training, strengthening, transfer training        Time Calculation:         PT Charges       Row Name 12/26/24 1141             Time Calculation    Start Time 1046  -MS      Stop Time 1100  -MS      Time Calculation (min) 14 min  -MS      PT Received On 12/26/24  -MS      PT - Next Appointment 12/27/24  -MS      PT Goal Re-Cert Due Date 01/02/25  -MS                User Key  (r) = Recorded By, (t) = Taken By, (c) = Cosigned By      Initials Name Provider Type    MS AndradeAltagracia PT Physical Therapist                  Therapy Charges for Today       Code Description Service Date Service Provider Modifiers Qty    80349156929  PT THER PROC EA 15 MIN 12/26/2024 Altagracia Andrade, PT GP 1    05162532702 HC PT EVAL MOD COMPLEXITY 3 12/26/2024 Altagracia Andrade, PT GP 1            PT G-Codes  Outcome Measure Options: AM-PAC 6 Clicks Daily Activity (OT), Modified Jey  AM-PAC 6 Clicks Score (PT): 18  AM-PAC 6 Clicks Score (OT): 13  Modified Hunt Scale: 1 - No significant  disability despite symptoms.  Able to carry out all usual duties and activities.  PT Discharge Summary  Anticipated Discharge Disposition (PT): skilled nursing facility    Altagracia Andrade, PT  12/26/2024

## 2024-12-26 NOTE — CASE MANAGEMENT/SOCIAL WORK
Discharge Planning Assessment  [facility]     Patient Name: Lavelle Soliman  MRN: [id number]  Today's Date: 12/26/2024    Admit Date: 12/24/2024    Plan: SNF then AL   Discharge Needs Assessment       Row Name 12/26/24 1455       Living Environment    People in Home child(angela), adult    Name(s) of People in Home Daughter    Current Living Arrangements home    Potentially Unsafe Housing Conditions none    Primary Care Provided by child(angela)    Provides Primary Care For no one, unable/limited ability to care for self    Family Caregiver if Needed child(angela), adult    Family Caregiver Names Little [phone]    Quality of Family Relationships supportive;involved;helpful    Able to Return to Prior Arrangements no       Resource/Environmental Concerns    Resource/Environmental Concerns home accessibility    Home Accessibility Concerns stairs to enter home;bathroom not accessible    Transportation Concerns none       Transition Planning    Patient/Family Anticipates Transition to inpatient rehabilitation facility    Patient/Family Anticipated Services at Transition rehabilitation services    Transportation Anticipated family or friend will provide       Discharge Needs Assessment    Equipment Currently Used at Home rollator;cane, straight;walker, rolling;grab bar    Concerns to be Addressed no discharge needs identified    Anticipated Changes Related to Illness none    Equipment Needed After Discharge none                   Discharge Plan       Row Name 12/26/24 9986       Plan    Plan SNF then AL    Patient/Family in Agreement with Plan yes    Plan Comments Met with pt and daughter/ POA. Introduced self, explained  role, verified face sheet. Daughter stated that the plan is for the pt to go to rehab and then assisted living at discharge. She will provide transportation. He does not use any DME but has a walker if needed. Pt currently lives at home with his daughter and son-in-law but their house is not  walker accessible and his daughter is no longer able to assist the pt to a sitting or standing position because of shoulder surgery. Pt choice list provided and pt's daughter gave three choices. Referrals entered. Introduced daughter to [facility]/ [facility] to help assist in finding an assisted living for the patient. Pt denied any need for HH or DME at this time. No further needs assessed at this time. CCP to follow. Benja VASQUEZ RN                  Continued Care and Services - Admitted Since 12/24/2024       Destination       Service Provider Request Status Services Address Phone Fax Patient Preferred    [facility] Pending - Request Sent -- [address] [id number] [phone] --    [facility] [address] [facility] [address] [phone] --    [facility] Pending - Request Sent -- [address] [address] [phone] --                  Expected Discharge Date and Time       Expected Discharge Date Expected Discharge Time    Dec 27, 2024            Demographic Summary       Row Name 12/26/24 1452       General Information    Admission Type inpatient    Preferred Language English                   Functional Status       Row Name 12/26/24 1452       Functional Status    Usual Activity Tolerance moderate    Current Activity Tolerance poor                   Psychosocial    No documentation.                  Abuse/Neglect    No documentation.                  Legal    No documentation.                  Substance Abuse    No documentation.                  Patient Forms    No documentation.                     Benja Garcia RN

## 2024-12-26 NOTE — THERAPY EVALUATION
Patient Name: Lavelle Soliman  : 1927    MRN: [id number]                              Today's Date: 2024       Admit Date: 2024    Visit Dx:     ICD-10-CM ICD-9-CM   1. Fall from standing, initial encounter  W19.XXXA E888.9   2. Dizziness  R42 780.4   3. Closed fracture of multiple ribs of left side, initial encounter  S22.42XA 807.09   4. Traumatic pneumothorax, initial encounter  S27.0XXA 860.0   5. Contusion of left lung, initial encounter  S27.321A 861.21   6. Tachycardia  R00.0 785.0     Patient Active Problem List   Diagnosis    Benign essential hypertension    Stage 3a chronic kidney disease    Hyperlipidemia    Hypothyroidism    Muscle cramps    Anemia    Hyperglycemia    Urinary retention    UTI (urinary tract infection), bacterial    BPH with obstruction/lower urinary tract symptoms    Permanent atrial fibrillation    Closed fracture of left hip, initial encounter    Postoperative anemia due to acute blood loss    Moderate malnutrition    Multiple rib fractures involving four or more ribs    Diplopia    Dry eye syndrome of bilateral lacrimal glands    Nonexudative age-related macular degeneration, right eye, intermediate dry stage    Hypertensive disorder    Low vision right eye category 1, low vision left eye category 1    Macular cyst, hole, or pseudohole, right eye    Macular degeneration    Presbyopia    Presence of intraocular lens    Scotoma involving central area, bilateral    Multiple rib fractures    Closed traumatic fracture of ribs of left side with pneumothorax    Fracture of left scapular body    Pulmonary contusion     Past Medical History:   Diagnosis Date    Abnormal ECG 2018    Anemia     Arrhythmia 2018    A fib    Benign essential hypertension     Cataract     Chronic kidney disease     Clotting disorder     Fx ribs    Coronary artery disease 2018    AFib    Disease of thyroid gland     GERD (gastroesophageal reflux disease) 2016    History of transfusion      Hip fx, fx ribs    HL (hearing loss)     Hyperlipidemia     Hyperlipidemia 02/11/2016    Hypothyroidism     Paroxysmal atrial fibrillation     Visual impairment S     Past Surgical History:   Procedure Laterality Date    EYE SURGERY      HERNIA REPAIR      HIP INTERTROCHANTERIC NAILING Left 01/25/2023    Procedure: LT. HIP INTERTROCHANTERIC NAILING;  Surgeon: Leland Hair MD;  Location: [facility] MAIN OR;  Service: Orthopedics;  Laterality: Left;      General Information       Row Name 12/26/24 1024          OT Time and Intention    Subjective Information no complaints  -BC     Document Type evaluation  -BC     Mode of Treatment individual therapy;occupational therapy  -BC     Patient Effort good  -BC     Symptoms Noted During/After Treatment none  -BC       Row Name 12/26/24 1024          General Information    Patient Profile Reviewed yes  -BC     Prior Level of Function independent:;ADL's  has had more recently falls was using a cane but progressed to a walker  -BC     Existing Precautions/Restrictions fall  -BC     Barriers to Rehab previous functional deficit;medically complex  -BC       Row Name 12/26/24 1024          Living Environment    People in Home child(angela), adult;grandchild(angela)  -BC       Row Name 12/26/24 1024          Home Main Entrance    Number of Stairs, Main Entrance none  -BC       Row Name 12/26/24 1024          Stairs Within Home, Primary    Number of Stairs, Within Home, Primary none  -BC       Row Name 12/26/24 1024          Cognition    Orientation Status (Cognition) oriented to;person;situation  -BC       Row Name 12/26/24 1024          Safety Issues/Impairments Affecting Functional Mobility    Safety Issues Affecting Function (Mobility) problem-solving;sequencing abilities  -BC     Impairments Affecting Function (Mobility) balance;endurance/activity tolerance;postural/trunk control;strength  -BC               User Key  (r) = Recorded By, (t) = Taken By, (c) = Cosigned By      Initials  Name Provider Type    BC Pretty Pierre OT Occupational Therapist                     Mobility/ADL's       Row Name 12/26/24 1026          Bed Mobility    Bed Mobility supine-sit;sit-supine  -BC     Supine-Sit [place] (Bed Mobility) minimum assist (75% patient effort)  -BC       Row Name 12/26/24 1026          Transfers    Transfers sit-stand transfer;stand-sit transfer  -BC       Row Name 12/26/24 1026          Sit-Stand Transfer    Sit-Stand [place] (Transfers) minimum assist (75% patient effort);verbal cues  -BC     Assistive Device (Sit-Stand Transfers) walker, front-wheeled  -BC       Row Name 12/26/24 1026          Stand-Sit Transfer    Stand-Sit [place] (Transfers) minimum assist (75% patient effort);verbal cues  -BC     Assistive Device (Stand-Sit Transfers) walker, front-wheeled  -BC       Row Name 12/26/24 1026          Functional Mobility    Functional Mobility- Ind. Level contact guard assist;verbal cues required  -BC     Functional Mobility- Device walker, front-wheeled  -BC     Functional Mobility- Safety Issues balance decreased during turns;sequencing ability decreased;step length decreased  -BC     Functional Mobility- Comment manual assist for help intermittently guide the walker with turns and CGA for stability/safety around the unit ~150 feet.  -BC     Patient was able to Ambulate yes  -BC       Row Name 12/26/24 1026          Activities of Daily Living    BADL Assessment/Intervention toileting;feeding;lower body dressing  -BC       Row Name 12/26/24 1026          Toileting Assessment/Training    [place] Level (Toileting) dependent (less than 25% patient effort)  -BC     Comment, (Toileting) purewick and brief  -BC       Row Name 12/26/24 1026          Self-Feeding Assessment/Training    [place] Level (Feeding) set up;feeding skills  -BC     Position (Feeding) supported sitting  -BC       Row Name 12/26/24 1026          Lower Body Dressing Assessment/Training     [place] Level (Lower Body Dressing) don;socks;maximum assist (25% patient effort)  -BC     Position (Lower Body Dressing) edge of bed sitting  -BC     Comment, (Lower Body Dressing) rib pain max A for help w/ mvmts  -BC               User Key  (r) = Recorded By, (t) = Taken By, (c) = Cosigned By      Initials Name Provider Type    Pretty Green OT Occupational Therapist                   Obj/Interventions       Row Name 12/26/24 1030          Sensory Assessment (Somatosensory)    Sensory Assessment (Somatosensory) UE sensation intact  -BC       Row Name 12/26/24 1030          Range of Motion Comprehensive    Comment, General Range of Motion decreased sh range due to pain w mvmts ~90* sh FF  -BC       Row Name 12/26/24 1030          Strength Comprehensive (MMT)    General Manual Muscle Testing (MMT) Assessment other (see comments)  -BC     Comment, General Manual Muscle Testing (MMT) Assessment decreased strength but functional with tasks, did not MMT due to pain w/ some mvmts  -BC       Row Name 12/26/24 1030          Balance    Balance Assessment sitting static balance;sitting dynamic balance;standing static balance;standing dynamic balance  -BC     Static Sitting Balance supervision  -BC     Dynamic Sitting Balance supervision  -BC     Position, Sitting Balance unsupported;sitting edge of bed  -BC     Static Standing Balance contact guard  -BC     Dynamic Standing Balance contact guard;minimal assist  -BC     Position/Device Used, Standing Balance supported;walker, front-wheeled  -BC     Comment, Balance standing balance w/ walker and CGA to min A for stability w/ dynamic turns and manual assist for guide  -BC               User Key  (r) = Recorded By, (t) = Taken By, (c) = Cosigned By      Initials Name Provider Type    Pretty Green OT Occupational Therapist                   Goals/Plan       Row Name 12/26/24 1116          Bed Mobility Goal 1 (OT)    Activity/Assistive Device (Bed Mobility Goal  1, OT) bed mobility activities, all  -BC     [place] Level/Cues Needed (Bed Mobility Goal 1, OT) supervision required  -BC     Time Frame (Bed Mobility Goal 1, OT) short term goal (STG);2 weeks  -BC     Progress/Outcomes (Bed Mobility Goal 1, OT) new goal  -BC       Row Name 12/26/24 1116          Transfer Goal 1 (OT)    Activity/Assistive Device (Transfer Goal 1, OT) sit-to-stand/stand-to-sit;bed-to-chair/chair-to-bed;toilet  -BC     [place] Level/Cues Needed (Transfer Goal 1, OT) supervision required  -BC     Time Frame (Transfer Goal 1, OT) short term goal (STG);2 weeks  -BC     Progress/Outcome (Transfer Goal 1, OT) new goal  -BC       Row Name 12/26/24 1116          Dressing Goal 1 (OT)    Activity/Device (Dressing Goal 1, OT) upper body dressing;lower body dressing  -BC     [place]/Cues Needed (Dressing Goal 1, OT) supervision required  -BC     Time Frame (Dressing Goal 1, OT) short term goal (STG);2 weeks  -BC     Progress/Outcome (Dressing Goal 1, OT) new goal  -BC       Row Name 12/26/24 1116          Toileting Goal 1 (OT)    Activity/Device (Toileting Goal 1, OT) toileting skills, all;adjust/manage clothing;perform perineal hygiene  -BC     [place] Level/Cues Needed (Toileting Goal 1, OT) supervision required  -BC     Time Frame (Toileting Goal 1, OT) short term goal (STG);2 weeks  -BC     Progress/Outcome (Toileting Goal 1, OT) new goal  -BC       Row Name 12/26/24 1116          Grooming Goal 1 (OT)    Activity/Device (Grooming Goal 1, OT) grooming skills, all  -BC     [place] (Grooming Goal 1, OT) supervision required  -BC     Time Frame (Grooming Goal 1, OT) 2 weeks;short term goal (STG)  -BC     Strategies/Barriers (Grooming Goal 1, OT) standing sinkside for ADL IND  -BC     Progress/Outcome (Grooming Goal 1, OT) new goal  -BC       Row Name 12/26/24 1116          Problem Specific Goal 1 (OT)    Problem Specific Goal 1 (OT) Pt will increase ADL IND for 3 step ADL in room w/  "walker and close sup A.  -BC     Time Frame (Problem Specific Goal 1, OT) 2 weeks;short term goal (STG)  -BC     Progress/Outcome (Problem Specific Goal 1, OT) new goal  -BC       Row Name 12/26/24 1116          Therapy Assessment/Plan (OT)    Planned Therapy Interventions (OT) activity tolerance training;BADL retraining;cognitive/visual perception retraining;functional balance retraining;IADL retraining;neuromuscular control/coordination retraining;occupation/activity based interventions;passive ROM/stretching;patient/caregiver education/training;strengthening exercise;transfer/mobility retraining  -BC               User Key  (r) = Recorded By, (t) = Taken By, (c) = Cosigned By      Initials Name Provider Type    BC Pretty Pierre OT Occupational Therapist                   Clinical Impression       Row Name 12/26/24 1032          Pain Assessment    Pretreatment Pain Rating 5/10  -BC     Posttreatment Pain Rating 5/10  -BC     Pain Location abdomen  -BC     Pain Side/Orientation left  -BC     Pain Management Interventions exercise or physical activity utilized  -BC     Response to Pain Interventions activity participation with tolerable pain  -BC     Pre/Posttreatment Pain Comment rib pain and gen'd body aches all over from 'aging'  -BC       Row Name 12/26/24 1032          Plan of Care Review    Plan of Care Reviewed With patient  -BC     Progress improving  -BC     Outcome Evaluation Pt is a 89 y/o M admitted to [facility] 12/24 with ground-level fall and left sided rib fracture, small left apical pneumothorax. Pt lives with daughter and \"four generations\" at his home where he was (I) w/ ADLs at baseline but has had recent falls at home and transitioning from cane to walker. Per daughter present at end of session Pt is not at his baseline with functional mobility and walker is not very condusive to using in their home, hopeful w/ therapy Pt will improve function and be able to return home following strengthening, ADL " retraining, and exer program on his cane. Pt is [place] with hearing aides applied, and fairly cognizant w some reminders for orientation to place, and able to verbalize understanding of fall precautions and therapy. Cont Acute Care OT services to address impairments and increase ADL (I)/function before home. Pts daughter present at end of session and asking for order to ambulate patient daily and used to worh here at [facility], requesting also to be cleared to help transfer him. Education on safety measures and for now if nursing observes her safely use gait belt, and walker and assist Pt to/from bed vs chair then OT would be okay with this as she has healthcare background but deffering to walk pt around the unit or further than short distances for safety in the room. RN present in room, and relayed questions to RN.  -BC       Row Name 12/26/24 1032          Therapy Assessment/Plan (OT)    Patient/Family Therapy Goal Statement (OT) to get stronger and back home  -BC     Rehab Potential (OT) good  -BC     Criteria for Skilled Therapeutic Interventions Met (OT) yes;meets criteria  -BC     Therapy Frequency (OT) 3 times/wk  -BC     Predicted Duration of Therapy Intervention (OT) 2 weeks  -BC       Row Name 12/26/24 1032          Therapy Plan Review/Discharge Plan (OT)    Anticipated Discharge Disposition (OT) skilled nursing facility  -BC       Row Name 12/26/24 1032          Vital Signs    O2 Delivery Pre Treatment room air  -BC     O2 Delivery Post Treatment room air  -BC     Pre Patient Position Supine  -BC     Intra Patient Position Standing  -BC     Post Patient Position Sitting  -BC       Row Name 12/26/24 1032          Positioning and Restraints    Pre-Treatment Position in bed  -BC     Post Treatment Position chair  -BC     In Chair notified nsg;call light within reach;encouraged to call for assist;exit alarm on;with family/caregiver;reclined;with nsg  -BC               User Key  (r) = Recorded By, (t) = Taken By, (c) =  Cosigned By      Initials Name Provider Type    Pretty Green OT Occupational Therapist                   Outcome Measures       Row Name 12/26/24 1117          How much help from another is currently needed...    Putting on and taking off regular lower body clothing? 2  -BC     Bathing (including washing, rinsing, and drying) 2  -BC     Toileting (which includes using toilet bed pan or urinal) 1  -BC     Putting on and taking off regular upper body clothing 2  -BC     Taking care of personal grooming (such as brushing teeth) 3  -BC     Eating meals 3  -BC     AM-PAC 6 Clicks Score (OT) 13  -BC       Row Name 12/26/24 0800          How much help from another person do you currently need...    Turning from your back to your side while in flat bed without using bedrails? 3  -HW     Moving from lying on back to sitting on the side of a flat bed without bedrails? 3  -HW     Moving to and from a bed to a chair (including a wheelchair)? 3  -HW     Standing up from a chair using your arms (e.g., wheelchair, bedside chair)? 3  -HW     Climbing 3-5 steps with a railing? 2  -HW     To walk in hospital room? 2  -HW     AM-PAC 6 Clicks Score (PT) 16  -       Row Name 12/26/24 1117          Modified [place] Scale    Modified [place] Scale 1 - No significant disability despite symptoms.  Able to carry out all usual duties and activities.  -BC       Row Name 12/26/24 1117          Functional Assessment    Outcome Measure Options AM-PAC 6 Clicks Daily Activity (OT);Modified [place]  -BC               User Key  (r) = Recorded By, (t) = Taken By, (c) = Cosigned By      Initials Name Provider Type    Suzanna Cox RN Registered Nurse    Pretty Green OT Occupational Therapist                    Occupational Therapy Education       Title: PT OT SLP Therapies (In Progress)       Topic: Occupational Therapy (In Progress)       Point: ADL training (Done)       Description:   Instruct learner(s) on proper safety adaptation  "and remediation techniques during self care or transfers.   Instruct in proper use of assistive devices.                  Learning Progress Summary            Patient Acceptance, E,TB, VU by BC at 12/26/2024 1117    Comment: educ on POC, role of OT at acute level, ADL sequence and safety, DC recs, cont teaching                      Point: Home exercise program (Not Started)       Description:   Instruct learner(s) on appropriate technique for monitoring, assisting and/or progressing therapeutic exercises/activities.                  Learner Progress:  Not documented in this visit.              Point: Precautions (Not Started)       Description:   Instruct learner(s) on prescribed precautions during self-care and functional transfers.                  Learner Progress:  Not documented in this visit.              Point: Body mechanics (Not Started)       Description:   Instruct learner(s) on proper positioning and spine alignment during self-care, functional mobility activities and/or exercises.                  Learner Progress:  Not documented in this visit.                              User Key       Initials Effective Dates Name Provider Type Discipline    BC 07/29/24 -  Pretty Pierre OT Occupational Therapist OT                  OT Recommendation and Plan  Planned Therapy Interventions (OT): activity tolerance training, BADL retraining, cognitive/visual perception retraining, functional balance retraining, IADL retraining, neuromuscular control/coordination retraining, occupation/activity based interventions, passive ROM/stretching, patient/caregiver education/training, strengthening exercise, transfer/mobility retraining  Therapy Frequency (OT): 3 times/wk  Plan of Care Review  Plan of Care Reviewed With: patient  Progress: improving  Outcome Evaluation: Pt is a 89 y/o M admitted to [facility] 12/24 with ground-level fall and left sided rib fracture, small left apical pneumothorax. Pt lives with daughter and \"four " "generations\" at his home where he was (I) w/ ADLs at baseline but has had recent falls at home and transitioning from cane to walker. Per daughter present at end of session Pt is not at his baseline with functional mobility and walker is not very condusive to using in their home, hopeful w/ therapy Pt will improve function and be able to return home following strengthening, ADL retraining, and exer program on his cane. Pt is [place] with hearing aides applied, and fairly cognizant w some reminders for orientation to place, and able to verbalize understanding of fall precautions and therapy. Cont Acute Care OT services to address impairments and increase ADL (I)/function before home. Pts daughter present at end of session and asking for order to ambulate patient daily and used to worh here at [facility], requesting also to be cleared to help transfer him. Education on safety measures and for now if nursing observes her safely use gait belt, and walker and assist Pt to/from bed vs chair then OT would be okay with this as she has healthcare background but deffering to walk pt around the unit or further than short distances for safety in the room. RN present in room, and relayed questions to RN.     Time Calculation:   Evaluation Complexity (OT)  Review Occupational Profile/Medical/Therapy History Complexity: brief/low complexity  Assessment, Occupational Performance/Identification of Deficit Complexity: 1-3 performance deficits  Clinical Decision Making Complexity (OT): problem focused assessment/low complexity  Overall Complexity of Evaluation (OT): low complexity     Time Calculation- OT       Row Name 12/26/24 1119             Time Calculation- OT    OT Start Time 0809  -BC      OT Stop Time 0835  -BC      OT Time Calculation (min) 26 min  -BC      Total Timed Code Minutes- OT 15 minute(s)  -BC      OT Received On 12/26/24  -BC      OT - Next Appointment 12/30/24  -BC      OT Goal Re-Cert Due Date 01/09/25  -BC         Timed " Charges    81325 - OT Therapeutic Activity Minutes 15  -BC         Total Minutes    Timed Charges Total Minutes 15  -BC       Total Minutes 15  -BC                User Key  (r) = Recorded By, (t) = Taken By, (c) = Cosigned By      Initials Name Provider Type    BC Pretty Pierre OT Occupational Therapist                  Therapy Charges for Today       Code Description Service Date Service Provider Modifiers Qty    [id number]  OT THERAPEUTIC ACT EA 15 MIN 12/26/2024 Pretty Pierre OT GO 1    [id number]  OT EVAL LOW COMPLEXITY 3 12/26/2024 Pretty Pierre OT GO 1                 Pretty Pierre OT  12/26/2024

## 2024-12-26 NOTE — PROGRESS NOTES
Progress note    Reason for consultation: Multiple left-sided rib fractures.    No acute issues overnight.  Pain well-controlled.  Sitting comfortably in recliner. Family at bedside. Feels better today.     No acute distress.  Alert, oriented x4.  Rate rhythm regular.  Non-labored breathing. With cough.   Left chest tenderness.  With all 4 extremities, no focal deficit.  Extremities warm.    Labs reviewed.  Imaging reviewed.    Patient presented with ground-level fall and left sided rib fractures.  He does not require surgical intervention.  His pain is adequately controlled.    Ensure adequate pain control.  Encourage incentive spirometer.  Due to high risk of bleeding and multiple falls, hold off on anticoagulation.  PT/OT.  Patient can be discharged from hospital from thoracic surgery standpoint.  He can follow-up with thoracic surgery as needed.    Will sign off at this time. Please call thoracic surgery with questions/concerns.    MARLENE Damon  Thoracic Surgery

## 2024-12-26 NOTE — PLAN OF CARE
"Goal Outcome Evaluation:  Plan of Care Reviewed With: patient        Progress: improving  Outcome Evaluation: Pt is a 89 y/o M admitted to [facility] 12/24 with ground-level fall and left sided rib fracture, small left apical pneumothorax. Pt lives with daughter and \"four generations\" at his home where he was (I) w/ ADLs at baseline but has had recent falls at home and transitioning from cane to walker. Per daughter present at end of session Pt is not at his baseline with functional mobility and walker is not very condusive to using in their home, hopeful w/ therapy Pt will improve function and be able to return home following strengthening, ADL retraining, and exer program on his cane. Pt is [place] with hearing aides applied, and fairly cognizant w some reminders for orientation to place, and able to verbalize understanding of fall precautions and therapy. Cont Acute Care OT services to address impairments and increase ADL (I)/function before home. Pts daughter present at end of session and asking for order to ambulate patient daily and used to worh here at [facility], requesting also to be cleared to help transfer him. Education on safety measures and for now if nursing observes her safely use gait belt, and walker and assist Pt to/from bed vs chair then OT would be okay with this as she has healthcare background but deffering to walk pt around the unit or further than short distances for safety in the room. RN present in room, and relayed questions to RN.    Anticipated Discharge Disposition (OT): skilled nursing facility                        "

## 2024-12-26 NOTE — PLAN OF CARE
Goal Outcome Evaluation:     Patient is a 89 y.o. male admitted to [facility] on 12/24/2024 for fall at home with resulting rib fractures and pneumothorax. Patient is ambulatory at baseline and lives with his daughter. Today, patient sitting UIC upon arrival, required CGA HHAx1 for transfers, and ambulated 40' Gill[place] HHAx1. Minimally unsteady throughout ambulation with balance deficits noted. Patient may benefit from skilled PT services acutely to address functional deficits as well as improve level of independence prior to discharge. Anticipate DC to SNF upon DC- daughter voiced significant concerns for him DCing home due to his fall history and inability to care for him at this time.    Anticipated Discharge Disposition (PT): skilled nursing facility

## 2024-12-27 LAB
ANION GAP SERPL CALCULATED.3IONS-SCNC: 9 MMOL/L (ref 5–15)
BUN SERPL-MCNC: 31 MG/DL (ref 8–23)
BUN/CREAT SERPL: 24.2 (ref 7–25)
CALCIUM SPEC-SCNC: 8 MG/DL (ref 8.2–9.6)
CHLORIDE SERPL-SCNC: 107 MMOL/L (ref 98–107)
CO2 SERPL-SCNC: 23 MMOL/L (ref 22–29)
CREAT SERPL-MCNC: 1.28 MG/DL (ref 0.76–1.27)
DEPRECATED RDW RBC AUTO: 44.9 FL (ref 37–54)
EGFRCR SERPLBLD CKD-EPI 2021: 50.9 ML/MIN/1.73
ERYTHROCYTE [DISTWIDTH] IN BLOOD BY AUTOMATED COUNT: 12.9 % (ref 12.3–15.4)
GLUCOSE SERPL-MCNC: 101 MG/DL (ref 65–99)
HCT VFR BLD AUTO: 36.8 % (ref 37.5–51)
HGB BLD-MCNC: 12.4 G/DL (ref 13–17.7)
MAGNESIUM SERPL-MCNC: 2.3 MG/DL (ref 1.7–2.3)
MCH RBC QN AUTO: 32 PG (ref 26.6–33)
MCHC RBC AUTO-ENTMCNC: 33.7 G/DL (ref 31.5–35.7)
MCV RBC AUTO: 94.8 FL (ref 79–97)
PHOSPHATE SERPL-MCNC: 2.5 MG/DL (ref 2.5–4.5)
PLATELET # BLD AUTO: 132 10*3/MM3 (ref 140–450)
PMV BLD AUTO: 11.7 FL (ref 6–12)
POTASSIUM SERPL-SCNC: 3.7 MMOL/L (ref 3.5–5.2)
RBC # BLD AUTO: 3.88 10*6/MM3 (ref 4.14–5.8)
SODIUM SERPL-SCNC: 139 MMOL/L (ref 136–145)
WBC NRBC COR # BLD AUTO: 13.75 10*3/MM3 (ref 3.4–10.8)

## 2024-12-27 PROCEDURE — 83735 ASSAY OF MAGNESIUM: CPT | Performed by: HOSPITALIST

## 2024-12-27 PROCEDURE — 85027 COMPLETE CBC AUTOMATED: CPT | Performed by: HOSPITALIST

## 2024-12-27 PROCEDURE — 80048 BASIC METABOLIC PNL TOTAL CA: CPT | Performed by: HOSPITALIST

## 2024-12-27 PROCEDURE — 84100 ASSAY OF PHOSPHORUS: CPT | Performed by: HOSPITALIST

## 2024-12-27 RX ADMIN — ACETAMINOPHEN 1000 MG: 500 TABLET, FILM COATED ORAL at 20:31

## 2024-12-27 RX ADMIN — TRAZODONE HYDROCHLORIDE 25 MG: 50 TABLET ORAL at 20:31

## 2024-12-27 RX ADMIN — IPRATROPIUM BROMIDE 2 SPRAY: 21 SPRAY, METERED NASAL at 06:29

## 2024-12-27 RX ADMIN — DIGOXIN 125 MCG: 125 TABLET ORAL at 09:25

## 2024-12-27 RX ADMIN — LEVOTHYROXINE SODIUM 75 MCG: 75 TABLET ORAL at 06:29

## 2024-12-27 RX ADMIN — LIDOCAINE 1 PATCH: 4 PATCH TOPICAL at 09:25

## 2024-12-27 RX ADMIN — ACETAMINOPHEN 1000 MG: 500 TABLET, FILM COATED ORAL at 14:48

## 2024-12-27 RX ADMIN — Medication 10 ML: at 09:26

## 2024-12-27 RX ADMIN — METOPROLOL TARTRATE 12.5 MG: 25 TABLET, FILM COATED ORAL at 09:25

## 2024-12-27 RX ADMIN — FAMOTIDINE 20 MG: 20 TABLET, FILM COATED ORAL at 09:25

## 2024-12-27 RX ADMIN — IPRATROPIUM BROMIDE 2 SPRAY: 21 SPRAY, METERED NASAL at 14:48

## 2024-12-27 RX ADMIN — TAMSULOSIN HYDROCHLORIDE 0.4 MG: 0.4 CAPSULE ORAL at 20:30

## 2024-12-27 RX ADMIN — MUPIROCIN 1 APPLICATION: 20 OINTMENT TOPICAL at 09:25

## 2024-12-27 RX ADMIN — METOPROLOL TARTRATE 12.5 MG: 25 TABLET, FILM COATED ORAL at 20:30

## 2024-12-27 RX ADMIN — FINASTERIDE 5 MG: 5 TABLET, FILM COATED ORAL at 20:31

## 2024-12-27 RX ADMIN — ACETAMINOPHEN 1000 MG: 500 TABLET, FILM COATED ORAL at 06:29

## 2024-12-27 RX ADMIN — Medication 10 ML: at 20:31

## 2024-12-27 RX ADMIN — MUPIROCIN 1 APPLICATION: 20 OINTMENT TOPICAL at 20:30

## 2024-12-27 NOTE — PROGRESS NOTES
Name: Lavelle Soliman ADMIT: 2024   : 1927  PCP: Lesly Kuhn APRN    MRN: [id number] LOS: 3 days   AGE/SEX: 89 y.o. male  ROOM: [facility]     Subjective   Subjective   Resting in bed.  States he needs to get up to use the bathroom because he needs to have a bowel movement.  No family at bedside.  He denies any pain at present.  Currently alert and oriented.  Denies any nausea or vomiting as well as trouble breathing.     Objective   Objective   Vital Signs  Temp:  [97.5 °F (36.4 °C)-98.2 °F (36.8 °C)] 97.5 °F (36.4 °C)  Heart Rate:  [82-94] 94  Resp:  [16] 16  BP: (142-159)/(80-92) 153/88  SpO2:  [91 %-94 %] 91 %  on   ;   Device (Oxygen Therapy): room air  Body mass index is 27.99 kg/m².    Physical Exam  Vitals and nursing note reviewed.   Constitutional:       Appearance: He is ill-appearing. He is not toxic-appearing.   HENT:      Head: Normocephalic and atraumatic.   Cardiovascular:      Rate and Rhythm: Normal rate and regular rhythm.      Pulses: Normal pulses.   Pulmonary:      Effort: Pulmonary effort is normal. No respiratory distress.      Comments: Diminished, on RA   Abdominal:      General: Bowel sounds are normal. There is no distension.      Palpations: Abdomen is soft.      Tenderness: There is no abdominal tenderness.   Musculoskeletal:         General: No swelling. Normal range of motion.   Skin:     General: Skin is warm and dry.      Findings: No bruising.   Neurological:      General: No focal deficit present.      Mental Status: He is alert and oriented to person, place, and time.      Sensory: No sensory deficit.      Motor: Weakness present.      Coordination: Coordination normal.   Psychiatric:         Mood and Affect: Mood normal.         Behavior: Behavior normal.       Results Review:       I reviewed the patient's new clinical results.  Results from last 7 days   Lab Units 24  0527 24  0656 24  0406 24  0917   WBC 10*3/mm3 13.75* 15.53* 12.49*  "12.45*   HEMOGLOBIN g/dL 12.4* 12.5* 11.9* 12.7*   PLATELETS 10*3/mm3 132* 129* 134* 154     Results from last 7 days   Lab Units 12/27/24  0527 12/26/24  0656 12/25/24  0406 12/24/24  0917   SODIUM mmol/L 139 138 143 136   POTASSIUM mmol/L 3.7 4.1 4.3 4.5   CHLORIDE mmol/L 107 106 110* 105   CO2 mmol/L 23.0 22.1 23.6 25.0   BUN mg/dL 31* 24* 24* 20   CREATININE mg/dL 1.28* 1.20 1.37* 1.49*   GLUCOSE mg/dL 101* 114* 125* 137*   Estimated Creatinine Clearance: 27.2 mL/min (A) (by C-G formula based on SCr of 1.28 mg/dL (H)).  Results from last 7 days   Lab Units 12/24/24  0917   ALBUMIN g/dL 3.5   BILIRUBIN mg/dL 0.5   ALK PHOS U/L 103   AST (SGOT) U/L 20   ALT (SGPT) U/L 19     Results from last 7 days   Lab Units 12/27/24  0527 12/26/24  0656 12/25/24  0406 12/24/24  0917   CALCIUM mg/dL 8.0* 8.1* 8.2 8.6   ALBUMIN g/dL  --   --   --  3.5   MAGNESIUM mg/dL 2.3 2.3 2.1  --    PHOSPHORUS mg/dL 2.5 2.7 2.5  --        No results found for: \"HGBA1C\", \"POCGLU\"    acetaminophen, 1,000 mg, Oral, Q8H  digoxin, 125 mcg, Oral, Once per day on Monday Wednesday Friday  famotidine, 20 mg, Oral, Daily  finasteride, 5 mg, Oral, Nightly  ipratropium, 2 spray, Each Nare, Q12H  levothyroxine, 75 mcg, Oral, QAM  Lidocaine, 1 patch, Transdermal, Q24H  metoprolol tartrate, 12.5 mg, Oral, Q12H  mupirocin, 1 Application, Each Nare, BID  sodium chloride, 10 mL, Intravenous, Q12H  tamsulosin, 0.4 mg, Oral, Nightly  traZODone, 25 mg, Oral, Nightly       Diet: Regular/House; Fluid Consistency: Thin (IDDSI 0)       Assessment/Plan     Active Hospital Problems    Diagnosis  POA    **Multiple rib fractures [S22.49XA]  Yes    Closed traumatic fracture of ribs of left side with pneumothorax [S22.42XA, S27.0XXA]  Yes    Fracture of left scapular body [S42.112A]  Yes    Pulmonary contusion [S27.329A]  Unknown    Multiple rib fractures involving four or more ribs [S22.49XA]  Yes    Permanent atrial fibrillation [I48.21]  Yes    Benign essential " hypertension [I10]  Yes    Hyperlipidemia [E78.5]  Yes    Hypothyroidism [E03.9]  Yes      Resolved Hospital Problems   No resolved problems to display.     Mr. Soliman is a 89 y.o. male former smoker with a history of hypertension, AF, HLD, hypothyroidism, BPH who presents after mechanical fall, found to have left-sided rib fractures and small left-sided pneumothorax.     Left-sided rib fractures  Small left-sided pneumothorax  -CT with small left apical pneumothorax as well as mildly displaced fracture of the posterior left eighth rib and fractures of left seventh, ninth and 10th ribs with evidence of pulmonary contusion  -Thoracic surgery followed. S/O 12/26.  -Pain control: scheduled Tylenol, as needed oxycodone, continue lidocaine patch; incentive spirometer/pulmonary hygiene  -Monitoring leukocytosis (down-trending today)     Delirium  -Delirium precautions.  -No new infectious complaints. No fever. WBC improving.  -Small dose of trazodone added for sleep.     Scapular fracture  -Ortho evaluated: Ok for sling for comfort (not needed if patient does not have any benefit)  -Can follow up outpatient if patient desires (already sees Dr. Hair in ortho clinic)     Mechanical fall with head trauma  -CT head and cervical spine negative for acute injury  -PT/OT/CCP. Referrals for SNF sent 12/26.     Hypertension  Permanent AF  HLD  -Continue home metoprolol and digoxin, patient not on AC at home due to previous history of frequent falls  -No longer on lasix, no e/o overload     CKD3b  -Cr 1.5 on admit, prior baseline 1.3-1.4  -Monitor daily. Creatinine 1.28 today.     Hypothyroidism  -Continue home Synthroid     BPH  -Continue finasteride and tamsulosin     History of dysphagia-patient and family acknowledge risk of aspiration, they would like patient to eat a regular diet with thin liquids. They have declined speech evaluation.     History of right sided pneumothorax with chest tube placement 5/23    Discussed with  patient, nurse and Dr. Moon.     DVT prophylaxis: SCDs  Code status: No CPR/limited  Discharge: Planning for SNF. Ready once placed.     MARLENE Garber  [facility]  12/27/24  11:02 EST

## 2024-12-27 NOTE — PLAN OF CARE
Problem: Adult Inpatient Plan of Care  Goal: Plan of Care Review  Flowsheets (Taken 12/27/2024 8050)  Progress: no change  Outcome Evaluation: oriented to self and place received trazodone for sleep has been picking at everything all night took monitor leads off many tiimes removed brief and purwick and was incontinent of stool had it all over very pleasant but will not leave things alone or sleep denies pain unless coughes a lot wants to go home and work on book  Plan of Care Reviewed With: patient   Goal Outcome Evaluation:  Plan of Care Reviewed With: patient        Progress: no change  Outcome Evaluation: oriented to self and place received trazodone for sleep has been picking at everything all night took monitor leads off many tiimes removed brief and purwick and was incontinent of stool had it all over very pleasant but will not leave things alone or sleep denies pain unless coughes a lot wants to go home and work on book

## 2024-12-27 NOTE — PLAN OF CARE
Goal Outcome Evaluation:  Plan of Care Reviewed With: patient        Progress: no change  Outcome Evaluation: Afib, RA, A/Ox4, forgetful at times. Plans to d/c to SNF pending medical clearance, family to transport.

## 2024-12-27 NOTE — CASE MANAGEMENT/SOCIAL WORK
Continued Stay Note  [facility]     Patient Name: Lavelle Soliman  MRN: [id number]  Today's Date: 12/27/2024    Admit Date: 12/24/2024    Plan[place]   Discharge Plan       Row Name 12/27/24 1541       Plan    Plan SNF [place]    Patient/Family in Agreement with Plan yes    Plan Comments Met with pt and daughter in room. The plan remains for the pt to go to rehab at discharge and then will transition to assisted living. His daughter will provide transportation. [facility] and the [facility] facilities have declined due to bed availability. [facility] does not have a bed today but may have a bed tomorrow. Discussed other options with the family. Referral for [facility] placed per request. They denied any further needs at this time. CCP following. Benja VASQUEZ RN                   Discharge Codes    No documentation.                 Expected Discharge Date and Time       Expected Discharge Date Expected Discharge Time    Dec 27, 2024               Benja Garcia RN

## 2024-12-28 VITALS
DIASTOLIC BLOOD PRESSURE: 77 MMHG | WEIGHT: 144.84 LBS | HEART RATE: 75 BPM | TEMPERATURE: 97.7 F | BODY MASS INDEX: 27.35 KG/M2 | RESPIRATION RATE: 16 BRPM | SYSTOLIC BLOOD PRESSURE: 121 MMHG | OXYGEN SATURATION: 97 % | HEIGHT: 61 IN

## 2024-12-28 LAB
ANION GAP SERPL CALCULATED.3IONS-SCNC: 8 MMOL/L (ref 5–15)
BUN SERPL-MCNC: 35 MG/DL (ref 8–23)
BUN/CREAT SERPL: 28 (ref 7–25)
CALCIUM SPEC-SCNC: 8 MG/DL (ref 8.2–9.6)
CHLORIDE SERPL-SCNC: 109 MMOL/L (ref 98–107)
CO2 SERPL-SCNC: 22 MMOL/L (ref 22–29)
CREAT SERPL-MCNC: 1.25 MG/DL (ref 0.76–1.27)
DEPRECATED RDW RBC AUTO: 46.6 FL (ref 37–54)
EGFRCR SERPLBLD CKD-EPI 2021: 52.4 ML/MIN/1.73
ERYTHROCYTE [DISTWIDTH] IN BLOOD BY AUTOMATED COUNT: 13.1 % (ref 12.3–15.4)
GLUCOSE SERPL-MCNC: 101 MG/DL (ref 65–99)
HCT VFR BLD AUTO: 38.5 % (ref 37.5–51)
HGB BLD-MCNC: 12.2 G/DL (ref 13–17.7)
MAGNESIUM SERPL-MCNC: 2.2 MG/DL (ref 1.7–2.3)
MCH RBC QN AUTO: 30.9 PG (ref 26.6–33)
MCHC RBC AUTO-ENTMCNC: 31.7 G/DL (ref 31.5–35.7)
MCV RBC AUTO: 97.5 FL (ref 79–97)
PHOSPHATE SERPL-MCNC: 2.9 MG/DL (ref 2.5–4.5)
PLATELET # BLD AUTO: 150 10*3/MM3 (ref 140–450)
PMV BLD AUTO: 11 FL (ref 6–12)
POTASSIUM SERPL-SCNC: 3.9 MMOL/L (ref 3.5–5.2)
RBC # BLD AUTO: 3.95 10*6/MM3 (ref 4.14–5.8)
SODIUM SERPL-SCNC: 139 MMOL/L (ref 136–145)
WBC NRBC COR # BLD AUTO: 13.01 10*3/MM3 (ref 3.4–10.8)

## 2024-12-28 PROCEDURE — 85027 COMPLETE CBC AUTOMATED: CPT | Performed by: HOSPITALIST

## 2024-12-28 PROCEDURE — 84100 ASSAY OF PHOSPHORUS: CPT | Performed by: HOSPITALIST

## 2024-12-28 PROCEDURE — 83735 ASSAY OF MAGNESIUM: CPT | Performed by: HOSPITALIST

## 2024-12-28 PROCEDURE — 80048 BASIC METABOLIC PNL TOTAL CA: CPT | Performed by: HOSPITALIST

## 2024-12-28 RX ORDER — BISACODYL 10 MG
10 SUPPOSITORY, RECTAL RECTAL DAILY PRN
Start: 2024-12-28

## 2024-12-28 RX ORDER — IPRATROPIUM BROMIDE 21 UG/1
2 SPRAY, METERED NASAL DAILY
Start: 2024-12-28

## 2024-12-28 RX ORDER — TAMSULOSIN HYDROCHLORIDE 0.4 MG/1
0.4 CAPSULE ORAL NIGHTLY
Start: 2024-12-28

## 2024-12-28 RX ORDER — BISACODYL 5 MG/1
5 TABLET, DELAYED RELEASE ORAL DAILY PRN
Start: 2024-12-28

## 2024-12-28 RX ORDER — LIDOCAINE 4 G/G
1 PATCH TOPICAL
Qty: 6 EACH | Refills: 0 | Status: SHIPPED | OUTPATIENT
Start: 2024-12-29

## 2024-12-28 RX ORDER — TRAZODONE HYDROCHLORIDE 50 MG/1
25 TABLET, FILM COATED ORAL NIGHTLY
Start: 2024-12-28

## 2024-12-28 RX ORDER — ACETAMINOPHEN 500 MG
1000 TABLET ORAL EVERY 8 HOURS
Start: 2024-12-28

## 2024-12-28 RX ORDER — FAMOTIDINE 20 MG/1
20 TABLET, FILM COATED ORAL NIGHTLY
Start: 2024-12-28

## 2024-12-28 RX ORDER — LIDOCAINE 4 G/G
1 PATCH TOPICAL
Start: 2024-12-29 | End: 2024-12-28

## 2024-12-28 RX ORDER — AMOXICILLIN 250 MG
2 CAPSULE ORAL 2 TIMES DAILY PRN
Start: 2024-12-28

## 2024-12-28 RX ORDER — POLYETHYLENE GLYCOL 3350 17 G/17G
17 POWDER, FOR SOLUTION ORAL DAILY PRN
Start: 2024-12-28

## 2024-12-28 RX ADMIN — LEVOTHYROXINE SODIUM 75 MCG: 75 TABLET ORAL at 06:14

## 2024-12-28 RX ADMIN — Medication 10 ML: at 09:11

## 2024-12-28 RX ADMIN — MUPIROCIN 1 APPLICATION: 20 OINTMENT TOPICAL at 09:11

## 2024-12-28 RX ADMIN — LIDOCAINE 1 PATCH: 4 PATCH TOPICAL at 08:30

## 2024-12-28 RX ADMIN — ACETAMINOPHEN 1000 MG: 500 TABLET, FILM COATED ORAL at 06:14

## 2024-12-28 RX ADMIN — METOPROLOL TARTRATE 12.5 MG: 25 TABLET, FILM COATED ORAL at 08:29

## 2024-12-28 RX ADMIN — FAMOTIDINE 20 MG: 20 TABLET, FILM COATED ORAL at 08:30

## 2024-12-28 NOTE — PLAN OF CARE
Goal Outcome Evaluation:  VSS. No c/o pain. Disoriented to place and situation during the night. Plan to dc to rehab today. Plan of care ongoing.

## 2024-12-28 NOTE — DISCHARGE SUMMARY
Date of Discharge:  12/28/2024    PCP: Lesly Kuhn APRN    Discharge Diagnosis:   Active Hospital Problems    Diagnosis  POA    **Multiple rib fractures [S22.49XA]  Yes    Closed traumatic fracture of ribs of left side with pneumothorax [S22.42XA, S27.0XXA]  Yes    Fracture of left scapular body [S42.112A]  Yes    Pulmonary contusion [S27.329A]  Unknown    Multiple rib fractures involving four or more ribs [S22.49XA]  Yes    Permanent atrial fibrillation [I48.21]  Yes    Benign essential hypertension [I10]  Yes    Hyperlipidemia [E78.5]  Yes    Hypothyroidism [E03.9]  Yes      Resolved Hospital Problems   No resolved problems to display.          Consults       Date and Time Order Name Status Description    12/24/2024 12:37 PM Inpatient Thoracic Surgery Consult Completed     12/24/2024 11:36 AM [facility] (on-call MD unless specified) Details            Hospital Course  Patient is a 89 y.o. male with a history of hypertension, AF, HLD, hypothyroidism, BPH who presents after mechanical fall, found to have left-sided rib fractures and small left-sided pneumothorax. He has a prior history of right-sided pneumothorax requiring chest tube placement 5/23. This admission he was seen by thoracic surgery. Pain was well-controlled. They did not think he needed rib stabilization or TALIA block. He is currently on scheduled Tylenol, lidocaine patch, and as needed oxycodone ordered but he has not taken any. Ortho saw him for scapular fracture and they recommended a sling for comfort (not needed if patient does not have any benefit). He has a history of atrial fibrillation but is not on anticoagulation due to history of frequent falls. CKD has been stable. He has a history of dysphagia. Patient/family acknowledged risk of aspiration and wanted to stay on a regular diet with thin liquids. He    He did have some delirium. Nightly trazodone was added and for now would continue that at least initially at SNF.    I discussed the patient's  findings and my recommendations with patient, family, and nursing staff.    Temp:  [97.4 °F (36.3 °C)-98.3 °F (36.8 °C)] 97.7 °F (36.5 °C)  Heart Rate:  [] 75  Resp:  [16] 16  BP: (121-137)/(77-85) 121/77  Body mass index is 27.37 kg/m².    Physical Exam  Constitutional:       General: He is not in acute distress.     Appearance: He is ill-appearing (frail, elderly). He is not toxic-appearing.   HENT:      Head: Normocephalic and atraumatic.   Cardiovascular:      Rate and Rhythm: Normal rate and regular rhythm.   Pulmonary:      Effort: Pulmonary effort is normal. No respiratory distress.      Breath sounds: No wheezing.   Abdominal:      General: Bowel sounds are normal.      Palpations: Abdomen is soft.      Tenderness: There is no abdominal tenderness. There is no guarding or rebound.   Musculoskeletal:         General: No swelling.   Skin:     General: Skin is warm and dry.   Neurological:      Mental Status: He is alert. Mental status is at baseline.   Psychiatric:         Mood and Affect: Mood normal.         Behavior: Behavior normal.       Disposition: Skilled Nursing Facility (DC - External)       Discharge Medications        New Medications        Instructions Start Date   acetaminophen 500 MG tablet  Commonly known as: TYLENOL   1,000 mg, Oral, Every 8 Hours      bisacodyl 5 MG EC tablet  Commonly known as: DULCOLAX   5 mg, Oral, Daily PRN      bisacodyl 10 MG suppository  Commonly known as: DULCOLAX   10 mg, Rectal, Daily PRN      Lidocaine 4 %   1 patch, Transdermal, Every 24 Hours Scheduled, Remove & Discard patch within 12 hours or as directed by MD   Start Date: December 29, 2024     polyethylene glycol 17 g packet  Commonly known as: MIRALAX   17 g, Oral, Daily PRN      sennosides-docusate 8.6-50 MG per tablet  Commonly known as: PERICOLACE   2 tablets, Oral, 2 Times Daily PRN      traZODone 50 MG tablet  Commonly known as: DESYREL   25 mg, Oral, Nightly             Changes to Medications         Instructions Start Date   famotidine 20 MG tablet  Commonly known as: PEPCID  What changed: when to take this   20 mg, Oral, Nightly      ipratropium 0.03 % nasal spray  Commonly known as: ATROVENT  What changed: when to take this   2 sprays, Nasal, Daily      tamsulosin 0.4 MG capsule 24 hr capsule  Commonly known as: FLOMAX  What changed: when to take this   0.4 mg, Oral, Nightly             Continue These Medications        Instructions Start Date   digoxin 125 MCG tablet  Commonly known as: LANOXIN   Take one tablet Monday, Wednesday and Friday      finasteride 5 MG tablet  Commonly known as: PROSCAR   5 mg, Oral, Nightly      levothyroxine 75 MCG tablet  Commonly known as: SYNTHROID, LEVOTHROID   75 mcg, Oral, Every Morning      metoprolol tartrate 25 MG tablet  Commonly known as: LOPRESSOR   TAKE 1/2 TABLET TWO TIMES A DAY             Stop These Medications      furosemide 20 MG tablet  Commonly known as: LASIX             Diet Instructions       Diet: Regular/House Diet      Discharge Diet: Regular/House Diet    Texture: Regular Texture (IDDSI 7)    Fluid Consistency: Thin (IDDSI 0)           Activity Instructions       Activity as Tolerated             Additional Instructions for the Follow-ups that You Need to Schedule       Call MD for problems / concerns.   As directed             Follow-up Information       Lesly Kuhn APRN .    Specialties: Nephrology, Nurse Practitioner  Contact information:  [address] [address] [phone]                          No future appointments.     Tip Moon MD  [facility]  12/28/24 12:28 EST    Discharge time spent greater than 30 minutes.

## 2025-04-21 ENCOUNTER — APPOINTMENT (OUTPATIENT)
Dept: CT IMAGING | Facility: HOSPITAL | Age: OVER 89
End: 2025-04-21
Payer: MEDICARE

## 2025-04-21 ENCOUNTER — HOSPITAL ENCOUNTER (EMERGENCY)
Facility: HOSPITAL | Age: OVER 89
Discharge: HOME | End: 2025-04-21
Attending: STUDENT IN AN ORGANIZED HEALTH CARE EDUCATION/TRAINING PROGRAM | Admitting: STUDENT IN AN ORGANIZED HEALTH CARE EDUCATION/TRAINING PROGRAM
Payer: MEDICARE

## 2025-04-21 ENCOUNTER — APPOINTMENT (OUTPATIENT)
Dept: GENERAL RADIOLOGY | Facility: HOSPITAL | Age: OVER 89
End: 2025-04-21
Payer: MEDICARE

## 2025-04-21 VITALS
TEMPERATURE: 98.3 F | BODY MASS INDEX: 28.72 KG/M2 | WEIGHT: 152.12 LBS | DIASTOLIC BLOOD PRESSURE: 121 MMHG | SYSTOLIC BLOOD PRESSURE: 164 MMHG | HEIGHT: 61 IN | OXYGEN SATURATION: 97 % | HEART RATE: 66 BPM | RESPIRATION RATE: 18 BRPM

## 2025-04-21 DIAGNOSIS — R41.82 ALTERED MENTAL STATUS, UNSPECIFIED ALTERED MENTAL STATUS TYPE: Primary | ICD-10-CM

## 2025-04-21 LAB
ALBUMIN SERPL-MCNC: 3.5 G/DL (ref 3.5–5.2)
ALBUMIN/GLOB SERPL: 0.9 G/DL
ALP SERPL-CCNC: 97 U/L (ref 39–117)
ALT SERPL W P-5'-P-CCNC: 26 U/L (ref 1–41)
ANION GAP SERPL CALCULATED.3IONS-SCNC: 10.2 MMOL/L (ref 5–15)
AST SERPL-CCNC: 15 U/L (ref 1–40)
BASOPHILS # BLD AUTO: 0.1 10*3/MM3 (ref 0–0.2)
BASOPHILS NFR BLD AUTO: 0.9 % (ref 0–1.5)
BILIRUB SERPL-MCNC: 0.2 MG/DL (ref 0–1.2)
BILIRUB UR QL STRIP: NEGATIVE
BUN SERPL-MCNC: 33 MG/DL (ref 8–23)
BUN/CREAT SERPL: 20 (ref 7–25)
CALCIUM SPEC-SCNC: 8.5 MG/DL (ref 8.2–9.6)
CHLORIDE SERPL-SCNC: 108 MMOL/L (ref 98–107)
CLARITY UR: CLEAR
CO2 SERPL-SCNC: 21.8 MMOL/L (ref 22–29)
COLOR UR: YELLOW
CREAT SERPL-MCNC: 1.65 MG/DL (ref 0.76–1.27)
DEPRECATED RDW RBC AUTO: 45.8 FL (ref 37–54)
DIGOXIN SERPL-MCNC: 0.6 NG/ML (ref 0.6–1.2)
EGFRCR SERPLBLD CKD-EPI 2021: 37.5 ML/MIN/1.73
EOSINOPHIL # BLD AUTO: 0.31 10*3/MM3 (ref 0–0.4)
EOSINOPHIL NFR BLD AUTO: 2.9 % (ref 0.3–6.2)
ERYTHROCYTE [DISTWIDTH] IN BLOOD BY AUTOMATED COUNT: 13.1 % (ref 12.3–15.4)
GEN 5 1HR TROPONIN T REFLEX: 23 NG/L
GLOBULIN UR ELPH-MCNC: 3.9 GM/DL
GLUCOSE BLDC GLUCOMTR-MCNC: 86 MG/DL (ref 70–130)
GLUCOSE SERPL-MCNC: 105 MG/DL (ref 65–99)
GLUCOSE UR STRIP-MCNC: NEGATIVE MG/DL
HCT VFR BLD AUTO: 34.4 % (ref 37.5–51)
HGB BLD-MCNC: 11.5 G/DL (ref 13–17.7)
HGB UR QL STRIP.AUTO: NEGATIVE
HOLD SPECIMEN: NORMAL
HOLD SPECIMEN: NORMAL
IMM GRANULOCYTES # BLD AUTO: 0.08 10*3/MM3 (ref 0–0.05)
IMM GRANULOCYTES NFR BLD AUTO: 0.8 % (ref 0–0.5)
KETONES UR QL STRIP: NEGATIVE
LEUKOCYTE ESTERASE UR QL STRIP.AUTO: NEGATIVE
LYMPHOCYTES # BLD AUTO: 2.35 10*3/MM3 (ref 0.7–3.1)
LYMPHOCYTES NFR BLD AUTO: 22.1 % (ref 19.6–45.3)
MAGNESIUM SERPL-MCNC: 2.3 MG/DL (ref 1.7–2.3)
MCH RBC QN AUTO: 32.8 PG (ref 26.6–33)
MCHC RBC AUTO-ENTMCNC: 33.4 G/DL (ref 31.5–35.7)
MCV RBC AUTO: 98 FL (ref 79–97)
MONOCYTES # BLD AUTO: 1.2 10*3/MM3 (ref 0.1–0.9)
MONOCYTES NFR BLD AUTO: 11.3 % (ref 5–12)
NEUTROPHILS NFR BLD AUTO: 6.61 10*3/MM3 (ref 1.7–7)
NEUTROPHILS NFR BLD AUTO: 62 % (ref 42.7–76)
NITRITE UR QL STRIP: NEGATIVE
NRBC BLD AUTO-RTO: 0 /100 WBC (ref 0–0.2)
PH UR STRIP.AUTO: 5.5 [PH] (ref 5–8)
PLATELET # BLD AUTO: 167 10*3/MM3 (ref 140–450)
PMV BLD AUTO: 11.1 FL (ref 6–12)
POTASSIUM SERPL-SCNC: 4 MMOL/L (ref 3.5–5.2)
PROT SERPL-MCNC: 7.4 G/DL (ref 6–8.5)
PROT UR QL STRIP: ABNORMAL
QT INTERVAL: 430 MS
QTC INTERVAL: 476 MS
RBC # BLD AUTO: 3.51 10*6/MM3 (ref 4.14–5.8)
SODIUM SERPL-SCNC: 140 MMOL/L (ref 136–145)
SP GR UR STRIP: 1.01 (ref 1–1.03)
TROPONIN T % DELTA: -8
TROPONIN T NUMERIC DELTA: -2 NG/L
TROPONIN T SERPL HS-MCNC: 25 NG/L
UROBILINOGEN UR QL STRIP: ABNORMAL
WBC NRBC COR # BLD AUTO: 10.65 10*3/MM3 (ref 3.4–10.8)
WHOLE BLOOD HOLD COAG: NORMAL
WHOLE BLOOD HOLD SPECIMEN: NORMAL

## 2025-04-21 PROCEDURE — 81003 URINALYSIS AUTO W/O SCOPE: CPT

## 2025-04-21 PROCEDURE — 85025 COMPLETE CBC W/AUTO DIFF WBC: CPT

## 2025-04-21 PROCEDURE — 93005 ELECTROCARDIOGRAM TRACING: CPT

## 2025-04-21 PROCEDURE — 70450 CT HEAD/BRAIN W/O DYE: CPT

## 2025-04-21 PROCEDURE — 83735 ASSAY OF MAGNESIUM: CPT

## 2025-04-21 PROCEDURE — 71045 X-RAY EXAM CHEST 1 VIEW: CPT

## 2025-04-21 PROCEDURE — 99284 EMERGENCY DEPT VISIT MOD MDM: CPT

## 2025-04-21 PROCEDURE — 80053 COMPREHEN METABOLIC PANEL: CPT

## 2025-04-21 PROCEDURE — 82948 REAGENT STRIP/BLOOD GLUCOSE: CPT

## 2025-04-21 PROCEDURE — 80162 ASSAY OF DIGOXIN TOTAL: CPT | Performed by: STUDENT IN AN ORGANIZED HEALTH CARE EDUCATION/TRAINING PROGRAM

## 2025-04-21 PROCEDURE — 84484 ASSAY OF TROPONIN QUANT: CPT | Performed by: STUDENT IN AN ORGANIZED HEALTH CARE EDUCATION/TRAINING PROGRAM

## 2025-04-21 PROCEDURE — 51798 US URINE CAPACITY MEASURE: CPT

## 2025-04-21 PROCEDURE — 36415 COLL VENOUS BLD VENIPUNCTURE: CPT

## 2025-04-21 PROCEDURE — 93005 ELECTROCARDIOGRAM TRACING: CPT | Performed by: STUDENT IN AN ORGANIZED HEALTH CARE EDUCATION/TRAINING PROGRAM

## 2025-04-21 PROCEDURE — 93010 ELECTROCARDIOGRAM REPORT: CPT | Performed by: INTERNAL MEDICINE

## 2025-04-21 PROCEDURE — 84484 ASSAY OF TROPONIN QUANT: CPT

## 2025-04-21 RX ORDER — LANOLIN ALCOHOL/MO/W.PET/CERES
1000 CREAM (GRAM) TOPICAL DAILY
COMMUNITY

## 2025-04-21 RX ORDER — CETIRIZINE HYDROCHLORIDE 10 MG/1
10 TABLET ORAL DAILY
COMMUNITY

## 2025-04-21 RX ORDER — SODIUM CHLORIDE 0.9 % (FLUSH) 0.9 %
10 SYRINGE (ML) INJECTION AS NEEDED
Status: DISCONTINUED | OUTPATIENT
Start: 2025-04-21 | End: 2025-04-21 | Stop reason: HOSPADM

## 2025-04-21 RX ORDER — ERGOCALCIFEROL 1.25 MG/1
50000 CAPSULE, LIQUID FILLED ORAL WEEKLY
COMMUNITY

## 2025-04-21 NOTE — ED TRIAGE NOTES
Pt to ED with c/o AMS since Friday- facility reports last known normal Friday, called family to inform them they should bring pt in for stroke symptoms, NIH in triage is 0.

## 2025-04-21 NOTE — ED PROVIDER NOTES
MD ATTESTATION NOTE    The JOVANI and I have discussed this patient's history, physical exam, MDM, and treatment plan.  I have reviewed the documentation and personally had a face to face interaction with the patient. I affirm the documentation and agree with the treatment and plan.  The attached note describes my personal findings.      I provided a substantive portion of the medical decision making.        Brief HPI: 89-year-old male, history of A-fib on digoxin, presents to the ED with family for evaluation of increased altered mental status.  Patient is coming from assisted living.  Family states that they noticed the start of some confusion 3 days ago when patient states they did not feed him breakfast.  Since then it has worsened.  Patient states that they do not feed him meals, he goes to the cafeteria and say they are serving in the wrong meal, and states that his clots are the incorrect time.  Patient also is not sleeping well and getting up every 2 hours to ask about meals.  Patient at baseline ambulates on his own and this has  not changed per family at bedside.  Patient reports he does have increased urinary frequency and decreased stream.      PHYSICAL EXAM  ED Triage Vitals   Temp Heart Rate Resp BP SpO2   04/21/25 1648 04/21/25 1647 04/21/25 1647 04/21/25 1709 04/21/25 1647   98.3 °F (36.8 °C) 76 18 140/89 96 %      Temp src Heart Rate Source Patient Position BP Location FiO2 (%)   04/21/25 1648 -- 04/21/25 1709 04/21/25 1709 --   Tympanic  Sitting Left arm          GENERAL: no acute distress, elderly  HENT: nares patent  EYES: no scleral icterus  CV:  irregularly irregular, regular rate  RESPIRATORY: normal effort, clear to auscultation bilaterally  ABDOMEN: soft, nontender  MUSCULOSKELETAL: no deformity, trace pretibial pitting edema  NEURO: alert, moves all extremities, follows commands  PSYCH:  calm, cooperative  SKIN: warm, dry    Vital signs and nursing notes reviewed.        Medical Decision  Making:  ED Course as of 04/22/25 1859   Mon Apr 21, 2025 1925 Patient presents to the ED with family for evaluation of increased altered mental status.  Patient is coming from assisted living.  Family states that they noticed the start of some confusion 3 days ago when patient states they did not feed him breakfast.  Since then it has worsened.  Patient states that they do not feed him meals, he goes to the cafeteria and say they are serving in the wrong meal, and states that his clots are the incorrect time.  Patient also is not sleeping well and getting up every 2 hours to ask about meals.  Patient at baseline ambulates on his own and this has  not changed per family at bedside.  Patient reports he does have increased urinary frequency and decreased stream.    On exam patient is elderly but nontoxic-appearing.  Lungs are clear, abdomen soft nontender, trace pretibial pitting edema    Will obtain basic labs, urine, cardiac enzymes, EKG, chest x-ray, PVR.  Patient and family are agreeable [DN]   1931 XR Chest 1 View  I reviewed patient's xray image(s), blunting right costophrenic angle, interpreted by self  I reviewed the radiologist's interpretation of above image(s)   [DN]   1931 ECG 12 Lead Altered Mental Status  A-fib, rate 73, normal axis and intervals, no significant T ST changes, no STEMI, interpreted by self [DN]   1932 WBC: 10.65 [DN]   1932 HS Troponin T(!): 25 [DN]   1932 Creatinine(!): 1.65  Stable from 3 months ago [DN]   1932 Leukocytes, UA: Negative [DN]   1932 Nitrite, UA: Negative [DN]   1940 Updated family regarding results.  Reassuring workup thus far.  Repeat troponin being drawn, after shared decision making we will obtain CT head  [DN]   2106 Workup today is reassuring.  Discussed findings with family at bedside.  They are agreeable to take patient back to his assisted living facility. [MP]      ED Course User Index  [DN] Fran Guevara MD  [MP] Altagracia Lorenzana, COLLIN                 Fran Guevara MD  04/21/25 1958       Fran Guevara MD  04/22/25 1856

## 2025-04-22 NOTE — DISCHARGE INSTRUCTIONS
Follow-up with primary care provider.  Consider giving trazodone for the next several days to help with sleep throughout the evening.  Return to emergency department for any worsening symptoms.

## 2025-04-22 NOTE — ED PROVIDER NOTES
EMERGENCY DEPARTMENT ENCOUNTER  Room Number:  33/33  PCP: Agustín Bay MD  Independent Historians: Patient and Family      HPI:  Chief Complaint: had concerns including Altered Mental Status.     A complete HPI/ROS/PMH/PSH/SH/FH are unobtainable due to: Altered Mental Status    Chronic or social conditions impacting patient care (Social Determinants of Health): None      Context: Lavelle Soliman is a 89 y.o. male with a medical history of hypertension, CKD, hyperlipidemia, hypothyroidism, BPH, atrial fibrillation, CAD, GERD who presents to the ED c/o acute confusion.  Patient resides at assisted living.  Has not been sleeping well over the last several nights.  Notes that he has awoken every 2 hours throughout the night requesting meals.  No reported fall or head injury.  No fever, vomiting, diarrhea.  Patient does complain of some urinary frequency.  He is able to ambulate independently.  He is not anticoagulated.  No other systemic complaints at this time.    Review of prior external notes (non-ED) -and- Review of prior external test results outside of this encounter:  Patient seen in office by ophthalmology on 2/25/2025 for macular degeneration.  Reviewed assessment and plan.  Will perform injection.  Reviewed labs collected on 1/10/2025.  CBC with hemoglobin 10.5, BMP with creatinine 1.75.    Prescription drug monitoring program review:     N/A    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    Permanent atrial fibrillation     Closed fracture of left hip, initial encounter 01/24/2023    Postoperative anemia  due to acute blood loss 01/26/2023    Moderate malnutrition 02/02/2023    Multiple rib fractures involving four or more ribs 05/06/2023    Diplopia 06/30/2023    Dry eye syndrome of bilateral lacrimal glands 06/24/2021    Nonexudative age-related macular degeneration, right eye, intermediate dry stage 04/29/2021    Hypertensive disorder 12/03/2018    Low vision right eye category 1, low vision left eye category 1 06/30/2023    Macular cyst, hole, or pseudohole, right eye 04/29/2021    Macular degeneration 06/30/2023    Presbyopia 04/29/2021    Presence of intraocular lens 04/29/2021    Scotoma involving central area, bilateral 04/29/2021    Multiple rib fractures 12/24/2024    Closed traumatic fracture of ribs of left side with pneumothorax 12/24/2024    Fracture of left scapular body 12/24/2024    Pulmonary contusion 12/24/2024     Resolved Ambulatory Problems     Diagnosis Date Noted    Sepsis 12/03/2018    Hypokalemia 12/03/2018     Past Medical History:   Diagnosis Date    Abnormal ECG 2018    Arrhythmia 2018    Cataract     Chronic kidney disease     Clotting disorder 2023    Coronary artery disease 2018    Disease of thyroid gland     GERD (gastroesophageal reflux disease) 2016    History of transfusion 2023    HL (hearing loss)     Paroxysmal atrial fibrillation     Visual impairment S         PAST SURGICAL HISTORY  Past Surgical History:   Procedure Laterality Date    EYE SURGERY      HERNIA REPAIR      HIP INTERTROCHANTERIC NAILING Left 01/25/2023    Procedure: LT. HIP INTERTROCHANTERIC NAILING;  Surgeon: Leland Hair MD;  [facility];  Service: Orthopedics;  Laterality: Left;         FAMILY HISTORY  Family History   Problem Relation Age of Onset    No Known Problems Mother     No Known Problems Father     Hyperlipidemia Brother     Hearing loss Paternal Aunt     Vision loss Paternal Aunt     No Known Problems Maternal Grandmother     No Known Problems Maternal Grandfather     No Known  Problems Paternal Grandmother     No Known Problems Paternal Grandfather     Malig Hyperthermia Neg Hx          SOCIAL HISTORY  Social History     Socioeconomic History    Marital status:    Tobacco Use    Smoking status: Former     Types: Pipe     Start date: 1955     Quit date: 1970     Years since quittin.3    Smokeless tobacco: Never   Vaping Use    Vaping status: Never Used   Substance and Sexual Activity    Alcohol use: Yes     Alcohol/week: 1.0 standard drink of alcohol     Types: 1 Shots of liquor per week     Comment: occ    Drug use: Never    Sexual activity: Not Currently     Partners: Female     Birth control/protection: None         ALLERGIES  Latex      REVIEW OF SYSTEMS  Included in HPI  All systems reviewed and negative except for those discussed in HPI.      PHYSICAL EXAM    I have reviewed the triage vital signs and nursing notes.    ED Triage Vitals   Temp Heart Rate Resp BP SpO2   25 1648 25 1647 25 1647 25 1709 25 1647   98.3 °F (36.8 °C) 76 18 140/89 96 %      Temp src Heart Rate Source Patient Position BP Location FiO2 (%)   25 1648 -- 25 1709 25 1709 --   Tympanic  Sitting Left arm        Physical Exam  Constitutional:       General: He is not in acute distress.     Appearance: Normal appearance.   HENT:      Head: Normocephalic and atraumatic.      Nose: Nose normal.      Mouth/Throat:      Mouth: Mucous membranes are moist.   Eyes:      Conjunctiva/sclera: Conjunctivae normal.      Pupils: Pupils are equal, round, and reactive to light.   Cardiovascular:      Rate and Rhythm: Normal rate. Rhythm irregular.      Pulses: Normal pulses.      Heart sounds: Normal heart sounds.   Pulmonary:      Effort: Pulmonary effort is normal.      Breath sounds: Normal breath sounds.   Abdominal:      General: There is no distension.   Musculoskeletal:         General: Normal range of motion.      Cervical back: Normal range of motion and  neck supple.   Skin:     General: Skin is warm.      Capillary Refill: Capillary refill takes less than 2 seconds.   Neurological:      General: No focal deficit present.      Mental Status: He is alert and oriented to person, place, and time.   Psychiatric:         Mood and Affect: Mood normal.             LAB RESULTS  Recent Results (from the past 24 hours)   ECG 12 Lead Altered Mental Status    Collection Time: 04/21/25  5:04 PM   Result Value Ref Range    QT Interval 430 ms    QTC Interval 476 ms   Comprehensive Metabolic Panel    Collection Time: 04/21/25  5:06 PM    Specimen: Arm, Right; Blood   Result Value Ref Range    Glucose 105 (H) 65 - 99 mg/dL    BUN 33 (H) 8 - 23 mg/dL    Creatinine 1.65 (H) 0.76 - 1.27 mg/dL    Sodium 140 136 - 145 mmol/L    Potassium 4.0 3.5 - 5.2 mmol/L    Chloride 108 (H) 98 - 107 mmol/L    CO2 21.8 (L) 22.0 - 29.0 mmol/L    Calcium 8.5 8.2 - 9.6 mg/dL    Total Protein 7.4 6.0 - 8.5 g/dL    Albumin 3.5 3.5 - 5.2 g/dL    ALT (SGPT) 26 1 - 41 U/L    AST (SGOT) 15 1 - 40 U/L    Alkaline Phosphatase 97 39 - 117 U/L    Total Bilirubin 0.2 0.0 - 1.2 mg/dL    Globulin 3.9 gm/dL    A/G Ratio 0.9 g/dL    BUN/Creatinine Ratio 20.0 7.0 - 25.0    Anion Gap 10.2 5.0 - 15.0 mmol/L    eGFR 37.5 (L) >60.0 mL/min/1.73   High Sensitivity Troponin T    Collection Time: 04/21/25  5:06 PM    Specimen: Arm, Right; Blood   Result Value Ref Range    HS Troponin T 25 (H) <22 ng/L   Magnesium    Collection Time: 04/21/25  5:06 PM    Specimen: Arm, Right; Blood   Result Value Ref Range    Magnesium 2.3 1.7 - 2.3 mg/dL   Green Top (Gel)    Collection Time: 04/21/25  5:06 PM   Result Value Ref Range    Extra Tube Hold for add-ons.    Lavender Top    Collection Time: 04/21/25  5:06 PM   Result Value Ref Range    Extra Tube hold for add-on    Gold Top - SST    Collection Time: 04/21/25  5:06 PM   Result Value Ref Range    Extra Tube Hold for add-ons.    Light Blue Top    Collection Time: 04/21/25  5:06 PM    Result Value Ref Range    Extra Tube Hold for add-ons.    CBC Auto Differential    Collection Time: 04/21/25  5:06 PM    Specimen: Arm, Right; Blood   Result Value Ref Range    WBC 10.65 3.40 - 10.80 10*3/mm3    RBC 3.51 (L) 4.14 - 5.80 10*6/mm3    Hemoglobin 11.5 (L) 13.0 - 17.7 g/dL    Hematocrit 34.4 (L) 37.5 - 51.0 %    MCV 98.0 (H) 79.0 - 97.0 fL    MCH 32.8 26.6 - 33.0 pg    MCHC 33.4 31.5 - 35.7 g/dL    RDW 13.1 12.3 - 15.4 %    RDW-SD 45.8 37.0 - 54.0 fl    MPV 11.1 6.0 - 12.0 fL    Platelets 167 140 - 450 10*3/mm3    Neutrophil % 62.0 42.7 - 76.0 %    Lymphocyte % 22.1 19.6 - 45.3 %    Monocyte % 11.3 5.0 - 12.0 %    Eosinophil % 2.9 0.3 - 6.2 %    Basophil % 0.9 0.0 - 1.5 %    Immature Grans % 0.8 (H) 0.0 - 0.5 %    Neutrophils, Absolute 6.61 1.70 - 7.00 10*3/mm3    Lymphocytes, Absolute 2.35 0.70 - 3.10 10*3/mm3    Monocytes, Absolute 1.20 (H) 0.10 - 0.90 10*3/mm3    Eosinophils, Absolute 0.31 0.00 - 0.40 10*3/mm3    Basophils, Absolute 0.10 0.00 - 0.20 10*3/mm3    Immature Grans, Absolute 0.08 (H) 0.00 - 0.05 10*3/mm3    nRBC 0.0 0.0 - 0.2 /100 WBC   Digoxin Level    Collection Time: 04/21/25  5:06 PM    Specimen: Arm, Right; Blood   Result Value Ref Range    Digoxin 0.60 0.60 - 1.20 ng/mL   Urinalysis With Microscopic If Indicated (No Culture) - Urine, Clean Catch    Collection Time: 04/21/25  6:51 PM    Specimen: Urine, Clean Catch   Result Value Ref Range    Color, UA Yellow Yellow, Straw    Appearance, UA Clear Clear    pH, UA 5.5 5.0 - 8.0    Specific Gravity, UA 1.015 1.005 - 1.030    Glucose, UA Negative Negative    Ketones, UA Negative Negative    Bilirubin, UA Negative Negative    Blood, UA Negative Negative    Protein, UA Trace (A) Negative    Leuk Esterase, UA Negative Negative    Nitrite, UA Negative Negative    Urobilinogen, UA 0.2 E.U./dL 0.2 - 1.0 E.U./dL   High Sensitivity Troponin T 1Hr    Collection Time: 04/21/25  7:51 PM    Specimen: Arm, Left; Blood   Result Value Ref Range    HS  Troponin T 23 (H) <22 ng/L    Troponin T Numeric Delta -2 ng/L    Troponin T % Delta -8 Abnormal if >/= 20%   POC Glucose Once    Collection Time: 04/21/25  8:06 PM    Specimen: Blood   Result Value Ref Range    Glucose 86 70 - 130 mg/dL         RADIOLOGY  CT Head Without Contrast  Result Date: 4/21/2025  CT OF THE BRAIN WITHOUT CONTRAST 4/21/2025  HISTORY: Altered mental status.  Axial images were obtained through the brain without intravenous contrast.  There is mild to moderate diffuse atrophy. There is decreased attenuation of the periventricular white matter bilaterally consistent with small vessel white matter ischemic disease. There is no evidence of acute infarction, hemorrhage, midline shift or mass effect.  No bony abnormalities are seen.      1. No acute process.  Radiation dose reduction techniques were utilized, including automated exposure control and exposure modulation based on body size.   This report was finalized on 4/21/2025 8:54 PM by Dr. Ivan Hastings M.D on Workstation: [id number]      XR Chest 1 View  Result Date: 4/21/2025  XR CHEST 1 VW-4/21/2025  HISTORY: Weakness, dizziness.  Heart size is at the upper limits of normal. There is ill-defined increased density in the right lower hemithorax which may represent pleural and parenchymal scarring and appears similar to the 12/25/2024 study. Right upper lung and left lung appear clear.      1. Chronic changes of the right lower hemithorax. 2. Borderline cardiomegaly. 3. No acute infiltrates are seen.   This report was finalized on 4/21/2025 6:31 PM by Dr. Ivan Hastings M.D on Workstation: [id number]          MEDICATIONS GIVEN IN ER  Medications   sodium chloride 0.9 % flush 10 mL (has no administration in time range)         ORDERS PLACED DURING THIS VISIT:  Orders Placed This Encounter   Procedures    XR Chest 1 View    CT Head Without Contrast    Stites Draw    Comprehensive Metabolic Panel    High Sensitivity Troponin T     Magnesium    Urinalysis With Microscopic If Indicated (No Culture) - Urine, Clean Catch    CBC Auto Differential    High Sensitivity Troponin T 1Hr    Digoxin Level    NPO Diet NPO Type: Strict NPO    Undress & Gown    Continuous Pulse Oximetry    Vital Signs    Orthostatic Blood Pressure    Measure post void residual    Oxygen Therapy- Nasal Cannula; Titrate 1-6 LPM Per SpO2; 90 - 95%    POC Glucose Once    POC Glucose Once    ECG 12 Lead Altered Mental Status    Insert Peripheral IV    Fall Precautions    CBC & Differential    Green Top (Gel)    Lavender Top    Gold Top - SST    Light Blue Top         OUTPATIENT MEDICATION MANAGEMENT:  Current Facility-Administered Medications Ordered in Epic   Medication Dose Route Frequency Provider Last Rate Last Admin    sodium chloride 0.9 % flush 10 mL  10 mL Intravenous PRN Fran Guevara MD         Current Outpatient Medications Ordered in Epic   Medication Sig Dispense Refill    acetaminophen (TYLENOL) 500 MG tablet Take 2 tablets by mouth Every 8 (Eight) Hours.      bisacodyl (DULCOLAX) 10 MG suppository Insert 1 suppository into the rectum Daily As Needed for Constipation (Use if bisacodyl oral is ineffective).      bisacodyl (DULCOLAX) 5 MG EC tablet Take 1 tablet by mouth Daily As Needed for Constipation (Use if polyethylene glycol is ineffective).      cetirizine (zyrTEC) 10 MG tablet Take 1 tablet by mouth Daily.      digoxin (LANOXIN) 125 MCG tablet Take one tablet Monday, Wednesday and Friday 45 tablet 1    famotidine (PEPCID) 20 MG tablet Take 1 tablet by mouth Every Night.      finasteride (PROSCAR) 5 MG tablet TAKE ONE TABLET BY MOUTH ONCE NIGHTLY 30 tablet 1    ipratropium (ATROVENT) 0.03 % nasal spray Administer 2 sprays into the nostril(s) as directed by provider Daily.      levothyroxine (SYNTHROID, LEVOTHROID) 75 MCG tablet TAKE ONE TABLET BY MOUTH EVERY MORNING 30 tablet 1    Lidocaine 4 % Place 1 patch on the skin as directed by provider Daily. Remove  & Discard patch within 12 hours or as directed by MD 6 each 0    metoprolol tartrate (LOPRESSOR) 25 MG tablet TAKE 1/2 TABLET TWO TIMES A DAY 60 tablet 1    polyethylene glycol (MIRALAX) 17 g packet Take 17 g by mouth Daily As Needed (Use if senna-docusate is ineffective).      sennosides-docusate (PERICOLACE) 8.6-50 MG per tablet Take 2 tablets by mouth 2 (Two) Times a Day As Needed for Constipation.      tamsulosin (FLOMAX) 0.4 MG capsule 24 hr capsule Take 1 capsule by mouth Every Night.      traZODone (DESYREL) 50 MG tablet Take 0.5 tablets by mouth Every Night.      vitamin B-12 (CYANOCOBALAMIN) 1000 MCG tablet Take 1 tablet by mouth Daily.      vitamin D (ERGOCALCIFEROL) 1.25 MG (67674 UT) capsule capsule Take 1 capsule by mouth 1 (One) Time Per Week. Mondays             PROGRESS, DATA ANALYSIS, CONSULTS, AND MEDICAL DECISION MAKING  All labs have been independently interpreted by me.  All radiology studies have been reviewed by me. All EKG's have been independently viewed and interpreted by me.  Discussion below represents my analysis of pertinent findings related to patient's condition, differential diagnosis, treatment plan and final disposition.    Differential diagnosis includes but is not limited to dementia, delirium, UTI.        ED Course as of 04/21/25 2206 Mon Apr 21, 2025 1925 Patient presents to the ED with family for evaluation of increased altered mental status.  Patient is coming from assisted living.  Family states that they noticed the start of some confusion 3 days ago when patient states they did not feed him breakfast.  Since then it has worsened.  Patient states that they do not feed him meals, he goes to the cafeteria and say they are serving in the wrong meal, and states that his clots are the incorrect time.  Patient also is not sleeping well and getting up every 2 hours to ask about meals.  Patient at baseline ambulates on his own and this has  not changed per family at bedside.   Patient reports he does have increased urinary frequency and decreased stream.    On exam patient is elderly but nontoxic-appearing.  Lungs are clear, abdomen soft nontender, trace pretibial pitting edema    Will obtain basic labs, urine, cardiac enzymes, EKG, chest x-ray, PVR.  Patient and family are agreeable [DN]   1931 XR Chest 1 View  I reviewed patient's xray image(s), blunting right costophrenic angle, interpreted by self  I reviewed the radiologist's interpretation of above image(s)   [DN]   1931 ECG 12 Lead Altered Mental Status  A-fib, rate 73, normal axis and intervals, no significant T ST changes, no STEMI, interpreted by self [DN]   1932 WBC: 10.65 [DN]   1932 HS Troponin T(!): 25 [DN]   1932 Creatinine(!): 1.65  Stable from 3 months ago [DN]   1932 Leukocytes, UA: Negative [DN]   1932 Nitrite, UA: Negative [DN]   1940 Updated family regarding results.  Reassuring workup thus far.  Repeat troponin being drawn, after shared decision making we will obtain CT head  [DN]   2106 Workup today is reassuring.  Discussed findings with family at bedside.  They are agreeable to take patient back to his assisted living facility. [MP]      ED Course User Index  [DN] Fran Guevara MD  [MP] Altagracia Lorenzana PA-C             AS OF 22:06 EDT VITALS:    BP - (!) 164/121  HR - 66  TEMP - 98.3 °F (36.8 °C) (Tympanic)  O2 SATS - 97%    COMPLEXITY OF CARE  Admission was considered but after careful review of the patient's presentation, physical examination, diagnostic results, and response to treatment the patient may be safely discharged with outpatient follow-up.      DIAGNOSIS  Final diagnoses:   Altered mental status, unspecified altered mental status type         DISPOSITION  ED Disposition       ED Disposition   Discharge    Condition   Stable    Comment   --                Please note that portions of this document were completed with a voice recognition program.    Note Disclaimer: At [facility], we believe  that sharing information builds trust and better relationships. You are receiving this note because you recently visited [facility]. It is possible you will see health information before a provider has talked with you about it. This kind of information can be easy to misunderstand. To help you fully understand what it means for your health, we urge you to discuss this note with your provider.     Altagracia Lorenzana PA-C  04/21/25 1913

## (undated) DEVICE — ANTIBACTERIAL UNDYED BRAIDED (POLYGLACTIN 910), SYNTHETIC ABSORBABLE SUTURE: Brand: COATED VICRYL

## (undated) DEVICE — PAD,ABDOMINAL,8"X10",ST,LF: Brand: MEDLINE

## (undated) DEVICE — LAG SCREW STEP DRILL: Brand: GAMMA

## (undated) DEVICE — TBG PENCL TELESCP MEGADYNE SMOKE EVAC 10FT

## (undated) DEVICE — PK HIP PINNING 40

## (undated) DEVICE — MAT FLR ABSORBENT LG 4FT 10 2.5FT

## (undated) DEVICE — GLV SURG BIOGEL LTX PF 8

## (undated) DEVICE — SOL ISO/ALC RUB 70PCT 4OZ

## (undated) DEVICE — TRAP FLD MINIVAC MEGADYNE 100ML

## (undated) DEVICE — LEGGINGS, PAIR, CLEAR, STERILE: Brand: MEDLINE

## (undated) DEVICE — GLV SURG SENSICARE W/ALOE PF LF 8 STRL

## (undated) DEVICE — GUIDE WIRE, BALL-TIPPED, STERILE

## (undated) DEVICE — DRILL, AO SMALL: Brand: GAMMA

## (undated) DEVICE — APPL CHLORAPREP HI/LITE 26ML ORNG

## (undated) DEVICE — PREMIUM DRY TRAY LF: Brand: MEDLINE INDUSTRIES, INC.

## (undated) DEVICE — PROXIMATE RH ROTATING HEAD SKIN STAPLERS (35 WIDE) CONTAINS 35 STAINLESS STEEL STAPLES: Brand: PROXIMATE

## (undated) DEVICE — BANDAGE,GAUZE,BULKEE II,4.5"X4.1YD,STRL: Brand: MEDLINE

## (undated) DEVICE — GLV SURG BIOGEL LTX PF 8 1/2

## (undated) DEVICE — REAMER SHAFT, MOD.TRINKLE: Brand: BIXCUT

## (undated) DEVICE — DRSNG GZ PETROLTM XEROFORM CURAD 1X8IN STRL

## (undated) DEVICE — BNDG ELAS CO-FLEX SLF ADHR 6IN 5YD LF STRL

## (undated) DEVICE — SPNG GZ WOVN 4X4IN 12PLY 10/BX STRL